# Patient Record
Sex: FEMALE | Race: WHITE
[De-identification: names, ages, dates, MRNs, and addresses within clinical notes are randomized per-mention and may not be internally consistent; named-entity substitution may affect disease eponyms.]

---

## 2018-01-17 ENCOUNTER — HOSPITAL ENCOUNTER (OUTPATIENT)
Dept: HOSPITAL 100 - MTLAB | Age: 73
Discharge: HOME | End: 2018-01-17
Payer: MEDICARE

## 2018-01-17 DIAGNOSIS — Z95.2: Primary | ICD-10-CM

## 2018-01-17 LAB — PROTHROMBIN TIME (PROTIME)PT.: 28.5 SECONDS (ref 11.7–14.9)

## 2018-01-17 PROCEDURE — 85610 PROTHROMBIN TIME: CPT

## 2018-01-17 PROCEDURE — 36415 COLL VENOUS BLD VENIPUNCTURE: CPT

## 2018-02-02 ENCOUNTER — HOSPITAL ENCOUNTER (OUTPATIENT)
Age: 73
End: 2018-02-02
Payer: MEDICARE

## 2018-02-02 DIAGNOSIS — R53.83: Primary | ICD-10-CM

## 2018-02-02 LAB
DEPRECATED RDW RBC: 48.9 FL (ref 35.1–43.9)
DIFFERENTIAL INDICATED: (no result)
ERYTHROCYTE [DISTWIDTH] IN BLOOD: 13.4 % (ref 11.6–14.6)
HCT VFR BLD AUTO: 42.7 % (ref 37–47)
HEMOGLOBIN: 13.7 G/DL (ref 12–15)
HGB BLD-MCNC: 13.7 G/DL (ref 12–15)
IMMATURE GRANULOCYTES COUNT: 0.01 X10^3/UL (ref 0–0)
MCV RBC: 99.5 FL (ref 81–99)
MEAN CORP HGB CONC: 32.1 G/GL (ref 32–36)
MEAN PLATELET VOL.: 10.8 FL (ref 6.2–12)
PLATELET # BLD: 215 K/MM3 (ref 150–450)
PLATELET COUNT: 215 K/MM3 (ref 150–450)
POSITIVE COUNT: NO
POSITIVE DIFFERENTIAL: NO
POSITIVE MORPHOLOGY: NO
RBC # BLD AUTO: 4.29 M/MM3 (ref 4.2–5.4)
RBC DISTRIBUTION WIDTH CV: 13.4 % (ref 11.6–14.6)
RBC DISTRIBUTION WIDTH SD: 48.9 FL (ref 35.1–43.9)
WBC # BLD AUTO: 6.9 K/MM3 (ref 4.4–11)
WHITE BLOOD COUNT: 6.9 K/MM3 (ref 4.4–11)

## 2018-02-02 PROCEDURE — 86664 EPSTEIN-BARR NUCLEAR ANTIGEN: CPT

## 2018-02-02 PROCEDURE — 36415 COLL VENOUS BLD VENIPUNCTURE: CPT

## 2018-02-02 PROCEDURE — 86663 EPSTEIN-BARR ANTIBODY: CPT

## 2018-02-02 PROCEDURE — 85025 COMPLETE CBC W/AUTO DIFF WBC: CPT

## 2018-02-02 PROCEDURE — 86665 EPSTEIN-BARR CAPSID VCA: CPT

## 2018-02-06 LAB
EBV ACUTE VCA IGM: > 160 U/ML (ref 0–35.9)
EBV VCA IGM SER-ACNC: > 160 U/ML (ref 0–35.9)
EBV-VCA IGG: > 600 U/ML (ref 0–17.9)

## 2018-02-08 ENCOUNTER — HOSPITAL ENCOUNTER (OUTPATIENT)
Age: 73
End: 2018-02-08
Payer: MEDICARE

## 2018-02-08 DIAGNOSIS — E78.5: Primary | ICD-10-CM

## 2018-02-08 DIAGNOSIS — E55.9: ICD-10-CM

## 2018-02-08 DIAGNOSIS — R53.83: ICD-10-CM

## 2018-02-08 LAB
ALANINE AMINOTRANSFER ALT/SGPT: 26 U/L (ref 13–56)
ALBUMIN SERPL-MCNC: 3.7 G/DL (ref 3.2–5)
ALKALINE PHOSPHATASE: 84 U/L (ref 45–117)
ANION GAP: 9 (ref 5–15)
AST(SGOT): 16 U/L (ref 15–37)
BUN SERPL-MCNC: 9 MG/DL (ref 7–18)
BUN/CREAT RATIO: 12.7 RATIO (ref 10–20)
CALCIUM SERPL-MCNC: 8.8 MG/DL (ref 8.5–10.1)
CARBON DIOXIDE: 30 MMOL/L (ref 21–32)
CHLORIDE: 102 MMOL/L (ref 98–107)
CHOLEST SERPL-MCNC: 206 MG/DL
DEPRECATED RDW RBC: 46.2 FL (ref 35.1–43.9)
DIFFERENTIAL INDICATED: (no result)
ERYTHROCYTE [DISTWIDTH] IN BLOOD: 13.1 % (ref 11.6–14.6)
EST GLOM FILT RATE - AFR AMER: 105 ML/MIN (ref 60–?)
GLOBULIN: 3.1 G/DL (ref 2.2–4.2)
GLUCOSE: 74 MG/DL (ref 74–106)
HCT VFR BLD AUTO: 41.5 % (ref 37–47)
HEMOGLOBIN: 13.7 G/DL (ref 12–15)
HGB BLD-MCNC: 13.7 G/DL (ref 12–15)
IMMATURE GRANULOCYTES COUNT: 0.02 X10^3/UL (ref 0–0)
MCV RBC: 98.3 FL (ref 81–99)
MEAN CORP HGB CONC: 33 G/GL (ref 32–36)
MEAN PLATELET VOL.: 10.7 FL (ref 6.2–12)
PLATELET # BLD: 302 K/MM3 (ref 150–450)
PLATELET COUNT: 302 K/MM3 (ref 150–450)
POSITIVE COUNT: NO
POSITIVE DIFFERENTIAL: NO
POSITIVE MORPHOLOGY: NO
POTASSIUM: 3.4 MMOL/L (ref 3.5–5.1)
RBC # BLD AUTO: 4.22 M/MM3 (ref 4.2–5.4)
RBC DISTRIBUTION WIDTH CV: 13.1 % (ref 11.6–14.6)
RBC DISTRIBUTION WIDTH SD: 46.2 FL (ref 35.1–43.9)
TRIGLYCERIDES: 136 MG/DL
VLDLC SERPL-MCNC: 27 MG/DL (ref 5–40)
WBC # BLD AUTO: 7.3 K/MM3 (ref 4.4–11)
WHITE BLOOD COUNT: 7.3 K/MM3 (ref 4.4–11)

## 2018-02-08 PROCEDURE — 86664 EPSTEIN-BARR NUCLEAR ANTIGEN: CPT

## 2018-02-08 PROCEDURE — 80061 LIPID PANEL: CPT

## 2018-02-08 PROCEDURE — 80053 COMPREHEN METABOLIC PANEL: CPT

## 2018-02-08 PROCEDURE — 86665 EPSTEIN-BARR CAPSID VCA: CPT

## 2018-02-08 PROCEDURE — 86663 EPSTEIN-BARR ANTIBODY: CPT

## 2018-02-08 PROCEDURE — 82306 VITAMIN D 25 HYDROXY: CPT

## 2018-02-08 PROCEDURE — 36415 COLL VENOUS BLD VENIPUNCTURE: CPT

## 2018-02-08 PROCEDURE — 85025 COMPLETE CBC W/AUTO DIFF WBC: CPT

## 2018-02-09 LAB — VITAMIN D,25 HYDROXY: 49.7 NG/ML (ref 19.95–100.01)

## 2018-02-13 ENCOUNTER — HOSPITAL ENCOUNTER (OUTPATIENT)
Dept: HOSPITAL 100 - MTLAB | Age: 73
Discharge: HOME | End: 2018-02-13
Payer: MEDICARE

## 2018-02-13 DIAGNOSIS — Z95.2: Primary | ICD-10-CM

## 2018-02-13 LAB — PROTHROMBIN TIME (PROTIME)PT.: 25.1 SECONDS (ref 11.7–14.9)

## 2018-02-13 PROCEDURE — 85610 PROTHROMBIN TIME: CPT

## 2018-02-13 PROCEDURE — 36415 COLL VENOUS BLD VENIPUNCTURE: CPT

## 2018-02-20 ENCOUNTER — HOSPITAL ENCOUNTER (OUTPATIENT)
Age: 73
End: 2018-02-20
Payer: MEDICARE

## 2018-02-20 DIAGNOSIS — Z95.2: ICD-10-CM

## 2018-02-20 DIAGNOSIS — E87.6: Primary | ICD-10-CM

## 2018-02-20 LAB
POTASSIUM: 3.8 MMOL/L (ref 3.5–5.1)
PROTHROMBIN TIME (PROTIME)PT.: 33.7 SECONDS (ref 11.7–14.9)

## 2018-02-20 PROCEDURE — 84132 ASSAY OF SERUM POTASSIUM: CPT

## 2018-02-20 PROCEDURE — 36415 COLL VENOUS BLD VENIPUNCTURE: CPT

## 2018-02-20 PROCEDURE — 85610 PROTHROMBIN TIME: CPT

## 2018-03-15 ENCOUNTER — HOSPITAL ENCOUNTER (OUTPATIENT)
Age: 73
End: 2018-03-15
Payer: MEDICARE

## 2018-03-15 DIAGNOSIS — Z95.2: Primary | ICD-10-CM

## 2018-03-15 LAB — PROTHROMBIN TIME (PROTIME)PT.: 23.9 SECONDS (ref 11.7–14.9)

## 2018-03-15 PROCEDURE — 85610 PROTHROMBIN TIME: CPT

## 2018-03-22 ENCOUNTER — HOSPITAL ENCOUNTER (OUTPATIENT)
Dept: HOSPITAL 100 - MTLAB | Age: 73
Discharge: HOME | End: 2018-03-22
Payer: MEDICARE

## 2018-03-22 DIAGNOSIS — Z95.2: Primary | ICD-10-CM

## 2018-03-22 LAB — PROTHROMBIN TIME (PROTIME)PT.: 25.9 SECONDS (ref 11.7–14.9)

## 2018-03-22 PROCEDURE — 36415 COLL VENOUS BLD VENIPUNCTURE: CPT

## 2018-03-22 PROCEDURE — 85610 PROTHROMBIN TIME: CPT

## 2018-04-05 ENCOUNTER — HOSPITAL ENCOUNTER (OUTPATIENT)
Dept: HOSPITAL 100 - MTLAB | Age: 73
Discharge: HOME | End: 2018-04-05
Payer: MEDICARE

## 2018-04-05 DIAGNOSIS — Z95.2: Primary | ICD-10-CM

## 2018-04-05 LAB — PROTHROMBIN TIME (PROTIME)PT.: 28.3 SECONDS (ref 11.7–14.9)

## 2018-04-05 PROCEDURE — 36415 COLL VENOUS BLD VENIPUNCTURE: CPT

## 2018-04-05 PROCEDURE — 85610 PROTHROMBIN TIME: CPT

## 2018-05-03 ENCOUNTER — HOSPITAL ENCOUNTER (OUTPATIENT)
Dept: HOSPITAL 100 - PT | Age: 73
Discharge: HOME | End: 2018-05-03
Payer: MEDICARE

## 2018-05-03 DIAGNOSIS — M70.61: Primary | ICD-10-CM

## 2018-05-03 PROCEDURE — 97035 APP MDLTY 1+ULTRASOUND EA 15: CPT

## 2018-05-03 PROCEDURE — 97163 PT EVAL HIGH COMPLEX 45 MIN: CPT

## 2018-05-03 PROCEDURE — 97110 THERAPEUTIC EXERCISES: CPT

## 2018-05-07 ENCOUNTER — HOSPITAL ENCOUNTER (OUTPATIENT)
Dept: HOSPITAL 100 - MTLAB | Age: 73
Discharge: HOME | End: 2018-05-07
Payer: MEDICARE

## 2018-05-07 DIAGNOSIS — Z95.2: Primary | ICD-10-CM

## 2018-05-07 LAB — PROTHROMBIN TIME (PROTIME)PT.: 32.8 SECONDS (ref 11.7–14.9)

## 2018-05-07 PROCEDURE — 85610 PROTHROMBIN TIME: CPT

## 2018-05-07 PROCEDURE — 36415 COLL VENOUS BLD VENIPUNCTURE: CPT

## 2018-06-08 LAB — PROTHROMBIN TIME (PROTIME)PT.: 37.1 SECONDS (ref 11.7–14.9)

## 2018-06-18 ENCOUNTER — HOSPITAL ENCOUNTER (OUTPATIENT)
Dept: HOSPITAL 100 - MTLAB | Age: 73
Discharge: HOME | End: 2018-06-18
Payer: MEDICARE

## 2018-06-18 DIAGNOSIS — Z95.2: Primary | ICD-10-CM

## 2018-06-18 LAB — PROTHROMBIN TIME (PROTIME)PT.: 31.1 SECONDS (ref 11.7–14.9)

## 2018-06-18 PROCEDURE — 85610 PROTHROMBIN TIME: CPT

## 2018-06-18 PROCEDURE — 36415 COLL VENOUS BLD VENIPUNCTURE: CPT

## 2018-07-03 ENCOUNTER — HOSPITAL ENCOUNTER (OUTPATIENT)
Age: 73
End: 2018-07-03
Payer: MEDICARE

## 2018-07-03 DIAGNOSIS — M70.71: ICD-10-CM

## 2018-07-03 DIAGNOSIS — M25.551: Primary | ICD-10-CM

## 2018-07-03 PROCEDURE — 97035 APP MDLTY 1+ULTRASOUND EA 15: CPT

## 2018-07-03 PROCEDURE — 97140 MANUAL THERAPY 1/> REGIONS: CPT

## 2018-07-03 PROCEDURE — 73502 X-RAY EXAM HIP UNI 2-3 VIEWS: CPT

## 2018-07-05 ENCOUNTER — HOSPITAL ENCOUNTER (OUTPATIENT)
Dept: HOSPITAL 100 - PT | Age: 73
Discharge: HOME | End: 2018-07-05
Payer: MEDICARE

## 2018-07-05 DIAGNOSIS — M70.71: Primary | ICD-10-CM

## 2018-07-05 PROCEDURE — 97140 MANUAL THERAPY 1/> REGIONS: CPT

## 2018-07-05 PROCEDURE — 97162 PT EVAL MOD COMPLEX 30 MIN: CPT

## 2018-07-05 PROCEDURE — 97035 APP MDLTY 1+ULTRASOUND EA 15: CPT

## 2018-07-10 LAB — PROTHROMBIN TIME (PROTIME)PT.: 35.5 SECONDS (ref 11.7–14.9)

## 2018-07-19 ENCOUNTER — HOSPITAL ENCOUNTER (OUTPATIENT)
Dept: HOSPITAL 100 - PT | Age: 73
Discharge: HOME | End: 2018-07-19
Payer: MEDICARE

## 2018-07-19 DIAGNOSIS — M25.551: Primary | ICD-10-CM

## 2018-07-19 PROCEDURE — 97161 PT EVAL LOW COMPLEX 20 MIN: CPT

## 2018-07-25 ENCOUNTER — HOSPITAL ENCOUNTER (OUTPATIENT)
Age: 73
End: 2018-07-25
Payer: MEDICARE

## 2018-07-25 ENCOUNTER — HOSPITAL ENCOUNTER (OUTPATIENT)
Dept: HOSPITAL 100 - LAB | Age: 73
Discharge: HOME | End: 2018-07-25
Payer: MEDICARE

## 2018-07-25 DIAGNOSIS — Z95.2: Primary | ICD-10-CM

## 2018-07-25 DIAGNOSIS — I70.0: Primary | ICD-10-CM

## 2018-07-25 DIAGNOSIS — I10: ICD-10-CM

## 2018-07-25 DIAGNOSIS — I35.0: ICD-10-CM

## 2018-07-25 DIAGNOSIS — I70.0: ICD-10-CM

## 2018-07-25 LAB — PROTHROMBIN TIME (PROTIME)PT.: 35.9 SECONDS (ref 11.7–14.9)

## 2018-07-25 PROCEDURE — 93978 VASCULAR STUDY: CPT

## 2018-07-25 PROCEDURE — 36415 COLL VENOUS BLD VENIPUNCTURE: CPT

## 2018-07-25 PROCEDURE — 85610 PROTHROMBIN TIME: CPT

## 2018-07-31 ENCOUNTER — HOSPITAL ENCOUNTER (OUTPATIENT)
Age: 73
End: 2018-07-31
Payer: MEDICARE

## 2018-07-31 DIAGNOSIS — Z78.0: ICD-10-CM

## 2018-07-31 DIAGNOSIS — Z12.31: Primary | ICD-10-CM

## 2018-07-31 PROCEDURE — 77067 SCR MAMMO BI INCL CAD: CPT

## 2018-07-31 PROCEDURE — 77063 BREAST TOMOSYNTHESIS BI: CPT

## 2018-07-31 PROCEDURE — 77080 DXA BONE DENSITY AXIAL: CPT

## 2018-08-16 LAB — PROTHROMBIN TIME (PROTIME)PT.: 30.9 SECONDS (ref 11.7–14.9)

## 2018-08-28 ENCOUNTER — HOSPITAL ENCOUNTER (OUTPATIENT)
Age: 73
End: 2018-08-28
Payer: MEDICARE

## 2018-08-28 DIAGNOSIS — I71.9: Primary | ICD-10-CM

## 2018-08-28 LAB — CREATININE FINGERSTICK: 0.7 MG/DL (ref 0.55–1.02)

## 2018-08-28 PROCEDURE — 71260 CT THORAX DX C+: CPT

## 2018-08-31 ENCOUNTER — HOSPITAL ENCOUNTER (OUTPATIENT)
Dept: HOSPITAL 100 - MTLAB | Age: 73
Discharge: HOME | End: 2018-08-31
Payer: MEDICARE

## 2018-08-31 DIAGNOSIS — E78.5: Primary | ICD-10-CM

## 2018-08-31 DIAGNOSIS — Z95.2: ICD-10-CM

## 2018-08-31 DIAGNOSIS — Z79.01: ICD-10-CM

## 2018-08-31 LAB
CHOLEST SERPL-MCNC: 194 MG/DL
PROTHROMBIN TIME (PROTIME)PT.: 27 SECONDS (ref 11.7–14.9)
TRIGLYCERIDES: 130 MG/DL
VLDLC SERPL-MCNC: 26 MG/DL (ref 5–40)

## 2018-08-31 PROCEDURE — 80061 LIPID PANEL: CPT

## 2018-08-31 PROCEDURE — 85610 PROTHROMBIN TIME: CPT

## 2018-08-31 PROCEDURE — 36415 COLL VENOUS BLD VENIPUNCTURE: CPT

## 2018-09-28 ENCOUNTER — HOSPITAL ENCOUNTER (OUTPATIENT)
Age: 73
End: 2018-09-28
Payer: MEDICARE

## 2018-09-28 DIAGNOSIS — D72.819: ICD-10-CM

## 2018-09-28 DIAGNOSIS — E53.8: Primary | ICD-10-CM

## 2018-09-28 DIAGNOSIS — E78.5: ICD-10-CM

## 2018-09-28 DIAGNOSIS — I11.9: ICD-10-CM

## 2018-09-28 LAB
ALANINE AMINOTRANSFER ALT/SGPT: 26 U/L (ref 13–56)
ALBUMIN SERPL-MCNC: 3.6 G/DL (ref 3.2–5)
ALKALINE PHOSPHATASE: 74 U/L (ref 45–117)
ANION GAP: 10 (ref 5–15)
AST(SGOT): 23 U/L (ref 15–37)
BUN SERPL-MCNC: 14 MG/DL (ref 7–18)
BUN/CREAT RATIO: 19.5 RATIO (ref 10–20)
CALCIUM SERPL-MCNC: 8.7 MG/DL (ref 8.5–10.1)
CARBON DIOXIDE: 25 MMOL/L (ref 21–32)
CHLORIDE: 106 MMOL/L (ref 98–107)
CHOLEST SERPL-MCNC: 213 MG/DL
CREAT UR-MCNC: 100 MG/DL
DEPRECATED RDW RBC: 46.8 FL (ref 35.1–43.9)
DIFFERENTIAL INDICATED: (no result)
ERYTHROCYTE [DISTWIDTH] IN BLOOD: 13.1 % (ref 11.6–14.6)
EST GLOM FILT RATE - AFR AMER: 103 ML/MIN (ref 60–?)
GLOBULIN: 3.1 G/DL (ref 2.2–4.2)
GLUCOSE: 86 MG/DL (ref 74–106)
HCT VFR BLD AUTO: 43.8 % (ref 37–47)
HEMOGLOBIN: 14.4 G/DL (ref 12–15)
HGB BLD-MCNC: 14.4 G/DL (ref 12–15)
IMMATURE GRANULOCYTES COUNT: 0 X10^3/UL (ref 0–0)
MCV RBC: 98.2 FL (ref 81–99)
MEAN CORP HGB CONC: 32.9 G/GL (ref 32–36)
MEAN PLATELET VOL.: 10.7 FL (ref 6.2–12)
MICROALBUMIN UR-MCNC: 6.4 MG/L
PLATELET # BLD: 206 K/MM3 (ref 150–450)
PLATELET COUNT: 206 K/MM3 (ref 150–450)
POSITIVE COUNT: NO
POSITIVE DIFFERENTIAL: NO
POSITIVE MORPHOLOGY: NO
POTASSIUM: 3.8 MMOL/L (ref 3.5–5.1)
RBC # BLD AUTO: 4.46 M/MM3 (ref 4.2–5.4)
RBC DISTRIBUTION WIDTH CV: 13.1 % (ref 11.6–14.6)
RBC DISTRIBUTION WIDTH SD: 46.8 FL (ref 35.1–43.9)
TRIGLYCERIDES: 137 MG/DL
VITAMIN B12: 381 PG/ML (ref 211–911)
VLDLC SERPL-MCNC: 27 MG/DL (ref 5–40)
WBC # BLD AUTO: 3.9 K/MM3 (ref 4.4–11)
WHITE BLOOD COUNT: 3.9 K/MM3 (ref 4.4–11)

## 2018-09-28 PROCEDURE — 85025 COMPLETE CBC W/AUTO DIFF WBC: CPT

## 2018-09-28 PROCEDURE — 36415 COLL VENOUS BLD VENIPUNCTURE: CPT

## 2018-09-28 PROCEDURE — 82043 UR ALBUMIN QUANTITATIVE: CPT

## 2018-09-28 PROCEDURE — 82570 ASSAY OF URINE CREATININE: CPT

## 2018-09-28 PROCEDURE — 82607 VITAMIN B-12: CPT

## 2018-09-28 PROCEDURE — 80053 COMPREHEN METABOLIC PANEL: CPT

## 2018-09-28 PROCEDURE — 80061 LIPID PANEL: CPT

## 2018-10-01 LAB — PROTHROMBIN TIME (PROTIME)PT.: 37.1 SECONDS (ref 11.7–14.9)

## 2018-10-05 LAB — PROTHROMBIN TIME (PROTIME)PT.: 31.8 SECONDS (ref 11.7–14.9)

## 2018-10-26 ENCOUNTER — HOSPITAL ENCOUNTER (OUTPATIENT)
Dept: HOSPITAL 100 - MTLAB | Age: 73
Discharge: HOME | End: 2018-10-26
Payer: MEDICARE

## 2018-10-26 DIAGNOSIS — Z79.01: Primary | ICD-10-CM

## 2018-10-26 DIAGNOSIS — Z95.2: ICD-10-CM

## 2018-10-26 LAB — PROTHROMBIN TIME (PROTIME)PT.: 32.2 SECONDS (ref 11.7–14.9)

## 2018-10-26 PROCEDURE — 36415 COLL VENOUS BLD VENIPUNCTURE: CPT

## 2018-10-26 PROCEDURE — 85610 PROTHROMBIN TIME: CPT

## 2018-11-23 ENCOUNTER — HOSPITAL ENCOUNTER (OUTPATIENT)
Dept: HOSPITAL 100 - MTLAB | Age: 73
Discharge: HOME | End: 2018-11-23
Payer: MEDICARE

## 2018-11-23 DIAGNOSIS — Z95.2: ICD-10-CM

## 2018-11-23 DIAGNOSIS — Z79.01: Primary | ICD-10-CM

## 2018-11-23 LAB — PROTHROMBIN TIME (PROTIME)PT.: 31.7 SECONDS (ref 11.7–14.9)

## 2018-11-23 PROCEDURE — 85610 PROTHROMBIN TIME: CPT

## 2018-11-23 PROCEDURE — 36415 COLL VENOUS BLD VENIPUNCTURE: CPT

## 2018-12-07 ENCOUNTER — HOSPITAL ENCOUNTER (OUTPATIENT)
Age: 73
End: 2018-12-07
Payer: MEDICARE

## 2018-12-07 DIAGNOSIS — E78.5: ICD-10-CM

## 2018-12-07 DIAGNOSIS — D75.89: ICD-10-CM

## 2018-12-07 DIAGNOSIS — I11.9: Primary | ICD-10-CM

## 2018-12-07 LAB
ALANINE AMINOTRANSFER ALT/SGPT: 26 U/L (ref 13–56)
ALBUMIN SERPL-MCNC: 3.6 G/DL (ref 3.2–5)
ALKALINE PHOSPHATASE: 70 U/L (ref 45–117)
ANION GAP: 7 (ref 5–15)
AST(SGOT): 27 U/L (ref 15–37)
BUN SERPL-MCNC: 12 MG/DL (ref 7–18)
BUN/CREAT RATIO: 14.8 RATIO (ref 10–20)
CALCIUM SERPL-MCNC: 8.7 MG/DL (ref 8.5–10.1)
CARBON DIOXIDE: 29 MMOL/L (ref 21–32)
CHLORIDE: 106 MMOL/L (ref 98–107)
CHOLEST SERPL-MCNC: 187 MG/DL
DEPRECATED RDW RBC: 47.7 FL (ref 35.1–43.9)
DIFFERENTIAL INDICATED: (no result)
ERYTHROCYTE [DISTWIDTH] IN BLOOD: 13.1 % (ref 11.6–14.6)
EST GLOM FILT RATE - AFR AMER: 89 ML/MIN (ref 60–?)
GLOBULIN: 3 G/DL (ref 2.2–4.2)
GLUCOSE: 86 MG/DL (ref 74–106)
HCT VFR BLD AUTO: 42.2 % (ref 37–47)
HEMOGLOBIN: 13.6 G/DL (ref 12–15)
HGB BLD-MCNC: 13.6 G/DL (ref 12–15)
IMMATURE GRANULOCYTES COUNT: 0 X10^3/UL (ref 0–0)
MCV RBC: 98.8 FL (ref 81–99)
MEAN CORP HGB CONC: 32.2 G/GL (ref 32–36)
MEAN PLATELET VOL.: 11 FL (ref 6.2–12)
MUCOUS THREADS URNS QL MICRO: (no result) /HPF
PLATELET # BLD: 219 K/MM3 (ref 150–450)
PLATELET COUNT: 219 K/MM3 (ref 150–450)
POSITIVE COUNT: NO
POSITIVE DIFFERENTIAL: NO
POSITIVE MORPHOLOGY: NO
POTASSIUM: 3.7 MMOL/L (ref 3.5–5.1)
RBC # BLD AUTO: 4.27 M/MM3 (ref 4.2–5.4)
RBC DISTRIBUTION WIDTH CV: 13.1 % (ref 11.6–14.6)
RBC DISTRIBUTION WIDTH SD: 47.7 FL (ref 35.1–43.9)
RBC UR QL: (no result) /HPF (ref 0–5)
SP GR UR: 1.01 (ref 1–1.03)
SQUAMOUS URNS QL MICRO: (no result) /HPF (ref 5–10)
TRIGLYCERIDES: 123 MG/DL
URINE PRESERVATIVE: (no result)
VLDLC SERPL-MCNC: 25 MG/DL (ref 5–40)
WBC # BLD AUTO: 3.9 K/MM3 (ref 4.4–11)
WHITE BLOOD COUNT: 3.9 K/MM3 (ref 4.4–11)

## 2018-12-07 PROCEDURE — 36415 COLL VENOUS BLD VENIPUNCTURE: CPT

## 2018-12-07 PROCEDURE — 80061 LIPID PANEL: CPT

## 2018-12-07 PROCEDURE — 81001 URINALYSIS AUTO W/SCOPE: CPT

## 2018-12-07 PROCEDURE — 80053 COMPREHEN METABOLIC PANEL: CPT

## 2018-12-07 PROCEDURE — 85025 COMPLETE CBC W/AUTO DIFF WBC: CPT

## 2018-12-14 ENCOUNTER — HOSPITAL ENCOUNTER (OUTPATIENT)
Age: 73
End: 2018-12-14
Payer: MEDICARE

## 2018-12-14 DIAGNOSIS — E04.1: Primary | ICD-10-CM

## 2018-12-14 PROCEDURE — 76536 US EXAM OF HEAD AND NECK: CPT

## 2018-12-26 ENCOUNTER — HOSPITAL ENCOUNTER (OUTPATIENT)
Dept: HOSPITAL 100 - MTLAB | Age: 73
Discharge: HOME | End: 2018-12-26
Payer: MEDICARE

## 2018-12-26 VITALS — BODY MASS INDEX: 25.4 KG/M2

## 2018-12-26 DIAGNOSIS — Z95.2: ICD-10-CM

## 2018-12-26 DIAGNOSIS — Z79.01: Primary | ICD-10-CM

## 2018-12-26 LAB — PROTHROMBIN TIME (PROTIME)PT.: 30.9 SECONDS (ref 11.7–14.9)

## 2018-12-26 PROCEDURE — 85610 PROTHROMBIN TIME: CPT

## 2018-12-26 PROCEDURE — 36415 COLL VENOUS BLD VENIPUNCTURE: CPT

## 2019-01-23 ENCOUNTER — HOSPITAL ENCOUNTER (OUTPATIENT)
Dept: HOSPITAL 100 - MTLAB | Age: 74
Discharge: HOME | End: 2019-01-23
Payer: MEDICARE

## 2019-01-23 VITALS — BODY MASS INDEX: 25.4 KG/M2

## 2019-01-23 DIAGNOSIS — Z95.2: ICD-10-CM

## 2019-01-23 DIAGNOSIS — Z79.01: Primary | ICD-10-CM

## 2019-01-23 LAB — PROTHROMBIN TIME (PROTIME)PT.: 30.4 SECONDS (ref 11.7–14.9)

## 2019-01-23 PROCEDURE — 36415 COLL VENOUS BLD VENIPUNCTURE: CPT

## 2019-01-23 PROCEDURE — 85610 PROTHROMBIN TIME: CPT

## 2019-02-22 ENCOUNTER — HOSPITAL ENCOUNTER (OUTPATIENT)
Dept: HOSPITAL 100 - MTLAB | Age: 74
LOS: 6 days | Discharge: HOME | End: 2019-02-28
Payer: MEDICARE

## 2019-02-22 VITALS — BODY MASS INDEX: 25.4 KG/M2

## 2019-02-22 DIAGNOSIS — Z95.2: ICD-10-CM

## 2019-02-22 DIAGNOSIS — Z79.01: Primary | ICD-10-CM

## 2019-02-22 LAB — PROTHROMBIN TIME (PROTIME)PT.: 34.2 SECONDS (ref 11.7–14.9)

## 2019-02-22 PROCEDURE — 36415 COLL VENOUS BLD VENIPUNCTURE: CPT

## 2019-02-22 PROCEDURE — 85610 PROTHROMBIN TIME: CPT

## 2019-03-08 LAB — PROTHROMBIN TIME (PROTIME)PT.: 26.4 SECONDS (ref 11.7–14.9)

## 2019-03-09 ENCOUNTER — HOSPITAL ENCOUNTER (OUTPATIENT)
Age: 74
End: 2019-03-09
Payer: MEDICARE

## 2019-03-09 VITALS — BODY MASS INDEX: 25.4 KG/M2

## 2019-03-09 DIAGNOSIS — E04.1: ICD-10-CM

## 2019-03-09 DIAGNOSIS — I11.9: Primary | ICD-10-CM

## 2019-03-09 DIAGNOSIS — E78.5: ICD-10-CM

## 2019-03-09 DIAGNOSIS — E53.8: ICD-10-CM

## 2019-03-09 LAB
ALANINE AMINOTRANSFER ALT/SGPT: 21 U/L (ref 13–56)
ALBUMIN SERPL-MCNC: 3.6 G/DL (ref 3.2–5)
ALKALINE PHOSPHATASE: 71 U/L (ref 45–117)
ANION GAP: 7 (ref 5–15)
AST(SGOT): 20 U/L (ref 15–37)
BUN SERPL-MCNC: 10 MG/DL (ref 7–18)
BUN/CREAT RATIO: 14.6 RATIO (ref 10–20)
CALCIUM SERPL-MCNC: 8.3 MG/DL (ref 8.5–10.1)
CARBON DIOXIDE: 25 MMOL/L (ref 21–32)
CHLORIDE: 107 MMOL/L (ref 98–107)
CHOLEST SERPL-MCNC: 186 MG/DL
DEPRECATED RDW RBC: 47.6 FL (ref 35.1–43.9)
DIFFERENTIAL INDICATED: (no result)
ERYTHROCYTE [DISTWIDTH] IN BLOOD: 12.9 % (ref 11.6–14.6)
EST GLOM FILT RATE - AFR AMER: 109 ML/MIN (ref 60–?)
GLOBULIN: 2.9 G/DL (ref 2.2–4.2)
GLUCOSE: 85 MG/DL (ref 74–106)
HCT VFR BLD AUTO: 43.4 % (ref 37–47)
HEMOGLOBIN: 13.8 G/DL (ref 12–15)
HGB BLD-MCNC: 13.8 G/DL (ref 12–15)
IMMATURE GRANULOCYTES COUNT: 0 X10^3/UL (ref 0–0)
MCV RBC: 100.9 FL (ref 81–99)
MEAN CORP HGB CONC: 31.8 G/GL (ref 32–36)
MEAN PLATELET VOL.: 10.8 FL (ref 6.2–12)
PLATELET # BLD: 199 K/MM3 (ref 150–450)
PLATELET COUNT: 199 K/MM3 (ref 150–450)
POSITIVE COUNT: NO
POSITIVE DIFFERENTIAL: NO
POSITIVE MORPHOLOGY: NO
POTASSIUM: 3.9 MMOL/L (ref 3.5–5.1)
RBC # BLD AUTO: 4.3 M/MM3 (ref 4.2–5.4)
RBC DISTRIBUTION WIDTH CV: 12.9 % (ref 11.6–14.6)
RBC DISTRIBUTION WIDTH SD: 47.6 FL (ref 35.1–43.9)
TRIGLYCERIDES: 116 MG/DL
VLDLC SERPL-MCNC: 23 MG/DL (ref 5–40)
WBC # BLD AUTO: 3.7 K/MM3 (ref 4.4–11)
WHITE BLOOD COUNT: 3.7 K/MM3 (ref 4.4–11)

## 2019-03-09 PROCEDURE — 84443 ASSAY THYROID STIM HORMONE: CPT

## 2019-03-09 PROCEDURE — 36415 COLL VENOUS BLD VENIPUNCTURE: CPT

## 2019-03-09 PROCEDURE — 80061 LIPID PANEL: CPT

## 2019-03-09 PROCEDURE — 82607 VITAMIN B-12: CPT

## 2019-03-09 PROCEDURE — 85025 COMPLETE CBC W/AUTO DIFF WBC: CPT

## 2019-03-09 PROCEDURE — 80053 COMPREHEN METABOLIC PANEL: CPT

## 2019-03-11 LAB
DILUTE RUSSELL VIPER VENOM: 59.7 SEC (ref 0–47)
PTT-LA: 51.9 SEC (ref 0–51.9)
SCREEN DRVVT: 38 SEC (ref 0–47)
SCREEN DRVVT: 59.7 SEC (ref 0–47)
VITAMIN B12: 690 PG/ML (ref 211–911)

## 2019-03-12 LAB
B2 GLYCOPROT1 IGA SER QL: <9 (ref 0–25)
B2 GLYCOPROT1 IGG SER QL: <9 (ref 0–20)
B2 GLYCOPROT1 IGM SER QL: <9 (ref 0–32)

## 2019-03-13 LAB
ANTI-DSDNA  AB: 9
DSDNA AB SER-ACNC: 9 [IU]/ML

## 2019-03-22 ENCOUNTER — HOSPITAL ENCOUNTER (OUTPATIENT)
Dept: HOSPITAL 100 - MTLAB | Age: 74
Discharge: HOME | End: 2019-03-22
Payer: MEDICARE

## 2019-03-22 VITALS — BODY MASS INDEX: 25.4 KG/M2

## 2019-03-22 DIAGNOSIS — Z79.01: Primary | ICD-10-CM

## 2019-03-22 DIAGNOSIS — Z95.2: ICD-10-CM

## 2019-03-22 LAB — PROTHROMBIN TIME (PROTIME)PT.: 26.8 SECONDS (ref 11.7–14.9)

## 2019-03-22 PROCEDURE — 86225 DNA ANTIBODY NATIVE: CPT

## 2019-03-22 PROCEDURE — 86147 CARDIOLIPIN ANTIBODY EA IG: CPT

## 2019-03-22 PROCEDURE — 86146 BETA-2 GLYCOPROTEIN ANTIBODY: CPT

## 2019-03-22 PROCEDURE — 85610 PROTHROMBIN TIME: CPT

## 2019-03-22 PROCEDURE — 86226 DNA ANTIBODY SINGLE STRAND: CPT

## 2019-03-22 PROCEDURE — 86038 ANTINUCLEAR ANTIBODIES: CPT

## 2019-03-22 PROCEDURE — 86235 NUCLEAR ANTIGEN ANTIBODY: CPT

## 2019-03-22 PROCEDURE — 36415 COLL VENOUS BLD VENIPUNCTURE: CPT

## 2019-03-28 ENCOUNTER — HOSPITAL ENCOUNTER (OUTPATIENT)
Age: 74
End: 2019-03-28
Payer: MEDICARE

## 2019-03-28 VITALS — BODY MASS INDEX: 25.4 KG/M2

## 2019-03-28 DIAGNOSIS — Z79.899: Primary | ICD-10-CM

## 2019-03-28 LAB
ALANINE AMINOTRANSFER ALT/SGPT: 26 U/L (ref 13–56)
ALBUMIN SERPL-MCNC: 3.7 G/DL (ref 3.2–5)
ALKALINE PHOSPHATASE: 74 U/L (ref 45–117)
AST(SGOT): 25 U/L (ref 15–37)
BILIRUB DIRECT SERPL-MCNC: 0.13 MG/DL (ref 0–0.3)
CRP SERPL-MCNC: < 2.9 MG/L (ref 0–3)
DEPRECATED RDW RBC: 48.3 FL (ref 35.1–43.9)
DIFFERENTIAL INDICATED: (no result)
ERYTHROCYTE [DISTWIDTH] IN BLOOD: 13.2 % (ref 11.6–14.6)
EST GLOM FILT RATE - AFR AMER: 96 ML/MIN (ref 60–?)
GLOBULIN: 2.9 G/DL (ref 2.2–4.2)
HCT VFR BLD AUTO: 42.1 % (ref 37–47)
HEMOGLOBIN: 13.6 G/DL (ref 12–15)
HGB BLD-MCNC: 13.6 G/DL (ref 12–15)
IMMATURE GRANULOCYTES COUNT: 0.01 X10^3/UL (ref 0–0)
MCV RBC: 99.8 FL (ref 81–99)
MEAN CORP HGB CONC: 32.3 G/GL (ref 32–36)
MEAN PLATELET VOL.: 11.2 FL (ref 6.2–12)
PLATELET # BLD: 220 K/MM3 (ref 150–450)
PLATELET COUNT: 220 K/MM3 (ref 150–450)
POSITIVE COUNT: NO
POSITIVE DIFFERENTIAL: NO
POSITIVE MORPHOLOGY: NO
RBC # BLD AUTO: 4.22 M/MM3 (ref 4.2–5.4)
RBC DISTRIBUTION WIDTH CV: 13.2 % (ref 11.6–14.6)
RBC DISTRIBUTION WIDTH SD: 48.3 FL (ref 35.1–43.9)
WBC # BLD AUTO: 3.4 K/MM3 (ref 4.4–11)
WHITE BLOOD COUNT: 3.4 K/MM3 (ref 4.4–11)

## 2019-03-28 PROCEDURE — 86235 NUCLEAR ANTIGEN ANTIBODY: CPT

## 2019-03-28 PROCEDURE — 85652 RBC SED RATE AUTOMATED: CPT

## 2019-03-28 PROCEDURE — 80076 HEPATIC FUNCTION PANEL: CPT

## 2019-03-28 PROCEDURE — 85025 COMPLETE CBC W/AUTO DIFF WBC: CPT

## 2019-03-28 PROCEDURE — 86140 C-REACTIVE PROTEIN: CPT

## 2019-03-28 PROCEDURE — 86147 CARDIOLIPIN ANTIBODY EA IG: CPT

## 2019-03-28 PROCEDURE — 82784 ASSAY IGA/IGD/IGG/IGM EACH: CPT

## 2019-03-28 PROCEDURE — 36415 COLL VENOUS BLD VENIPUNCTURE: CPT

## 2019-03-28 PROCEDURE — 86038 ANTINUCLEAR ANTIBODIES: CPT

## 2019-03-28 PROCEDURE — 86160 COMPLEMENT ANTIGEN: CPT

## 2019-03-28 PROCEDURE — 82565 ASSAY OF CREATININE: CPT

## 2019-03-29 LAB
IGA SERPL-MCNC: 174 MG/DL (ref 64–422)
IGM SERPL-MCNC: 82 MG/DL (ref 26–217)
IMMUNOGLOBULIN A: 174 MG/DL (ref 64–422)
IMMUNOGLOBULIN M: 82 MG/DL (ref 26–217)

## 2019-04-23 ENCOUNTER — HOSPITAL ENCOUNTER (OUTPATIENT)
Dept: HOSPITAL 100 - MTLAB | Age: 74
LOS: 7 days | Discharge: HOME | End: 2019-04-30
Payer: MEDICARE

## 2019-04-23 VITALS — BODY MASS INDEX: 25.4 KG/M2

## 2019-04-23 DIAGNOSIS — Z95.2: ICD-10-CM

## 2019-04-23 DIAGNOSIS — Z79.01: Primary | ICD-10-CM

## 2019-04-23 LAB — PROTHROMBIN TIME (PROTIME)PT.: 31.3 SECONDS (ref 11.7–14.9)

## 2019-04-23 PROCEDURE — 36415 COLL VENOUS BLD VENIPUNCTURE: CPT

## 2019-04-23 PROCEDURE — 85610 PROTHROMBIN TIME: CPT

## 2019-05-20 ENCOUNTER — HOSPITAL ENCOUNTER (OUTPATIENT)
Dept: HOSPITAL 100 - HPRAD | Age: 74
Discharge: HOME | End: 2019-05-20
Payer: MEDICARE

## 2019-05-20 VITALS — BODY MASS INDEX: 25.4 KG/M2

## 2019-05-20 DIAGNOSIS — R05: Primary | ICD-10-CM

## 2019-05-20 PROCEDURE — 71046 X-RAY EXAM CHEST 2 VIEWS: CPT

## 2019-06-05 LAB — PROTHROMBIN TIME (PROTIME)PT.: 24 SECONDS (ref 11.7–14.9)

## 2019-06-10 LAB — PROTHROMBIN TIME (PROTIME)PT.: 21.2 SECONDS (ref 11.7–14.9)

## 2019-06-17 LAB — PROTHROMBIN TIME (PROTIME)PT.: 24.5 SECONDS (ref 11.7–14.9)

## 2019-06-27 ENCOUNTER — HOSPITAL ENCOUNTER (OUTPATIENT)
Dept: HOSPITAL 100 - MTLAB | Age: 74
Discharge: HOME | End: 2019-06-27
Payer: MEDICARE

## 2019-06-27 VITALS — BODY MASS INDEX: 25.4 KG/M2

## 2019-06-27 DIAGNOSIS — Z79.01: Primary | ICD-10-CM

## 2019-06-27 DIAGNOSIS — Z95.2: ICD-10-CM

## 2019-06-27 LAB — PROTHROMBIN TIME (PROTIME)PT.: 30.4 SECONDS (ref 11.7–14.9)

## 2019-06-27 PROCEDURE — 85610 PROTHROMBIN TIME: CPT

## 2019-06-27 PROCEDURE — 36415 COLL VENOUS BLD VENIPUNCTURE: CPT

## 2019-07-11 ENCOUNTER — HOSPITAL ENCOUNTER (OUTPATIENT)
Dept: HOSPITAL 100 - MTLAB | Age: 74
LOS: 20 days | Discharge: HOME | End: 2019-07-31
Payer: MEDICARE

## 2019-07-11 VITALS — BODY MASS INDEX: 25.4 KG/M2

## 2019-07-11 DIAGNOSIS — Z95.2: ICD-10-CM

## 2019-07-11 DIAGNOSIS — Z79.01: Primary | ICD-10-CM

## 2019-07-11 LAB — PROTHROMBIN TIME (PROTIME)PT.: 30.1 SECONDS (ref 11.7–14.9)

## 2019-07-11 PROCEDURE — 36415 COLL VENOUS BLD VENIPUNCTURE: CPT

## 2019-07-11 PROCEDURE — 85610 PROTHROMBIN TIME: CPT

## 2019-07-12 ENCOUNTER — HOSPITAL ENCOUNTER (OUTPATIENT)
Dept: HOSPITAL 100 - US | Age: 74
Discharge: HOME | End: 2019-07-12
Payer: MEDICARE

## 2019-07-12 VITALS — BODY MASS INDEX: 25.4 KG/M2

## 2019-07-12 DIAGNOSIS — I70.0: Primary | ICD-10-CM

## 2019-07-12 PROCEDURE — 76775 US EXAM ABDO BACK WALL LIM: CPT

## 2019-07-16 ENCOUNTER — HOSPITAL ENCOUNTER (OUTPATIENT)
Dept: HOSPITAL 100 - CT | Age: 74
Discharge: HOME | End: 2019-07-16
Payer: MEDICARE

## 2019-07-16 VITALS — BODY MASS INDEX: 25.4 KG/M2

## 2019-07-16 DIAGNOSIS — R05: Primary | ICD-10-CM

## 2019-07-16 PROCEDURE — 71250 CT THORAX DX C-: CPT

## 2019-08-01 ENCOUNTER — HOSPITAL ENCOUNTER (OUTPATIENT)
Dept: HOSPITAL 100 - OPBI | Age: 74
Discharge: HOME | End: 2019-08-01
Payer: MEDICARE

## 2019-08-01 VITALS — BODY MASS INDEX: 25.4 KG/M2

## 2019-08-01 DIAGNOSIS — Z12.31: Primary | ICD-10-CM

## 2019-08-01 PROCEDURE — 77067 SCR MAMMO BI INCL CAD: CPT

## 2019-08-01 PROCEDURE — 77063 BREAST TOMOSYNTHESIS BI: CPT

## 2019-08-02 LAB — PROTHROMBIN TIME (PROTIME)PT.: 45 SECONDS (ref 11.7–14.9)

## 2019-08-06 LAB — PROTHROMBIN TIME (PROTIME)PT.: 18.2 SECONDS (ref 11.7–14.9)

## 2019-08-08 LAB — PROTHROMBIN TIME (PROTIME)PT.: 28.1 SECONDS (ref 11.7–14.9)

## 2019-08-15 LAB — PROTHROMBIN TIME (PROTIME)PT.: 33.1 SECONDS (ref 11.7–14.9)

## 2019-08-29 ENCOUNTER — HOSPITAL ENCOUNTER (OUTPATIENT)
Dept: HOSPITAL 100 - MTLAB | Age: 74
Discharge: HOME | End: 2019-08-29
Payer: MEDICARE

## 2019-08-29 VITALS — BODY MASS INDEX: 25.4 KG/M2

## 2019-08-29 DIAGNOSIS — Z79.01: Primary | ICD-10-CM

## 2019-08-29 DIAGNOSIS — Z95.2: ICD-10-CM

## 2019-08-29 LAB — PROTHROMBIN TIME (PROTIME)PT.: 25.9 SECONDS (ref 11.7–14.9)

## 2019-08-29 PROCEDURE — 85610 PROTHROMBIN TIME: CPT

## 2019-08-29 PROCEDURE — 36415 COLL VENOUS BLD VENIPUNCTURE: CPT

## 2019-09-09 LAB — PROTHROMBIN TIME (PROTIME)PT.: 25.4 SECONDS (ref 11.7–14.9)

## 2019-09-16 ENCOUNTER — HOSPITAL ENCOUNTER (OUTPATIENT)
Dept: HOSPITAL 100 - MTLAB | Age: 74
Discharge: HOME | End: 2019-09-16
Payer: MEDICARE

## 2019-09-16 VITALS — BODY MASS INDEX: 23.6 KG/M2

## 2019-09-16 DIAGNOSIS — Z79.01: Primary | ICD-10-CM

## 2019-09-16 DIAGNOSIS — Z95.2: ICD-10-CM

## 2019-09-16 LAB — PROTHROMBIN TIME (PROTIME)PT.: 27 SECONDS (ref 11.7–14.9)

## 2019-09-16 PROCEDURE — 36415 COLL VENOUS BLD VENIPUNCTURE: CPT

## 2019-09-16 PROCEDURE — 85610 PROTHROMBIN TIME: CPT

## 2019-09-17 ENCOUNTER — HOSPITAL ENCOUNTER (OUTPATIENT)
Dept: HOSPITAL 100 - CVS | Age: 74
Discharge: HOME | End: 2019-09-17
Payer: MEDICARE

## 2019-09-17 VITALS — BODY MASS INDEX: 23.6 KG/M2

## 2019-09-17 DIAGNOSIS — Z01.810: Primary | ICD-10-CM

## 2019-09-17 DIAGNOSIS — Z95.2: ICD-10-CM

## 2019-09-17 DIAGNOSIS — I71.2: ICD-10-CM

## 2019-09-17 DIAGNOSIS — Z79.01: ICD-10-CM

## 2019-09-17 DIAGNOSIS — Z98.890: ICD-10-CM

## 2019-09-17 DIAGNOSIS — I10: ICD-10-CM

## 2019-09-17 PROCEDURE — A4216 STERILE WATER/SALINE, 10 ML: HCPCS

## 2019-09-17 PROCEDURE — 93306 TTE W/DOPPLER COMPLETE: CPT

## 2019-10-01 LAB — PROTHROMBIN TIME (PROTIME)PT.: 31.8 SECONDS (ref 11.7–14.9)

## 2019-10-03 ENCOUNTER — HOSPITAL ENCOUNTER (OUTPATIENT)
Dept: HOSPITAL 100 - MTLAB | Age: 74
Discharge: HOME | End: 2019-10-03
Payer: MEDICARE

## 2019-10-03 VITALS — BODY MASS INDEX: 23.6 KG/M2

## 2019-10-03 DIAGNOSIS — I11.9: Primary | ICD-10-CM

## 2019-10-03 DIAGNOSIS — E78.5: ICD-10-CM

## 2019-10-03 DIAGNOSIS — R73.09: ICD-10-CM

## 2019-10-03 DIAGNOSIS — D75.89: ICD-10-CM

## 2019-10-03 DIAGNOSIS — R79.82: ICD-10-CM

## 2019-10-03 LAB
ALANINE AMINOTRANSFER ALT/SGPT: 29 U/L (ref 13–56)
ALBUMIN SERPL-MCNC: 3.8 G/DL (ref 3.2–5)
ALKALINE PHOSPHATASE: 83 U/L (ref 45–117)
ANION GAP: 8 (ref 5–15)
AST(SGOT): 25 U/L (ref 15–37)
BUN SERPL-MCNC: 9 MG/DL (ref 7–18)
BUN/CREAT RATIO: 13.5 RATIO (ref 10–20)
CALCIUM SERPL-MCNC: 8.7 MG/DL (ref 8.5–10.1)
CARBON DIOXIDE: 28 MMOL/L (ref 21–32)
CHLORIDE: 107 MMOL/L (ref 98–107)
CHOLEST SERPL-MCNC: 206 MG/DL
CRP, HIGH SENSITIVITY CARDIAC: 1.93 MG/L
DEPRECATED RDW RBC: 48 FL (ref 35.1–43.9)
ERYTHROCYTE [DISTWIDTH] IN BLOOD: 12.6 % (ref 11.6–14.6)
EST GLOM FILT RATE - AFR AMER: 112 ML/MIN (ref 60–?)
GLOBULIN: 2.5 G/DL (ref 2.2–4.2)
GLUCOSE: 84 MG/DL (ref 74–106)
HCT VFR BLD AUTO: 44 % (ref 37–47)
HEMOGLOBIN: 14 G/DL (ref 12–15)
HGB BLD-MCNC: 14 G/DL (ref 12–15)
IMMATURE GRANULOCYTES COUNT: 0.01 X10^3/UL (ref 0–0)
MCV RBC: 101.9 FL (ref 81–99)
MEAN CORP HGB CONC: 31.8 G/DL (ref 32–36)
MEAN PLATELET VOL.: 11.5 FL (ref 6.2–12)
NRBC FLAGGED BY ANALYZER: 0 % (ref 0–5)
PLATELET # BLD: 194 K/MM3 (ref 150–450)
PLATELET COUNT: 194 K/MM3 (ref 150–450)
POTASSIUM: 3.9 MMOL/L (ref 3.5–5.1)
RBC # BLD AUTO: 4.32 M/MM3 (ref 4.2–5.4)
RBC DISTRIBUTION WIDTH CV: 12.6 % (ref 11.6–14.6)
RBC DISTRIBUTION WIDTH SD: 48 FL (ref 35.1–43.9)
TRIGLYCERIDES: 130 MG/DL
VITAMIN B12: 643 PG/ML (ref 211–911)
VLDLC SERPL-MCNC: 26 MG/DL (ref 5–40)
WBC # BLD AUTO: 4 K/MM3 (ref 4.4–11)
WHITE BLOOD COUNT: 4 K/MM3 (ref 4.4–11)

## 2019-10-03 PROCEDURE — 82607 VITAMIN B-12: CPT

## 2019-10-03 PROCEDURE — 80053 COMPREHEN METABOLIC PANEL: CPT

## 2019-10-03 PROCEDURE — 83036 HEMOGLOBIN GLYCOSYLATED A1C: CPT

## 2019-10-03 PROCEDURE — 36415 COLL VENOUS BLD VENIPUNCTURE: CPT

## 2019-10-03 PROCEDURE — 86141 C-REACTIVE PROTEIN HS: CPT

## 2019-10-03 PROCEDURE — 80061 LIPID PANEL: CPT

## 2019-10-03 PROCEDURE — 85025 COMPLETE CBC W/AUTO DIFF WBC: CPT

## 2019-10-29 ENCOUNTER — HOSPITAL ENCOUNTER (OUTPATIENT)
Dept: HOSPITAL 100 - MTLAB | Age: 74
Discharge: HOME | End: 2019-10-29
Payer: MEDICARE

## 2019-10-29 VITALS — BODY MASS INDEX: 23.6 KG/M2

## 2019-10-29 DIAGNOSIS — Z79.01: Primary | ICD-10-CM

## 2019-10-29 DIAGNOSIS — Z95.2: ICD-10-CM

## 2019-10-29 LAB
ALANINE AMINOTRANSFER ALT/SGPT: 21 U/L (ref 13–56)
ALBUMIN SERPL-MCNC: 3.7 G/DL (ref 3.2–5)
ALKALINE PHOSPHATASE: 80 U/L (ref 45–117)
ANION GAP: 6 (ref 5–15)
AST(SGOT): 22 U/L (ref 15–37)
BUN SERPL-MCNC: 15 MG/DL (ref 7–18)
BUN/CREAT RATIO: 18.2 RATIO (ref 10–20)
CALCIUM SERPL-MCNC: 8.8 MG/DL (ref 8.5–10.1)
CARBON DIOXIDE: 29 MMOL/L (ref 21–32)
CHLORIDE: 105 MMOL/L (ref 98–107)
DEPRECATED RDW RBC: 47.5 FL (ref 35.1–43.9)
ERYTHROCYTE [DISTWIDTH] IN BLOOD: 12.7 % (ref 11.6–14.6)
EST GLOM FILT RATE - AFR AMER: 88 ML/MIN (ref 60–?)
GLOBULIN: 3.3 G/DL (ref 2.2–4.2)
GLUCOSE: 83 MG/DL (ref 74–106)
HCT VFR BLD AUTO: 44.3 % (ref 37–47)
HEMOGLOBIN: 14.4 G/DL (ref 12–15)
HGB BLD-MCNC: 14.4 G/DL (ref 12–15)
IMMATURE GRANULOCYTES COUNT: 0 X10^3/UL (ref 0–0)
MCV RBC: 100.7 FL (ref 81–99)
MEAN CORP HGB CONC: 32.5 G/DL (ref 32–36)
MEAN PLATELET VOL.: 10.8 FL (ref 6.2–12)
NRBC FLAGGED BY ANALYZER: 0 % (ref 0–5)
PLATELET # BLD: 201 K/MM3 (ref 150–450)
PLATELET COUNT: 201 K/MM3 (ref 150–450)
POTASSIUM: 4 MMOL/L (ref 3.5–5.1)
PROTHROMBIN TIME (PROTIME)PT.: 27.2 SECONDS (ref 11.7–14.9)
RBC # BLD AUTO: 4.4 M/MM3 (ref 4.2–5.4)
RBC DISTRIBUTION WIDTH CV: 12.7 % (ref 11.6–14.6)
RBC DISTRIBUTION WIDTH SD: 47.5 FL (ref 35.1–43.9)
WBC # BLD AUTO: 3.6 K/MM3 (ref 4.4–11)
WHITE BLOOD COUNT: 3.6 K/MM3 (ref 4.4–11)

## 2019-10-29 PROCEDURE — 85610 PROTHROMBIN TIME: CPT

## 2019-10-29 PROCEDURE — 80053 COMPREHEN METABOLIC PANEL: CPT

## 2019-10-29 PROCEDURE — 85025 COMPLETE CBC W/AUTO DIFF WBC: CPT

## 2019-10-29 PROCEDURE — 36415 COLL VENOUS BLD VENIPUNCTURE: CPT

## 2019-11-19 LAB — PROTHROMBIN TIME (PROTIME)PT.: 20.9 SECONDS (ref 11.7–14.9)

## 2019-11-22 ENCOUNTER — HOSPITAL ENCOUNTER (OUTPATIENT)
Dept: HOSPITAL 100 - SDC | Age: 74
Discharge: HOME | End: 2019-11-22
Payer: MEDICARE

## 2019-11-22 VITALS
SYSTOLIC BLOOD PRESSURE: 141 MMHG | RESPIRATION RATE: 16 BRPM | HEART RATE: 50 BPM | OXYGEN SATURATION: 94 % | DIASTOLIC BLOOD PRESSURE: 80 MMHG

## 2019-11-22 VITALS
TEMPERATURE: 97.52 F | SYSTOLIC BLOOD PRESSURE: 135 MMHG | OXYGEN SATURATION: 92 % | RESPIRATION RATE: 16 BRPM | DIASTOLIC BLOOD PRESSURE: 80 MMHG | HEART RATE: 50 BPM

## 2019-11-22 VITALS
DIASTOLIC BLOOD PRESSURE: 82 MMHG | SYSTOLIC BLOOD PRESSURE: 141 MMHG | HEART RATE: 52 BPM | OXYGEN SATURATION: 96 % | RESPIRATION RATE: 16 BRPM

## 2019-11-22 VITALS
OXYGEN SATURATION: 98 % | SYSTOLIC BLOOD PRESSURE: 141 MMHG | TEMPERATURE: 98.7 F | HEART RATE: 71 BPM | RESPIRATION RATE: 16 BRPM | DIASTOLIC BLOOD PRESSURE: 80 MMHG

## 2019-11-22 VITALS
DIASTOLIC BLOOD PRESSURE: 85 MMHG | OXYGEN SATURATION: 95 % | HEART RATE: 50 BPM | SYSTOLIC BLOOD PRESSURE: 151 MMHG | RESPIRATION RATE: 16 BRPM

## 2019-11-22 VITALS
HEART RATE: 52 BPM | DIASTOLIC BLOOD PRESSURE: 87 MMHG | SYSTOLIC BLOOD PRESSURE: 149 MMHG | OXYGEN SATURATION: 95 % | RESPIRATION RATE: 16 BRPM

## 2019-11-22 VITALS
RESPIRATION RATE: 16 BRPM | HEART RATE: 50 BPM | OXYGEN SATURATION: 96 % | SYSTOLIC BLOOD PRESSURE: 151 MMHG | DIASTOLIC BLOOD PRESSURE: 80 MMHG

## 2019-11-22 VITALS
RESPIRATION RATE: 18 BRPM | SYSTOLIC BLOOD PRESSURE: 141 MMHG | HEART RATE: 50 BPM | OXYGEN SATURATION: 95 % | DIASTOLIC BLOOD PRESSURE: 80 MMHG | TEMPERATURE: 97 F

## 2019-11-22 VITALS — BODY MASS INDEX: 23.6 KG/M2

## 2019-11-22 VITALS
OXYGEN SATURATION: 95 % | SYSTOLIC BLOOD PRESSURE: 161 MMHG | DIASTOLIC BLOOD PRESSURE: 80 MMHG | HEART RATE: 50 BPM | RESPIRATION RATE: 16 BRPM | TEMPERATURE: 97.88 F

## 2019-11-22 DIAGNOSIS — M32.9: ICD-10-CM

## 2019-11-22 DIAGNOSIS — M21.612: ICD-10-CM

## 2019-11-22 DIAGNOSIS — E55.9: ICD-10-CM

## 2019-11-22 DIAGNOSIS — E53.8: ICD-10-CM

## 2019-11-22 DIAGNOSIS — M20.12: Primary | ICD-10-CM

## 2019-11-22 DIAGNOSIS — M79.7: ICD-10-CM

## 2019-11-22 DIAGNOSIS — I35.1: ICD-10-CM

## 2019-11-22 DIAGNOSIS — E04.1: ICD-10-CM

## 2019-11-22 DIAGNOSIS — K21.9: ICD-10-CM

## 2019-11-22 DIAGNOSIS — M21.622: ICD-10-CM

## 2019-11-22 DIAGNOSIS — M20.42: ICD-10-CM

## 2019-11-22 DIAGNOSIS — D75.89: ICD-10-CM

## 2019-11-22 DIAGNOSIS — Z79.01: ICD-10-CM

## 2019-11-22 DIAGNOSIS — Z79.82: ICD-10-CM

## 2019-11-22 DIAGNOSIS — M19.072: ICD-10-CM

## 2019-11-22 DIAGNOSIS — E78.5: ICD-10-CM

## 2019-11-22 DIAGNOSIS — Z79.899: ICD-10-CM

## 2019-11-22 DIAGNOSIS — I11.9: ICD-10-CM

## 2019-11-22 PROCEDURE — 28292 COR HLX VLGS RSC PRX PHLX BS: CPT

## 2019-11-22 PROCEDURE — 73630 X-RAY EXAM OF FOOT: CPT

## 2019-11-22 PROCEDURE — 28110 PART REMOVAL OF METATARSAL: CPT

## 2019-11-22 PROCEDURE — 28285 REPAIR OF HAMMERTOE: CPT

## 2019-11-22 PROCEDURE — 01480 ANES OPEN PX LOWER L/A/F NOS: CPT

## 2019-11-22 PROCEDURE — C1713 ANCHOR/SCREW BN/BN,TIS/BN: HCPCS

## 2019-11-22 PROCEDURE — 76000 FLUOROSCOPY <1 HR PHYS/QHP: CPT

## 2019-11-22 PROCEDURE — 88311 DECALCIFY TISSUE: CPT

## 2019-11-22 PROCEDURE — 88305 TISSUE EXAM BY PATHOLOGIST: CPT

## 2019-11-26 LAB — PROTHROMBIN TIME (PROTIME)PT.: 19 SECONDS (ref 11.7–14.9)

## 2019-11-29 ENCOUNTER — HOSPITAL ENCOUNTER (OUTPATIENT)
Dept: HOSPITAL 100 - LAB | Age: 74
Discharge: HOME | End: 2019-11-29
Payer: MEDICARE

## 2019-11-29 VITALS — BODY MASS INDEX: 23.6 KG/M2

## 2019-11-29 DIAGNOSIS — Z95.2: ICD-10-CM

## 2019-11-29 DIAGNOSIS — Z79.01: Primary | ICD-10-CM

## 2019-11-29 LAB — PROTHROMBIN TIME (PROTIME)PT.: 21.8 SECONDS (ref 11.7–14.9)

## 2019-11-29 PROCEDURE — 85610 PROTHROMBIN TIME: CPT

## 2019-11-29 PROCEDURE — 36415 COLL VENOUS BLD VENIPUNCTURE: CPT

## 2019-12-04 LAB — PROTHROMBIN TIME (PROTIME)PT.: 31.3 SECONDS (ref 11.7–14.9)

## 2019-12-12 ENCOUNTER — HOSPITAL ENCOUNTER (OUTPATIENT)
Age: 74
End: 2019-12-12
Payer: MEDICARE

## 2019-12-12 VITALS — BODY MASS INDEX: 23.6 KG/M2

## 2019-12-12 DIAGNOSIS — Z95.2: ICD-10-CM

## 2019-12-12 DIAGNOSIS — Z79.01: ICD-10-CM

## 2019-12-12 DIAGNOSIS — E04.1: Primary | ICD-10-CM

## 2019-12-12 LAB — PROTHROMBIN TIME (PROTIME)PT.: 27.3 SECONDS (ref 11.7–14.9)

## 2019-12-12 PROCEDURE — 76536 US EXAM OF HEAD AND NECK: CPT

## 2019-12-12 PROCEDURE — 36415 COLL VENOUS BLD VENIPUNCTURE: CPT

## 2019-12-12 PROCEDURE — 85610 PROTHROMBIN TIME: CPT

## 2019-12-26 ENCOUNTER — HOSPITAL ENCOUNTER (OUTPATIENT)
Dept: HOSPITAL 100 - MTLAB | Age: 74
Discharge: HOME | End: 2019-12-26
Payer: MEDICARE

## 2019-12-26 VITALS — BODY MASS INDEX: 23.6 KG/M2

## 2019-12-26 DIAGNOSIS — Z95.2: ICD-10-CM

## 2019-12-26 DIAGNOSIS — Z79.01: Primary | ICD-10-CM

## 2019-12-26 LAB — PROTHROMBIN TIME (PROTIME)PT.: 31.2 SECONDS (ref 11.7–14.9)

## 2019-12-26 PROCEDURE — 36415 COLL VENOUS BLD VENIPUNCTURE: CPT

## 2019-12-26 PROCEDURE — 85610 PROTHROMBIN TIME: CPT

## 2020-01-09 ENCOUNTER — HOSPITAL ENCOUNTER (OUTPATIENT)
Age: 75
End: 2020-01-09
Payer: MEDICARE

## 2020-01-09 VITALS — BODY MASS INDEX: 23.6 KG/M2

## 2020-01-09 DIAGNOSIS — Z01.818: Primary | ICD-10-CM

## 2020-01-09 LAB
ALANINE AMINOTRANSFER ALT/SGPT: 23 U/L (ref 13–56)
ALBUMIN SERPL-MCNC: 3.7 G/DL (ref 3.2–5)
ALKALINE PHOSPHATASE: 77 U/L (ref 45–117)
ANION GAP: 4 (ref 5–15)
AST(SGOT): 19 U/L (ref 15–37)
BUN SERPL-MCNC: 10 MG/DL (ref 7–18)
BUN/CREAT RATIO: 14.6 RATIO (ref 10–20)
CALCIUM SERPL-MCNC: 9 MG/DL (ref 8.5–10.1)
CARBON DIOXIDE: 30 MMOL/L (ref 21–32)
CHLORIDE: 107 MMOL/L (ref 98–107)
DEPRECATED RDW RBC: 47.8 FL (ref 35.1–43.9)
ERYTHROCYTE [DISTWIDTH] IN BLOOD: 13.1 % (ref 11.6–14.6)
EST GLOM FILT RATE - AFR AMER: 108 ML/MIN (ref 60–?)
GLOBULIN: 3.3 G/DL (ref 2.2–4.2)
GLUCOSE: 81 MG/DL (ref 74–106)
HCT VFR BLD AUTO: 42.5 % (ref 37–47)
HEMOGLOBIN: 13.8 G/DL (ref 12–15)
HGB BLD-MCNC: 13.8 G/DL (ref 12–15)
IMMATURE GRANULOCYTES COUNT: 0 X10^3/UL (ref 0–0)
MCV RBC: 99.3 FL (ref 81–99)
MEAN CORP HGB CONC: 32.5 G/DL (ref 32–36)
MEAN PLATELET VOL.: 10.9 FL (ref 6.2–12)
NRBC FLAGGED BY ANALYZER: 0 % (ref 0–5)
PLATELET # BLD: 233 K/MM3 (ref 150–450)
PLATELET COUNT: 233 K/MM3 (ref 150–450)
POTASSIUM: 4 MMOL/L (ref 3.5–5.1)
RBC # BLD AUTO: 4.28 M/MM3 (ref 4.2–5.4)
RBC DISTRIBUTION WIDTH CV: 13.1 % (ref 11.6–14.6)
RBC DISTRIBUTION WIDTH SD: 47.8 FL (ref 35.1–43.9)
WBC # BLD AUTO: 3.3 K/MM3 (ref 4.4–11)
WHITE BLOOD COUNT: 3.3 K/MM3 (ref 4.4–11)

## 2020-01-09 PROCEDURE — 80053 COMPREHEN METABOLIC PANEL: CPT

## 2020-01-09 PROCEDURE — 36415 COLL VENOUS BLD VENIPUNCTURE: CPT

## 2020-01-09 PROCEDURE — 85025 COMPLETE CBC W/AUTO DIFF WBC: CPT

## 2020-01-16 LAB
CHOLEST SERPL-MCNC: 215 MG/DL
PROTHROMBIN TIME (PROTIME)PT.: 27.3 SECONDS (ref 11.7–14.9)
TRIGLYCERIDES: 112 MG/DL
VITAMIN B12: 555 PG/ML (ref 211–911)
VITAMIN D,25 HYDROXY: 53.3 NG/ML (ref 29.95–100.01)
VLDLC SERPL-MCNC: 22 MG/DL (ref 5–40)

## 2020-01-30 ENCOUNTER — HOSPITAL ENCOUNTER (OUTPATIENT)
Dept: HOSPITAL 100 - MTLAB | Age: 75
Discharge: HOME | End: 2020-01-30
Payer: MEDICARE

## 2020-01-30 VITALS — BODY MASS INDEX: 23.6 KG/M2

## 2020-01-30 DIAGNOSIS — E55.9: ICD-10-CM

## 2020-01-30 DIAGNOSIS — R73.09: Primary | ICD-10-CM

## 2020-01-30 DIAGNOSIS — Z79.01: ICD-10-CM

## 2020-01-30 DIAGNOSIS — Z95.2: ICD-10-CM

## 2020-01-30 DIAGNOSIS — E78.5: ICD-10-CM

## 2020-01-30 LAB — PROTHROMBIN TIME (PROTIME)PT.: 27.5 SECONDS (ref 11.7–14.9)

## 2020-01-30 PROCEDURE — 82306 VITAMIN D 25 HYDROXY: CPT

## 2020-01-30 PROCEDURE — 80061 LIPID PANEL: CPT

## 2020-01-30 PROCEDURE — 82607 VITAMIN B-12: CPT

## 2020-01-30 PROCEDURE — 83036 HEMOGLOBIN GLYCOSYLATED A1C: CPT

## 2020-01-30 PROCEDURE — 36415 COLL VENOUS BLD VENIPUNCTURE: CPT

## 2020-01-30 PROCEDURE — 85610 PROTHROMBIN TIME: CPT

## 2020-02-20 ENCOUNTER — HOSPITAL ENCOUNTER (OUTPATIENT)
Dept: HOSPITAL 100 - PT | Age: 75
Discharge: HOME | End: 2020-02-20
Payer: MEDICARE

## 2020-02-20 VITALS — BODY MASS INDEX: 23.6 KG/M2

## 2020-02-20 DIAGNOSIS — Z98.890: Primary | ICD-10-CM

## 2020-02-20 PROCEDURE — 97161 PT EVAL LOW COMPLEX 20 MIN: CPT

## 2020-02-20 PROCEDURE — 97110 THERAPEUTIC EXERCISES: CPT

## 2020-02-20 PROCEDURE — 97116 GAIT TRAINING THERAPY: CPT

## 2020-02-20 PROCEDURE — 97530 THERAPEUTIC ACTIVITIES: CPT

## 2020-02-20 PROCEDURE — 97140 MANUAL THERAPY 1/> REGIONS: CPT

## 2020-03-04 ENCOUNTER — HOSPITAL ENCOUNTER (OUTPATIENT)
Dept: HOSPITAL 100 - MTLAB | Age: 75
Discharge: HOME | End: 2020-03-04
Payer: MEDICARE

## 2020-03-04 VITALS — BODY MASS INDEX: 23.6 KG/M2

## 2020-03-04 DIAGNOSIS — Z95.2: ICD-10-CM

## 2020-03-04 DIAGNOSIS — Z79.01: Primary | ICD-10-CM

## 2020-03-04 LAB — PROTHROMBIN TIME (PROTIME)PT.: 30.8 SECONDS (ref 11.7–14.9)

## 2020-03-04 PROCEDURE — 85610 PROTHROMBIN TIME: CPT

## 2020-03-04 PROCEDURE — 36415 COLL VENOUS BLD VENIPUNCTURE: CPT

## 2020-03-05 ENCOUNTER — HOSPITAL ENCOUNTER (OUTPATIENT)
Age: 75
End: 2020-03-05
Payer: MEDICARE

## 2020-03-05 VITALS — BODY MASS INDEX: 23.6 KG/M2

## 2020-03-05 DIAGNOSIS — M32.9: Primary | ICD-10-CM

## 2020-03-05 DIAGNOSIS — Z79.899: ICD-10-CM

## 2020-03-05 LAB
ALANINE AMINOTRANSFER ALT/SGPT: 27 U/L (ref 13–56)
ALBUMIN SERPL-MCNC: 3.7 G/DL (ref 3.2–5)
ALKALINE PHOSPHATASE: 86 U/L (ref 45–117)
AST(SGOT): 25 U/L (ref 15–37)
BILIRUB DIRECT SERPL-MCNC: 0.12 MG/DL (ref 0–0.3)
CRP SERPL-MCNC: < 2.9 MG/L (ref 0–3)
DEPRECATED RDW RBC: 48.8 FL (ref 35.1–43.9)
ERYTHROCYTE [DISTWIDTH] IN BLOOD: 13.1 % (ref 11.6–14.6)
EST GLOM FILT RATE - AFR AMER: 103 ML/MIN (ref 60–?)
GLOBULIN: 3.3 G/DL (ref 2.2–4.2)
HCT VFR BLD AUTO: 44.2 % (ref 37–47)
HEMOGLOBIN: 14 G/DL (ref 12–15)
HGB BLD-MCNC: 14 G/DL (ref 12–15)
IMMATURE GRANULOCYTES COUNT: 0 X10^3/UL (ref 0–0)
MCV RBC: 100 FL (ref 81–99)
MEAN CORP HGB CONC: 31.7 G/DL (ref 32–36)
MEAN PLATELET VOL.: 11 FL (ref 6.2–12)
MUCOUS THREADS URNS QL MICRO: (no result) /HPF
NRBC FLAGGED BY ANALYZER: 0 % (ref 0–5)
PLATELET # BLD: 205 K/MM3 (ref 150–450)
PLATELET COUNT: 205 K/MM3 (ref 150–450)
RBC # BLD AUTO: 4.42 M/MM3 (ref 4.2–5.4)
RBC DISTRIBUTION WIDTH CV: 13.1 % (ref 11.6–14.6)
RBC DISTRIBUTION WIDTH SD: 48.8 FL (ref 35.1–43.9)
RBC UR QL: (no result) /HPF (ref 0–5)
SP GR UR: 1.01 (ref 1–1.03)
SQUAMOUS URNS QL MICRO: (no result) /HPF (ref 5–10)
URINE PRESERVATIVE: (no result)
WBC # BLD AUTO: 3.7 K/MM3 (ref 4.4–11)
WHITE BLOOD COUNT: 3.7 K/MM3 (ref 4.4–11)

## 2020-03-05 PROCEDURE — 85652 RBC SED RATE AUTOMATED: CPT

## 2020-03-05 PROCEDURE — 81001 URINALYSIS AUTO W/SCOPE: CPT

## 2020-03-05 PROCEDURE — 85025 COMPLETE CBC W/AUTO DIFF WBC: CPT

## 2020-03-05 PROCEDURE — 82565 ASSAY OF CREATININE: CPT

## 2020-03-05 PROCEDURE — 80076 HEPATIC FUNCTION PANEL: CPT

## 2020-03-05 PROCEDURE — 86160 COMPLEMENT ANTIGEN: CPT

## 2020-03-05 PROCEDURE — 86140 C-REACTIVE PROTEIN: CPT

## 2020-03-05 PROCEDURE — 36415 COLL VENOUS BLD VENIPUNCTURE: CPT

## 2020-03-12 ENCOUNTER — HOSPITAL ENCOUNTER (OUTPATIENT)
Age: 75
End: 2020-03-12
Payer: MEDICARE

## 2020-03-12 VITALS — BODY MASS INDEX: 23.6 KG/M2

## 2020-03-12 DIAGNOSIS — Z79.899: ICD-10-CM

## 2020-03-12 DIAGNOSIS — M32.9: Primary | ICD-10-CM

## 2020-03-12 LAB
ALANINE AMINOTRANSFER ALT/SGPT: 31 U/L (ref 13–56)
ALBUMIN SERPL-MCNC: 3.7 G/DL (ref 3.2–5)
ALKALINE PHOSPHATASE: 91 U/L (ref 45–117)
AST(SGOT): 26 U/L (ref 15–37)
BILIRUB DIRECT SERPL-MCNC: 0.11 MG/DL (ref 0–0.3)
CRP SERPL-MCNC: < 2.9 MG/L (ref 0–3)
DEPRECATED RDW RBC: 48.5 FL (ref 35.1–43.9)
ERYTHROCYTE [DISTWIDTH] IN BLOOD: 13.2 % (ref 11.6–14.6)
EST GLOM FILT RATE - AFR AMER: 90 ML/MIN (ref 60–?)
GLOBULIN: 3.1 G/DL (ref 2.2–4.2)
HCT VFR BLD AUTO: 42 % (ref 37–47)
HEMOGLOBIN: 13.4 G/DL (ref 12–15)
HGB BLD-MCNC: 13.4 G/DL (ref 12–15)
IMMATURE GRANULOCYTES COUNT: 0.01 X10^3/UL (ref 0–0)
MCV RBC: 99.8 FL (ref 81–99)
MEAN CORP HGB CONC: 31.9 G/DL (ref 32–36)
MEAN PLATELET VOL.: 11.2 FL (ref 6.2–12)
NRBC FLAGGED BY ANALYZER: 0 % (ref 0–5)
PLATELET # BLD: 210 K/MM3 (ref 150–450)
PLATELET COUNT: 210 K/MM3 (ref 150–450)
RBC # BLD AUTO: 4.21 M/MM3 (ref 4.2–5.4)
RBC DISTRIBUTION WIDTH CV: 13.2 % (ref 11.6–14.6)
RBC DISTRIBUTION WIDTH SD: 48.5 FL (ref 35.1–43.9)
WBC # BLD AUTO: 4.5 K/MM3 (ref 4.4–11)
WHITE BLOOD COUNT: 4.5 K/MM3 (ref 4.4–11)

## 2020-03-12 PROCEDURE — 85025 COMPLETE CBC W/AUTO DIFF WBC: CPT

## 2020-03-12 PROCEDURE — 36415 COLL VENOUS BLD VENIPUNCTURE: CPT

## 2020-03-12 PROCEDURE — 86160 COMPLEMENT ANTIGEN: CPT

## 2020-03-12 PROCEDURE — 85652 RBC SED RATE AUTOMATED: CPT

## 2020-03-12 PROCEDURE — 80076 HEPATIC FUNCTION PANEL: CPT

## 2020-03-12 PROCEDURE — 86140 C-REACTIVE PROTEIN: CPT

## 2020-03-12 PROCEDURE — 82565 ASSAY OF CREATININE: CPT

## 2020-04-06 LAB
ALANINE AMINOTRANSFER ALT/SGPT: 26 U/L (ref 13–56)
ALBUMIN SERPL-MCNC: 3.7 G/DL (ref 3.2–5)
ALKALINE PHOSPHATASE: 87 U/L (ref 45–117)
ANION GAP: 5 (ref 5–15)
AST(SGOT): 22 U/L (ref 15–37)
BUN SERPL-MCNC: 15 MG/DL (ref 7–18)
BUN/CREAT RATIO: 22.1 RATIO (ref 10–20)
CALCIUM SERPL-MCNC: 8.8 MG/DL (ref 8.5–10.1)
CARBON DIOXIDE: 26 MMOL/L (ref 21–32)
CHLORIDE: 110 MMOL/L (ref 98–107)
CHOLEST SERPL-MCNC: 207 MG/DL
DEPRECATED RDW RBC: 47.8 FL (ref 35.1–43.9)
ERYTHROCYTE [DISTWIDTH] IN BLOOD: 13 % (ref 11.6–14.6)
EST GLOM FILT RATE - AFR AMER: 110 ML/MIN (ref 60–?)
GLOBULIN: 2.6 G/DL (ref 2.2–4.2)
GLUCOSE: 90 MG/DL (ref 74–106)
HCT VFR BLD AUTO: 44.1 % (ref 37–47)
HEMOGLOBIN: 14.3 G/DL (ref 12–15)
HGB BLD-MCNC: 14.3 G/DL (ref 12–15)
IMMATURE GRANULOCYTES COUNT: 0.01 X10^3/UL (ref 0–0)
MCV RBC: 98.9 FL (ref 81–99)
MEAN CORP HGB CONC: 32.4 G/DL (ref 32–36)
MEAN PLATELET VOL.: 10.5 FL (ref 6.2–12)
MUCOUS THREADS URNS QL MICRO: (no result) /HPF
NRBC FLAGGED BY ANALYZER: 0 % (ref 0–5)
PLATELET # BLD: 191 K/MM3 (ref 150–450)
PLATELET COUNT: 191 K/MM3 (ref 150–450)
POTASSIUM: 4.1 MMOL/L (ref 3.5–5.1)
PROTHROMBIN TIME (PROTIME)PT.: 26 SECONDS (ref 11.7–14.9)
RBC # BLD AUTO: 4.46 M/MM3 (ref 4.2–5.4)
RBC DISTRIBUTION WIDTH CV: 13 % (ref 11.6–14.6)
RBC DISTRIBUTION WIDTH SD: 47.8 FL (ref 35.1–43.9)
RBC UR QL: (no result) /HPF (ref 0–5)
RBC UR QL: 10 /UL
SP GR UR: 1.01 (ref 1–1.03)
SQUAMOUS URNS QL MICRO: (no result) /HPF (ref 5–10)
TRIGLYCERIDES: 85 MG/DL
URINE PRESERVATIVE: (no result)
VITAMIN B12: 607 PG/ML (ref 211–911)
VITAMIN D,25 HYDROXY: 56.4 NG/ML
VLDLC SERPL-MCNC: 17 MG/DL (ref 5–40)
WBC # BLD AUTO: 4.1 K/MM3 (ref 4.4–11)
WHITE BLOOD COUNT: 4.1 K/MM3 (ref 4.4–11)

## 2020-04-28 ENCOUNTER — HOSPITAL ENCOUNTER (OUTPATIENT)
Dept: HOSPITAL 100 - MTLAB | Age: 75
LOS: 2 days | Discharge: HOME | End: 2020-04-30
Payer: MEDICARE

## 2020-04-28 VITALS — BODY MASS INDEX: 23.6 KG/M2

## 2020-04-28 DIAGNOSIS — Z79.01: ICD-10-CM

## 2020-04-28 DIAGNOSIS — I11.9: Primary | ICD-10-CM

## 2020-04-28 DIAGNOSIS — E55.9: ICD-10-CM

## 2020-04-28 DIAGNOSIS — E78.5: ICD-10-CM

## 2020-04-28 DIAGNOSIS — R73.09: ICD-10-CM

## 2020-04-28 DIAGNOSIS — Z95.2: ICD-10-CM

## 2020-04-28 LAB — PROTHROMBIN TIME (PROTIME)PT.: 29.9 SECONDS (ref 11.7–14.9)

## 2020-04-28 PROCEDURE — 80053 COMPREHEN METABOLIC PANEL: CPT

## 2020-04-28 PROCEDURE — 82607 VITAMIN B-12: CPT

## 2020-04-28 PROCEDURE — 85025 COMPLETE CBC W/AUTO DIFF WBC: CPT

## 2020-04-28 PROCEDURE — 82306 VITAMIN D 25 HYDROXY: CPT

## 2020-04-28 PROCEDURE — 36415 COLL VENOUS BLD VENIPUNCTURE: CPT

## 2020-04-28 PROCEDURE — 80061 LIPID PANEL: CPT

## 2020-04-28 PROCEDURE — 85610 PROTHROMBIN TIME: CPT

## 2020-04-28 PROCEDURE — 81001 URINALYSIS AUTO W/SCOPE: CPT

## 2020-04-28 PROCEDURE — 83036 HEMOGLOBIN GLYCOSYLATED A1C: CPT

## 2020-05-19 ENCOUNTER — HOSPITAL ENCOUNTER (OUTPATIENT)
Dept: HOSPITAL 100 - MTLAB | Age: 75
Discharge: HOME | End: 2020-05-19
Payer: MEDICARE

## 2020-05-19 VITALS — BODY MASS INDEX: 23.6 KG/M2

## 2020-05-19 DIAGNOSIS — Z95.2: ICD-10-CM

## 2020-05-19 DIAGNOSIS — Z79.01: Primary | ICD-10-CM

## 2020-05-19 LAB — PROTHROMBIN TIME (PROTIME)PT.: 27.6 SECONDS (ref 11.7–14.9)

## 2020-05-19 PROCEDURE — 85610 PROTHROMBIN TIME: CPT

## 2020-05-19 PROCEDURE — 36415 COLL VENOUS BLD VENIPUNCTURE: CPT

## 2020-07-23 ENCOUNTER — HOSPITAL ENCOUNTER (OUTPATIENT)
Dept: HOSPITAL 100 - MTLAB | Age: 75
Discharge: HOME | End: 2020-07-23
Payer: MEDICARE

## 2020-07-23 VITALS — BODY MASS INDEX: 23.6 KG/M2

## 2020-07-23 DIAGNOSIS — Z95.2: ICD-10-CM

## 2020-07-23 DIAGNOSIS — Z79.01: Primary | ICD-10-CM

## 2020-07-23 LAB — PROTHROMBIN TIME (PROTIME)PT.: 34.6 SECONDS (ref 11.7–14.9)

## 2020-07-23 PROCEDURE — 36415 COLL VENOUS BLD VENIPUNCTURE: CPT

## 2020-07-23 PROCEDURE — 85610 PROTHROMBIN TIME: CPT

## 2020-08-06 ENCOUNTER — HOSPITAL ENCOUNTER (OUTPATIENT)
Age: 75
End: 2020-08-06
Payer: MEDICARE

## 2020-08-06 VITALS — BODY MASS INDEX: 23.6 KG/M2

## 2020-08-06 DIAGNOSIS — Z12.31: Primary | ICD-10-CM

## 2020-08-06 DIAGNOSIS — Z78.0: ICD-10-CM

## 2020-08-06 PROCEDURE — 77080 DXA BONE DENSITY AXIAL: CPT

## 2020-08-06 PROCEDURE — 77067 SCR MAMMO BI INCL CAD: CPT

## 2020-08-06 PROCEDURE — 77063 BREAST TOMOSYNTHESIS BI: CPT

## 2020-08-20 ENCOUNTER — HOSPITAL ENCOUNTER (OUTPATIENT)
Age: 75
End: 2020-08-20
Payer: MEDICARE

## 2020-08-20 VITALS — BODY MASS INDEX: 25.9 KG/M2

## 2020-08-20 DIAGNOSIS — I71.2: Primary | ICD-10-CM

## 2020-08-20 LAB — CREATININE FINGERSTICK: < 0.6 MG/DL (ref 0.55–1.02)

## 2020-08-20 PROCEDURE — 71260 CT THORAX DX C+: CPT

## 2020-08-25 ENCOUNTER — HOSPITAL ENCOUNTER (OUTPATIENT)
Dept: HOSPITAL 100 - MTLAB | Age: 75
LOS: 6 days | Discharge: HOME | End: 2020-08-31
Payer: MEDICARE

## 2020-08-25 VITALS — BODY MASS INDEX: 23.6 KG/M2 | BODY MASS INDEX: 25.9 KG/M2

## 2020-08-25 DIAGNOSIS — Z79.01: Primary | ICD-10-CM

## 2020-08-25 DIAGNOSIS — I70.0: ICD-10-CM

## 2020-08-25 DIAGNOSIS — I10: ICD-10-CM

## 2020-08-25 DIAGNOSIS — I35.0: ICD-10-CM

## 2020-08-25 DIAGNOSIS — Z95.2: ICD-10-CM

## 2020-08-25 LAB — PROTHROMBIN TIME (PROTIME)PT.: 32.8 SECONDS (ref 11.7–14.9)

## 2020-08-25 PROCEDURE — 85610 PROTHROMBIN TIME: CPT

## 2020-08-25 PROCEDURE — 36415 COLL VENOUS BLD VENIPUNCTURE: CPT

## 2020-09-16 ENCOUNTER — HOSPITAL ENCOUNTER (OUTPATIENT)
Age: 75
End: 2020-09-16
Payer: MEDICARE

## 2020-09-16 VITALS — BODY MASS INDEX: 25.9 KG/M2

## 2020-09-16 DIAGNOSIS — M32.9: Primary | ICD-10-CM

## 2020-09-16 DIAGNOSIS — Z79.899: ICD-10-CM

## 2020-09-16 LAB
ALANINE AMINOTRANSFER ALT/SGPT: 26 U/L (ref 13–56)
ALBUMIN SERPL-MCNC: 4.1 G/DL (ref 3.2–5)
ALKALINE PHOSPHATASE: 94 U/L (ref 45–117)
AST(SGOT): 25 U/L (ref 15–37)
BILIRUB DIRECT SERPL-MCNC: 0.16 MG/DL (ref 0–0.3)
CRP SERPL-MCNC: < 2.9 MG/L (ref 0–3)
DEPRECATED RDW RBC: 48.3 FL (ref 35.1–43.9)
ERYTHROCYTE [DISTWIDTH] IN BLOOD: 13.1 % (ref 11.6–14.6)
EST GLOM FILT RATE - AFR AMER: 99 ML/MIN (ref 60–?)
GLOBULIN: 3.5 G/DL (ref 2.2–4.2)
HCT VFR BLD AUTO: 44.8 % (ref 37–47)
HEMOGLOBIN: 14.6 G/DL (ref 12–15)
HGB BLD-MCNC: 14.6 G/DL (ref 12–15)
IMMATURE GRANULOCYTES COUNT: 0.01 X10^3/UL (ref 0–0)
MCV RBC: 99.3 FL (ref 81–99)
MEAN CORP HGB CONC: 32.6 G/DL (ref 32–36)
MEAN PLATELET VOL.: 10.8 FL (ref 6.2–12)
MUCOUS THREADS URNS QL MICRO: (no result) /HPF
NRBC FLAGGED BY ANALYZER: 0 % (ref 0–5)
PLATELET # BLD: 230 K/MM3 (ref 150–450)
PLATELET COUNT: 230 K/MM3 (ref 150–450)
PROT UR QL STRIP.AUTO: 15 MG/DL
RBC # BLD AUTO: 4.51 M/MM3 (ref 4.2–5.4)
RBC DISTRIBUTION WIDTH CV: 13.1 % (ref 11.6–14.6)
RBC DISTRIBUTION WIDTH SD: 48.3 FL (ref 35.1–43.9)
RBC UR QL: (no result) /HPF (ref 0–5)
RBC UR QL: 10 /UL
SP GR UR: 1.01 (ref 1–1.03)
SQUAMOUS URNS QL MICRO: (no result) /HPF (ref 5–10)
URINE PRESERVATIVE: (no result)
WBC # BLD AUTO: 6.3 K/MM3 (ref 4.4–11)
WHITE BLOOD COUNT: 6.3 K/MM3 (ref 4.4–11)

## 2020-09-16 PROCEDURE — 82565 ASSAY OF CREATININE: CPT

## 2020-09-16 PROCEDURE — 81001 URINALYSIS AUTO W/SCOPE: CPT

## 2020-09-16 PROCEDURE — 80076 HEPATIC FUNCTION PANEL: CPT

## 2020-09-16 PROCEDURE — 85652 RBC SED RATE AUTOMATED: CPT

## 2020-09-16 PROCEDURE — 86160 COMPLEMENT ANTIGEN: CPT

## 2020-09-16 PROCEDURE — 36415 COLL VENOUS BLD VENIPUNCTURE: CPT

## 2020-09-16 PROCEDURE — 86140 C-REACTIVE PROTEIN: CPT

## 2020-09-16 PROCEDURE — 85025 COMPLETE CBC W/AUTO DIFF WBC: CPT

## 2020-10-29 ENCOUNTER — HOSPITAL ENCOUNTER (OUTPATIENT)
Dept: HOSPITAL 100 - MTLAB | Age: 75
Discharge: HOME | End: 2020-10-29
Payer: MEDICARE

## 2020-10-29 VITALS — BODY MASS INDEX: 25.9 KG/M2

## 2020-10-29 DIAGNOSIS — Z79.01: ICD-10-CM

## 2020-10-29 DIAGNOSIS — Z95.2: ICD-10-CM

## 2020-10-29 DIAGNOSIS — I11.9: Primary | ICD-10-CM

## 2020-10-29 LAB
MUCOUS THREADS URNS QL MICRO: (no result) /HPF
PROTHROMBIN TIME (PROTIME)PT.: 33.5 SECONDS (ref 11.7–14.9)
RBC UR QL: (no result) /HPF (ref 0–5)
SP GR UR: 1.01 (ref 1–1.03)
SQUAMOUS URNS QL MICRO: (no result) /HPF (ref 5–10)
URINE PRESERVATIVE: (no result)

## 2020-10-29 PROCEDURE — 85610 PROTHROMBIN TIME: CPT

## 2020-10-29 PROCEDURE — 81001 URINALYSIS AUTO W/SCOPE: CPT

## 2020-10-29 PROCEDURE — 36415 COLL VENOUS BLD VENIPUNCTURE: CPT

## 2020-11-19 ENCOUNTER — HOSPITAL ENCOUNTER (OUTPATIENT)
Age: 75
End: 2020-11-19
Payer: MEDICARE

## 2020-11-19 VITALS — BODY MASS INDEX: 25.9 KG/M2

## 2020-11-19 DIAGNOSIS — M85.80: ICD-10-CM

## 2020-11-19 DIAGNOSIS — I11.9: Primary | ICD-10-CM

## 2020-11-19 DIAGNOSIS — E78.5: ICD-10-CM

## 2020-11-19 DIAGNOSIS — R80.9: ICD-10-CM

## 2020-11-19 DIAGNOSIS — R73.09: ICD-10-CM

## 2020-11-19 LAB
ALANINE AMINOTRANSFER ALT/SGPT: 20 U/L (ref 13–56)
ALBUMIN SERPL-MCNC: 3.7 G/DL (ref 3.2–5)
ALKALINE PHOSPHATASE: 78 U/L (ref 45–117)
ANION GAP: 3 (ref 5–15)
AST(SGOT): 17 U/L (ref 15–37)
BUN SERPL-MCNC: 12 MG/DL (ref 7–18)
BUN/CREAT RATIO: 15.8 RATIO (ref 10–20)
CALCIUM SERPL-MCNC: 8.6 MG/DL (ref 8.5–10.1)
CARBON DIOXIDE: 28 MMOL/L (ref 21–32)
CHLORIDE: 109 MMOL/L (ref 98–107)
CHOLEST SERPL-MCNC: 206 MG/DL
CREAT UR-MCNC: 188 MG/DL
DEPRECATED RDW RBC: 49.8 FL (ref 35.1–43.9)
ERYTHROCYTE [DISTWIDTH] IN BLOOD: 13.2 % (ref 11.6–14.6)
EST GLOM FILT RATE - AFR AMER: 96 ML/MIN (ref 60–?)
GLOBULIN: 3 G/DL (ref 2.2–4.2)
GLUCOSE: 88 MG/DL (ref 74–106)
HCT VFR BLD AUTO: 43.6 % (ref 37–47)
HEMOGLOBIN: 14.3 G/DL (ref 12–15)
HGB BLD-MCNC: 14.3 G/DL (ref 12–15)
IMMATURE GRANULOCYTES COUNT: 0.01 X10^3/UL (ref 0–0)
MCV RBC: 100.5 FL (ref 81–99)
MEAN CORP HGB CONC: 32.8 G/DL (ref 32–36)
MEAN PLATELET VOL.: 10.6 FL (ref 6.2–12)
MICROALBUMIN UR-MCNC: 14.8 MG/L
MUCOUS THREADS URNS QL MICRO: (no result) /HPF
NRBC FLAGGED BY ANALYZER: 0 % (ref 0–5)
PLATELET # BLD: 250 K/MM3 (ref 150–450)
PLATELET COUNT: 250 K/MM3 (ref 150–450)
POTASSIUM: 3.4 MMOL/L (ref 3.5–5.1)
PROT UR QL STRIP.AUTO: 15 MG/DL
RBC # BLD AUTO: 4.34 M/MM3 (ref 4.2–5.4)
RBC DISTRIBUTION WIDTH CV: 13.2 % (ref 11.6–14.6)
RBC DISTRIBUTION WIDTH SD: 49.8 FL (ref 35.1–43.9)
RBC UR QL: (no result) /HPF (ref 0–5)
SP GR UR: 1.02 (ref 1–1.03)
SQUAMOUS URNS QL MICRO: (no result) /HPF (ref 5–10)
TRIGLYCERIDES: 85 MG/DL
URINE PRESERVATIVE: (no result)
VITAMIN B12: 895 PG/ML (ref 211–911)
VLDLC SERPL-MCNC: 17 MG/DL (ref 5–40)
WBC # BLD AUTO: 4.7 K/MM3 (ref 4.4–11)
WHITE BLOOD COUNT: 4.7 K/MM3 (ref 4.4–11)

## 2020-11-19 PROCEDURE — 80061 LIPID PANEL: CPT

## 2020-11-19 PROCEDURE — 36415 COLL VENOUS BLD VENIPUNCTURE: CPT

## 2020-11-19 PROCEDURE — 85025 COMPLETE CBC W/AUTO DIFF WBC: CPT

## 2020-11-19 PROCEDURE — 82570 ASSAY OF URINE CREATININE: CPT

## 2020-11-19 PROCEDURE — 82043 UR ALBUMIN QUANTITATIVE: CPT

## 2020-11-19 PROCEDURE — 82607 VITAMIN B-12: CPT

## 2020-11-19 PROCEDURE — 81001 URINALYSIS AUTO W/SCOPE: CPT

## 2020-11-19 PROCEDURE — 80053 COMPREHEN METABOLIC PANEL: CPT

## 2020-11-19 PROCEDURE — 83036 HEMOGLOBIN GLYCOSYLATED A1C: CPT

## 2020-11-30 ENCOUNTER — HOSPITAL ENCOUNTER (OUTPATIENT)
Dept: HOSPITAL 100 - MTLAB | Age: 75
Discharge: HOME | End: 2020-11-30
Payer: MEDICARE

## 2020-11-30 VITALS — BODY MASS INDEX: 25.9 KG/M2

## 2020-11-30 DIAGNOSIS — Z79.01: Primary | ICD-10-CM

## 2020-11-30 LAB — PROTHROMBIN TIME (PROTIME)PT.: 31.8 SECONDS (ref 11.7–14.9)

## 2020-11-30 PROCEDURE — 36415 COLL VENOUS BLD VENIPUNCTURE: CPT

## 2020-11-30 PROCEDURE — 85610 PROTHROMBIN TIME: CPT

## 2020-12-28 ENCOUNTER — HOSPITAL ENCOUNTER (OUTPATIENT)
Dept: HOSPITAL 100 - MTLAB | Age: 75
Discharge: HOME | End: 2020-12-28
Payer: MEDICARE

## 2020-12-28 VITALS — BODY MASS INDEX: 25.9 KG/M2

## 2020-12-28 DIAGNOSIS — Z79.01: Primary | ICD-10-CM

## 2020-12-28 LAB — PROTHROMBIN TIME (PROTIME)PT.: 32.2 SECONDS (ref 11.7–14.9)

## 2020-12-28 PROCEDURE — 85610 PROTHROMBIN TIME: CPT

## 2020-12-28 PROCEDURE — 36415 COLL VENOUS BLD VENIPUNCTURE: CPT

## 2021-01-25 ENCOUNTER — HOSPITAL ENCOUNTER (OUTPATIENT)
Dept: HOSPITAL 100 - MTLAB | Age: 76
Discharge: HOME | End: 2021-01-25
Payer: MEDICARE

## 2021-01-25 VITALS — BODY MASS INDEX: 25.9 KG/M2

## 2021-01-25 DIAGNOSIS — Z79.01: Primary | ICD-10-CM

## 2021-01-25 LAB — PROTHROMBIN TIME (PROTIME)PT.: 23.5 SECONDS (ref 11.7–14.9)

## 2021-01-25 PROCEDURE — 36415 COLL VENOUS BLD VENIPUNCTURE: CPT

## 2021-01-25 PROCEDURE — 85610 PROTHROMBIN TIME: CPT

## 2021-02-03 LAB — PROTHROMBIN TIME (PROTIME)PT.: 35.5 SECONDS (ref 11.7–14.9)

## 2021-02-10 LAB — PROTHROMBIN TIME (PROTIME)PT.: 37.2 SECONDS (ref 11.7–14.9)

## 2021-02-25 ENCOUNTER — HOSPITAL ENCOUNTER (OUTPATIENT)
Dept: HOSPITAL 100 - MTLAB | Age: 76
Discharge: HOME | End: 2021-02-25
Payer: MEDICARE

## 2021-02-25 VITALS — BODY MASS INDEX: 25.9 KG/M2

## 2021-02-25 DIAGNOSIS — Z79.01: ICD-10-CM

## 2021-02-25 DIAGNOSIS — I10: ICD-10-CM

## 2021-02-25 DIAGNOSIS — E53.8: ICD-10-CM

## 2021-02-25 DIAGNOSIS — I70.0: Primary | ICD-10-CM

## 2021-02-25 DIAGNOSIS — E55.9: ICD-10-CM

## 2021-02-25 DIAGNOSIS — I35.0: ICD-10-CM

## 2021-02-25 DIAGNOSIS — R73.09: ICD-10-CM

## 2021-02-25 LAB
ALANINE AMINOTRANSFER ALT/SGPT: 25 U/L (ref 13–56)
ALBUMIN SERPL-MCNC: 3.8 G/DL (ref 3.2–5)
ALKALINE PHOSPHATASE: 79 U/L (ref 45–117)
ANION GAP: 6 (ref 5–15)
AST(SGOT): 24 U/L (ref 15–37)
BUN SERPL-MCNC: 12 MG/DL (ref 7–18)
BUN/CREAT RATIO: 16.8 RATIO (ref 10–20)
CALCIUM SERPL-MCNC: 8.8 MG/DL (ref 8.5–10.1)
CARBON DIOXIDE: 24 MMOL/L (ref 21–32)
CHLORIDE: 110 MMOL/L (ref 98–107)
CHOLEST SERPL-MCNC: 215 MG/DL
CREAT UR-MCNC: 174 MG/DL
DEPRECATED RDW RBC: 47.6 FL (ref 35.1–43.9)
ERYTHROCYTE [DISTWIDTH] IN BLOOD: 12.8 % (ref 11.6–14.6)
EST GLOM FILT RATE - AFR AMER: 103 ML/MIN (ref 60–?)
GLOBULIN: 3.1 G/DL (ref 2.2–4.2)
GLUCOSE: 85 MG/DL (ref 74–106)
HCT VFR BLD AUTO: 44.5 % (ref 37–47)
HEMOGLOBIN: 14.6 G/DL (ref 12–15)
HGB BLD-MCNC: 14.6 G/DL (ref 12–15)
IMMATURE GRANULOCYTES COUNT: 0.01 X10^3/UL (ref 0–0)
KETONE-DIPSTICK: 5 MG/DL
LEUKOCYTE ESTERASE UR QL STRIP: 25 /UL
MCV RBC: 100 FL (ref 81–99)
MEAN CORP HGB CONC: 32.8 G/DL (ref 32–36)
MEAN PLATELET VOL.: 11.2 FL (ref 6.2–12)
MICROALBUMIN UR-MCNC: 13.6 MG/L
NRBC FLAGGED BY ANALYZER: 0 % (ref 0–5)
PLATELET # BLD: 228 K/MM3 (ref 150–450)
PLATELET COUNT: 228 K/MM3 (ref 150–450)
POTASSIUM: 3.8 MMOL/L (ref 3.5–5.1)
PROTHROMBIN TIME (PROTIME)PT.: 27.5 SECONDS (ref 11.7–14.9)
RBC # BLD AUTO: 4.45 M/MM3 (ref 4.2–5.4)
RBC DISTRIBUTION WIDTH CV: 12.8 % (ref 11.6–14.6)
RBC DISTRIBUTION WIDTH SD: 47.6 FL (ref 35.1–43.9)
SP GR UR: 1.02 (ref 1–1.03)
TRIGLYCERIDES: 90 MG/DL
URINE PRESERVATIVE: (no result)
VITAMIN D,25 HYDROXY: 55.8 NG/ML
VLDLC SERPL-MCNC: 18 MG/DL (ref 5–40)
WBC # BLD AUTO: 3.7 K/MM3 (ref 4.4–11)
WHITE BLOOD COUNT: 3.7 K/MM3 (ref 4.4–11)

## 2021-02-25 PROCEDURE — 85025 COMPLETE CBC W/AUTO DIFF WBC: CPT

## 2021-02-25 PROCEDURE — 82043 UR ALBUMIN QUANTITATIVE: CPT

## 2021-02-25 PROCEDURE — 80053 COMPREHEN METABOLIC PANEL: CPT

## 2021-02-25 PROCEDURE — 85610 PROTHROMBIN TIME: CPT

## 2021-02-25 PROCEDURE — 81002 URINALYSIS NONAUTO W/O SCOPE: CPT

## 2021-02-25 PROCEDURE — 36415 COLL VENOUS BLD VENIPUNCTURE: CPT

## 2021-02-25 PROCEDURE — 82306 VITAMIN D 25 HYDROXY: CPT

## 2021-02-25 PROCEDURE — 82570 ASSAY OF URINE CREATININE: CPT

## 2021-02-25 PROCEDURE — 80061 LIPID PANEL: CPT

## 2021-02-25 PROCEDURE — 83036 HEMOGLOBIN GLYCOSYLATED A1C: CPT

## 2021-03-10 ENCOUNTER — HOSPITAL ENCOUNTER (OUTPATIENT)
Dept: HOSPITAL 100 - MTLAB | Age: 76
Discharge: HOME | End: 2021-03-10
Payer: MEDICARE

## 2021-03-10 VITALS — BODY MASS INDEX: 25.9 KG/M2

## 2021-03-10 DIAGNOSIS — Z79.01: Primary | ICD-10-CM

## 2021-03-10 LAB — PROTHROMBIN TIME (PROTIME)PT.: 30.9 SECONDS (ref 11.7–14.9)

## 2021-03-10 PROCEDURE — 36415 COLL VENOUS BLD VENIPUNCTURE: CPT

## 2021-03-10 PROCEDURE — 85610 PROTHROMBIN TIME: CPT

## 2021-04-02 ENCOUNTER — HOSPITAL ENCOUNTER (OUTPATIENT)
Age: 76
End: 2021-04-02
Payer: MEDICARE

## 2021-04-02 VITALS — BODY MASS INDEX: 25.9 KG/M2

## 2021-04-02 DIAGNOSIS — M79.606: Primary | ICD-10-CM

## 2021-04-02 DIAGNOSIS — M54.9: ICD-10-CM

## 2021-04-02 LAB — CRP SERPL-MCNC: < 2.9 MG/L (ref 0–3)

## 2021-04-02 PROCEDURE — 86140 C-REACTIVE PROTEIN: CPT

## 2021-04-02 PROCEDURE — 85652 RBC SED RATE AUTOMATED: CPT

## 2021-04-02 PROCEDURE — 81374 HLA I TYPING 1 ANTIGEN LR: CPT

## 2021-04-02 PROCEDURE — 72202 X-RAY EXAM SI JOINTS 3/> VWS: CPT

## 2021-04-02 PROCEDURE — 72110 X-RAY EXAM L-2 SPINE 4/>VWS: CPT

## 2021-04-02 PROCEDURE — 36415 COLL VENOUS BLD VENIPUNCTURE: CPT

## 2021-04-02 PROCEDURE — 73521 X-RAY EXAM HIPS BI 2 VIEWS: CPT

## 2021-04-09 LAB — PROTHROMBIN TIME (PROTIME)PT.: 40.1 SECONDS (ref 11.7–14.9)

## 2021-04-16 ENCOUNTER — HOSPITAL ENCOUNTER (OUTPATIENT)
Dept: HOSPITAL 100 - MTLAB | Age: 76
Discharge: HOME | End: 2021-04-16
Payer: MEDICARE

## 2021-04-16 VITALS — BODY MASS INDEX: 25.9 KG/M2

## 2021-04-16 DIAGNOSIS — Z79.01: Primary | ICD-10-CM

## 2021-04-16 LAB — PROTHROMBIN TIME (PROTIME)PT.: 26.8 SECONDS (ref 11.7–14.9)

## 2021-04-16 PROCEDURE — 85610 PROTHROMBIN TIME: CPT

## 2021-04-16 PROCEDURE — 36415 COLL VENOUS BLD VENIPUNCTURE: CPT

## 2021-05-03 ENCOUNTER — HOSPITAL ENCOUNTER (OUTPATIENT)
Dept: HOSPITAL 100 - MTLAB | Age: 76
Discharge: HOME | End: 2021-05-03
Payer: MEDICARE

## 2021-05-03 VITALS — BODY MASS INDEX: 25.9 KG/M2

## 2021-05-03 DIAGNOSIS — Z79.01: Primary | ICD-10-CM

## 2021-05-03 LAB — PROTHROMBIN TIME (PROTIME)PT.: 31.3 SECONDS (ref 11.7–14.9)

## 2021-05-03 PROCEDURE — 36415 COLL VENOUS BLD VENIPUNCTURE: CPT

## 2021-05-03 PROCEDURE — 85610 PROTHROMBIN TIME: CPT

## 2021-05-27 ENCOUNTER — HOSPITAL ENCOUNTER (OUTPATIENT)
Age: 76
End: 2021-05-27
Payer: MEDICARE

## 2021-05-27 VITALS — BODY MASS INDEX: 25.9 KG/M2

## 2021-05-27 DIAGNOSIS — R73.09: Primary | ICD-10-CM

## 2021-05-27 DIAGNOSIS — E78.5: ICD-10-CM

## 2021-05-27 DIAGNOSIS — E04.1: ICD-10-CM

## 2021-05-27 DIAGNOSIS — E55.9: ICD-10-CM

## 2021-05-27 LAB
DEPRECATED RDW RBC: 49.2 FL (ref 35.1–43.9)
ERYTHROCYTE [DISTWIDTH] IN BLOOD: 13.3 % (ref 11.6–14.6)
HCT VFR BLD AUTO: 44.4 % (ref 37–47)
HEMOGLOBIN: 14.3 G/DL (ref 12–15)
HGB BLD-MCNC: 14.3 G/DL (ref 12–15)
IMMATURE GRANULOCYTES COUNT: 0 X10^3/UL (ref 0–0)
MCV RBC: 99.8 FL (ref 81–99)
MEAN CORP HGB CONC: 32.2 G/DL (ref 32–36)
MEAN PLATELET VOL.: 11 FL (ref 6.2–12)
NRBC FLAGGED BY ANALYZER: 0 % (ref 0–5)
PLATELET # BLD: 203 K/MM3 (ref 150–450)
PLATELET COUNT: 203 K/MM3 (ref 150–450)
RBC # BLD AUTO: 4.45 M/MM3 (ref 4.2–5.4)
RBC DISTRIBUTION WIDTH CV: 13.3 % (ref 11.6–14.6)
RBC DISTRIBUTION WIDTH SD: 49.2 FL (ref 35.1–43.9)
WBC # BLD AUTO: 4.1 K/MM3 (ref 4.4–11)
WHITE BLOOD COUNT: 4.1 K/MM3 (ref 4.4–11)

## 2021-05-27 PROCEDURE — 83036 HEMOGLOBIN GLYCOSYLATED A1C: CPT

## 2021-05-27 PROCEDURE — 85025 COMPLETE CBC W/AUTO DIFF WBC: CPT

## 2021-05-27 PROCEDURE — 36415 COLL VENOUS BLD VENIPUNCTURE: CPT

## 2021-05-28 ENCOUNTER — HOSPITAL ENCOUNTER (OUTPATIENT)
Age: 76
End: 2021-05-28
Payer: MEDICARE

## 2021-05-28 VITALS — BODY MASS INDEX: 25.9 KG/M2

## 2021-05-28 DIAGNOSIS — E04.1: ICD-10-CM

## 2021-05-28 DIAGNOSIS — E55.9: ICD-10-CM

## 2021-05-28 DIAGNOSIS — R73.09: Primary | ICD-10-CM

## 2021-05-28 DIAGNOSIS — E53.8: ICD-10-CM

## 2021-05-28 DIAGNOSIS — E78.5: ICD-10-CM

## 2021-05-28 LAB
ALANINE AMINOTRANSFER ALT/SGPT: 29 U/L (ref 13–56)
ALBUMIN SERPL-MCNC: 4 G/DL (ref 3.2–5)
ALKALINE PHOSPHATASE: 77 U/L (ref 45–117)
ANION GAP: 6 (ref 5–15)
AST(SGOT): 27 U/L (ref 15–37)
BUN SERPL-MCNC: 14 MG/DL (ref 7–18)
BUN/CREAT RATIO: 21 RATIO (ref 10–20)
CALCIUM SERPL-MCNC: 8.8 MG/DL (ref 8.5–10.1)
CARBON DIOXIDE: 29 MMOL/L (ref 21–32)
CHLORIDE: 106 MMOL/L (ref 98–107)
CHOLEST SERPL-MCNC: 231 MG/DL
EST GLOM FILT RATE - AFR AMER: 111 ML/MIN (ref 60–?)
FOLATES, (FOLIC ACID): 14.1 NG/ML (ref 3.1–55.4)
GLOBULIN: 2.8 G/DL (ref 2.2–4.2)
GLUCOSE: 92 MG/DL (ref 74–106)
POTASSIUM: 4 MMOL/L (ref 3.5–5.1)
TRIGLYCERIDES: 142 MG/DL
VITAMIN B12: 651 PG/ML (ref 211–911)
VITAMIN D,25 HYDROXY: 54.5 NG/ML
VLDLC SERPL-MCNC: 28 MG/DL (ref 5–40)

## 2021-05-28 PROCEDURE — 82306 VITAMIN D 25 HYDROXY: CPT

## 2021-05-28 PROCEDURE — 84443 ASSAY THYROID STIM HORMONE: CPT

## 2021-05-28 PROCEDURE — 80061 LIPID PANEL: CPT

## 2021-05-28 PROCEDURE — 82607 VITAMIN B-12: CPT

## 2021-05-28 PROCEDURE — 80053 COMPREHEN METABOLIC PANEL: CPT

## 2021-05-28 PROCEDURE — 82746 ASSAY OF FOLIC ACID SERUM: CPT

## 2021-06-04 ENCOUNTER — HOSPITAL ENCOUNTER (OUTPATIENT)
Age: 76
End: 2021-06-04
Payer: MEDICARE

## 2021-06-04 VITALS — BODY MASS INDEX: 25.9 KG/M2

## 2021-06-04 DIAGNOSIS — E04.1: Primary | ICD-10-CM

## 2021-06-04 DIAGNOSIS — R93.7: ICD-10-CM

## 2021-06-04 PROCEDURE — A9503 TC99M MEDRONATE: HCPCS

## 2021-06-04 PROCEDURE — 78300 BONE IMAGING LIMITED AREA: CPT

## 2021-06-04 PROCEDURE — 76536 US EXAM OF HEAD AND NECK: CPT

## 2021-07-06 ENCOUNTER — HOSPITAL ENCOUNTER (OUTPATIENT)
Dept: HOSPITAL 100 - MTLAB | Age: 76
Discharge: HOME | End: 2021-07-06
Payer: MEDICARE

## 2021-07-06 VITALS — BODY MASS INDEX: 25.9 KG/M2

## 2021-07-06 DIAGNOSIS — Z79.01: Primary | ICD-10-CM

## 2021-07-06 LAB — PROTHROMBIN TIME (PROTIME)PT.: 31 SECONDS (ref 11.7–14.9)

## 2021-07-06 PROCEDURE — 36415 COLL VENOUS BLD VENIPUNCTURE: CPT

## 2021-07-06 PROCEDURE — 85610 PROTHROMBIN TIME: CPT

## 2021-08-09 ENCOUNTER — HOSPITAL ENCOUNTER (OUTPATIENT)
Dept: HOSPITAL 100 - MTLAB | Age: 76
Discharge: HOME | End: 2021-08-09
Payer: MEDICARE

## 2021-08-09 ENCOUNTER — HOSPITAL ENCOUNTER (OUTPATIENT)
Age: 76
End: 2021-08-09
Payer: MEDICARE

## 2021-08-09 VITALS — BODY MASS INDEX: 25.9 KG/M2

## 2021-08-09 DIAGNOSIS — Z12.31: ICD-10-CM

## 2021-08-09 DIAGNOSIS — Z12.31: Primary | ICD-10-CM

## 2021-08-09 DIAGNOSIS — Z79.01: Primary | ICD-10-CM

## 2021-08-09 LAB — PROTHROMBIN TIME (PROTIME)PT.: 30.3 SECONDS (ref 11.7–14.9)

## 2021-08-09 PROCEDURE — 77063 BREAST TOMOSYNTHESIS BI: CPT

## 2021-08-09 PROCEDURE — 36415 COLL VENOUS BLD VENIPUNCTURE: CPT

## 2021-08-09 PROCEDURE — 85610 PROTHROMBIN TIME: CPT

## 2021-08-09 PROCEDURE — 77067 SCR MAMMO BI INCL CAD: CPT

## 2021-08-26 ENCOUNTER — HOSPITAL ENCOUNTER (OUTPATIENT)
Age: 76
End: 2021-08-26
Payer: MEDICARE

## 2021-08-26 DIAGNOSIS — I34.0: ICD-10-CM

## 2021-08-26 DIAGNOSIS — I34.1: ICD-10-CM

## 2021-08-26 DIAGNOSIS — Z95.2: ICD-10-CM

## 2021-08-26 DIAGNOSIS — E78.00: ICD-10-CM

## 2021-08-26 DIAGNOSIS — I51.7: Primary | ICD-10-CM

## 2021-08-26 DIAGNOSIS — Z98.890: ICD-10-CM

## 2021-08-26 PROCEDURE — 93306 TTE W/DOPPLER COMPLETE: CPT

## 2021-09-07 ENCOUNTER — HOSPITAL ENCOUNTER (OUTPATIENT)
Dept: HOSPITAL 100 - MTLAB | Age: 76
Discharge: HOME | End: 2021-09-07
Payer: MEDICARE

## 2021-09-07 VITALS — BODY MASS INDEX: 25.9 KG/M2

## 2021-09-07 DIAGNOSIS — Z79.01: ICD-10-CM

## 2021-09-07 DIAGNOSIS — M32.9: Primary | ICD-10-CM

## 2021-09-07 DIAGNOSIS — Z79.899: ICD-10-CM

## 2021-09-07 LAB
ALANINE AMINOTRANSFER ALT/SGPT: 23 U/L (ref 13–56)
ALBUMIN SERPL-MCNC: 3.6 G/DL (ref 3.2–5)
ALKALINE PHOSPHATASE: 74 U/L (ref 45–117)
AST(SGOT): 21 U/L (ref 15–37)
BILIRUB DIRECT SERPL-MCNC: 0.13 MG/DL (ref 0–0.3)
CRP SERPL-MCNC: < 2.9 MG/L (ref 0–3)
DEPRECATED RDW RBC: 46 FL (ref 35.1–43.9)
ERYTHROCYTE [DISTWIDTH] IN BLOOD: 12.9 % (ref 11.6–14.6)
EST GLOM FILT RATE - AFR AMER: 114 ML/MIN (ref 60–?)
GLOBULIN: 3.3 G/DL (ref 2.2–4.2)
HCT VFR BLD AUTO: 40.8 % (ref 37–47)
HEMOGLOBIN: 13.7 G/DL (ref 12–15)
HGB BLD-MCNC: 13.7 G/DL (ref 12–15)
IMMATURE GRANULOCYTES COUNT: 0 X10^3/UL (ref 0–0)
MCV RBC: 96.5 FL (ref 81–99)
MEAN CORP HGB CONC: 33.6 G/DL (ref 32–36)
MEAN PLATELET VOL.: 11 FL (ref 6.2–12)
NRBC FLAGGED BY ANALYZER: 0 % (ref 0–5)
PLATELET # BLD: 198 K/MM3 (ref 150–450)
PLATELET COUNT: 198 K/MM3 (ref 150–450)
PROTHROMBIN TIME (PROTIME)PT.: 28.3 SECONDS (ref 11.7–14.9)
RBC # BLD AUTO: 4.23 M/MM3 (ref 4.2–5.4)
RBC DISTRIBUTION WIDTH CV: 12.9 % (ref 11.6–14.6)
RBC DISTRIBUTION WIDTH SD: 46 FL (ref 35.1–43.9)
WBC # BLD AUTO: 3.8 K/MM3 (ref 4.4–11)
WHITE BLOOD COUNT: 3.8 K/MM3 (ref 4.4–11)

## 2021-09-07 PROCEDURE — 86160 COMPLEMENT ANTIGEN: CPT

## 2021-09-07 PROCEDURE — 86140 C-REACTIVE PROTEIN: CPT

## 2021-09-07 PROCEDURE — 86235 NUCLEAR ANTIGEN ANTIBODY: CPT

## 2021-09-07 PROCEDURE — 86225 DNA ANTIBODY NATIVE: CPT

## 2021-09-07 PROCEDURE — 82565 ASSAY OF CREATININE: CPT

## 2021-09-07 PROCEDURE — 85610 PROTHROMBIN TIME: CPT

## 2021-09-07 PROCEDURE — 36415 COLL VENOUS BLD VENIPUNCTURE: CPT

## 2021-09-07 PROCEDURE — 85652 RBC SED RATE AUTOMATED: CPT

## 2021-09-07 PROCEDURE — 80076 HEPATIC FUNCTION PANEL: CPT

## 2021-09-07 PROCEDURE — 83516 IMMUNOASSAY NONANTIBODY: CPT

## 2021-09-07 PROCEDURE — 85025 COMPLETE CBC W/AUTO DIFF WBC: CPT

## 2021-09-08 LAB
ANTI-DSDNA  AB: 10 IU/ML (ref 0–9)
DSDNA AB SER-ACNC: 10 IU/ML (ref 0–9)
SJOGREN'S ANTI-SS-A TEST: 3.5 AI (ref 0–0.9)
SJOGREN'S ANTI-SS-B TEST: < 0.2 AI (ref 0–0.9)

## 2021-09-09 ENCOUNTER — HOSPITAL ENCOUNTER (OUTPATIENT)
Age: 76
End: 2021-09-09
Payer: MEDICARE

## 2021-09-09 DIAGNOSIS — I71.2: Primary | ICD-10-CM

## 2021-09-09 PROCEDURE — 71260 CT THORAX DX C+: CPT

## 2021-09-09 PROCEDURE — A4216 STERILE WATER/SALINE, 10 ML: HCPCS

## 2021-09-10 LAB
ACTIN IGG SERPL-ACNC: 9 UNITS (ref 0–19)
ANTI-SMOOTH MUSCLE ABS: 9 UNITS (ref 0–19)

## 2021-09-21 ENCOUNTER — HOSPITAL ENCOUNTER (OUTPATIENT)
Age: 76
End: 2021-09-21
Payer: MEDICARE

## 2021-09-21 DIAGNOSIS — M32.9: Primary | ICD-10-CM

## 2021-09-21 LAB
LEUKOCYTE ESTERASE UR QL STRIP: 25 /UL
MUCOUS THREADS URNS QL MICRO: (no result) /HPF
PROT UR QL STRIP.AUTO: 30 MG/DL
RBC UR QL: (no result) /HPF (ref 0–5)
RBC UR QL: 10 /UL
SP GR UR: 1.01 (ref 1–1.03)
SQUAMOUS URNS QL MICRO: (no result) /HPF (ref 5–10)
URINE PRESERVATIVE: (no result)

## 2021-09-21 PROCEDURE — 36415 COLL VENOUS BLD VENIPUNCTURE: CPT

## 2021-09-21 PROCEDURE — 81001 URINALYSIS AUTO W/SCOPE: CPT

## 2021-10-07 ENCOUNTER — HOSPITAL ENCOUNTER (OUTPATIENT)
Dept: HOSPITAL 100 - MTLAB | Age: 76
LOS: 24 days | Discharge: HOME | End: 2021-10-31
Payer: MEDICARE

## 2021-10-07 VITALS — BODY MASS INDEX: 25.9 KG/M2

## 2021-10-07 DIAGNOSIS — Z79.01: Primary | ICD-10-CM

## 2021-10-07 LAB — PROTHROMBIN TIME (PROTIME)PT.: 29.8 SECONDS (ref 11.7–14.9)

## 2021-10-07 PROCEDURE — 85610 PROTHROMBIN TIME: CPT

## 2021-10-07 PROCEDURE — 36415 COLL VENOUS BLD VENIPUNCTURE: CPT

## 2021-10-25 ENCOUNTER — HOSPITAL ENCOUNTER (OUTPATIENT)
Age: 76
End: 2021-10-25
Payer: MEDICARE

## 2021-10-25 DIAGNOSIS — N39.0: Primary | ICD-10-CM

## 2021-10-25 LAB
MUCOUS THREADS URNS QL MICRO: (no result) /HPF
PROT UR QL STRIP.AUTO: 15 MG/DL
RBC UR QL: (no result) /HPF (ref 0–5)
SP GR UR: 1.01 (ref 1–1.03)
SQUAMOUS URNS QL MICRO: (no result) /HPF (ref 5–10)
URINE PRESERVATIVE: (no result)

## 2021-10-25 PROCEDURE — 81001 URINALYSIS AUTO W/SCOPE: CPT

## 2021-10-30 ENCOUNTER — HOSPITAL ENCOUNTER (EMERGENCY)
Age: 76
Discharge: ANOTHER HOSPITAL | End: 2021-10-30
Payer: MEDICARE

## 2021-10-30 VITALS
HEART RATE: 72 BPM | SYSTOLIC BLOOD PRESSURE: 197 MMHG | DIASTOLIC BLOOD PRESSURE: 103 MMHG | OXYGEN SATURATION: 96 % | TEMPERATURE: 97.9 F | RESPIRATION RATE: 18 BRPM

## 2021-10-30 VITALS
DIASTOLIC BLOOD PRESSURE: 100 MMHG | HEART RATE: 68 BPM | OXYGEN SATURATION: 97 % | RESPIRATION RATE: 18 BRPM | SYSTOLIC BLOOD PRESSURE: 162 MMHG

## 2021-10-30 VITALS
DIASTOLIC BLOOD PRESSURE: 104 MMHG | RESPIRATION RATE: 14 BRPM | OXYGEN SATURATION: 100 % | SYSTOLIC BLOOD PRESSURE: 166 MMHG | HEART RATE: 67 BPM

## 2021-10-30 VITALS
DIASTOLIC BLOOD PRESSURE: 81 MMHG | HEART RATE: 67 BPM | OXYGEN SATURATION: 100 % | SYSTOLIC BLOOD PRESSURE: 110 MMHG | RESPIRATION RATE: 15 BRPM

## 2021-10-30 VITALS
RESPIRATION RATE: 17 BRPM | SYSTOLIC BLOOD PRESSURE: 123 MMHG | OXYGEN SATURATION: 99 % | HEART RATE: 69 BPM | DIASTOLIC BLOOD PRESSURE: 86 MMHG

## 2021-10-30 VITALS — BODY MASS INDEX: 23.9 KG/M2

## 2021-10-30 VITALS
DIASTOLIC BLOOD PRESSURE: 98 MMHG | RESPIRATION RATE: 15 BRPM | OXYGEN SATURATION: 100 % | HEART RATE: 63 BPM | SYSTOLIC BLOOD PRESSURE: 156 MMHG

## 2021-10-30 VITALS
OXYGEN SATURATION: 100 % | DIASTOLIC BLOOD PRESSURE: 82 MMHG | RESPIRATION RATE: 15 BRPM | HEART RATE: 62 BPM | SYSTOLIC BLOOD PRESSURE: 121 MMHG

## 2021-10-30 VITALS — RESPIRATION RATE: 16 BRPM | HEART RATE: 69 BPM | OXYGEN SATURATION: 98 %

## 2021-10-30 VITALS — RESPIRATION RATE: 15 BRPM | HEART RATE: 66 BPM | SYSTOLIC BLOOD PRESSURE: 161 MMHG | DIASTOLIC BLOOD PRESSURE: 99 MMHG

## 2021-10-30 VITALS
SYSTOLIC BLOOD PRESSURE: 197 MMHG | RESPIRATION RATE: 18 BRPM | TEMPERATURE: 97.88 F | HEART RATE: 72 BPM | DIASTOLIC BLOOD PRESSURE: 103 MMHG | OXYGEN SATURATION: 96 %

## 2021-10-30 VITALS — OXYGEN SATURATION: 98 %

## 2021-10-30 DIAGNOSIS — J96.00: ICD-10-CM

## 2021-10-30 DIAGNOSIS — Z79.01: ICD-10-CM

## 2021-10-30 DIAGNOSIS — R56.9: ICD-10-CM

## 2021-10-30 DIAGNOSIS — E78.00: ICD-10-CM

## 2021-10-30 DIAGNOSIS — Z79.899: ICD-10-CM

## 2021-10-30 DIAGNOSIS — K21.9: ICD-10-CM

## 2021-10-30 DIAGNOSIS — I60.9: ICD-10-CM

## 2021-10-30 DIAGNOSIS — Z79.82: ICD-10-CM

## 2021-10-30 DIAGNOSIS — Z95.2: ICD-10-CM

## 2021-10-30 DIAGNOSIS — I61.5: Primary | ICD-10-CM

## 2021-10-30 LAB
ALANINE AMINOTRANSFER ALT/SGPT: 28 U/L (ref 13–56)
ALBUMIN SERPL-MCNC: 3.7 G/DL (ref 3.2–5)
ALKALINE PHOSPHATASE: 80 U/L (ref 45–117)
ANION GAP: 13 (ref 5–15)
AST(SGOT): 33 U/L (ref 15–37)
BASE EXCESS BLDV CALC-SCNC: -6 MMOL/L
BUN SERPL-MCNC: 18 MG/DL (ref 7–18)
BUN/CREAT RATIO: 23.7 RATIO (ref 10–20)
CALCIUM SERPL-MCNC: 8.6 MG/DL (ref 8.5–10.1)
CARBON DIOXIDE: 22 MMOL/L (ref 21–32)
CHLORIDE: 104 MMOL/L (ref 98–107)
DEPRECATED RDW RBC: 49.2 FL (ref 35.1–43.9)
ERYTHROCYTE [DISTWIDTH] IN BLOOD: 13.6 % (ref 11.6–14.6)
EST GLOM FILT RATE - AFR AMER: 96 ML/MIN (ref 60–?)
ESTIMATED CREATININE CLEARANCE: 46.2 ML/MIN
FI02: 35
GLOBULIN: 3.4 G/DL (ref 2.2–4.2)
GLUCOSE, DIPSTICK: 100 MG/DL
GLUCOSE: 164 MG/DL (ref 74–106)
HCT VFR BLD AUTO: 41.6 % (ref 37–47)
HEMOGLOBIN: 13.9 G/DL (ref 12–15)
HGB BLD-MCNC: 13.9 G/DL (ref 12–15)
IMMATURE GRANULOCYTES COUNT: 0.03 X10^3/UL (ref 0–0)
KETONE-DIPSTICK: 5 MG/DL
LIPASE: 86 U/L (ref 73–393)
MCV RBC: 97.9 FL (ref 81–99)
MEAN CORP HGB CONC: 33.4 G/DL (ref 32–36)
MEAN PLATELET VOL.: 10 FL (ref 6.2–12)
MUCOUS THREADS URNS QL MICRO: (no result) /HPF
NRBC FLAGGED BY ANALYZER: 0 % (ref 0–5)
PEEP: 5
PLATELET # BLD: 244 K/MM3 (ref 150–450)
PLATELET COUNT: 244 K/MM3 (ref 150–450)
PO2 BLDA: 44 MMHG (ref 75–100)
POTASSIUM: 3 MMOL/L (ref 3.5–5.1)
PROT UR QL STRIP.AUTO: 15 MG/DL
PROTHROMBIN TIME (PROTIME)PT.: 34.2 SECONDS (ref 11.7–14.9)
RBC # BLD AUTO: 4.25 M/MM3 (ref 4.2–5.4)
RBC DISTRIBUTION WIDTH CV: 13.6 % (ref 11.6–14.6)
RBC DISTRIBUTION WIDTH SD: 49.2 FL (ref 35.1–43.9)
RBC UR QL: (no result) /HPF (ref 0–5)
RR: 14
SO2: 81 % (ref 95–99)
SP GR UR: 1.01 (ref 1–1.03)
SQUAMOUS URNS QL MICRO: (no result) /HPF (ref 5–10)
TROPONIN-I HS: 10 PG/ML (ref 3–54)
URINE PRESERVATIVE: (no result)
WBC # BLD AUTO: 9.9 K/MM3 (ref 4.4–11)
WHITE BLOOD COUNT: 9.9 K/MM3 (ref 4.4–11)

## 2021-10-30 PROCEDURE — 74018 RADEX ABDOMEN 1 VIEW: CPT

## 2021-10-30 PROCEDURE — 93005 ELECTROCARDIOGRAM TRACING: CPT

## 2021-10-30 PROCEDURE — 99285 EMERGENCY DEPT VISIT HI MDM: CPT

## 2021-10-30 PROCEDURE — A4216 STERILE WATER/SALINE, 10 ML: HCPCS

## 2021-10-30 PROCEDURE — 96365 THER/PROPH/DIAG IV INF INIT: CPT

## 2021-10-30 PROCEDURE — 96367 TX/PROPH/DG ADDL SEQ IV INF: CPT

## 2021-10-30 PROCEDURE — 83690 ASSAY OF LIPASE: CPT

## 2021-10-30 PROCEDURE — 80053 COMPREHEN METABOLIC PANEL: CPT

## 2021-10-30 PROCEDURE — 72125 CT NECK SPINE W/O DYE: CPT

## 2021-10-30 PROCEDURE — 96375 TX/PRO/DX INJ NEW DRUG ADDON: CPT

## 2021-10-30 PROCEDURE — 31500 INSERT EMERGENCY AIRWAY: CPT

## 2021-10-30 PROCEDURE — 81001 URINALYSIS AUTO W/SCOPE: CPT

## 2021-10-30 PROCEDURE — 84484 ASSAY OF TROPONIN QUANT: CPT

## 2021-10-30 PROCEDURE — 99251: CPT

## 2021-10-30 PROCEDURE — 51702 INSERT TEMP BLADDER CATH: CPT

## 2021-10-30 PROCEDURE — 96366 THER/PROPH/DIAG IV INF ADDON: CPT

## 2021-10-30 PROCEDURE — 83605 ASSAY OF LACTIC ACID: CPT

## 2021-10-30 PROCEDURE — 36600 WITHDRAWAL OF ARTERIAL BLOOD: CPT

## 2021-10-30 PROCEDURE — G0463 HOSPITAL OUTPT CLINIC VISIT: HCPCS

## 2021-10-30 PROCEDURE — 74177 CT ABD & PELVIS W/CONTRAST: CPT

## 2021-10-30 PROCEDURE — 94002 VENT MGMT INPAT INIT DAY: CPT

## 2021-10-30 PROCEDURE — 85610 PROTHROMBIN TIME: CPT

## 2021-10-30 PROCEDURE — 70450 CT HEAD/BRAIN W/O DYE: CPT

## 2021-10-30 PROCEDURE — 85025 COMPLETE CBC W/AUTO DIFF WBC: CPT

## 2021-10-30 PROCEDURE — 82803 BLOOD GASES ANY COMBINATION: CPT

## 2021-10-30 PROCEDURE — 71045 X-RAY EXAM CHEST 1 VIEW: CPT

## 2021-11-29 ENCOUNTER — HOSPITAL ENCOUNTER (EMERGENCY)
Age: 76
LOS: 1 days | Discharge: NURSING, REHAB OR LONG-TERM CARE | End: 2021-11-30
Payer: MEDICARE

## 2021-11-29 ENCOUNTER — HOSPITAL ENCOUNTER (INPATIENT)
Age: 76
LOS: 37 days | Discharge: HOME HEALTH | DRG: 65 | End: 2022-01-05
Payer: MEDICARE

## 2021-11-29 VITALS
DIASTOLIC BLOOD PRESSURE: 105 MMHG | TEMPERATURE: 98.42 F | OXYGEN SATURATION: 93 % | HEART RATE: 109 BPM | RESPIRATION RATE: 20 BRPM | SYSTOLIC BLOOD PRESSURE: 160 MMHG

## 2021-11-29 VITALS
HEART RATE: 99 BPM | RESPIRATION RATE: 12 BRPM | TEMPERATURE: 98.7 F | DIASTOLIC BLOOD PRESSURE: 100 MMHG | SYSTOLIC BLOOD PRESSURE: 153 MMHG | OXYGEN SATURATION: 97 %

## 2021-11-29 VITALS — HEART RATE: 107 BPM | SYSTOLIC BLOOD PRESSURE: 163 MMHG | DIASTOLIC BLOOD PRESSURE: 93 MMHG

## 2021-11-29 VITALS — OXYGEN SATURATION: 95 % | HEART RATE: 107 BPM

## 2021-11-29 VITALS — BODY MASS INDEX: 26.2 KG/M2

## 2021-11-29 DIAGNOSIS — R33.9: Primary | ICD-10-CM

## 2021-11-29 DIAGNOSIS — L02.211: ICD-10-CM

## 2021-11-29 DIAGNOSIS — E78.5: ICD-10-CM

## 2021-11-29 DIAGNOSIS — Z79.01: ICD-10-CM

## 2021-11-29 DIAGNOSIS — I67.1: ICD-10-CM

## 2021-11-29 DIAGNOSIS — K94.22: ICD-10-CM

## 2021-11-29 DIAGNOSIS — Z79.82: ICD-10-CM

## 2021-11-29 DIAGNOSIS — I60.9: Primary | ICD-10-CM

## 2021-11-29 DIAGNOSIS — K21.9: ICD-10-CM

## 2021-11-29 DIAGNOSIS — Z93.1: ICD-10-CM

## 2021-11-29 DIAGNOSIS — F32.A: ICD-10-CM

## 2021-11-29 DIAGNOSIS — Z79.899: ICD-10-CM

## 2021-11-29 DIAGNOSIS — M32.9: ICD-10-CM

## 2021-11-29 DIAGNOSIS — Z86.73: ICD-10-CM

## 2021-11-29 DIAGNOSIS — Z95.2: ICD-10-CM

## 2021-11-29 DIAGNOSIS — I61.5: ICD-10-CM

## 2021-11-29 DIAGNOSIS — G40.909: ICD-10-CM

## 2021-11-29 DIAGNOSIS — I10: ICD-10-CM

## 2021-11-29 LAB — PROTHROMBIN TIME (PROTIME)PT.: 31.5 SECONDS (ref 11.7–14.9)

## 2021-11-29 PROCEDURE — 70450 CT HEAD/BRAIN W/O DYE: CPT

## 2021-11-29 PROCEDURE — 87070 CULTURE OTHR SPECIMN AEROBIC: CPT

## 2021-11-29 PROCEDURE — 85610 PROTHROMBIN TIME: CPT

## 2021-11-29 PROCEDURE — 36415 COLL VENOUS BLD VENIPUNCTURE: CPT

## 2021-11-29 PROCEDURE — 87088 URINE BACTERIA CULTURE: CPT

## 2021-11-29 PROCEDURE — 92611 MOTION FLUOROSCOPY/SWALLOW: CPT

## 2021-11-29 PROCEDURE — 92507 TX SP LANG VOICE COMM INDIV: CPT

## 2021-11-29 PROCEDURE — 99283 EMERGENCY DEPT VISIT LOW MDM: CPT

## 2021-11-29 PROCEDURE — 87077 CULTURE AEROBIC IDENTIFY: CPT

## 2021-11-29 PROCEDURE — A4216 STERILE WATER/SALINE, 10 ML: HCPCS

## 2021-11-29 PROCEDURE — 92610 EVALUATE SWALLOWING FUNCTION: CPT

## 2021-11-29 PROCEDURE — U0005 INFEC AGEN DETEC AMPLI PROBE: HCPCS

## 2021-11-29 PROCEDURE — U0003 INFECTIOUS AGENT DETECTION BY NUCLEIC ACID (DNA OR RNA); SEVERE ACUTE RESPIRATORY SYNDROME CORONAVIRUS 2 (SARS-COV-2) (CORONAVIRUS DISEASE [COVID-19]), AMPLIFIED PROBE TECHNIQUE, MAKING USE OF HIGH THROUGHPUT TECHNOLOGIES AS DESCRIBED BY CMS-2020-01-R: HCPCS

## 2021-11-29 PROCEDURE — 87086 URINE CULTURE/COLONY COUNT: CPT

## 2021-11-29 PROCEDURE — 80048 BASIC METABOLIC PNL TOTAL CA: CPT

## 2021-11-29 PROCEDURE — 87186 SC STD MICRODIL/AGAR DIL: CPT

## 2021-11-29 PROCEDURE — 87640 STAPH A DNA AMP PROBE: CPT

## 2021-11-29 PROCEDURE — 96375 TX/PRO/DX INJ NEW DRUG ADDON: CPT

## 2021-11-29 PROCEDURE — 97802 MEDICAL NUTRITION INDIV IN: CPT

## 2021-11-29 PROCEDURE — 96365 THER/PROPH/DIAG IV INF INIT: CPT

## 2021-11-29 PROCEDURE — 92526 ORAL FUNCTION THERAPY: CPT

## 2021-11-29 PROCEDURE — 74177 CT ABD & PELVIS W/CONTRAST: CPT

## 2021-11-29 PROCEDURE — 97110 THERAPEUTIC EXERCISES: CPT

## 2021-11-29 PROCEDURE — 97535 SELF CARE MNGMENT TRAINING: CPT

## 2021-11-29 PROCEDURE — 87635 SARS-COV-2 COVID-19 AMP PRB: CPT

## 2021-11-29 PROCEDURE — 97112 NEUROMUSCULAR REEDUCATION: CPT

## 2021-11-29 PROCEDURE — 87205 SMEAR GRAM STAIN: CPT

## 2021-11-29 PROCEDURE — 85025 COMPLETE CBC W/AUTO DIFF WBC: CPT

## 2021-11-29 PROCEDURE — 83605 ASSAY OF LACTIC ACID: CPT

## 2021-11-29 PROCEDURE — 97803 MED NUTRITION INDIV SUBSEQ: CPT

## 2021-11-29 PROCEDURE — 97116 GAIT TRAINING THERAPY: CPT

## 2021-11-29 PROCEDURE — 71046 X-RAY EXAM CHEST 2 VIEWS: CPT

## 2021-11-29 PROCEDURE — 97166 OT EVAL MOD COMPLEX 45 MIN: CPT

## 2021-11-29 PROCEDURE — 87807 RSV ASSAY W/OPTIC: CPT

## 2021-11-29 PROCEDURE — 36569 INSJ PICC 5 YR+ W/O IMAGING: CPT

## 2021-11-29 PROCEDURE — 97530 THERAPEUTIC ACTIVITIES: CPT

## 2021-11-29 PROCEDURE — 81001 URINALYSIS AUTO W/SCOPE: CPT

## 2021-11-29 PROCEDURE — 97162 PT EVAL MOD COMPLEX 30 MIN: CPT

## 2021-11-29 PROCEDURE — 74230 X-RAY XM SWLNG FUNCJ C+: CPT

## 2021-11-29 PROCEDURE — 87804 INFLUENZA ASSAY W/OPTIC: CPT

## 2021-11-30 VITALS
DIASTOLIC BLOOD PRESSURE: 84 MMHG | HEART RATE: 93 BPM | SYSTOLIC BLOOD PRESSURE: 127 MMHG | OXYGEN SATURATION: 95 % | TEMPERATURE: 98.06 F | RESPIRATION RATE: 18 BRPM

## 2021-11-30 VITALS
OXYGEN SATURATION: 93 % | RESPIRATION RATE: 14 BRPM | SYSTOLIC BLOOD PRESSURE: 134 MMHG | TEMPERATURE: 97.88 F | HEART RATE: 91 BPM | DIASTOLIC BLOOD PRESSURE: 81 MMHG

## 2021-11-30 VITALS
OXYGEN SATURATION: 94 % | DIASTOLIC BLOOD PRESSURE: 94 MMHG | SYSTOLIC BLOOD PRESSURE: 149 MMHG | HEART RATE: 102 BPM | RESPIRATION RATE: 16 BRPM

## 2021-11-30 VITALS
SYSTOLIC BLOOD PRESSURE: 126 MMHG | RESPIRATION RATE: 16 BRPM | DIASTOLIC BLOOD PRESSURE: 87 MMHG | OXYGEN SATURATION: 93 % | HEART RATE: 95 BPM

## 2021-11-30 VITALS
HEART RATE: 82 BPM | DIASTOLIC BLOOD PRESSURE: 78 MMHG | RESPIRATION RATE: 15 BRPM | SYSTOLIC BLOOD PRESSURE: 124 MMHG | OXYGEN SATURATION: 97 %

## 2021-11-30 LAB
ANION GAP: 7 (ref 5–15)
ANION GAP: 8 (ref 5–15)
BUN SERPL-MCNC: 10 MG/DL (ref 7–18)
BUN SERPL-MCNC: 9 MG/DL (ref 7–18)
BUN/CREAT RATIO: 14.7 RATIO (ref 10–20)
BUN/CREAT RATIO: 15.8 RATIO (ref 10–20)
CALCIUM SERPL-MCNC: 8.7 MG/DL (ref 8.5–10.1)
CALCIUM SERPL-MCNC: 9 MG/DL (ref 8.5–10.1)
CARBON DIOXIDE: 26 MMOL/L (ref 21–32)
CARBON DIOXIDE: 27 MMOL/L (ref 21–32)
CHLORIDE: 103 MMOL/L (ref 98–107)
CHLORIDE: 107 MMOL/L (ref 98–107)
DEPRECATED RDW RBC: 50 FL (ref 35.1–43.9)
DEPRECATED RDW RBC: 51.3 FL (ref 35.1–43.9)
DIFFERENTIAL INDICATED: (no result)
ERYTHROCYTE [DISTWIDTH] IN BLOOD: 13.5 % (ref 11.6–14.6)
ERYTHROCYTE [DISTWIDTH] IN BLOOD: 14 % (ref 11.6–14.6)
EST GLOM FILT RATE - AFR AMER: 118 ML/MIN (ref 60–?)
EST GLOM FILT RATE - AFR AMER: 123 ML/MIN (ref 60–?)
ESTIMATED CREATININE CLEARANCE: 39.04 ML/MIN
ESTIMATED CREATININE CLEARANCE: 40.64 ML/MIN
GLUCOSE: 108 MG/DL (ref 74–106)
GLUCOSE: 96 MG/DL (ref 74–106)
HCT VFR BLD AUTO: 29.9 % (ref 37–47)
HCT VFR BLD AUTO: 31.8 % (ref 37–47)
HEMOGLOBIN: 10 G/DL (ref 12–15)
HEMOGLOBIN: 10.7 G/DL (ref 12–15)
HGB BLD-MCNC: 10 G/DL (ref 12–15)
HGB BLD-MCNC: 10.7 G/DL (ref 12–15)
IMMATURE GRANULOCYTES COUNT: 0.03 X10^3/UL (ref 0–0)
IMMATURE GRANULOCYTES COUNT: 0.03 X10^3/UL (ref 0–0)
LEUKOCYTE ESTERASE UR QL STRIP: 500 /UL
MCV RBC: 100 FL (ref 81–99)
MCV RBC: 99.4 FL (ref 81–99)
MEAN CORP HGB CONC: 33.4 G/DL (ref 32–36)
MEAN CORP HGB CONC: 33.6 G/DL (ref 32–36)
MEAN PLATELET VOL.: 10 FL (ref 6.2–12)
MEAN PLATELET VOL.: 10.1 FL (ref 6.2–12)
MUCOUS THREADS URNS QL MICRO: (no result) /HPF
NRBC FLAGGED BY ANALYZER: 0 % (ref 0–5)
NRBC FLAGGED BY ANALYZER: 0 % (ref 0–5)
PLATELET # BLD: 237 K/MM3 (ref 150–450)
PLATELET # BLD: 241 K/MM3 (ref 150–450)
PLATELET COUNT: 237 K/MM3 (ref 150–450)
PLATELET COUNT: 241 K/MM3 (ref 150–450)
POSITIVE DIFFERENTIAL: YES
POTASSIUM: 3.8 MMOL/L (ref 3.5–5.1)
POTASSIUM: 3.9 MMOL/L (ref 3.5–5.1)
PROT UR QL STRIP.AUTO: 30 MG/DL
PROTHROMBIN TIME (PROTIME)PT.: 31 SECONDS (ref 11.7–14.9)
RBC # BLD AUTO: 2.99 M/MM3 (ref 4.2–5.4)
RBC # BLD AUTO: 3.2 M/MM3 (ref 4.2–5.4)
RBC DISTRIBUTION WIDTH CV: 13.5 % (ref 11.6–14.6)
RBC DISTRIBUTION WIDTH CV: 14 % (ref 11.6–14.6)
RBC DISTRIBUTION WIDTH SD: 50 FL (ref 35.1–43.9)
RBC DISTRIBUTION WIDTH SD: 51.3 FL (ref 35.1–43.9)
RBC UR QL: (no result) /HPF (ref 0–5)
RBC UR QL: 25 /UL
SCAN SMEAR PER REVIEW CRITERIA: (no result)
SP GR UR: 1.01 (ref 1–1.03)
SQUAMOUS URNS QL MICRO: (no result) /HPF (ref 5–10)
STAPH AUREUS DNA BY PCR: NEGATIVE
URINE PRESERVATIVE: (no result)
WBC # BLD AUTO: 11.8 K/MM3 (ref 4.4–11)
WBC # BLD AUTO: 9.8 K/MM3 (ref 4.4–11)
WHITE BLOOD COUNT: 11.8 K/MM3 (ref 4.4–11)
WHITE BLOOD COUNT: 9.8 K/MM3 (ref 4.4–11)

## 2021-12-01 VITALS
TEMPERATURE: 98.5 F | RESPIRATION RATE: 24 BRPM | DIASTOLIC BLOOD PRESSURE: 86 MMHG | SYSTOLIC BLOOD PRESSURE: 128 MMHG | HEART RATE: 105 BPM | OXYGEN SATURATION: 96 %

## 2021-12-01 VITALS — OXYGEN SATURATION: 95 % | RESPIRATION RATE: 16 BRPM | HEART RATE: 82 BPM

## 2021-12-02 VITALS
OXYGEN SATURATION: 96 % | HEART RATE: 90 BPM | DIASTOLIC BLOOD PRESSURE: 90 MMHG | RESPIRATION RATE: 16 BRPM | TEMPERATURE: 96.44 F | SYSTOLIC BLOOD PRESSURE: 144 MMHG

## 2021-12-02 VITALS — HEART RATE: 99 BPM | RESPIRATION RATE: 16 BRPM | OXYGEN SATURATION: 95 %

## 2021-12-02 LAB — PROTHROMBIN TIME (PROTIME)PT.: 32.1 SECONDS (ref 11.7–14.9)

## 2021-12-03 VITALS
SYSTOLIC BLOOD PRESSURE: 149 MMHG | HEART RATE: 79 BPM | TEMPERATURE: 97.3 F | DIASTOLIC BLOOD PRESSURE: 85 MMHG | RESPIRATION RATE: 14 BRPM | OXYGEN SATURATION: 95 %

## 2021-12-04 VITALS
DIASTOLIC BLOOD PRESSURE: 73 MMHG | HEART RATE: 84 BPM | RESPIRATION RATE: 18 BRPM | TEMPERATURE: 98.24 F | OXYGEN SATURATION: 95 % | SYSTOLIC BLOOD PRESSURE: 135 MMHG

## 2021-12-05 VITALS
DIASTOLIC BLOOD PRESSURE: 88 MMHG | HEART RATE: 94 BPM | TEMPERATURE: 97.88 F | OXYGEN SATURATION: 94 % | RESPIRATION RATE: 16 BRPM | SYSTOLIC BLOOD PRESSURE: 153 MMHG

## 2021-12-05 VITALS — HEART RATE: 78 BPM | RESPIRATION RATE: 18 BRPM | OXYGEN SATURATION: 94 %

## 2021-12-06 VITALS
HEART RATE: 87 BPM | OXYGEN SATURATION: 98 % | RESPIRATION RATE: 16 BRPM | SYSTOLIC BLOOD PRESSURE: 154 MMHG | DIASTOLIC BLOOD PRESSURE: 93 MMHG | TEMPERATURE: 97.52 F

## 2021-12-06 LAB — PROTHROMBIN TIME (PROTIME)PT.: 27.3 SECONDS (ref 11.7–14.9)

## 2021-12-07 VITALS
OXYGEN SATURATION: 93 % | SYSTOLIC BLOOD PRESSURE: 144 MMHG | HEART RATE: 95 BPM | TEMPERATURE: 98.6 F | RESPIRATION RATE: 16 BRPM | DIASTOLIC BLOOD PRESSURE: 92 MMHG

## 2021-12-07 VITALS — RESPIRATION RATE: 18 BRPM | OXYGEN SATURATION: 93 % | HEART RATE: 95 BPM

## 2021-12-07 LAB
ANION GAP: 6 (ref 5–15)
BUN SERPL-MCNC: 19 MG/DL (ref 7–18)
BUN/CREAT RATIO: 37.5 RATIO (ref 10–20)
CALCIUM SERPL-MCNC: 8.2 MG/DL (ref 8.5–10.1)
CARBON DIOXIDE: 29 MMOL/L (ref 21–32)
CHLORIDE: 107 MMOL/L (ref 98–107)
DEPRECATED RDW RBC: 55.6 FL (ref 35.1–43.9)
ERYTHROCYTE [DISTWIDTH] IN BLOOD: 14.9 % (ref 11.6–14.6)
EST GLOM FILT RATE - AFR AMER: 153 ML/MIN (ref 60–?)
ESTIMATED CREATININE CLEARANCE: 39.04 ML/MIN
GLUCOSE: 105 MG/DL (ref 74–106)
HCT VFR BLD AUTO: 29.3 % (ref 37–47)
HEMOGLOBIN: 9.3 G/DL (ref 12–15)
HGB BLD-MCNC: 9.3 G/DL (ref 12–15)
IMMATURE GRANULOCYTES COUNT: 0.04 X10^3/UL (ref 0–0)
MCV RBC: 102.4 FL (ref 81–99)
MEAN CORP HGB CONC: 31.7 G/DL (ref 32–36)
MEAN PLATELET VOL.: 10.1 FL (ref 6.2–12)
NRBC FLAGGED BY ANALYZER: 0 % (ref 0–5)
PLATELET # BLD: 373 K/MM3 (ref 150–450)
PLATELET COUNT: 373 K/MM3 (ref 150–450)
POTASSIUM: 3.4 MMOL/L (ref 3.5–5.1)
RBC # BLD AUTO: 2.86 M/MM3 (ref 4.2–5.4)
RBC DISTRIBUTION WIDTH CV: 14.9 % (ref 11.6–14.6)
RBC DISTRIBUTION WIDTH SD: 55.6 FL (ref 35.1–43.9)
WBC # BLD AUTO: 6.4 K/MM3 (ref 4.4–11)
WHITE BLOOD COUNT: 6.4 K/MM3 (ref 4.4–11)

## 2021-12-08 ENCOUNTER — HOSPITAL ENCOUNTER (OUTPATIENT)
Age: 76
End: 2021-12-08
Payer: MEDICARE

## 2021-12-08 VITALS — OXYGEN SATURATION: 94 % | HEART RATE: 92 BPM | RESPIRATION RATE: 16 BRPM

## 2021-12-08 VITALS — HEART RATE: 83 BPM

## 2021-12-08 VITALS
RESPIRATION RATE: 15 BRPM | HEART RATE: 83 BPM | TEMPERATURE: 98.78 F | SYSTOLIC BLOOD PRESSURE: 142 MMHG | OXYGEN SATURATION: 91 % | DIASTOLIC BLOOD PRESSURE: 84 MMHG

## 2021-12-08 VITALS — HEART RATE: 90 BPM | SYSTOLIC BLOOD PRESSURE: 149 MMHG | DIASTOLIC BLOOD PRESSURE: 95 MMHG

## 2021-12-08 VITALS — SYSTOLIC BLOOD PRESSURE: 149 MMHG | DIASTOLIC BLOOD PRESSURE: 93 MMHG

## 2021-12-08 VITALS — HEART RATE: 90 BPM

## 2021-12-08 DIAGNOSIS — G44.52: Primary | ICD-10-CM

## 2021-12-08 LAB
ANION GAP: 7 (ref 5–15)
BUN SERPL-MCNC: 20 MG/DL (ref 7–18)
BUN/CREAT RATIO: 38.4 RATIO (ref 10–20)
CALCIUM SERPL-MCNC: 8.5 MG/DL (ref 8.5–10.1)
CARBON DIOXIDE: 27 MMOL/L (ref 21–32)
CHLORIDE: 109 MMOL/L (ref 98–107)
EST GLOM FILT RATE - AFR AMER: 148 ML/MIN (ref 60–?)
ESTIMATED CREATININE CLEARANCE: 39.04 ML/MIN
GLUCOSE: 106 MG/DL (ref 74–106)
POTASSIUM: 3.5 MMOL/L (ref 3.5–5.1)

## 2021-12-08 PROCEDURE — 70450 CT HEAD/BRAIN W/O DYE: CPT

## 2021-12-09 VITALS — HEART RATE: 94 BPM | RESPIRATION RATE: 16 BRPM | OXYGEN SATURATION: 92 %

## 2021-12-09 VITALS
DIASTOLIC BLOOD PRESSURE: 84 MMHG | TEMPERATURE: 98.96 F | HEART RATE: 88 BPM | SYSTOLIC BLOOD PRESSURE: 129 MMHG | OXYGEN SATURATION: 95 % | RESPIRATION RATE: 16 BRPM

## 2021-12-09 VITALS — SYSTOLIC BLOOD PRESSURE: 129 MMHG | DIASTOLIC BLOOD PRESSURE: 84 MMHG | HEART RATE: 88 BPM

## 2021-12-09 VITALS — DIASTOLIC BLOOD PRESSURE: 84 MMHG | HEART RATE: 88 BPM | SYSTOLIC BLOOD PRESSURE: 129 MMHG

## 2021-12-09 VITALS — DIASTOLIC BLOOD PRESSURE: 79 MMHG | HEART RATE: 90 BPM | SYSTOLIC BLOOD PRESSURE: 130 MMHG

## 2021-12-09 LAB — PROTHROMBIN TIME (PROTIME)PT.: 29.4 SECONDS (ref 11.7–14.9)

## 2021-12-10 ENCOUNTER — HOSPITAL ENCOUNTER (OUTPATIENT)
Age: 76
End: 2021-12-10
Payer: MEDICARE

## 2021-12-10 VITALS
TEMPERATURE: 97.34 F | DIASTOLIC BLOOD PRESSURE: 83 MMHG | SYSTOLIC BLOOD PRESSURE: 130 MMHG | RESPIRATION RATE: 16 BRPM | HEART RATE: 86 BPM | OXYGEN SATURATION: 93 %

## 2021-12-10 VITALS
RESPIRATION RATE: 16 BRPM | TEMPERATURE: 97.8 F | HEART RATE: 71 BPM | SYSTOLIC BLOOD PRESSURE: 115 MMHG | OXYGEN SATURATION: 95 % | DIASTOLIC BLOOD PRESSURE: 72 MMHG

## 2021-12-10 VITALS
SYSTOLIC BLOOD PRESSURE: 131 MMHG | RESPIRATION RATE: 17 BRPM | HEART RATE: 88 BPM | DIASTOLIC BLOOD PRESSURE: 88 MMHG | TEMPERATURE: 97.3 F | OXYGEN SATURATION: 95 %

## 2021-12-10 VITALS — HEART RATE: 80 BPM | DIASTOLIC BLOOD PRESSURE: 72 MMHG | SYSTOLIC BLOOD PRESSURE: 115 MMHG

## 2021-12-10 VITALS — HEART RATE: 86 BPM

## 2021-12-10 DIAGNOSIS — K65.1: Primary | ICD-10-CM

## 2021-12-10 PROCEDURE — 74160 CT ABDOMEN W/CONTRAST: CPT

## 2021-12-10 PROCEDURE — A4216 STERILE WATER/SALINE, 10 ML: HCPCS

## 2021-12-11 VITALS
OXYGEN SATURATION: 96 % | DIASTOLIC BLOOD PRESSURE: 90 MMHG | HEART RATE: 77 BPM | SYSTOLIC BLOOD PRESSURE: 156 MMHG | RESPIRATION RATE: 14 BRPM | TEMPERATURE: 97.88 F

## 2021-12-11 VITALS — OXYGEN SATURATION: 98 % | RESPIRATION RATE: 16 BRPM | HEART RATE: 85 BPM

## 2021-12-11 VITALS — HEART RATE: 77 BPM

## 2021-12-11 VITALS — HEART RATE: 80 BPM | SYSTOLIC BLOOD PRESSURE: 123 MMHG | DIASTOLIC BLOOD PRESSURE: 80 MMHG

## 2021-12-11 VITALS — RESPIRATION RATE: 16 BRPM

## 2021-12-11 VITALS
DIASTOLIC BLOOD PRESSURE: 78 MMHG | SYSTOLIC BLOOD PRESSURE: 136 MMHG | HEART RATE: 65 BPM | OXYGEN SATURATION: 98 % | RESPIRATION RATE: 14 BRPM | TEMPERATURE: 98.5 F

## 2021-12-12 VITALS
OXYGEN SATURATION: 95 % | DIASTOLIC BLOOD PRESSURE: 71 MMHG | TEMPERATURE: 98.4 F | SYSTOLIC BLOOD PRESSURE: 130 MMHG | HEART RATE: 76 BPM | RESPIRATION RATE: 16 BRPM

## 2021-12-12 VITALS — DIASTOLIC BLOOD PRESSURE: 91 MMHG | SYSTOLIC BLOOD PRESSURE: 128 MMHG | HEART RATE: 85 BPM

## 2021-12-12 VITALS — HEART RATE: 72 BPM

## 2021-12-13 VITALS — RESPIRATION RATE: 18 BRPM | HEART RATE: 79 BPM | OXYGEN SATURATION: 95 %

## 2021-12-13 VITALS
SYSTOLIC BLOOD PRESSURE: 149 MMHG | TEMPERATURE: 98.78 F | OXYGEN SATURATION: 93 % | DIASTOLIC BLOOD PRESSURE: 96 MMHG | HEART RATE: 84 BPM | RESPIRATION RATE: 14 BRPM

## 2021-12-13 VITALS — SYSTOLIC BLOOD PRESSURE: 149 MMHG | DIASTOLIC BLOOD PRESSURE: 96 MMHG | HEART RATE: 84 BPM

## 2021-12-13 VITALS — DIASTOLIC BLOOD PRESSURE: 99 MMHG | SYSTOLIC BLOOD PRESSURE: 149 MMHG | HEART RATE: 87 BPM

## 2021-12-13 LAB — PROTHROMBIN TIME (PROTIME)PT.: 22.4 SECONDS (ref 11.7–14.9)

## 2021-12-14 VITALS
TEMPERATURE: 97.8 F | HEART RATE: 88 BPM | RESPIRATION RATE: 16 BRPM | OXYGEN SATURATION: 92 % | SYSTOLIC BLOOD PRESSURE: 140 MMHG | DIASTOLIC BLOOD PRESSURE: 92 MMHG

## 2021-12-14 VITALS — HEART RATE: 84 BPM | RESPIRATION RATE: 16 BRPM | OXYGEN SATURATION: 95 %

## 2021-12-14 VITALS — DIASTOLIC BLOOD PRESSURE: 104 MMHG | SYSTOLIC BLOOD PRESSURE: 151 MMHG | HEART RATE: 90 BPM

## 2021-12-14 VITALS — HEART RATE: 88 BPM

## 2021-12-14 VITALS — HEART RATE: 90 BPM | SYSTOLIC BLOOD PRESSURE: 151 MMHG | DIASTOLIC BLOOD PRESSURE: 104 MMHG

## 2021-12-14 VITALS — DIASTOLIC BLOOD PRESSURE: 94 MMHG | HEART RATE: 87 BPM | SYSTOLIC BLOOD PRESSURE: 149 MMHG

## 2021-12-14 VITALS — SYSTOLIC BLOOD PRESSURE: 160 MMHG | DIASTOLIC BLOOD PRESSURE: 95 MMHG | HEART RATE: 84 BPM

## 2021-12-14 LAB
ANION GAP: 6 (ref 5–15)
BUN SERPL-MCNC: 17 MG/DL (ref 7–18)
BUN/CREAT RATIO: 40.3 RATIO (ref 10–20)
CALCIUM SERPL-MCNC: 8.7 MG/DL (ref 8.5–10.1)
CARBON DIOXIDE: 26 MMOL/L (ref 21–32)
CHLORIDE: 108 MMOL/L (ref 98–107)
DEPRECATED RDW RBC: 57.9 FL (ref 35.1–43.9)
ERYTHROCYTE [DISTWIDTH] IN BLOOD: 15.1 % (ref 11.6–14.6)
EST GLOM FILT RATE - AFR AMER: 188 ML/MIN (ref 60–?)
ESTIMATED CREATININE CLEARANCE: 39.04 ML/MIN
GLUCOSE: 85 MG/DL (ref 74–106)
HCT VFR BLD AUTO: 32.1 % (ref 37–47)
HEMOGLOBIN: 10.2 G/DL (ref 12–15)
HGB BLD-MCNC: 10.2 G/DL (ref 12–15)
IMMATURE GRANULOCYTES COUNT: 0.02 X10^3/UL (ref 0–0)
MCV RBC: 104.2 FL (ref 81–99)
MEAN CORP HGB CONC: 31.8 G/DL (ref 32–36)
MEAN PLATELET VOL.: 10.4 FL (ref 6.2–12)
NRBC FLAGGED BY ANALYZER: 0 % (ref 0–5)
PLATELET # BLD: 339 K/MM3 (ref 150–450)
PLATELET COUNT: 339 K/MM3 (ref 150–450)
POTASSIUM: 3.9 MMOL/L (ref 3.5–5.1)
RBC # BLD AUTO: 3.08 M/MM3 (ref 4.2–5.4)
RBC DISTRIBUTION WIDTH CV: 15.1 % (ref 11.6–14.6)
RBC DISTRIBUTION WIDTH SD: 57.9 FL (ref 35.1–43.9)
WBC # BLD AUTO: 4.4 K/MM3 (ref 4.4–11)
WHITE BLOOD COUNT: 4.4 K/MM3 (ref 4.4–11)

## 2021-12-15 VITALS — RESPIRATION RATE: 16 BRPM | HEART RATE: 82 BPM | OXYGEN SATURATION: 94 %

## 2021-12-15 VITALS — HEART RATE: 87 BPM | SYSTOLIC BLOOD PRESSURE: 156 MMHG | DIASTOLIC BLOOD PRESSURE: 95 MMHG

## 2021-12-15 VITALS — HEART RATE: 90 BPM | SYSTOLIC BLOOD PRESSURE: 145 MMHG | DIASTOLIC BLOOD PRESSURE: 104 MMHG

## 2021-12-15 VITALS — HEART RATE: 82 BPM

## 2021-12-15 VITALS
OXYGEN SATURATION: 92 % | TEMPERATURE: 98.8 F | SYSTOLIC BLOOD PRESSURE: 144 MMHG | DIASTOLIC BLOOD PRESSURE: 77 MMHG | RESPIRATION RATE: 16 BRPM | HEART RATE: 78 BPM

## 2021-12-15 VITALS — DIASTOLIC BLOOD PRESSURE: 79 MMHG | SYSTOLIC BLOOD PRESSURE: 133 MMHG | HEART RATE: 82 BPM

## 2021-12-15 VITALS — HEART RATE: 90 BPM

## 2021-12-15 VITALS — HEART RATE: 79 BPM | DIASTOLIC BLOOD PRESSURE: 98 MMHG | SYSTOLIC BLOOD PRESSURE: 154 MMHG

## 2021-12-16 VITALS
RESPIRATION RATE: 16 BRPM | HEART RATE: 80 BPM | TEMPERATURE: 97.7 F | SYSTOLIC BLOOD PRESSURE: 116 MMHG | OXYGEN SATURATION: 95 % | DIASTOLIC BLOOD PRESSURE: 68 MMHG

## 2021-12-16 VITALS — DIASTOLIC BLOOD PRESSURE: 78 MMHG | HEART RATE: 80 BPM | SYSTOLIC BLOOD PRESSURE: 116 MMHG

## 2021-12-16 VITALS — SYSTOLIC BLOOD PRESSURE: 116 MMHG | DIASTOLIC BLOOD PRESSURE: 78 MMHG | HEART RATE: 80 BPM

## 2021-12-16 VITALS — HEART RATE: 86 BPM | DIASTOLIC BLOOD PRESSURE: 85 MMHG | SYSTOLIC BLOOD PRESSURE: 116 MMHG

## 2021-12-16 VITALS — HEART RATE: 86 BPM | OXYGEN SATURATION: 95 % | RESPIRATION RATE: 16 BRPM

## 2021-12-16 VITALS — HEART RATE: 80 BPM

## 2021-12-16 LAB — PROTHROMBIN TIME (PROTIME)PT.: 23.5 SECONDS (ref 11.7–14.9)

## 2021-12-17 VITALS — SYSTOLIC BLOOD PRESSURE: 112 MMHG | DIASTOLIC BLOOD PRESSURE: 81 MMHG | HEART RATE: 84 BPM

## 2021-12-17 VITALS
TEMPERATURE: 98.1 F | OXYGEN SATURATION: 96 % | DIASTOLIC BLOOD PRESSURE: 80 MMHG | RESPIRATION RATE: 16 BRPM | SYSTOLIC BLOOD PRESSURE: 119 MMHG | HEART RATE: 88 BPM

## 2021-12-17 VITALS — RESPIRATION RATE: 16 BRPM | HEART RATE: 76 BPM | OXYGEN SATURATION: 96 %

## 2021-12-17 VITALS — HEART RATE: 84 BPM

## 2021-12-17 VITALS — HEART RATE: 88 BPM

## 2021-12-18 VITALS — DIASTOLIC BLOOD PRESSURE: 83 MMHG | SYSTOLIC BLOOD PRESSURE: 134 MMHG | HEART RATE: 85 BPM

## 2021-12-18 VITALS
OXYGEN SATURATION: 94 % | DIASTOLIC BLOOD PRESSURE: 75 MMHG | SYSTOLIC BLOOD PRESSURE: 110 MMHG | HEART RATE: 81 BPM | TEMPERATURE: 97.1 F | RESPIRATION RATE: 16 BRPM

## 2021-12-18 VITALS — DIASTOLIC BLOOD PRESSURE: 74 MMHG | SYSTOLIC BLOOD PRESSURE: 97 MMHG | HEART RATE: 76 BPM

## 2021-12-18 VITALS — HEART RATE: 81 BPM

## 2021-12-18 VITALS — SYSTOLIC BLOOD PRESSURE: 116 MMHG | HEART RATE: 81 BPM | DIASTOLIC BLOOD PRESSURE: 79 MMHG

## 2021-12-19 VITALS — HEART RATE: 87 BPM | SYSTOLIC BLOOD PRESSURE: 117 MMHG | DIASTOLIC BLOOD PRESSURE: 84 MMHG

## 2021-12-19 VITALS
DIASTOLIC BLOOD PRESSURE: 78 MMHG | TEMPERATURE: 97.4 F | OXYGEN SATURATION: 96 % | SYSTOLIC BLOOD PRESSURE: 120 MMHG | HEART RATE: 84 BPM | RESPIRATION RATE: 16 BRPM

## 2021-12-19 VITALS — SYSTOLIC BLOOD PRESSURE: 129 MMHG | DIASTOLIC BLOOD PRESSURE: 78 MMHG | HEART RATE: 103 BPM

## 2021-12-19 VITALS — RESPIRATION RATE: 16 BRPM

## 2021-12-20 ENCOUNTER — HOSPITAL ENCOUNTER (OUTPATIENT)
Age: 76
End: 2021-12-20
Payer: MEDICARE

## 2021-12-20 VITALS
RESPIRATION RATE: 16 BRPM | TEMPERATURE: 97.34 F | DIASTOLIC BLOOD PRESSURE: 72 MMHG | OXYGEN SATURATION: 96 % | SYSTOLIC BLOOD PRESSURE: 118 MMHG | HEART RATE: 80 BPM

## 2021-12-20 VITALS — HEART RATE: 78 BPM | SYSTOLIC BLOOD PRESSURE: 104 MMHG | DIASTOLIC BLOOD PRESSURE: 69 MMHG

## 2021-12-20 VITALS
SYSTOLIC BLOOD PRESSURE: 125 MMHG | DIASTOLIC BLOOD PRESSURE: 79 MMHG | RESPIRATION RATE: 18 BRPM | TEMPERATURE: 96.98 F | HEART RATE: 75 BPM

## 2021-12-20 VITALS — HEART RATE: 80 BPM

## 2021-12-20 DIAGNOSIS — I83.893: Primary | ICD-10-CM

## 2021-12-20 LAB — PROTHROMBIN TIME (PROTIME)PT.: 28.1 SECONDS (ref 11.7–14.9)

## 2021-12-20 PROCEDURE — 70450 CT HEAD/BRAIN W/O DYE: CPT

## 2021-12-21 VITALS — DIASTOLIC BLOOD PRESSURE: 81 MMHG | SYSTOLIC BLOOD PRESSURE: 135 MMHG | HEART RATE: 71 BPM

## 2021-12-21 VITALS
TEMPERATURE: 98.42 F | HEART RATE: 83 BPM | OXYGEN SATURATION: 97 % | DIASTOLIC BLOOD PRESSURE: 85 MMHG | SYSTOLIC BLOOD PRESSURE: 118 MMHG | RESPIRATION RATE: 18 BRPM

## 2021-12-21 VITALS — DIASTOLIC BLOOD PRESSURE: 85 MMHG | SYSTOLIC BLOOD PRESSURE: 118 MMHG | HEART RATE: 83 BPM

## 2021-12-21 LAB
ANION GAP: 7 (ref 5–15)
BUN SERPL-MCNC: 19 MG/DL (ref 7–18)
BUN/CREAT RATIO: 33.9 RATIO (ref 10–20)
CALCIUM SERPL-MCNC: 8.3 MG/DL (ref 8.5–10.1)
CARBON DIOXIDE: 28 MMOL/L (ref 21–32)
CHLORIDE: 107 MMOL/L (ref 98–107)
DEPRECATED RDW RBC: 53.6 FL (ref 35.1–43.9)
ERYTHROCYTE [DISTWIDTH] IN BLOOD: 14.2 % (ref 11.6–14.6)
EST GLOM FILT RATE - AFR AMER: 136 ML/MIN (ref 60–?)
ESTIMATED CREATININE CLEARANCE: 39.04 ML/MIN
GLUCOSE: 97 MG/DL (ref 74–106)
HCT VFR BLD AUTO: 32.2 % (ref 37–47)
HEMOGLOBIN: 10.6 G/DL (ref 12–15)
HGB BLD-MCNC: 10.6 G/DL (ref 12–15)
IMMATURE GRANULOCYTES COUNT: 0 X10^3/UL (ref 0–0)
MCV RBC: 102.5 FL (ref 81–99)
MEAN CORP HGB CONC: 32.9 G/DL (ref 32–36)
MEAN PLATELET VOL.: 10.3 FL (ref 6.2–12)
NRBC FLAGGED BY ANALYZER: 0 % (ref 0–5)
PLATELET # BLD: 200 K/MM3 (ref 150–450)
PLATELET COUNT: 200 K/MM3 (ref 150–450)
POTASSIUM: 4.1 MMOL/L (ref 3.5–5.1)
RBC # BLD AUTO: 3.14 M/MM3 (ref 4.2–5.4)
RBC DISTRIBUTION WIDTH CV: 14.2 % (ref 11.6–14.6)
RBC DISTRIBUTION WIDTH SD: 53.6 FL (ref 35.1–43.9)
WBC # BLD AUTO: 4.5 K/MM3 (ref 4.4–11)
WHITE BLOOD COUNT: 4.5 K/MM3 (ref 4.4–11)

## 2021-12-22 VITALS — HEART RATE: 74 BPM | SYSTOLIC BLOOD PRESSURE: 112 MMHG | DIASTOLIC BLOOD PRESSURE: 80 MMHG

## 2021-12-22 VITALS — SYSTOLIC BLOOD PRESSURE: 135 MMHG | HEART RATE: 78 BPM | DIASTOLIC BLOOD PRESSURE: 85 MMHG

## 2021-12-22 VITALS
HEART RATE: 78 BPM | DIASTOLIC BLOOD PRESSURE: 85 MMHG | RESPIRATION RATE: 16 BRPM | SYSTOLIC BLOOD PRESSURE: 135 MMHG | OXYGEN SATURATION: 95 % | TEMPERATURE: 98.5 F

## 2021-12-23 VITALS — RESPIRATION RATE: 16 BRPM | OXYGEN SATURATION: 94 % | HEART RATE: 66 BPM

## 2021-12-23 VITALS
OXYGEN SATURATION: 97 % | RESPIRATION RATE: 14 BRPM | TEMPERATURE: 98.5 F | HEART RATE: 74 BPM | SYSTOLIC BLOOD PRESSURE: 126 MMHG | DIASTOLIC BLOOD PRESSURE: 79 MMHG

## 2021-12-23 VITALS — DIASTOLIC BLOOD PRESSURE: 76 MMHG | SYSTOLIC BLOOD PRESSURE: 118 MMHG | HEART RATE: 71 BPM

## 2021-12-23 VITALS — HEART RATE: 74 BPM

## 2021-12-23 LAB — PROTHROMBIN TIME (PROTIME)PT.: 31.8 SECONDS (ref 11.7–14.9)

## 2021-12-24 VITALS — HEART RATE: 71 BPM

## 2021-12-24 VITALS
RESPIRATION RATE: 16 BRPM | OXYGEN SATURATION: 93 % | SYSTOLIC BLOOD PRESSURE: 117 MMHG | TEMPERATURE: 98.1 F | DIASTOLIC BLOOD PRESSURE: 81 MMHG | HEART RATE: 83 BPM

## 2021-12-24 VITALS — DIASTOLIC BLOOD PRESSURE: 83 MMHG | SYSTOLIC BLOOD PRESSURE: 126 MMHG | HEART RATE: 71 BPM

## 2021-12-24 VITALS — HEART RATE: 83 BPM

## 2021-12-25 VITALS — HEART RATE: 85 BPM | SYSTOLIC BLOOD PRESSURE: 111 MMHG | DIASTOLIC BLOOD PRESSURE: 80 MMHG

## 2021-12-25 VITALS — HEART RATE: 78 BPM

## 2021-12-25 VITALS
SYSTOLIC BLOOD PRESSURE: 123 MMHG | TEMPERATURE: 98.4 F | RESPIRATION RATE: 16 BRPM | DIASTOLIC BLOOD PRESSURE: 76 MMHG | OXYGEN SATURATION: 91 % | HEART RATE: 78 BPM

## 2021-12-25 VITALS — HEART RATE: 85 BPM

## 2021-12-26 VITALS — DIASTOLIC BLOOD PRESSURE: 84 MMHG | HEART RATE: 91 BPM | SYSTOLIC BLOOD PRESSURE: 113 MMHG

## 2021-12-26 VITALS — DIASTOLIC BLOOD PRESSURE: 84 MMHG | SYSTOLIC BLOOD PRESSURE: 121 MMHG | HEART RATE: 75 BPM

## 2021-12-26 VITALS
TEMPERATURE: 98.24 F | DIASTOLIC BLOOD PRESSURE: 84 MMHG | SYSTOLIC BLOOD PRESSURE: 121 MMHG | OXYGEN SATURATION: 95 % | RESPIRATION RATE: 16 BRPM | HEART RATE: 75 BPM

## 2021-12-27 VITALS — SYSTOLIC BLOOD PRESSURE: 125 MMHG | HEART RATE: 83 BPM | DIASTOLIC BLOOD PRESSURE: 85 MMHG

## 2021-12-27 VITALS
SYSTOLIC BLOOD PRESSURE: 115 MMHG | RESPIRATION RATE: 16 BRPM | OXYGEN SATURATION: 97 % | TEMPERATURE: 98.06 F | DIASTOLIC BLOOD PRESSURE: 73 MMHG | HEART RATE: 75 BPM

## 2021-12-27 VITALS — DIASTOLIC BLOOD PRESSURE: 73 MMHG | SYSTOLIC BLOOD PRESSURE: 115 MMHG | HEART RATE: 75 BPM

## 2021-12-27 LAB — PROTHROMBIN TIME (PROTIME)PT.: 32.5 SECONDS (ref 11.7–14.9)

## 2021-12-28 VITALS — OXYGEN SATURATION: 94 % | HEART RATE: 63 BPM | RESPIRATION RATE: 18 BRPM

## 2021-12-28 VITALS
DIASTOLIC BLOOD PRESSURE: 69 MMHG | TEMPERATURE: 97.9 F | RESPIRATION RATE: 16 BRPM | OXYGEN SATURATION: 97 % | SYSTOLIC BLOOD PRESSURE: 105 MMHG | HEART RATE: 70 BPM

## 2021-12-28 VITALS — DIASTOLIC BLOOD PRESSURE: 86 MMHG | HEART RATE: 83 BPM | SYSTOLIC BLOOD PRESSURE: 130 MMHG

## 2021-12-28 VITALS — SYSTOLIC BLOOD PRESSURE: 120 MMHG | HEART RATE: 61 BPM | DIASTOLIC BLOOD PRESSURE: 79 MMHG

## 2021-12-28 VITALS — HEART RATE: 61 BPM

## 2021-12-28 LAB
ANION GAP: 6 (ref 5–15)
BUN SERPL-MCNC: 14 MG/DL (ref 7–18)
BUN/CREAT RATIO: 25.9 RATIO (ref 10–20)
CALCIUM SERPL-MCNC: 8.3 MG/DL (ref 8.5–10.1)
CARBON DIOXIDE: 27 MMOL/L (ref 21–32)
CHLORIDE: 110 MMOL/L (ref 98–107)
DEPRECATED RDW RBC: 50.8 FL (ref 35.1–43.9)
ERYTHROCYTE [DISTWIDTH] IN BLOOD: 13.3 % (ref 11.6–14.6)
EST GLOM FILT RATE - AFR AMER: 141 ML/MIN (ref 60–?)
ESTIMATED CREATININE CLEARANCE: 39.04 ML/MIN
GLUCOSE: 88 MG/DL (ref 74–106)
HCT VFR BLD AUTO: 33.7 % (ref 37–47)
HEMOGLOBIN: 10.7 G/DL (ref 12–15)
HGB BLD-MCNC: 10.7 G/DL (ref 12–15)
IMMATURE GRANULOCYTES COUNT: 0.01 X10^3/UL (ref 0–0)
MCV RBC: 102.7 FL (ref 81–99)
MEAN CORP HGB CONC: 31.8 G/DL (ref 32–36)
MEAN PLATELET VOL.: 11.4 FL (ref 6.2–12)
NRBC FLAGGED BY ANALYZER: 0 % (ref 0–5)
PLATELET # BLD: 156 K/MM3 (ref 150–450)
PLATELET COUNT: 156 K/MM3 (ref 150–450)
POTASSIUM: 3.3 MMOL/L (ref 3.5–5.1)
RBC # BLD AUTO: 3.28 M/MM3 (ref 4.2–5.4)
RBC DISTRIBUTION WIDTH CV: 13.3 % (ref 11.6–14.6)
RBC DISTRIBUTION WIDTH SD: 50.8 FL (ref 35.1–43.9)
WBC # BLD AUTO: 3.3 K/MM3 (ref 4.4–11)
WHITE BLOOD COUNT: 3.3 K/MM3 (ref 4.4–11)

## 2021-12-29 VITALS
SYSTOLIC BLOOD PRESSURE: 101 MMHG | DIASTOLIC BLOOD PRESSURE: 58 MMHG | OXYGEN SATURATION: 95 % | HEART RATE: 73 BPM | RESPIRATION RATE: 16 BRPM | TEMPERATURE: 98.06 F

## 2021-12-29 VITALS — HEART RATE: 81 BPM | SYSTOLIC BLOOD PRESSURE: 122 MMHG | DIASTOLIC BLOOD PRESSURE: 86 MMHG

## 2021-12-29 VITALS — HEART RATE: 70 BPM | DIASTOLIC BLOOD PRESSURE: 79 MMHG | SYSTOLIC BLOOD PRESSURE: 120 MMHG

## 2021-12-30 VITALS
OXYGEN SATURATION: 98 % | RESPIRATION RATE: 16 BRPM | DIASTOLIC BLOOD PRESSURE: 76 MMHG | HEART RATE: 75 BPM | TEMPERATURE: 99.14 F | SYSTOLIC BLOOD PRESSURE: 118 MMHG

## 2021-12-30 VITALS — HEART RATE: 73 BPM | SYSTOLIC BLOOD PRESSURE: 139 MMHG | DIASTOLIC BLOOD PRESSURE: 85 MMHG

## 2021-12-30 VITALS — HEART RATE: 89 BPM | OXYGEN SATURATION: 92 % | RESPIRATION RATE: 16 BRPM

## 2021-12-30 VITALS — HEART RATE: 75 BPM

## 2021-12-30 LAB
ANION GAP: 6 (ref 5–15)
BUN SERPL-MCNC: 13 MG/DL (ref 7–18)
BUN/CREAT RATIO: 21.2 RATIO (ref 10–20)
CALCIUM SERPL-MCNC: 8.5 MG/DL (ref 8.5–10.1)
CARBON DIOXIDE: 26 MMOL/L (ref 21–32)
CHLORIDE: 113 MMOL/L (ref 98–107)
EST GLOM FILT RATE - AFR AMER: 123 ML/MIN (ref 60–?)
ESTIMATED CREATININE CLEARANCE: 39.04 ML/MIN
GLUCOSE: 91 MG/DL (ref 74–106)
POTASSIUM: 3.6 MMOL/L (ref 3.5–5.1)
PROTHROMBIN TIME (PROTIME)PT.: 41.7 SECONDS (ref 11.7–14.9)

## 2021-12-31 VITALS
OXYGEN SATURATION: 93 % | SYSTOLIC BLOOD PRESSURE: 119 MMHG | RESPIRATION RATE: 15 BRPM | TEMPERATURE: 99.5 F | DIASTOLIC BLOOD PRESSURE: 78 MMHG | HEART RATE: 82 BPM

## 2021-12-31 VITALS — HEART RATE: 82 BPM | DIASTOLIC BLOOD PRESSURE: 78 MMHG | SYSTOLIC BLOOD PRESSURE: 119 MMHG

## 2021-12-31 VITALS — RESPIRATION RATE: 18 BRPM | OXYGEN SATURATION: 92 %

## 2021-12-31 VITALS — SYSTOLIC BLOOD PRESSURE: 132 MMHG | DIASTOLIC BLOOD PRESSURE: 88 MMHG | HEART RATE: 84 BPM

## 2021-12-31 LAB
ANION GAP: 5 (ref 5–15)
BUN SERPL-MCNC: 11 MG/DL (ref 7–18)
BUN/CREAT RATIO: 16.7 RATIO (ref 10–20)
CALCIUM SERPL-MCNC: 8.8 MG/DL (ref 8.5–10.1)
CARBON DIOXIDE: 26 MMOL/L (ref 21–32)
CHLORIDE: 112 MMOL/L (ref 98–107)
DEPRECATED RDW RBC: 50.6 FL (ref 35.1–43.9)
DIFFERENTIAL INDICATED: (no result)
ERYTHROCYTE [DISTWIDTH] IN BLOOD: 13.5 % (ref 11.6–14.6)
EST GLOM FILT RATE - AFR AMER: 112 ML/MIN (ref 60–?)
ESTIMATED CREATININE CLEARANCE: 39.04 ML/MIN
GLUCOSE: 109 MG/DL (ref 74–106)
HCT VFR BLD AUTO: 35.5 % (ref 37–47)
HEMOGLOBIN: 11.6 G/DL (ref 12–15)
HGB BLD-MCNC: 11.6 G/DL (ref 12–15)
IMMATURE GRANULOCYTES COUNT: 0.01 X10^3/UL (ref 0–0)
MCV RBC: 101.4 FL (ref 81–99)
MEAN CORP HGB CONC: 32.7 G/DL (ref 32–36)
MEAN PLATELET VOL.: 11.2 FL (ref 6.2–12)
NRBC FLAGGED BY ANALYZER: 0 % (ref 0–5)
PLATELET # BLD: 114 K/MM3 (ref 150–450)
PLATELET COUNT: 114 K/MM3 (ref 150–450)
POSITIVE COUNT: YES
POSITIVE DIFFERENTIAL: YES
POTASSIUM: 4 MMOL/L (ref 3.5–5.1)
PROTHROMBIN TIME (PROTIME)PT.: 32.2 SECONDS (ref 11.7–14.9)
RBC # BLD AUTO: 3.5 M/MM3 (ref 4.2–5.4)
RBC DISTRIBUTION WIDTH CV: 13.5 % (ref 11.6–14.6)
RBC DISTRIBUTION WIDTH SD: 50.6 FL (ref 35.1–43.9)
SCAN SMEAR PER REVIEW CRITERIA: (no result)
WBC # BLD AUTO: 4 K/MM3 (ref 4.4–11)
WHITE BLOOD COUNT: 4 K/MM3 (ref 4.4–11)

## 2022-01-01 VITALS
HEART RATE: 80 BPM | RESPIRATION RATE: 16 BRPM | SYSTOLIC BLOOD PRESSURE: 103 MMHG | TEMPERATURE: 97.7 F | DIASTOLIC BLOOD PRESSURE: 77 MMHG | OXYGEN SATURATION: 97 %

## 2022-01-01 VITALS — SYSTOLIC BLOOD PRESSURE: 111 MMHG | DIASTOLIC BLOOD PRESSURE: 74 MMHG | HEART RATE: 78 BPM

## 2022-01-01 VITALS — HEART RATE: 80 BPM

## 2022-01-01 LAB — PROTHROMBIN TIME (PROTIME)PT.: 22.9 SECONDS (ref 11.7–14.9)

## 2022-01-02 VITALS
RESPIRATION RATE: 16 BRPM | OXYGEN SATURATION: 96 % | HEART RATE: 71 BPM | DIASTOLIC BLOOD PRESSURE: 75 MMHG | SYSTOLIC BLOOD PRESSURE: 121 MMHG | TEMPERATURE: 98.06 F

## 2022-01-02 VITALS — SYSTOLIC BLOOD PRESSURE: 101 MMHG | HEART RATE: 75 BPM | DIASTOLIC BLOOD PRESSURE: 68 MMHG

## 2022-01-02 VITALS — HEART RATE: 71 BPM

## 2022-01-02 LAB — PROTHROMBIN TIME (PROTIME)PT.: 20.8 SECONDS (ref 11.7–14.9)

## 2022-01-03 VITALS — DIASTOLIC BLOOD PRESSURE: 81 MMHG | HEART RATE: 72 BPM | SYSTOLIC BLOOD PRESSURE: 118 MMHG

## 2022-01-03 VITALS — HEART RATE: 80 BPM | SYSTOLIC BLOOD PRESSURE: 123 MMHG | DIASTOLIC BLOOD PRESSURE: 82 MMHG

## 2022-01-03 VITALS
HEART RATE: 81 BPM | TEMPERATURE: 97.6 F | RESPIRATION RATE: 16 BRPM | DIASTOLIC BLOOD PRESSURE: 82 MMHG | SYSTOLIC BLOOD PRESSURE: 122 MMHG | OXYGEN SATURATION: 94 %

## 2022-01-03 LAB — PROTHROMBIN TIME (PROTIME)PT.: 23.7 SECONDS (ref 11.7–14.9)

## 2022-01-04 VITALS — SYSTOLIC BLOOD PRESSURE: 115 MMHG | HEART RATE: 72 BPM | DIASTOLIC BLOOD PRESSURE: 81 MMHG

## 2022-01-04 VITALS
TEMPERATURE: 97.16 F | RESPIRATION RATE: 18 BRPM | OXYGEN SATURATION: 96 % | SYSTOLIC BLOOD PRESSURE: 111 MMHG | DIASTOLIC BLOOD PRESSURE: 69 MMHG | HEART RATE: 73 BPM

## 2022-01-04 VITALS — SYSTOLIC BLOOD PRESSURE: 111 MMHG | DIASTOLIC BLOOD PRESSURE: 69 MMHG | HEART RATE: 73 BPM

## 2022-01-04 LAB
ANION GAP: 7 (ref 5–15)
BUN SERPL-MCNC: 12 MG/DL (ref 7–18)
BUN/CREAT RATIO: 18.2 RATIO (ref 10–20)
CALCIUM SERPL-MCNC: 8.6 MG/DL (ref 8.5–10.1)
CARBON DIOXIDE: 28 MMOL/L (ref 21–32)
CHLORIDE: 110 MMOL/L (ref 98–107)
DEPRECATED RDW RBC: 51.8 FL (ref 35.1–43.9)
ERYTHROCYTE [DISTWIDTH] IN BLOOD: 13.3 % (ref 11.6–14.6)
EST GLOM FILT RATE - AFR AMER: 113 ML/MIN (ref 60–?)
ESTIMATED CREATININE CLEARANCE: 39.04 ML/MIN
GLUCOSE: 87 MG/DL (ref 74–106)
HCT VFR BLD AUTO: 38.3 % (ref 37–47)
HEMOGLOBIN: 12 G/DL (ref 12–15)
HGB BLD-MCNC: 12 G/DL (ref 12–15)
IMMATURE GRANULOCYTES COUNT: 0.01 X10^3/UL (ref 0–0)
MCV RBC: 103.2 FL (ref 81–99)
MEAN CORP HGB CONC: 31.3 G/DL (ref 32–36)
MEAN PLATELET VOL.: 11.2 FL (ref 6.2–12)
NRBC FLAGGED BY ANALYZER: 0 % (ref 0–5)
PLATELET # BLD: 162 K/MM3 (ref 150–450)
PLATELET COUNT: 162 K/MM3 (ref 150–450)
POTASSIUM: 3.8 MMOL/L (ref 3.5–5.1)
PROTHROMBIN TIME (PROTIME)PT.: 25.2 SECONDS (ref 11.7–14.9)
RBC # BLD AUTO: 3.71 M/MM3 (ref 4.2–5.4)
RBC DISTRIBUTION WIDTH CV: 13.3 % (ref 11.6–14.6)
RBC DISTRIBUTION WIDTH SD: 51.8 FL (ref 35.1–43.9)
WBC # BLD AUTO: 2.9 K/MM3 (ref 4.4–11)
WHITE BLOOD COUNT: 2.9 K/MM3 (ref 4.4–11)

## 2022-01-05 VITALS
OXYGEN SATURATION: 97 % | RESPIRATION RATE: 14 BRPM | TEMPERATURE: 97.52 F | DIASTOLIC BLOOD PRESSURE: 80 MMHG | SYSTOLIC BLOOD PRESSURE: 110 MMHG | HEART RATE: 74 BPM

## 2022-01-05 VITALS — HEART RATE: 75 BPM | SYSTOLIC BLOOD PRESSURE: 111 MMHG | DIASTOLIC BLOOD PRESSURE: 74 MMHG

## 2022-01-05 LAB — PROTHROMBIN TIME (PROTIME)PT.: 28.6 SECONDS (ref 11.7–14.9)

## 2022-01-06 ENCOUNTER — HOSPITAL ENCOUNTER (OUTPATIENT)
Dept: HOSPITAL 100 - MTLAB | Age: 77
Discharge: ANOTHER HOSPITAL | End: 2022-01-06
Payer: MEDICARE

## 2022-01-06 DIAGNOSIS — R73.09: ICD-10-CM

## 2022-01-06 DIAGNOSIS — R82.90: ICD-10-CM

## 2022-01-06 DIAGNOSIS — I11.9: Primary | ICD-10-CM

## 2022-01-06 DIAGNOSIS — E78.5: ICD-10-CM

## 2022-01-06 LAB
ALANINE AMINOTRANSFER ALT/SGPT: 44 U/L (ref 13–56)
ALBUMIN SERPL-MCNC: 3.6 G/DL (ref 3.2–5)
ALKALINE PHOSPHATASE: 158 U/L (ref 45–117)
ANION GAP: 9 (ref 5–15)
AST(SGOT): 32 U/L (ref 15–37)
BUN SERPL-MCNC: 18 MG/DL (ref 7–18)
BUN/CREAT RATIO: 25.3 RATIO (ref 10–20)
CALCIUM SERPL-MCNC: 8.9 MG/DL (ref 8.5–10.1)
CARBON DIOXIDE: 27 MMOL/L (ref 21–32)
CHLORIDE: 109 MMOL/L (ref 98–107)
CHOLEST SERPL-MCNC: 114 MG/DL
DEPRECATED RDW RBC: 50.1 FL (ref 35.1–43.9)
ERYTHROCYTE [DISTWIDTH] IN BLOOD: 13.3 % (ref 11.6–14.6)
EST GLOM FILT RATE - AFR AMER: 103 ML/MIN (ref 60–?)
GLOBULIN: 3.6 G/DL (ref 2.2–4.2)
GLUCOSE: 95 MG/DL (ref 74–106)
HCT VFR BLD AUTO: 40.1 % (ref 37–47)
HEMOGLOBIN: 12.5 G/DL (ref 12–15)
HGB BLD-MCNC: 12.5 G/DL (ref 12–15)
IMMATURE GRANULOCYTES COUNT: 0.01 X10^3/UL (ref 0–0)
MCV RBC: 102.6 FL (ref 81–99)
MEAN CORP HGB CONC: 31.2 G/DL (ref 32–36)
MEAN PLATELET VOL.: 11.6 FL (ref 6.2–12)
NRBC FLAGGED BY ANALYZER: 0 % (ref 0–5)
PLATELET # BLD: 207 K/MM3 (ref 150–450)
PLATELET COUNT: 207 K/MM3 (ref 150–450)
POTASSIUM: 3.2 MMOL/L (ref 3.5–5.1)
RBC # BLD AUTO: 3.91 M/MM3 (ref 4.2–5.4)
RBC DISTRIBUTION WIDTH CV: 13.3 % (ref 11.6–14.6)
RBC DISTRIBUTION WIDTH SD: 50.1 FL (ref 35.1–43.9)
TRIGLYCERIDES: 89 MG/DL
VLDLC SERPL-MCNC: 18 MG/DL (ref 5–40)
WBC # BLD AUTO: 4.6 K/MM3 (ref 4.4–11)
WHITE BLOOD COUNT: 4.6 K/MM3 (ref 4.4–11)

## 2022-01-06 PROCEDURE — 80053 COMPREHEN METABOLIC PANEL: CPT

## 2022-01-06 PROCEDURE — 83036 HEMOGLOBIN GLYCOSYLATED A1C: CPT

## 2022-01-06 PROCEDURE — 80061 LIPID PANEL: CPT

## 2022-01-06 PROCEDURE — 85025 COMPLETE CBC W/AUTO DIFF WBC: CPT

## 2022-01-06 PROCEDURE — 87086 URINE CULTURE/COLONY COUNT: CPT

## 2022-01-06 PROCEDURE — 87088 URINE BACTERIA CULTURE: CPT

## 2022-01-06 PROCEDURE — 36415 COLL VENOUS BLD VENIPUNCTURE: CPT

## 2022-01-19 ENCOUNTER — HOSPITAL ENCOUNTER (OUTPATIENT)
Dept: HOSPITAL 100 - HHLAB | Age: 77
LOS: 12 days | Discharge: HOME | End: 2022-01-31
Payer: MEDICARE

## 2022-01-19 DIAGNOSIS — I11.9: ICD-10-CM

## 2022-01-19 DIAGNOSIS — I69.098: Primary | ICD-10-CM

## 2022-01-19 DIAGNOSIS — G91.9: ICD-10-CM

## 2022-01-19 DIAGNOSIS — I69.091: ICD-10-CM

## 2022-01-19 DIAGNOSIS — E53.8: ICD-10-CM

## 2022-01-19 DIAGNOSIS — D50.9: ICD-10-CM

## 2022-01-19 LAB
ALANINE AMINOTRANSFER ALT/SGPT: 88 U/L (ref 13–56)
ALBUMIN SERPL-MCNC: 3.7 G/DL (ref 3.2–5)
ALKALINE PHOSPHATASE: 232 U/L (ref 45–117)
ANION GAP: 6 (ref 5–15)
AST(SGOT): 94 U/L (ref 15–37)
BUN SERPL-MCNC: 14 MG/DL (ref 7–18)
BUN/CREAT RATIO: 17.6 RATIO (ref 10–20)
CALCIUM SERPL-MCNC: 8.8 MG/DL (ref 8.5–10.1)
CARBON DIOXIDE: 26 MMOL/L (ref 21–32)
CHLORIDE: 112 MMOL/L (ref 98–107)
EST GLOM FILT RATE - AFR AMER: 90 ML/MIN (ref 60–?)
FERRITIN SERPL-MCNC: 441 NG/ML (ref 8–252)
GLOBULIN: 3.2 G/DL (ref 2.2–4.2)
GLUCOSE: 91 MG/DL (ref 74–106)
IRON BINDING CAPACITY,TOTAL: 231 UG/DL (ref 250–450)
IRON SATN MFR SERPL: 27.3 % (ref 15–55)
IRON SPEC-MCNT: 63 UG/DL (ref 50–170)
POTASSIUM: 3.5 MMOL/L (ref 3.5–5.1)
VITAMIN B12: 709 PG/ML (ref 211–911)

## 2022-01-19 PROCEDURE — 83540 ASSAY OF IRON: CPT

## 2022-01-19 PROCEDURE — 80053 COMPREHEN METABOLIC PANEL: CPT

## 2022-01-19 PROCEDURE — 83550 IRON BINDING TEST: CPT

## 2022-01-19 PROCEDURE — 82728 ASSAY OF FERRITIN: CPT

## 2022-01-19 PROCEDURE — 82607 VITAMIN B-12: CPT

## 2022-04-15 LAB — PROTHROMBIN TIME (PROTIME)PT.: 28.1 SECONDS (ref 11.7–14.9)

## 2022-04-28 ENCOUNTER — HOSPITAL ENCOUNTER (OUTPATIENT)
Dept: HOSPITAL 100 - MTLAB | Age: 77
Discharge: HOME | End: 2022-04-28
Payer: MEDICARE

## 2022-04-28 DIAGNOSIS — Z95.2: Primary | ICD-10-CM

## 2022-04-28 DIAGNOSIS — Z79.01: ICD-10-CM

## 2022-04-28 LAB — PROTHROMBIN TIME (PROTIME)PT.: 23.1 SECONDS (ref 11.7–14.9)

## 2022-04-28 PROCEDURE — 85610 PROTHROMBIN TIME: CPT

## 2022-04-28 PROCEDURE — 36415 COLL VENOUS BLD VENIPUNCTURE: CPT

## 2022-05-06 ENCOUNTER — HOSPITAL ENCOUNTER (OUTPATIENT)
Dept: HOSPITAL 100 - MTLAB | Age: 77
Discharge: HOME | End: 2022-05-06
Payer: MEDICARE

## 2022-05-06 DIAGNOSIS — R69: Primary | ICD-10-CM

## 2022-05-06 PROCEDURE — 85610 PROTHROMBIN TIME: CPT

## 2022-05-06 PROCEDURE — 36415 COLL VENOUS BLD VENIPUNCTURE: CPT

## 2022-05-13 LAB
ALANINE AMINOTRANSFER ALT/SGPT: 18 U/L (ref 13–56)
ALBUMIN SERPL-MCNC: 3.5 G/DL (ref 3.2–5)
ALKALINE PHOSPHATASE: 60 U/L (ref 45–117)
ANION GAP: 5 (ref 5–15)
AST(SGOT): 23 U/L (ref 15–37)
BUN SERPL-MCNC: 12 MG/DL (ref 7–18)
BUN/CREAT RATIO: 15.3 RATIO (ref 10–20)
CALCIUM SERPL-MCNC: 9 MG/DL (ref 8.5–10.1)
CARBON DIOXIDE: 25 MMOL/L (ref 21–32)
CHLORIDE: 111 MMOL/L (ref 98–107)
CHOLEST SERPL-MCNC: 206 MG/DL
DEPRECATED RDW RBC: 48.8 FL (ref 35.1–43.9)
ERYTHROCYTE [DISTWIDTH] IN BLOOD: 13.2 % (ref 11.6–14.6)
EST GLOM FILT RATE - AFR AMER: 92 ML/MIN (ref 60–?)
FERRITIN SERPL-MCNC: 204 NG/ML (ref 8–252)
GLOBULIN: 3.4 G/DL (ref 2.2–4.2)
GLUCOSE: 93 MG/DL (ref 74–106)
HCT VFR BLD AUTO: 42.5 % (ref 37–47)
HEMOGLOBIN: 13.8 G/DL (ref 12–15)
HGB BLD-MCNC: 13.8 G/DL (ref 12–15)
IMMATURE GRANULOCYTES COUNT: 0.01 X10^3/UL (ref 0–0)
IRON SPEC-MCNT: 85 UG/DL (ref 50–170)
MCV RBC: 99.8 FL (ref 81–99)
MEAN CORP HGB CONC: 32.5 G/DL (ref 32–36)
MEAN PLATELET VOL.: 10.5 FL (ref 6.2–12)
NRBC FLAGGED BY ANALYZER: 0 % (ref 0–5)
PLATELET # BLD: 199 K/MM3 (ref 150–450)
PLATELET COUNT: 199 K/MM3 (ref 150–450)
POTASSIUM: 4 MMOL/L (ref 3.5–5.1)
PROTHROMBIN TIME (PROTIME)PT.: 18.5 SECONDS (ref 11.7–14.9)
RBC # BLD AUTO: 4.26 M/MM3 (ref 4.2–5.4)
RBC DISTRIBUTION WIDTH CV: 13.2 % (ref 11.6–14.6)
RBC DISTRIBUTION WIDTH SD: 48.8 FL (ref 35.1–43.9)
TRIGLYCERIDES: 179 MG/DL
VITAMIN B12: 424 PG/ML (ref 211–911)
VITAMIN D,25 HYDROXY: 71.4 NG/ML
VLDLC SERPL-MCNC: 36 MG/DL (ref 5–40)
WBC # BLD AUTO: 4.6 K/MM3 (ref 4.4–11)
WHITE BLOOD COUNT: 4.6 K/MM3 (ref 4.4–11)

## 2022-05-17 LAB — PROTHROMBIN TIME (PROTIME)PT.: 33.3 SECONDS (ref 11.7–14.9)

## 2022-05-19 LAB — KEPPRA (LEVETIRACETAM): 17.6 UG/ML (ref 10–40)

## 2022-05-24 ENCOUNTER — HOSPITAL ENCOUNTER (OUTPATIENT)
Dept: HOSPITAL 100 - MTLAB | Age: 77
Discharge: HOME | End: 2022-05-24
Payer: MEDICARE

## 2022-05-24 DIAGNOSIS — E78.5: ICD-10-CM

## 2022-05-24 DIAGNOSIS — Z95.2: ICD-10-CM

## 2022-05-24 DIAGNOSIS — E55.9: ICD-10-CM

## 2022-05-24 DIAGNOSIS — D50.9: ICD-10-CM

## 2022-05-24 DIAGNOSIS — Z79.01: ICD-10-CM

## 2022-05-24 DIAGNOSIS — E53.8: ICD-10-CM

## 2022-05-24 DIAGNOSIS — I69.091: ICD-10-CM

## 2022-05-24 DIAGNOSIS — G91.9: ICD-10-CM

## 2022-05-24 DIAGNOSIS — I69.098: Primary | ICD-10-CM

## 2022-05-24 DIAGNOSIS — E04.1: ICD-10-CM

## 2022-05-24 DIAGNOSIS — R73.09: ICD-10-CM

## 2022-05-24 DIAGNOSIS — I11.9: ICD-10-CM

## 2022-05-24 LAB — PROTHROMBIN TIME (PROTIME)PT.: 30.6 SECONDS (ref 11.7–14.9)

## 2022-05-24 PROCEDURE — 83036 HEMOGLOBIN GLYCOSYLATED A1C: CPT

## 2022-05-24 PROCEDURE — 80053 COMPREHEN METABOLIC PANEL: CPT

## 2022-05-24 PROCEDURE — 80177 DRUG SCRN QUAN LEVETIRACETAM: CPT

## 2022-05-24 PROCEDURE — 85610 PROTHROMBIN TIME: CPT

## 2022-05-24 PROCEDURE — 80061 LIPID PANEL: CPT

## 2022-05-24 PROCEDURE — 82607 VITAMIN B-12: CPT

## 2022-05-24 PROCEDURE — 82306 VITAMIN D 25 HYDROXY: CPT

## 2022-05-24 PROCEDURE — 36415 COLL VENOUS BLD VENIPUNCTURE: CPT

## 2022-05-24 PROCEDURE — 82728 ASSAY OF FERRITIN: CPT

## 2022-05-24 PROCEDURE — 84443 ASSAY THYROID STIM HORMONE: CPT

## 2022-05-24 PROCEDURE — 85025 COMPLETE CBC W/AUTO DIFF WBC: CPT

## 2022-05-24 PROCEDURE — 83540 ASSAY OF IRON: CPT

## 2022-05-27 ENCOUNTER — HOSPITAL ENCOUNTER (OUTPATIENT)
Dept: HOSPITAL 100 - LABSPEC | Age: 77
Discharge: HOME | End: 2022-05-27
Payer: MEDICARE

## 2022-05-27 DIAGNOSIS — R41.0: Primary | ICD-10-CM

## 2022-05-27 LAB
ALANINE AMINOTRANSFER ALT/SGPT: 21 U/L (ref 13–56)
ALBUMIN SERPL-MCNC: 4.1 G/DL (ref 3.2–5)
ALKALINE PHOSPHATASE: 61 U/L (ref 45–117)
ANION GAP: 5 (ref 5–15)
AST(SGOT): 20 U/L (ref 15–37)
BUN SERPL-MCNC: 13 MG/DL (ref 7–18)
BUN/CREAT RATIO: 16.8 RATIO (ref 10–20)
CALCIUM SERPL-MCNC: 9.5 MG/DL (ref 8.5–10.1)
CARBON DIOXIDE: 28 MMOL/L (ref 21–32)
CHLORIDE: 106 MMOL/L (ref 98–107)
EST GLOM FILT RATE - AFR AMER: 93 ML/MIN (ref 60–?)
GLOBULIN: 2.9 G/DL (ref 2.2–4.2)
GLUCOSE: 117 MG/DL (ref 74–106)
POTASSIUM: 4.2 MMOL/L (ref 3.5–5.1)

## 2022-05-27 PROCEDURE — 80053 COMPREHEN METABOLIC PANEL: CPT

## 2022-05-29 ENCOUNTER — HOSPITAL ENCOUNTER (INPATIENT)
Dept: HOSPITAL 100 - ED | Age: 77
LOS: 3 days | Discharge: PSYCHIATRIC OR SUBSTANCE USE FACILITY | DRG: 689 | End: 2022-06-01
Payer: MEDICARE

## 2022-05-29 VITALS
DIASTOLIC BLOOD PRESSURE: 76 MMHG | SYSTOLIC BLOOD PRESSURE: 133 MMHG | RESPIRATION RATE: 14 BRPM | TEMPERATURE: 97.88 F | OXYGEN SATURATION: 98 % | HEART RATE: 78 BPM

## 2022-05-29 VITALS
TEMPERATURE: 97.88 F | DIASTOLIC BLOOD PRESSURE: 97 MMHG | HEART RATE: 76 BPM | SYSTOLIC BLOOD PRESSURE: 138 MMHG | RESPIRATION RATE: 16 BRPM | OXYGEN SATURATION: 97 %

## 2022-05-29 VITALS
SYSTOLIC BLOOD PRESSURE: 123 MMHG | DIASTOLIC BLOOD PRESSURE: 88 MMHG | OXYGEN SATURATION: 96 % | RESPIRATION RATE: 18 BRPM | TEMPERATURE: 99.14 F | HEART RATE: 66 BPM

## 2022-05-29 VITALS
SYSTOLIC BLOOD PRESSURE: 135 MMHG | DIASTOLIC BLOOD PRESSURE: 78 MMHG | OXYGEN SATURATION: 98 % | HEART RATE: 77 BPM | RESPIRATION RATE: 14 BRPM | TEMPERATURE: 98.8 F

## 2022-05-29 VITALS — BODY MASS INDEX: 23.6 KG/M2 | BODY MASS INDEX: 23.8 KG/M2

## 2022-05-29 VITALS
TEMPERATURE: 98 F | SYSTOLIC BLOOD PRESSURE: 155 MMHG | DIASTOLIC BLOOD PRESSURE: 103 MMHG | OXYGEN SATURATION: 100 % | RESPIRATION RATE: 16 BRPM | HEART RATE: 72 BPM

## 2022-05-29 VITALS
HEART RATE: 76 BPM | DIASTOLIC BLOOD PRESSURE: 97 MMHG | RESPIRATION RATE: 16 BRPM | OXYGEN SATURATION: 97 % | TEMPERATURE: 97.88 F | SYSTOLIC BLOOD PRESSURE: 138 MMHG

## 2022-05-29 DIAGNOSIS — Z95.2: ICD-10-CM

## 2022-05-29 DIAGNOSIS — G93.41: ICD-10-CM

## 2022-05-29 DIAGNOSIS — K21.9: ICD-10-CM

## 2022-05-29 DIAGNOSIS — B96.1: ICD-10-CM

## 2022-05-29 DIAGNOSIS — T45.515A: ICD-10-CM

## 2022-05-29 DIAGNOSIS — F05: ICD-10-CM

## 2022-05-29 DIAGNOSIS — Z86.73: ICD-10-CM

## 2022-05-29 DIAGNOSIS — I10: ICD-10-CM

## 2022-05-29 DIAGNOSIS — M54.2: ICD-10-CM

## 2022-05-29 DIAGNOSIS — Z79.83: ICD-10-CM

## 2022-05-29 DIAGNOSIS — Z20.822: ICD-10-CM

## 2022-05-29 DIAGNOSIS — E78.00: ICD-10-CM

## 2022-05-29 DIAGNOSIS — E78.5: ICD-10-CM

## 2022-05-29 DIAGNOSIS — Z79.899: ICD-10-CM

## 2022-05-29 DIAGNOSIS — Z79.01: ICD-10-CM

## 2022-05-29 DIAGNOSIS — R79.1: ICD-10-CM

## 2022-05-29 DIAGNOSIS — F23: ICD-10-CM

## 2022-05-29 DIAGNOSIS — N30.00: Primary | ICD-10-CM

## 2022-05-29 LAB
ALANINE AMINOTRANSFER ALT/SGPT: 23 U/L (ref 13–56)
ALBUMIN SERPL-MCNC: 4.1 G/DL (ref 3.2–5)
ALKALINE PHOSPHATASE: 59 U/L (ref 45–117)
ANION GAP: 8 (ref 5–15)
AST(SGOT): 30 U/L (ref 15–37)
BNP,B-TYPE NATRIURETIC PEPTIDE: 78.6 PG/ML (ref 0–100)
BUN SERPL-MCNC: 14 MG/DL (ref 7–18)
BUN/CREAT RATIO: 15.3 RATIO (ref 10–20)
CALCIUM SERPL-MCNC: 9.4 MG/DL (ref 8.5–10.1)
CARBON DIOXIDE: 24 MMOL/L (ref 21–32)
CHLORIDE: 102 MMOL/L (ref 98–107)
DEPRECATED RDW RBC: 47.2 FL (ref 35.1–43.9)
ERYTHROCYTE [DISTWIDTH] IN BLOOD: 12.8 % (ref 11.6–14.6)
EST GLOM FILT RATE - AFR AMER: 77 ML/MIN (ref 60–?)
ESTIMATED CREATININE CLEARANCE: 42.43 ML/MIN
GLOBULIN: 3.4 G/DL (ref 2.2–4.2)
GLUCOSE: 111 MG/DL (ref 74–106)
HCT VFR BLD AUTO: 43.3 % (ref 37–47)
HEMOGLOBIN: 13.9 G/DL (ref 12–15)
HGB BLD-MCNC: 13.9 G/DL (ref 12–15)
IMMATURE GRANULOCYTES COUNT: 0.01 X10^3/UL (ref 0–0)
KETONE-DIPSTICK: 5 MG/DL
LEUKOCYTE ESTERASE UR QL STRIP: 500 /UL
MCV RBC: 99.3 FL (ref 81–99)
MEAN CORP HGB CONC: 32.1 G/DL (ref 32–36)
MEAN PLATELET VOL.: 10.9 FL (ref 6.2–12)
MUCOUS THREADS URNS QL MICRO: (no result) /HPF
NRBC FLAGGED BY ANALYZER: 0 % (ref 0–5)
PLATELET # BLD: 234 K/MM3 (ref 150–450)
PLATELET COUNT: 234 K/MM3 (ref 150–450)
POTASSIUM: 4.9 MMOL/L (ref 3.5–5.1)
PROT UR QL STRIP.AUTO: 30 MG/DL
PROTHROMBIN TIME (PROTIME)PT.: 35.7 SECONDS (ref 11.7–14.9)
RBC # BLD AUTO: 4.36 M/MM3 (ref 4.2–5.4)
RBC DISTRIBUTION WIDTH CV: 12.8 % (ref 11.6–14.6)
RBC DISTRIBUTION WIDTH SD: 47.2 FL (ref 35.1–43.9)
RBC UR QL: (no result) /HPF (ref 0–5)
RBC UR QL: 25 /UL
SP GR UR: 1.01 (ref 1–1.03)
SQUAMOUS URNS QL MICRO: (no result) /HPF (ref 5–10)
TROPONIN-I HS: 9 PG/ML (ref 3–54)
URINE PRESERVATIVE: (no result)
WBC # BLD AUTO: 7.3 K/MM3 (ref 4.4–11)
WHITE BLOOD COUNT: 7.3 K/MM3 (ref 4.4–11)

## 2022-05-29 PROCEDURE — 97802 MEDICAL NUTRITION INDIV IN: CPT

## 2022-05-29 PROCEDURE — 36415 COLL VENOUS BLD VENIPUNCTURE: CPT

## 2022-05-29 PROCEDURE — 80048 BASIC METABOLIC PNL TOTAL CA: CPT

## 2022-05-29 PROCEDURE — 83880 ASSAY OF NATRIURETIC PEPTIDE: CPT

## 2022-05-29 PROCEDURE — 83605 ASSAY OF LACTIC ACID: CPT

## 2022-05-29 PROCEDURE — 87040 BLOOD CULTURE FOR BACTERIA: CPT

## 2022-05-29 PROCEDURE — 97166 OT EVAL MOD COMPLEX 45 MIN: CPT

## 2022-05-29 PROCEDURE — 87077 CULTURE AEROBIC IDENTIFY: CPT

## 2022-05-29 PROCEDURE — 99283 EMERGENCY DEPT VISIT LOW MDM: CPT

## 2022-05-29 PROCEDURE — 71046 X-RAY EXAM CHEST 2 VIEWS: CPT

## 2022-05-29 PROCEDURE — 87086 URINE CULTURE/COLONY COUNT: CPT

## 2022-05-29 PROCEDURE — 87426 SARSCOV CORONAVIRUS AG IA: CPT

## 2022-05-29 PROCEDURE — A4216 STERILE WATER/SALINE, 10 ML: HCPCS

## 2022-05-29 PROCEDURE — 93005 ELECTROCARDIOGRAM TRACING: CPT

## 2022-05-29 PROCEDURE — 81001 URINALYSIS AUTO W/SCOPE: CPT

## 2022-05-29 PROCEDURE — 84484 ASSAY OF TROPONIN QUANT: CPT

## 2022-05-29 PROCEDURE — 87186 SC STD MICRODIL/AGAR DIL: CPT

## 2022-05-29 PROCEDURE — 87088 URINE BACTERIA CULTURE: CPT

## 2022-05-29 PROCEDURE — 85025 COMPLETE CBC W/AUTO DIFF WBC: CPT

## 2022-05-29 PROCEDURE — 85610 PROTHROMBIN TIME: CPT

## 2022-05-29 PROCEDURE — 97161 PT EVAL LOW COMPLEX 20 MIN: CPT

## 2022-05-29 PROCEDURE — 80053 COMPREHEN METABOLIC PANEL: CPT

## 2022-05-29 PROCEDURE — 70450 CT HEAD/BRAIN W/O DYE: CPT

## 2022-05-29 RX ADMIN — SODIUM CHLORIDE 100 ML: 9 INJECTION, SOLUTION INTRAVENOUS at 21:26

## 2022-05-29 RX ADMIN — SODIUM CHLORIDE 100 ML: 9 INJECTION, SOLUTION INTRAVENOUS at 13:28

## 2022-05-30 VITALS
DIASTOLIC BLOOD PRESSURE: 103 MMHG | TEMPERATURE: 98.24 F | SYSTOLIC BLOOD PRESSURE: 149 MMHG | OXYGEN SATURATION: 97 % | HEART RATE: 74 BPM | RESPIRATION RATE: 16 BRPM

## 2022-05-30 VITALS
DIASTOLIC BLOOD PRESSURE: 93 MMHG | HEART RATE: 81 BPM | TEMPERATURE: 98.24 F | SYSTOLIC BLOOD PRESSURE: 138 MMHG | RESPIRATION RATE: 16 BRPM | OXYGEN SATURATION: 97 %

## 2022-05-30 VITALS
DIASTOLIC BLOOD PRESSURE: 72 MMHG | OXYGEN SATURATION: 97 % | SYSTOLIC BLOOD PRESSURE: 152 MMHG | RESPIRATION RATE: 18 BRPM | TEMPERATURE: 98.06 F | HEART RATE: 69 BPM

## 2022-05-30 VITALS
OXYGEN SATURATION: 98 % | RESPIRATION RATE: 16 BRPM | DIASTOLIC BLOOD PRESSURE: 89 MMHG | HEART RATE: 71 BPM | TEMPERATURE: 97.16 F | SYSTOLIC BLOOD PRESSURE: 105 MMHG

## 2022-05-30 LAB
ALANINE AMINOTRANSFER ALT/SGPT: 16 U/L (ref 13–56)
ALBUMIN SERPL-MCNC: 3.3 G/DL (ref 3.2–5)
ALKALINE PHOSPHATASE: 49 U/L (ref 45–117)
ANION GAP: 7 (ref 5–15)
AST(SGOT): 18 U/L (ref 15–37)
BUN SERPL-MCNC: 8 MG/DL (ref 7–18)
BUN/CREAT RATIO: 14.2 RATIO (ref 10–20)
CALCIUM SERPL-MCNC: 8.3 MG/DL (ref 8.5–10.1)
CARBON DIOXIDE: 24 MMOL/L (ref 21–32)
CHLORIDE: 110 MMOL/L (ref 98–107)
DEPRECATED RDW RBC: 46.8 FL (ref 35.1–43.9)
ERYTHROCYTE [DISTWIDTH] IN BLOOD: 12.7 % (ref 11.6–14.6)
EST GLOM FILT RATE - AFR AMER: 135 ML/MIN (ref 60–?)
ESTIMATED CREATININE CLEARANCE: 39.04 ML/MIN
GLOBULIN: 2.7 G/DL (ref 2.2–4.2)
GLUCOSE: 100 MG/DL (ref 74–106)
HCT VFR BLD AUTO: 35.9 % (ref 37–47)
HEMOGLOBIN: 11.8 G/DL (ref 12–15)
HGB BLD-MCNC: 11.8 G/DL (ref 12–15)
IMMATURE GRANULOCYTES COUNT: 0.02 X10^3/UL (ref 0–0)
MCV RBC: 99.4 FL (ref 81–99)
MEAN CORP HGB CONC: 32.9 G/DL (ref 32–36)
MEAN PLATELET VOL.: 10.7 FL (ref 6.2–12)
NRBC FLAGGED BY ANALYZER: 0 % (ref 0–5)
PLATELET # BLD: 192 K/MM3 (ref 150–450)
PLATELET COUNT: 192 K/MM3 (ref 150–450)
POTASSIUM: 3.5 MMOL/L (ref 3.5–5.1)
PROTHROMBIN TIME (PROTIME)PT.: 44.3 SECONDS (ref 11.7–14.9)
RBC # BLD AUTO: 3.61 M/MM3 (ref 4.2–5.4)
RBC DISTRIBUTION WIDTH CV: 12.7 % (ref 11.6–14.6)
RBC DISTRIBUTION WIDTH SD: 46.8 FL (ref 35.1–43.9)
WBC # BLD AUTO: 4.8 K/MM3 (ref 4.4–11)
WHITE BLOOD COUNT: 4.8 K/MM3 (ref 4.4–11)

## 2022-05-30 RX ADMIN — SODIUM CHLORIDE 100 ML: 9 INJECTION, SOLUTION INTRAVENOUS at 17:22

## 2022-05-30 RX ADMIN — POTASSIUM CHLORIDE 20 MEQ: 1500 TABLET, EXTENDED RELEASE ORAL at 17:17

## 2022-05-30 RX ADMIN — SODIUM CHLORIDE 100 ML: 9 INJECTION, SOLUTION INTRAVENOUS at 07:38

## 2022-05-31 VITALS
RESPIRATION RATE: 16 BRPM | TEMPERATURE: 97.6 F | SYSTOLIC BLOOD PRESSURE: 139 MMHG | DIASTOLIC BLOOD PRESSURE: 97 MMHG | OXYGEN SATURATION: 97 % | HEART RATE: 79 BPM

## 2022-05-31 VITALS
RESPIRATION RATE: 18 BRPM | DIASTOLIC BLOOD PRESSURE: 106 MMHG | SYSTOLIC BLOOD PRESSURE: 145 MMHG | HEART RATE: 74 BPM | OXYGEN SATURATION: 95 % | TEMPERATURE: 98.42 F

## 2022-05-31 VITALS — HEART RATE: 79 BPM

## 2022-05-31 VITALS
OXYGEN SATURATION: 98 % | SYSTOLIC BLOOD PRESSURE: 130 MMHG | DIASTOLIC BLOOD PRESSURE: 91 MMHG | TEMPERATURE: 97.4 F | HEART RATE: 75 BPM | RESPIRATION RATE: 18 BRPM

## 2022-05-31 VITALS
SYSTOLIC BLOOD PRESSURE: 129 MMHG | TEMPERATURE: 97.6 F | HEART RATE: 66 BPM | RESPIRATION RATE: 16 BRPM | OXYGEN SATURATION: 99 % | DIASTOLIC BLOOD PRESSURE: 75 MMHG

## 2022-05-31 LAB
ALANINE AMINOTRANSFER ALT/SGPT: 17 U/L (ref 13–56)
ALBUMIN SERPL-MCNC: 3.3 G/DL (ref 3.2–5)
ALKALINE PHOSPHATASE: 49 U/L (ref 45–117)
ANION GAP: 6 (ref 5–15)
AST(SGOT): 20 U/L (ref 15–37)
BUN SERPL-MCNC: 5 MG/DL (ref 7–18)
BUN/CREAT RATIO: 9 RATIO (ref 10–20)
CALCIUM SERPL-MCNC: 8.4 MG/DL (ref 8.5–10.1)
CARBON DIOXIDE: 25 MMOL/L (ref 21–32)
CHLORIDE: 110 MMOL/L (ref 98–107)
DEPRECATED RDW RBC: 46.5 FL (ref 35.1–43.9)
ERYTHROCYTE [DISTWIDTH] IN BLOOD: 13 % (ref 11.6–14.6)
EST GLOM FILT RATE - AFR AMER: 137 ML/MIN (ref 60–?)
ESTIMATED CREATININE CLEARANCE: 39.04 ML/MIN
GLOBULIN: 2.9 G/DL (ref 2.2–4.2)
GLUCOSE: 91 MG/DL (ref 74–106)
HCT VFR BLD AUTO: 36.7 % (ref 37–47)
HEMOGLOBIN: 12.3 G/DL (ref 12–15)
HGB BLD-MCNC: 12.3 G/DL (ref 12–15)
IMMATURE GRANULOCYTES COUNT: 0 X10^3/UL (ref 0–0)
MCV RBC: 96.6 FL (ref 81–99)
MEAN CORP HGB CONC: 33.5 G/DL (ref 32–36)
MEAN PLATELET VOL.: 10.5 FL (ref 6.2–12)
NRBC FLAGGED BY ANALYZER: 0 % (ref 0–5)
PLATELET # BLD: 173 K/MM3 (ref 150–450)
PLATELET COUNT: 173 K/MM3 (ref 150–450)
POTASSIUM: 3.1 MMOL/L (ref 3.5–5.1)
PROTHROMBIN TIME (PROTIME)PT.: 33.9 SECONDS (ref 11.7–14.9)
RBC # BLD AUTO: 3.8 M/MM3 (ref 4.2–5.4)
RBC DISTRIBUTION WIDTH CV: 13 % (ref 11.6–14.6)
RBC DISTRIBUTION WIDTH SD: 46.5 FL (ref 35.1–43.9)
WBC # BLD AUTO: 3.7 K/MM3 (ref 4.4–11)
WHITE BLOOD COUNT: 3.7 K/MM3 (ref 4.4–11)

## 2022-05-31 RX ADMIN — POTASSIUM CHLORIDE 20 MEQ: 1500 TABLET, EXTENDED RELEASE ORAL at 09:49

## 2022-05-31 RX ADMIN — SODIUM CHLORIDE, PRESERVATIVE FREE 0 ML: 5 INJECTION INTRAVENOUS at 22:59

## 2022-05-31 RX ADMIN — SODIUM CHLORIDE 100 ML: 9 INJECTION, SOLUTION INTRAVENOUS at 02:21

## 2022-05-31 RX ADMIN — POTASSIUM CHLORIDE 20 MEQ: 1500 TABLET, EXTENDED RELEASE ORAL at 16:59

## 2022-05-31 RX ADMIN — SODIUM CHLORIDE, PRESERVATIVE FREE 0 ML: 5 INJECTION INTRAVENOUS at 23:58

## 2022-06-01 VITALS — HEART RATE: 78 BPM

## 2022-06-01 VITALS
DIASTOLIC BLOOD PRESSURE: 100 MMHG | OXYGEN SATURATION: 100 % | HEART RATE: 77 BPM | RESPIRATION RATE: 16 BRPM | TEMPERATURE: 98.06 F | SYSTOLIC BLOOD PRESSURE: 138 MMHG

## 2022-06-01 VITALS
HEART RATE: 78 BPM | DIASTOLIC BLOOD PRESSURE: 89 MMHG | TEMPERATURE: 98.24 F | SYSTOLIC BLOOD PRESSURE: 114 MMHG | OXYGEN SATURATION: 100 % | RESPIRATION RATE: 16 BRPM

## 2022-06-01 VITALS
SYSTOLIC BLOOD PRESSURE: 107 MMHG | HEART RATE: 68 BPM | OXYGEN SATURATION: 98 % | TEMPERATURE: 97.34 F | DIASTOLIC BLOOD PRESSURE: 85 MMHG | RESPIRATION RATE: 18 BRPM

## 2022-06-01 LAB
ANION GAP: 7 (ref 5–15)
BUN SERPL-MCNC: 8 MG/DL (ref 7–18)
BUN/CREAT RATIO: 9.7 RATIO (ref 10–20)
CALCIUM SERPL-MCNC: 8.6 MG/DL (ref 8.5–10.1)
CARBON DIOXIDE: 23 MMOL/L (ref 21–32)
CHLORIDE: 110 MMOL/L (ref 98–107)
DEPRECATED RDW RBC: 45.7 FL (ref 35.1–43.9)
ERYTHROCYTE [DISTWIDTH] IN BLOOD: 12.8 % (ref 11.6–14.6)
EST GLOM FILT RATE - AFR AMER: 87 ML/MIN (ref 60–?)
ESTIMATED CREATININE CLEARANCE: 47.61 ML/MIN
GLUCOSE: 100 MG/DL (ref 74–106)
HCT VFR BLD AUTO: 40 % (ref 37–47)
HEMOGLOBIN: 13.4 G/DL (ref 12–15)
HGB BLD-MCNC: 13.4 G/DL (ref 12–15)
IMMATURE GRANULOCYTES COUNT: 0.01 X10^3/UL (ref 0–0)
MCV RBC: 97.6 FL (ref 81–99)
MEAN CORP HGB CONC: 33.5 G/DL (ref 32–36)
MEAN PLATELET VOL.: 10.2 FL (ref 6.2–12)
NRBC FLAGGED BY ANALYZER: 0 % (ref 0–5)
PLATELET # BLD: 194 K/MM3 (ref 150–450)
PLATELET COUNT: 194 K/MM3 (ref 150–450)
POTASSIUM: 3.7 MMOL/L (ref 3.5–5.1)
PROTHROMBIN TIME (PROTIME)PT.: 25.2 SECONDS (ref 11.7–14.9)
RBC # BLD AUTO: 4.1 M/MM3 (ref 4.2–5.4)
RBC DISTRIBUTION WIDTH CV: 12.8 % (ref 11.6–14.6)
RBC DISTRIBUTION WIDTH SD: 45.7 FL (ref 35.1–43.9)
WBC # BLD AUTO: 5 K/MM3 (ref 4.4–11)
WHITE BLOOD COUNT: 5 K/MM3 (ref 4.4–11)

## 2022-06-01 RX ADMIN — SODIUM CHLORIDE, PRESERVATIVE FREE 0 ML: 5 INJECTION INTRAVENOUS at 06:45

## 2022-06-01 RX ADMIN — POTASSIUM CHLORIDE 20 MEQ: 1500 TABLET, EXTENDED RELEASE ORAL at 08:53

## 2022-06-08 LAB
ALBUMIN SERPL-MCNC: 4.4 G/DL (ref 3.5–5.2)
ALP BLD-CCNC: 63 U/L (ref 35–104)
ALT SERPL-CCNC: 16 U/L (ref 0–32)
ANION GAP SERPL CALCULATED.3IONS-SCNC: 14 MMOL/L (ref 7–16)
AST SERPL-CCNC: 26 U/L (ref 0–31)
BASOPHILS ABSOLUTE: 0.03 E9/L (ref 0–0.2)
BASOPHILS RELATIVE PERCENT: 0.7 % (ref 0–2)
BILIRUB SERPL-MCNC: 0.3 MG/DL (ref 0–1.2)
BUN BLDV-MCNC: 19 MG/DL (ref 6–23)
CALCIUM SERPL-MCNC: 9.6 MG/DL (ref 8.6–10.2)
CHLORIDE BLD-SCNC: 107 MMOL/L (ref 98–107)
CO2: 19 MMOL/L (ref 22–29)
CREAT SERPL-MCNC: 1 MG/DL (ref 0.5–1)
EOSINOPHILS ABSOLUTE: 0.1 E9/L (ref 0.05–0.5)
EOSINOPHILS RELATIVE PERCENT: 2.2 % (ref 0–6)
FOLATE: 8.8 NG/ML (ref 4.8–24.2)
GFR AFRICAN AMERICAN: >60
GFR NON-AFRICAN AMERICAN: 54 ML/MIN/1.73
GLUCOSE BLD-MCNC: 93 MG/DL (ref 74–99)
HCT VFR BLD CALC: 41.2 % (ref 34–48)
HEMOGLOBIN: 13.2 G/DL (ref 11.5–15.5)
IMMATURE GRANULOCYTES #: 0.01 E9/L
IMMATURE GRANULOCYTES %: 0.2 % (ref 0–5)
LYMPHOCYTES ABSOLUTE: 1.12 E9/L (ref 1.5–4)
LYMPHOCYTES RELATIVE PERCENT: 25.1 % (ref 20–42)
MCH RBC QN AUTO: 32.3 PG (ref 26–35)
MCHC RBC AUTO-ENTMCNC: 32 % (ref 32–34.5)
MCV RBC AUTO: 100.7 FL (ref 80–99.9)
MONOCYTES ABSOLUTE: 0.68 E9/L (ref 0.1–0.95)
MONOCYTES RELATIVE PERCENT: 15.2 % (ref 2–12)
NEUTROPHILS ABSOLUTE: 2.52 E9/L (ref 1.8–7.3)
NEUTROPHILS RELATIVE PERCENT: 56.6 % (ref 43–80)
PDW BLD-RTO: 13 FL (ref 11.5–15)
PLATELET # BLD: 208 E9/L (ref 130–450)
PMV BLD AUTO: 11.3 FL (ref 7–12)
POTASSIUM SERPL-SCNC: 4.7 MMOL/L (ref 3.5–5)
RBC # BLD: 4.09 E12/L (ref 3.5–5.5)
SODIUM BLD-SCNC: 140 MMOL/L (ref 132–146)
TOTAL PROTEIN: 6.3 G/DL (ref 6.4–8.3)
TSH SERPL DL<=0.05 MIU/L-ACNC: 1.12 UIU/ML (ref 0.27–4.2)
VITAMIN B-12: 719 PG/ML (ref 211–946)
WBC # BLD: 4.5 E9/L (ref 4.5–11.5)

## 2022-06-09 LAB — RPR: NORMAL

## 2022-06-11 LAB
INR BLD: 4.4
PROTHROMBIN TIME: 48.2 SEC (ref 9.3–12.4)

## 2022-06-12 LAB
INR BLD: 3.6
PROTHROMBIN TIME: 38.8 SEC (ref 9.3–12.4)

## 2022-06-16 LAB — PROTHROMBIN TIME (PROTIME)PT.: 21.6 SECONDS (ref 11.7–14.9)

## 2022-06-22 ENCOUNTER — HOSPITAL ENCOUNTER (OUTPATIENT)
Dept: HOSPITAL 100 - MTLAB | Age: 77
LOS: 8 days | Discharge: HOME | End: 2022-06-30
Payer: MEDICARE

## 2022-06-22 DIAGNOSIS — Z95.2: ICD-10-CM

## 2022-06-22 DIAGNOSIS — Z79.01: ICD-10-CM

## 2022-06-22 LAB — PROTHROMBIN TIME (PROTIME)PT.: 36.2 SECONDS (ref 11.7–14.9)

## 2022-06-22 PROCEDURE — 85610 PROTHROMBIN TIME: CPT

## 2022-06-22 PROCEDURE — 36415 COLL VENOUS BLD VENIPUNCTURE: CPT

## 2022-07-02 ENCOUNTER — HOSPITAL ENCOUNTER (OUTPATIENT)
Dept: HOSPITAL 100 - LABSPEC | Age: 77
Discharge: HOME | End: 2022-07-02
Payer: MEDICARE

## 2022-07-02 DIAGNOSIS — Z79.01: ICD-10-CM

## 2022-07-02 DIAGNOSIS — Z95.2: Primary | ICD-10-CM

## 2022-07-02 LAB — PROTHROMBIN TIME (PROTIME)PT.: 30 SECONDS (ref 11.7–14.9)

## 2022-07-02 PROCEDURE — 36415 COLL VENOUS BLD VENIPUNCTURE: CPT

## 2022-07-02 PROCEDURE — 85610 PROTHROMBIN TIME: CPT

## 2022-07-15 ENCOUNTER — HOSPITAL ENCOUNTER (EMERGENCY)
Age: 77
LOS: 1 days | Discharge: PSYCHIATRIC OR SUBSTANCE USE FACILITY | End: 2022-07-16
Payer: MEDICARE

## 2022-07-15 VITALS
SYSTOLIC BLOOD PRESSURE: 118 MMHG | OXYGEN SATURATION: 100 % | TEMPERATURE: 98.6 F | RESPIRATION RATE: 16 BRPM | HEART RATE: 91 BPM | DIASTOLIC BLOOD PRESSURE: 85 MMHG

## 2022-07-15 VITALS
OXYGEN SATURATION: 98 % | DIASTOLIC BLOOD PRESSURE: 70 MMHG | SYSTOLIC BLOOD PRESSURE: 155 MMHG | HEART RATE: 90 BPM | RESPIRATION RATE: 18 BRPM

## 2022-07-15 VITALS
RESPIRATION RATE: 16 BRPM | HEART RATE: 85 BPM | SYSTOLIC BLOOD PRESSURE: 120 MMHG | DIASTOLIC BLOOD PRESSURE: 65 MMHG | OXYGEN SATURATION: 97 %

## 2022-07-15 VITALS
OXYGEN SATURATION: 96 % | SYSTOLIC BLOOD PRESSURE: 170 MMHG | RESPIRATION RATE: 16 BRPM | DIASTOLIC BLOOD PRESSURE: 100 MMHG | HEART RATE: 93 BPM

## 2022-07-15 VITALS
TEMPERATURE: 98.2 F | HEART RATE: 69 BPM | RESPIRATION RATE: 18 BRPM | DIASTOLIC BLOOD PRESSURE: 95 MMHG | SYSTOLIC BLOOD PRESSURE: 155 MMHG | OXYGEN SATURATION: 97 %

## 2022-07-15 VITALS — BODY MASS INDEX: 23.6 KG/M2

## 2022-07-15 DIAGNOSIS — Z86.73: ICD-10-CM

## 2022-07-15 DIAGNOSIS — Z79.01: ICD-10-CM

## 2022-07-15 DIAGNOSIS — Z79.899: ICD-10-CM

## 2022-07-15 DIAGNOSIS — K21.9: ICD-10-CM

## 2022-07-15 DIAGNOSIS — I10: ICD-10-CM

## 2022-07-15 DIAGNOSIS — F03.90: Primary | ICD-10-CM

## 2022-07-15 LAB
ALANINE AMINOTRANSFER ALT/SGPT: 18 U/L (ref 13–56)
ALBUMIN SERPL-MCNC: 3.6 G/DL (ref 3.2–5)
ALCOHOL, BLOOD (MEDICAL)-SERUM: 3 MG/DL
ALKALINE PHOSPHATASE: 56 U/L (ref 45–117)
AMPHET UR-MCNC: NEGATIVE NG/ML
ANION GAP: 8 (ref 5–15)
AST(SGOT): 23 U/L (ref 15–37)
BARBITURATE URINE VISTA: NEGATIVE
BENZODIAZEPINE URINE VISTA: NEGATIVE
BUN SERPL-MCNC: 9 MG/DL (ref 7–18)
BUN/CREAT RATIO: 9.8 RATIO (ref 10–20)
CALCIUM SERPL-MCNC: 9 MG/DL (ref 8.5–10.1)
CARBON DIOXIDE: 27 MMOL/L (ref 21–32)
CHLORIDE: 109 MMOL/L (ref 98–107)
COCAINE URINE VISTA: NEGATIVE
DEPRECATED RDW RBC: 50 FL (ref 35.1–43.9)
DIFFERENTIAL COMMENT: (no result)
DIFFERENTIAL INDICATED: (no result)
DRUG CONFIRMATION TO FOLLOW?: (no result)
ECSTACY URINE VISTA: NEGATIVE
ERYTHROCYTE [DISTWIDTH] IN BLOOD: 13.2 % (ref 11.6–14.6)
EST GLOM FILT RATE - AFR AMER: 77 ML/MIN (ref 60–?)
ESTIMATED CREATININE CLEARANCE: 41.79 ML/MIN
GLOBULIN: 2.9 G/DL (ref 2.2–4.2)
GLUCOSE: 105 MG/DL (ref 74–106)
HCT VFR BLD AUTO: 37.4 % (ref 37–47)
HEMOGLOBIN: 12.2 G/DL (ref 12–15)
HGB BLD-MCNC: 12.2 G/DL (ref 12–15)
IMMATURE GRANULOCYTES COUNT: 0.01 X10^3/UL (ref 0–0)
MANUAL DIF COMMENT BLD-IMP: (no result)
MCV RBC: 101.9 FL (ref 81–99)
MEAN CORP HGB CONC: 32.6 G/DL (ref 32–36)
MEAN PLATELET VOL.: 10.7 FL (ref 6.2–12)
METHADONE URINE VISTA: NEGATIVE
MUCOUS THREADS URNS QL MICRO: (no result) /HPF
NRBC FLAGGED BY ANALYZER: 0 % (ref 0–5)
PCP UR QL: NEGATIVE
PH UR: 7 [PH]
PLATELET # BLD: 203 K/MM3 (ref 150–450)
PLATELET COUNT: 203 K/MM3 (ref 150–450)
POSITIVE DIFFERENTIAL: YES
POTASSIUM: 3.6 MMOL/L (ref 3.5–5.1)
PROTHROMBIN TIME (PROTIME)PT.: 20.6 SECONDS (ref 11.7–14.9)
RBC # BLD AUTO: 3.67 M/MM3 (ref 4.2–5.4)
RBC DISTRIBUTION WIDTH CV: 13.2 % (ref 11.6–14.6)
RBC DISTRIBUTION WIDTH SD: 50 FL (ref 35.1–43.9)
RBC UR QL: (no result) /HPF (ref 0–5)
SCAN SMEAR PER REVIEW CRITERIA: (no result)
SP GR UR: 1.01 (ref 1–1.03)
SQUAMOUS URNS QL MICRO: (no result) /HPF (ref 5–10)
THC URINE VISTA: NEGATIVE
TROPONIN-I HS: 14 PG/ML (ref 3–54)
URINE PRESERVATIVE: (no result)
WBC # BLD AUTO: 4.4 K/MM3 (ref 4.4–11)
WHITE BLOOD COUNT: 4.4 K/MM3 (ref 4.4–11)

## 2022-07-15 PROCEDURE — 84484 ASSAY OF TROPONIN QUANT: CPT

## 2022-07-15 PROCEDURE — 99285 EMERGENCY DEPT VISIT HI MDM: CPT

## 2022-07-15 PROCEDURE — 85025 COMPLETE CBC W/AUTO DIFF WBC: CPT

## 2022-07-15 PROCEDURE — 87088 URINE BACTERIA CULTURE: CPT

## 2022-07-15 PROCEDURE — 85610 PROTHROMBIN TIME: CPT

## 2022-07-15 PROCEDURE — 70450 CT HEAD/BRAIN W/O DYE: CPT

## 2022-07-15 PROCEDURE — 87086 URINE CULTURE/COLONY COUNT: CPT

## 2022-07-15 PROCEDURE — 80053 COMPREHEN METABOLIC PANEL: CPT

## 2022-07-15 PROCEDURE — 81001 URINALYSIS AUTO W/SCOPE: CPT

## 2022-07-15 PROCEDURE — 93005 ELECTROCARDIOGRAM TRACING: CPT

## 2022-07-15 PROCEDURE — 82077 ASSAY SPEC XCP UR&BREATH IA: CPT

## 2022-07-15 PROCEDURE — 80307 DRUG TEST PRSMV CHEM ANLYZR: CPT

## 2022-07-15 PROCEDURE — 87811 SARS-COV-2 COVID19 W/OPTIC: CPT

## 2022-07-16 VITALS
OXYGEN SATURATION: 97 % | DIASTOLIC BLOOD PRESSURE: 95 MMHG | SYSTOLIC BLOOD PRESSURE: 155 MMHG | RESPIRATION RATE: 18 BRPM | HEART RATE: 69 BPM | TEMPERATURE: 98.24 F

## 2022-07-16 VITALS — RESPIRATION RATE: 16 BRPM

## 2022-07-28 ENCOUNTER — HOSPITAL ENCOUNTER (OUTPATIENT)
Dept: HOSPITAL 100 - MTLAB | Age: 77
LOS: 3 days | Discharge: HOME | End: 2022-07-31
Payer: MEDICARE

## 2022-07-28 DIAGNOSIS — Z79.01: Primary | ICD-10-CM

## 2022-07-28 DIAGNOSIS — Z95.2: ICD-10-CM

## 2022-07-28 LAB — PROTHROMBIN TIME (PROTIME)PT.: 33.4 SECONDS (ref 11.7–14.9)

## 2022-07-28 PROCEDURE — 85610 PROTHROMBIN TIME: CPT

## 2022-07-28 PROCEDURE — 36415 COLL VENOUS BLD VENIPUNCTURE: CPT

## 2022-08-18 ENCOUNTER — HOSPITAL ENCOUNTER (OUTPATIENT)
Dept: HOSPITAL 100 - MTLAB | Age: 77
Discharge: HOME | End: 2022-08-18
Payer: MEDICARE

## 2022-08-18 DIAGNOSIS — Z79.01: Primary | ICD-10-CM

## 2022-08-18 DIAGNOSIS — Z95.2: ICD-10-CM

## 2022-08-18 LAB — PROTHROMBIN TIME (PROTIME)PT.: 27.9 SECONDS (ref 11.7–14.9)

## 2022-08-18 PROCEDURE — 85610 PROTHROMBIN TIME: CPT

## 2022-08-18 PROCEDURE — 36415 COLL VENOUS BLD VENIPUNCTURE: CPT

## 2022-08-25 ENCOUNTER — HOSPITAL ENCOUNTER (OUTPATIENT)
Age: 77
Discharge: HOME | End: 2022-08-25
Payer: MEDICARE

## 2022-08-25 DIAGNOSIS — I71.2: Primary | ICD-10-CM

## 2022-08-25 LAB
CREATININE FINGERSTICK: < 0.9 MG/DL (ref 0.55–1.02)
EGFR FINGERSTICK: > 60 ML/MIN (ref 60–?)

## 2022-08-25 PROCEDURE — 71260 CT THORAX DX C+: CPT

## 2022-09-01 ENCOUNTER — HOSPITAL ENCOUNTER (OUTPATIENT)
Dept: HOSPITAL 100 - MTLAB | Age: 77
Discharge: HOME | End: 2022-09-01
Payer: MEDICARE

## 2022-09-01 DIAGNOSIS — E53.8: ICD-10-CM

## 2022-09-01 DIAGNOSIS — E04.1: ICD-10-CM

## 2022-09-01 DIAGNOSIS — R73.09: ICD-10-CM

## 2022-09-01 DIAGNOSIS — E55.9: ICD-10-CM

## 2022-09-01 DIAGNOSIS — E78.5: Primary | ICD-10-CM

## 2022-09-01 LAB
ALANINE AMINOTRANSFER ALT/SGPT: 23 U/L (ref 13–56)
ALBUMIN SERPL-MCNC: 3.8 G/DL (ref 3.2–5)
ALKALINE PHOSPHATASE: 73 U/L (ref 45–117)
ANION GAP: 9 (ref 5–15)
AST(SGOT): 18 U/L (ref 15–37)
BUN SERPL-MCNC: 14 MG/DL (ref 7–18)
BUN/CREAT RATIO: 19 RATIO (ref 10–20)
CALCIUM SERPL-MCNC: 8.7 MG/DL (ref 8.5–10.1)
CARBON DIOXIDE: 25 MMOL/L (ref 21–32)
CHLORIDE: 103 MMOL/L (ref 98–107)
CHOLEST SERPL-MCNC: 193 MG/DL
DEPRECATED RDW RBC: 48.4 FL (ref 35.1–43.9)
ERYTHROCYTE [DISTWIDTH] IN BLOOD: 12.9 % (ref 11.6–14.6)
EST GLOM FILT RATE - AFR AMER: 99 ML/MIN (ref 60–?)
GLOBULIN: 3.3 G/DL (ref 2.2–4.2)
GLUCOSE: 87 MG/DL (ref 74–106)
HCT VFR BLD AUTO: 43.2 % (ref 37–47)
HEMOGLOBIN: 14 G/DL (ref 12–15)
HGB BLD-MCNC: 14 G/DL (ref 12–15)
IMMATURE GRANULOCYTES COUNT: 0.01 X10^3/UL (ref 0–0)
MCV RBC: 102.6 FL (ref 81–99)
MEAN CORP HGB CONC: 32.4 G/DL (ref 32–36)
MEAN PLATELET VOL.: 10.6 FL (ref 6.2–12)
NRBC FLAGGED BY ANALYZER: 0 % (ref 0–5)
PLATELET # BLD: 247 K/MM3 (ref 150–450)
PLATELET COUNT: 247 K/MM3 (ref 150–450)
POTASSIUM: 4 MMOL/L (ref 3.5–5.1)
RBC # BLD AUTO: 4.21 M/MM3 (ref 4.2–5.4)
RBC DISTRIBUTION WIDTH CV: 12.9 % (ref 11.6–14.6)
RBC DISTRIBUTION WIDTH SD: 48.4 FL (ref 35.1–43.9)
TRIGLYCERIDES: 112 MG/DL
VITAMIN B12: 441 PG/ML (ref 211–911)
VITAMIN D,25 HYDROXY: 62.9 NG/ML
VLDLC SERPL-MCNC: 22 MG/DL (ref 5–40)
WBC # BLD AUTO: 5 K/MM3 (ref 4.4–11)
WHITE BLOOD COUNT: 5 K/MM3 (ref 4.4–11)

## 2022-09-01 PROCEDURE — 85025 COMPLETE CBC W/AUTO DIFF WBC: CPT

## 2022-09-01 PROCEDURE — 83036 HEMOGLOBIN GLYCOSYLATED A1C: CPT

## 2022-09-01 PROCEDURE — 82607 VITAMIN B-12: CPT

## 2022-09-01 PROCEDURE — 84443 ASSAY THYROID STIM HORMONE: CPT

## 2022-09-01 PROCEDURE — 80053 COMPREHEN METABOLIC PANEL: CPT

## 2022-09-01 PROCEDURE — 82306 VITAMIN D 25 HYDROXY: CPT

## 2022-09-01 PROCEDURE — 80061 LIPID PANEL: CPT

## 2022-09-01 PROCEDURE — 36415 COLL VENOUS BLD VENIPUNCTURE: CPT

## 2022-09-09 LAB — PROTHROMBIN TIME (PROTIME)PT.: 32.1 SECONDS (ref 11.7–14.9)

## 2022-09-13 ENCOUNTER — HOSPITAL ENCOUNTER (OUTPATIENT)
Dept: HOSPITAL 100 - CVS | Age: 77
Discharge: HOME | End: 2022-09-13
Payer: MEDICARE

## 2022-09-13 DIAGNOSIS — Z95.2: Primary | ICD-10-CM

## 2022-09-13 PROCEDURE — 93306 TTE W/DOPPLER COMPLETE: CPT

## 2022-09-21 ENCOUNTER — HOSPITAL ENCOUNTER (OUTPATIENT)
Dept: HOSPITAL 100 - OPBD | Age: 77
Discharge: HOME | End: 2022-09-21
Payer: MEDICARE

## 2022-09-21 DIAGNOSIS — Z12.31: ICD-10-CM

## 2022-09-21 DIAGNOSIS — M85.89: Primary | ICD-10-CM

## 2022-09-21 PROCEDURE — 77063 BREAST TOMOSYNTHESIS BI: CPT

## 2022-09-21 PROCEDURE — 77080 DXA BONE DENSITY AXIAL: CPT

## 2022-09-21 PROCEDURE — 77067 SCR MAMMO BI INCL CAD: CPT

## 2022-09-29 ENCOUNTER — HOSPITAL ENCOUNTER (OUTPATIENT)
Dept: HOSPITAL 100 - MTLAB | Age: 77
Discharge: HOME | End: 2022-09-29
Payer: MEDICARE

## 2022-09-29 DIAGNOSIS — Z79.01: Primary | ICD-10-CM

## 2022-09-29 DIAGNOSIS — Z95.2: ICD-10-CM

## 2022-09-29 LAB — PROTHROMBIN TIME (PROTIME)PT.: 37.8 SECONDS (ref 11.7–14.9)

## 2022-09-29 PROCEDURE — 85610 PROTHROMBIN TIME: CPT

## 2022-09-29 PROCEDURE — 36415 COLL VENOUS BLD VENIPUNCTURE: CPT

## 2022-10-14 ENCOUNTER — HOSPITAL ENCOUNTER (OUTPATIENT)
Dept: HOSPITAL 100 - MTLAB | Age: 77
Discharge: HOME | End: 2022-10-14
Payer: MEDICARE

## 2022-10-14 DIAGNOSIS — Z79.01: Primary | ICD-10-CM

## 2022-10-14 DIAGNOSIS — Z95.2: ICD-10-CM

## 2022-10-14 LAB — PROTHROMBIN TIME (PROTIME)PT.: 31.3 SECONDS (ref 11.7–14.9)

## 2022-10-14 PROCEDURE — 85610 PROTHROMBIN TIME: CPT

## 2022-10-14 PROCEDURE — 36415 COLL VENOUS BLD VENIPUNCTURE: CPT

## 2022-10-20 ENCOUNTER — HOSPITAL ENCOUNTER (OUTPATIENT)
Dept: HOSPITAL 100 - PSN | Age: 77
Discharge: HOME | End: 2022-10-20
Payer: MEDICARE

## 2022-10-20 DIAGNOSIS — R56.9: Primary | ICD-10-CM

## 2022-10-20 PROCEDURE — 95819 EEG AWAKE AND ASLEEP: CPT

## 2022-11-03 LAB — PROTHROMBIN TIME (PROTIME)PT.: 43.3 SECONDS (ref 11.7–14.9)

## 2022-11-08 LAB — PROTHROMBIN TIME (PROTIME)PT.: 31.6 SECONDS (ref 11.7–14.9)

## 2022-11-17 ENCOUNTER — HOSPITAL ENCOUNTER (OUTPATIENT)
Dept: HOSPITAL 100 - MTLAB | Age: 77
Discharge: HOME | End: 2022-11-17
Payer: MEDICARE

## 2022-11-17 DIAGNOSIS — M32.9: Primary | ICD-10-CM

## 2022-11-17 LAB
MUCOUS THREADS URNS QL MICRO: (no result) /HPF
PROT UR-MCNC: 16.9 MG/DL (ref ?–11.9)
RBC UR QL: (no result) /HPF (ref 0–5)
SP GR UR: 1.01 (ref 1–1.03)
SQUAMOUS URNS QL MICRO: (no result) /HPF (ref 5–10)
URINE PRESERVATIVE: (no result)

## 2022-11-17 PROCEDURE — 84156 ASSAY OF PROTEIN URINE: CPT

## 2022-11-17 PROCEDURE — 81001 URINALYSIS AUTO W/SCOPE: CPT

## 2022-11-22 ENCOUNTER — HOSPITAL ENCOUNTER (OUTPATIENT)
Dept: HOSPITAL 100 - MTLAB | Age: 77
LOS: 8 days | Discharge: HOME | End: 2022-11-30
Payer: MEDICARE

## 2022-11-22 DIAGNOSIS — Z95.2: ICD-10-CM

## 2022-11-22 DIAGNOSIS — Z79.01: Primary | ICD-10-CM

## 2022-11-22 LAB — PROTHROMBIN TIME (PROTIME)PT.: 37.5 SECONDS (ref 11.7–14.9)

## 2022-11-22 PROCEDURE — 85610 PROTHROMBIN TIME: CPT

## 2022-11-22 PROCEDURE — 36415 COLL VENOUS BLD VENIPUNCTURE: CPT

## 2022-11-28 ENCOUNTER — HOSPITAL ENCOUNTER (OUTPATIENT)
Dept: HOSPITAL 100 - MTLAB | Age: 77
Discharge: HOME | End: 2022-11-28
Payer: MEDICARE

## 2022-11-28 DIAGNOSIS — M32.19: Primary | ICD-10-CM

## 2022-11-28 LAB
CREAT UR-MCNC: 29.9 MG/DL
PROT UR-MCNC: 7.6 MG/DL (ref ?–11.9)
PROT/CREAT UR: 254 MG/G CRE (ref 0–200)

## 2022-11-28 PROCEDURE — 82570 ASSAY OF URINE CREATININE: CPT

## 2022-11-28 PROCEDURE — 84156 ASSAY OF PROTEIN URINE: CPT

## 2022-12-06 ENCOUNTER — HOSPITAL ENCOUNTER (OUTPATIENT)
Dept: HOSPITAL 100 - US | Age: 77
Discharge: HOME | End: 2022-12-06
Payer: MEDICARE

## 2022-12-06 DIAGNOSIS — Z95.2: ICD-10-CM

## 2022-12-06 DIAGNOSIS — D50.9: ICD-10-CM

## 2022-12-06 DIAGNOSIS — R73.09: ICD-10-CM

## 2022-12-06 DIAGNOSIS — Z79.01: ICD-10-CM

## 2022-12-06 DIAGNOSIS — E04.1: Primary | ICD-10-CM

## 2022-12-06 DIAGNOSIS — E53.8: ICD-10-CM

## 2022-12-06 DIAGNOSIS — I11.9: ICD-10-CM

## 2022-12-06 LAB
ALANINE AMINOTRANSFER ALT/SGPT: 25 U/L (ref 13–56)
ALBUMIN SERPL-MCNC: 3.8 G/DL (ref 3.2–5)
ALKALINE PHOSPHATASE: 70 U/L (ref 45–117)
ANION GAP: 5 (ref 5–15)
AST(SGOT): 24 U/L (ref 15–37)
BUN SERPL-MCNC: 16 MG/DL (ref 7–18)
BUN/CREAT RATIO: 18.8 RATIO (ref 10–20)
CALCIUM SERPL-MCNC: 9.1 MG/DL (ref 8.5–10.1)
CARBON DIOXIDE: 29 MMOL/L (ref 21–32)
CHLORIDE: 108 MMOL/L (ref 98–107)
CHOLEST SERPL-MCNC: 237 MG/DL
DEPRECATED RDW RBC: 46.5 FL (ref 35.1–43.9)
ERYTHROCYTE [DISTWIDTH] IN BLOOD: 12.9 % (ref 11.6–14.6)
EST GLOM FILT RATE - AFR AMER: 84 ML/MIN (ref 60–?)
GLOBULIN: 2.9 G/DL (ref 2.2–4.2)
GLUCOSE: 93 MG/DL (ref 74–106)
HCT VFR BLD AUTO: 39.9 % (ref 37–47)
HEMOGLOBIN: 13.2 G/DL (ref 12–15)
HGB BLD-MCNC: 13.2 G/DL (ref 12–15)
IMMATURE GRANULOCYTES COUNT: 0 X10^3/UL (ref 0–0)
MCV RBC: 98.5 FL (ref 81–99)
MEAN CORP HGB CONC: 33.1 G/DL (ref 32–36)
MEAN PLATELET VOL.: 10.9 FL (ref 6.2–12)
NRBC FLAGGED BY ANALYZER: 0 % (ref 0–5)
PLATELET # BLD: 223 K/MM3 (ref 150–450)
PLATELET COUNT: 223 K/MM3 (ref 150–450)
POTASSIUM: 3.7 MMOL/L (ref 3.5–5.1)
PROTHROMBIN TIME (PROTIME)PT.: 39.8 SECONDS (ref 11.7–14.9)
RBC # BLD AUTO: 4.05 M/MM3 (ref 4.2–5.4)
RBC DISTRIBUTION WIDTH CV: 12.9 % (ref 11.6–14.6)
RBC DISTRIBUTION WIDTH SD: 46.5 FL (ref 35.1–43.9)
TRIGLYCERIDES: 110 MG/DL
VLDLC SERPL-MCNC: 22 MG/DL (ref 5–40)
WBC # BLD AUTO: 4 K/MM3 (ref 4.4–11)
WHITE BLOOD COUNT: 4 K/MM3 (ref 4.4–11)

## 2022-12-06 PROCEDURE — 36415 COLL VENOUS BLD VENIPUNCTURE: CPT

## 2022-12-06 PROCEDURE — 85025 COMPLETE CBC W/AUTO DIFF WBC: CPT

## 2022-12-06 PROCEDURE — 83036 HEMOGLOBIN GLYCOSYLATED A1C: CPT

## 2022-12-06 PROCEDURE — 85610 PROTHROMBIN TIME: CPT

## 2022-12-06 PROCEDURE — 80061 LIPID PANEL: CPT

## 2022-12-06 PROCEDURE — 80053 COMPREHEN METABOLIC PANEL: CPT

## 2022-12-06 PROCEDURE — 76536 US EXAM OF HEAD AND NECK: CPT

## 2022-12-13 ENCOUNTER — HOSPITAL ENCOUNTER (OUTPATIENT)
Dept: HOSPITAL 100 - MTLAB | Age: 77
Discharge: HOME | End: 2022-12-13
Payer: MEDICARE

## 2022-12-13 DIAGNOSIS — E53.8: ICD-10-CM

## 2022-12-13 DIAGNOSIS — R73.09: ICD-10-CM

## 2022-12-13 DIAGNOSIS — D50.9: ICD-10-CM

## 2022-12-13 DIAGNOSIS — I11.9: ICD-10-CM

## 2022-12-13 DIAGNOSIS — Z95.2: ICD-10-CM

## 2022-12-13 DIAGNOSIS — E78.5: ICD-10-CM

## 2022-12-13 DIAGNOSIS — Z79.01: Primary | ICD-10-CM

## 2022-12-13 LAB — PROTHROMBIN TIME (PROTIME)PT.: 33.3 SECONDS (ref 11.7–14.9)

## 2022-12-13 PROCEDURE — 85610 PROTHROMBIN TIME: CPT

## 2022-12-13 PROCEDURE — 36415 COLL VENOUS BLD VENIPUNCTURE: CPT

## 2022-12-13 PROCEDURE — 83036 HEMOGLOBIN GLYCOSYLATED A1C: CPT

## 2022-12-13 PROCEDURE — 85025 COMPLETE CBC W/AUTO DIFF WBC: CPT

## 2022-12-13 PROCEDURE — 80053 COMPREHEN METABOLIC PANEL: CPT

## 2022-12-13 PROCEDURE — 80061 LIPID PANEL: CPT

## 2023-01-10 LAB — PROTHROMBIN TIME (PROTIME)PT.: 30 SECONDS (ref 11.7–14.9)

## 2023-01-30 ENCOUNTER — HOSPITAL ENCOUNTER (OUTPATIENT)
Dept: HOSPITAL 100 - MTLAB | Age: 78
Discharge: HOME | End: 2023-01-30
Payer: MEDICARE

## 2023-01-30 DIAGNOSIS — Z79.01: Primary | ICD-10-CM

## 2023-01-30 DIAGNOSIS — Z95.2: ICD-10-CM

## 2023-01-30 LAB — PROTHROMBIN TIME (PROTIME)PT.: 32.1 SECONDS (ref 11.7–14.9)

## 2023-01-30 PROCEDURE — 36415 COLL VENOUS BLD VENIPUNCTURE: CPT

## 2023-01-30 PROCEDURE — 85610 PROTHROMBIN TIME: CPT

## 2023-03-13 LAB — PROTHROMBIN TIME (PROTIME)PT.: 41.5 SECONDS (ref 11.7–14.9)

## 2023-03-17 LAB — PROTHROMBIN TIME (PROTIME)PT.: 24.1 SECONDS (ref 11.7–14.9)

## 2023-03-24 ENCOUNTER — HOSPITAL ENCOUNTER (OUTPATIENT)
Dept: HOSPITAL 100 - PT | Age: 78
Discharge: HOME | End: 2023-03-24
Payer: MEDICARE

## 2023-03-24 ENCOUNTER — HOSPITAL ENCOUNTER (OUTPATIENT)
Dept: HOSPITAL 100 - MTLAB | Age: 78
Discharge: HOME | End: 2023-03-24
Payer: MEDICARE

## 2023-03-24 DIAGNOSIS — R60.0: Primary | ICD-10-CM

## 2023-03-24 DIAGNOSIS — R26.89: Primary | ICD-10-CM

## 2023-03-24 DIAGNOSIS — R33.9: ICD-10-CM

## 2023-03-24 LAB
ALANINE AMINOTRANSFER ALT/SGPT: 21 U/L (ref 13–56)
ALBUMIN SERPL-MCNC: 3.8 G/DL (ref 3.2–5)
ALKALINE PHOSPHATASE: 58 U/L (ref 45–117)
ANION GAP: 4 (ref 5–15)
AST(SGOT): 21 U/L (ref 15–37)
BUN SERPL-MCNC: 15 MG/DL (ref 7–18)
BUN/CREAT RATIO: 16.7 RATIO (ref 10–20)
CALCIUM SERPL-MCNC: 9.2 MG/DL (ref 8.5–10.1)
CARBON DIOXIDE: 27 MMOL/L (ref 21–32)
CHLORIDE: 108 MMOL/L (ref 98–107)
DEPRECATED RDW RBC: 46.4 FL (ref 35.1–43.9)
ERYTHROCYTE [DISTWIDTH] IN BLOOD: 12.3 % (ref 11.6–14.6)
EST GLOM FILT RATE - AFR AMER: 78 ML/MIN (ref 60–?)
GLOBULIN: 2.9 G/DL (ref 2.2–4.2)
GLUCOSE: 126 MG/DL (ref 74–106)
HCT VFR BLD AUTO: 39.9 % (ref 37–47)
HEMOGLOBIN: 12.8 G/DL (ref 12–15)
HGB BLD-MCNC: 12.8 G/DL (ref 12–15)
IMMATURE GRANULOCYTES COUNT: 0.01 X10^3/UL (ref 0–0)
MCV RBC: 100.3 FL (ref 81–99)
MEAN CORP HGB CONC: 32.1 G/DL (ref 32–36)
MEAN PLATELET VOL.: 11 FL (ref 6.2–12)
MUCOUS THREADS URNS QL MICRO: (no result) /HPF
NRBC FLAGGED BY ANALYZER: 0 % (ref 0–5)
PLATELET # BLD: 216 K/MM3 (ref 150–450)
PLATELET COUNT: 216 K/MM3 (ref 150–450)
POTASSIUM: 4 MMOL/L (ref 3.5–5.1)
PROT UR QL STRIP.AUTO: 15 MG/DL
RBC # BLD AUTO: 3.98 M/MM3 (ref 4.2–5.4)
RBC DISTRIBUTION WIDTH CV: 12.3 % (ref 11.6–14.6)
RBC DISTRIBUTION WIDTH SD: 46.4 FL (ref 35.1–43.9)
RBC UR QL: (no result) /HPF (ref 0–5)
SP GR UR: 1.01 (ref 1–1.03)
SQUAMOUS URNS QL MICRO: (no result) /HPF (ref 5–10)
URINE PRESERVATIVE: (no result)
WBC # BLD AUTO: 6.3 K/MM3 (ref 4.4–11)
WHITE BLOOD COUNT: 6.3 K/MM3 (ref 4.4–11)

## 2023-03-24 PROCEDURE — 84443 ASSAY THYROID STIM HORMONE: CPT

## 2023-03-24 PROCEDURE — 87086 URINE CULTURE/COLONY COUNT: CPT

## 2023-03-24 PROCEDURE — 81001 URINALYSIS AUTO W/SCOPE: CPT

## 2023-03-24 PROCEDURE — 97162 PT EVAL MOD COMPLEX 30 MIN: CPT

## 2023-03-24 PROCEDURE — 36415 COLL VENOUS BLD VENIPUNCTURE: CPT

## 2023-03-24 PROCEDURE — 97164 PT RE-EVAL EST PLAN CARE: CPT

## 2023-03-24 PROCEDURE — 80053 COMPREHEN METABOLIC PANEL: CPT

## 2023-03-24 PROCEDURE — 97110 THERAPEUTIC EXERCISES: CPT

## 2023-03-24 PROCEDURE — 97750 PHYSICAL PERFORMANCE TEST: CPT

## 2023-03-24 PROCEDURE — 85025 COMPLETE CBC W/AUTO DIFF WBC: CPT

## 2023-03-27 ENCOUNTER — HOSPITAL ENCOUNTER (OUTPATIENT)
Dept: HOSPITAL 100 - MTLAB | Age: 78
LOS: 4 days | Discharge: HOME | End: 2023-03-31
Payer: MEDICARE

## 2023-03-27 DIAGNOSIS — Z79.01: Primary | ICD-10-CM

## 2023-03-27 DIAGNOSIS — Z95.2: ICD-10-CM

## 2023-03-27 LAB — PROTHROMBIN TIME (PROTIME)PT.: 19.6 SECONDS (ref 11.7–14.9)

## 2023-03-27 PROCEDURE — 36415 COLL VENOUS BLD VENIPUNCTURE: CPT

## 2023-03-27 PROCEDURE — 85610 PROTHROMBIN TIME: CPT

## 2023-04-03 ENCOUNTER — HOSPITAL ENCOUNTER (OUTPATIENT)
Dept: HOSPITAL 100 - MTLAB | Age: 78
LOS: 27 days | Discharge: HOME | End: 2023-04-30
Payer: MEDICARE

## 2023-04-03 DIAGNOSIS — Z79.01: Primary | ICD-10-CM

## 2023-04-03 DIAGNOSIS — Z95.2: ICD-10-CM

## 2023-04-03 LAB — PROTHROMBIN TIME (PROTIME)PT.: 29.9 SECONDS (ref 11.7–14.9)

## 2023-04-03 PROCEDURE — 85610 PROTHROMBIN TIME: CPT

## 2023-04-03 PROCEDURE — 36415 COLL VENOUS BLD VENIPUNCTURE: CPT

## 2023-05-05 LAB — INR FINGERSTICK: 3.1

## 2023-05-08 ENCOUNTER — HOSPITAL ENCOUNTER (OUTPATIENT)
Dept: HOSPITAL 100 - EN | Age: 78
Discharge: HOME | End: 2023-05-08
Payer: MEDICARE

## 2023-05-08 VITALS
HEART RATE: 71 BPM | SYSTOLIC BLOOD PRESSURE: 117 MMHG | OXYGEN SATURATION: 99 % | RESPIRATION RATE: 16 BRPM | DIASTOLIC BLOOD PRESSURE: 75 MMHG | TEMPERATURE: 98.24 F

## 2023-05-08 VITALS
OXYGEN SATURATION: 99 % | SYSTOLIC BLOOD PRESSURE: 124 MMHG | TEMPERATURE: 97.4 F | RESPIRATION RATE: 18 BRPM | HEART RATE: 72 BPM | DIASTOLIC BLOOD PRESSURE: 78 MMHG

## 2023-05-08 VITALS
DIASTOLIC BLOOD PRESSURE: 78 MMHG | SYSTOLIC BLOOD PRESSURE: 122 MMHG | RESPIRATION RATE: 16 BRPM | HEART RATE: 70 BPM | OXYGEN SATURATION: 97 %

## 2023-05-08 VITALS
DIASTOLIC BLOOD PRESSURE: 78 MMHG | RESPIRATION RATE: 16 BRPM | OXYGEN SATURATION: 97 % | SYSTOLIC BLOOD PRESSURE: 124 MMHG | HEART RATE: 71 BPM | TEMPERATURE: 97.52 F

## 2023-05-08 VITALS
OXYGEN SATURATION: 97 % | SYSTOLIC BLOOD PRESSURE: 124 MMHG | HEART RATE: 69 BPM | RESPIRATION RATE: 16 BRPM | DIASTOLIC BLOOD PRESSURE: 78 MMHG

## 2023-05-08 VITALS — SYSTOLIC BLOOD PRESSURE: 124 MMHG | DIASTOLIC BLOOD PRESSURE: 78 MMHG

## 2023-05-08 VITALS
DIASTOLIC BLOOD PRESSURE: 83 MMHG | SYSTOLIC BLOOD PRESSURE: 124 MMHG | RESPIRATION RATE: 16 BRPM | HEART RATE: 73 BPM | OXYGEN SATURATION: 97 %

## 2023-05-08 DIAGNOSIS — K21.00: ICD-10-CM

## 2023-05-08 DIAGNOSIS — I10: ICD-10-CM

## 2023-05-08 DIAGNOSIS — Z86.73: ICD-10-CM

## 2023-05-08 DIAGNOSIS — Z12.11: Primary | ICD-10-CM

## 2023-05-08 DIAGNOSIS — K57.30: ICD-10-CM

## 2023-05-08 DIAGNOSIS — R56.9: ICD-10-CM

## 2023-05-08 DIAGNOSIS — K22.2: ICD-10-CM

## 2023-05-08 DIAGNOSIS — Z86.010: ICD-10-CM

## 2023-05-08 DIAGNOSIS — D12.0: ICD-10-CM

## 2023-05-08 DIAGNOSIS — Z79.01: ICD-10-CM

## 2023-05-08 DIAGNOSIS — K44.9: ICD-10-CM

## 2023-05-08 DIAGNOSIS — Z79.899: ICD-10-CM

## 2023-05-08 DIAGNOSIS — F32.A: ICD-10-CM

## 2023-05-08 LAB — INR FINGERSTICK: 1.7

## 2023-05-08 PROCEDURE — 36416 COLLJ CAPILLARY BLOOD SPEC: CPT

## 2023-05-08 PROCEDURE — 43239 EGD BIOPSY SINGLE/MULTIPLE: CPT

## 2023-05-08 PROCEDURE — 0DJD8ZZ INSPECTION OF LOWER INTESTINAL TRACT, VIA NATURAL OR ARTIFICIAL OPENING ENDOSCOPIC: ICD-10-PCS | Performed by: INTERNAL MEDICINE

## 2023-05-08 PROCEDURE — 43248 EGD GUIDE WIRE INSERTION: CPT

## 2023-05-08 PROCEDURE — C1769 GUIDE WIRE: HCPCS

## 2023-05-08 PROCEDURE — 45385 COLONOSCOPY W/LESION REMOVAL: CPT

## 2023-05-08 PROCEDURE — 88305 TISSUE EXAM BY PATHOLOGIST: CPT

## 2023-05-08 PROCEDURE — 85610 PROTHROMBIN TIME: CPT

## 2023-05-08 PROCEDURE — 88312 SPECIAL STAINS GROUP 1: CPT

## 2023-05-08 PROCEDURE — 88313 SPECIAL STAINS GROUP 2: CPT

## 2023-05-12 LAB — INR FINGERSTICK: 2.5

## 2023-05-13 ENCOUNTER — HOSPITAL ENCOUNTER (OUTPATIENT)
Age: 78
Discharge: HOME | End: 2023-05-13
Payer: MEDICARE

## 2023-05-13 DIAGNOSIS — E78.5: Primary | ICD-10-CM

## 2023-05-13 DIAGNOSIS — R73.09: ICD-10-CM

## 2023-05-13 DIAGNOSIS — I11.9: ICD-10-CM

## 2023-05-13 DIAGNOSIS — E53.8: ICD-10-CM

## 2023-05-13 LAB
ALANINE AMINOTRANSFER ALT/SGPT: 24 U/L (ref 13–56)
ALBUMIN SERPL-MCNC: 3.8 G/DL (ref 3.2–5)
ALKALINE PHOSPHATASE: 77 U/L (ref 45–117)
ANION GAP: 7 (ref 5–15)
AST(SGOT): 29 U/L (ref 15–37)
BUN SERPL-MCNC: 9 MG/DL (ref 7–18)
BUN/CREAT RATIO: 11 RATIO (ref 10–20)
CALCIUM SERPL-MCNC: 9.4 MG/DL (ref 8.5–10.1)
CARBON DIOXIDE: 28 MMOL/L (ref 21–32)
CHLORIDE: 105 MMOL/L (ref 98–107)
CHOLEST SERPL-MCNC: 219 MG/DL
DEPRECATED RDW RBC: 47.9 FL (ref 35.1–43.9)
ERYTHROCYTE [DISTWIDTH] IN BLOOD: 12.7 % (ref 11.6–14.6)
EST GLOM FILT RATE - AFR AMER: 87 ML/MIN (ref 60–?)
FOLATES, (FOLIC ACID): 8.5 NG/ML (ref 3.1–55.4)
GLOBULIN: 2.7 G/DL (ref 2.2–4.2)
GLUCOSE: 99 MG/DL (ref 74–106)
HCT VFR BLD AUTO: 41.7 % (ref 37–47)
HEMOGLOBIN: 13.3 G/DL (ref 12–15)
HGB BLD-MCNC: 13.3 G/DL (ref 12–15)
IMMATURE GRANULOCYTES COUNT: 0.01 X10^3/UL (ref 0–0)
MCV RBC: 101.2 FL (ref 81–99)
MEAN CORP HGB CONC: 31.9 G/DL (ref 32–36)
MEAN PLATELET VOL.: 10.7 FL (ref 6.2–12)
NRBC FLAGGED BY ANALYZER: 0 % (ref 0–5)
PLATELET # BLD: 217 K/MM3 (ref 150–450)
PLATELET COUNT: 217 K/MM3 (ref 150–450)
POTASSIUM: 4.2 MMOL/L (ref 3.5–5.1)
RBC # BLD AUTO: 4.12 M/MM3 (ref 4.2–5.4)
RBC DISTRIBUTION WIDTH CV: 12.7 % (ref 11.6–14.6)
RBC DISTRIBUTION WIDTH SD: 47.9 FL (ref 35.1–43.9)
TRIGLYCERIDES: 119 MG/DL
VLDLC SERPL-MCNC: 24 MG/DL (ref 5–40)
WBC # BLD AUTO: 4.5 K/MM3 (ref 4.4–11)
WHITE BLOOD COUNT: 4.5 K/MM3 (ref 4.4–11)

## 2023-05-13 PROCEDURE — 80061 LIPID PANEL: CPT

## 2023-05-13 PROCEDURE — 85025 COMPLETE CBC W/AUTO DIFF WBC: CPT

## 2023-05-13 PROCEDURE — 82607 VITAMIN B-12: CPT

## 2023-05-13 PROCEDURE — 83036 HEMOGLOBIN GLYCOSYLATED A1C: CPT

## 2023-05-13 PROCEDURE — 82746 ASSAY OF FOLIC ACID SERUM: CPT

## 2023-05-13 PROCEDURE — 80053 COMPREHEN METABOLIC PANEL: CPT

## 2023-05-13 PROCEDURE — 36415 COLL VENOUS BLD VENIPUNCTURE: CPT

## 2023-05-15 ENCOUNTER — HOSPITAL ENCOUNTER (OUTPATIENT)
Dept: HOSPITAL 100 - LABSPEC | Age: 78
Discharge: HOME | End: 2023-05-15
Payer: MEDICARE

## 2023-05-15 DIAGNOSIS — I11.9: ICD-10-CM

## 2023-05-15 DIAGNOSIS — E53.8: ICD-10-CM

## 2023-05-15 DIAGNOSIS — E78.5: Primary | ICD-10-CM

## 2023-05-15 LAB
CREAT UR-MCNC: 149 MG/DL
MICROALBUMIN UR-MCNC: 119 MG/L
VITAMIN B12: 1108 PG/ML (ref 211–911)

## 2023-05-15 PROCEDURE — 82043 UR ALBUMIN QUANTITATIVE: CPT

## 2023-05-15 PROCEDURE — 82570 ASSAY OF URINE CREATININE: CPT

## 2023-05-25 LAB — PROTHROMBIN TIME (PROTIME)PT.: 49.2 SECONDS (ref 11.7–14.9)

## 2023-05-30 ENCOUNTER — HOSPITAL ENCOUNTER (OUTPATIENT)
Dept: HOSPITAL 100 - MTLAB | Age: 78
Discharge: HOME | End: 2023-05-30
Payer: MEDICARE

## 2023-05-30 DIAGNOSIS — Z79.01: Primary | ICD-10-CM

## 2023-05-30 DIAGNOSIS — Z95.2: ICD-10-CM

## 2023-05-30 LAB — PROTHROMBIN TIME (PROTIME)PT.: 27.7 SECONDS (ref 11.7–14.9)

## 2023-05-30 PROCEDURE — 85610 PROTHROMBIN TIME: CPT

## 2023-05-30 PROCEDURE — 36415 COLL VENOUS BLD VENIPUNCTURE: CPT

## 2023-05-30 PROCEDURE — 36416 COLLJ CAPILLARY BLOOD SPEC: CPT

## 2023-06-06 LAB — PROTHROMBIN TIME (PROTIME)PT.: 25.2 SECONDS (ref 11.7–14.9)

## 2023-06-12 LAB — PROTHROMBIN TIME (PROTIME)PT.: 23.4 SECONDS (ref 11.7–14.9)

## 2023-06-19 ENCOUNTER — HOSPITAL ENCOUNTER (OUTPATIENT)
Dept: HOSPITAL 100 - MTLAB | Age: 78
Discharge: HOME | End: 2023-06-19
Payer: MEDICARE

## 2023-06-19 DIAGNOSIS — Z95.2: ICD-10-CM

## 2023-06-19 DIAGNOSIS — Z79.01: Primary | ICD-10-CM

## 2023-06-19 LAB — INR FINGERSTICK: 3.4

## 2023-06-19 PROCEDURE — 36416 COLLJ CAPILLARY BLOOD SPEC: CPT

## 2023-06-19 PROCEDURE — 36415 COLL VENOUS BLD VENIPUNCTURE: CPT

## 2023-06-19 PROCEDURE — 85610 PROTHROMBIN TIME: CPT

## 2023-07-12 LAB — INR FINGERSTICK: 2.5

## 2023-07-17 ENCOUNTER — HOSPITAL ENCOUNTER (OUTPATIENT)
Dept: HOSPITAL 100 - MTLAB | Age: 78
LOS: 14 days | Discharge: HOME | End: 2023-07-31
Payer: MEDICARE

## 2023-07-17 DIAGNOSIS — Z79.01: Primary | ICD-10-CM

## 2023-07-17 DIAGNOSIS — Z95.2: ICD-10-CM

## 2023-07-17 LAB — PROTHROMBIN TIME (PROTIME)PT.: 32.7 SECONDS (ref 11.7–14.9)

## 2023-07-17 PROCEDURE — 36415 COLL VENOUS BLD VENIPUNCTURE: CPT

## 2023-07-17 PROCEDURE — 36416 COLLJ CAPILLARY BLOOD SPEC: CPT

## 2023-07-17 PROCEDURE — 85610 PROTHROMBIN TIME: CPT

## 2023-08-10 LAB — PROTHROMBIN TIME (PROTIME)PT.: 37.9 SECONDS (ref 11.7–14.9)

## 2023-08-15 ENCOUNTER — HOSPITAL ENCOUNTER (OUTPATIENT)
Dept: HOSPITAL 100 - MTLAB | Age: 78
Discharge: HOME | End: 2023-08-15
Payer: MEDICARE

## 2023-08-15 ENCOUNTER — HOSPITAL ENCOUNTER (OUTPATIENT)
Dept: HOSPITAL 100 - LAB | Age: 78
Discharge: HOME | End: 2023-08-15
Payer: MEDICARE

## 2023-08-15 DIAGNOSIS — R56.9: ICD-10-CM

## 2023-08-15 DIAGNOSIS — Z95.2: ICD-10-CM

## 2023-08-15 DIAGNOSIS — E55.9: ICD-10-CM

## 2023-08-15 DIAGNOSIS — I11.9: Primary | ICD-10-CM

## 2023-08-15 DIAGNOSIS — Z79.01: ICD-10-CM

## 2023-08-15 DIAGNOSIS — E04.1: ICD-10-CM

## 2023-08-15 DIAGNOSIS — Z79.01: Primary | ICD-10-CM

## 2023-08-15 DIAGNOSIS — E78.5: ICD-10-CM

## 2023-08-15 DIAGNOSIS — R73.09: ICD-10-CM

## 2023-08-15 LAB
ALANINE AMINOTRANSFER ALT/SGPT: 14 U/L (ref 13–56)
ALBUMIN SERPL-MCNC: 3.5 G/DL (ref 3.2–5)
ALKALINE PHOSPHATASE: 66 U/L (ref 45–117)
ANION GAP: 4 (ref 5–15)
AST(SGOT): 16 U/L (ref 15–37)
BUN SERPL-MCNC: 9 MG/DL (ref 7–18)
BUN/CREAT RATIO: 12 RATIO (ref 10–20)
CALCIUM SERPL-MCNC: 8.9 MG/DL (ref 8.5–10.1)
CARBON DIOXIDE: 27 MMOL/L (ref 21–32)
CHLORIDE: 109 MMOL/L (ref 98–107)
CHOLEST SERPL-MCNC: 188 MG/DL
DEPRECATED RDW RBC: 47.5 FL (ref 35.1–43.9)
ERYTHROCYTE [DISTWIDTH] IN BLOOD: 12.7 % (ref 11.6–14.6)
EST GLOM FILT RATE - AFR AMER: 97 ML/MIN (ref 60–?)
GLOBULIN: 3.2 G/DL (ref 2.2–4.2)
GLUCOSE: 93 MG/DL (ref 74–106)
HCT VFR BLD AUTO: 43.3 % (ref 37–47)
HEMOGLOBIN: 14 G/DL (ref 12–15)
HGB BLD-MCNC: 14 G/DL (ref 12–15)
IMMATURE GRANULOCYTES COUNT: 0.02 X10^3/UL (ref 0–0)
MCV RBC: 100.9 FL (ref 81–99)
MEAN CORP HGB CONC: 32.3 G/DL (ref 32–36)
MEAN PLATELET VOL.: 10.3 FL (ref 6.2–12)
NRBC FLAGGED BY ANALYZER: 0 % (ref 0–5)
PLATELET # BLD: 221 K/MM3 (ref 150–450)
PLATELET COUNT: 221 K/MM3 (ref 150–450)
POTASSIUM: 4.4 MMOL/L (ref 3.5–5.1)
PROTHROMBIN TIME (PROTIME)PT.: 35.9 SECONDS (ref 11.7–14.9)
RBC # BLD AUTO: 4.29 M/MM3 (ref 4.2–5.4)
RBC DISTRIBUTION WIDTH CV: 12.7 % (ref 11.6–14.6)
RBC DISTRIBUTION WIDTH SD: 47.5 FL (ref 35.1–43.9)
TRIGLYCERIDES: 160 MG/DL
VITAMIN D,25 HYDROXY: 85.7 NG/ML
VLDLC SERPL-MCNC: 32 MG/DL (ref 5–40)
WBC # BLD AUTO: 5.5 K/MM3 (ref 4.4–11)
WHITE BLOOD COUNT: 5.5 K/MM3 (ref 4.4–11)

## 2023-08-15 PROCEDURE — 82306 VITAMIN D 25 HYDROXY: CPT

## 2023-08-15 PROCEDURE — 36415 COLL VENOUS BLD VENIPUNCTURE: CPT

## 2023-08-15 PROCEDURE — 83036 HEMOGLOBIN GLYCOSYLATED A1C: CPT

## 2023-08-15 PROCEDURE — 85025 COMPLETE CBC W/AUTO DIFF WBC: CPT

## 2023-08-15 PROCEDURE — 80177 DRUG SCRN QUAN LEVETIRACETAM: CPT

## 2023-08-15 PROCEDURE — 85610 PROTHROMBIN TIME: CPT

## 2023-08-15 PROCEDURE — 80061 LIPID PANEL: CPT

## 2023-08-15 PROCEDURE — 80053 COMPREHEN METABOLIC PANEL: CPT

## 2023-08-15 PROCEDURE — 84443 ASSAY THYROID STIM HORMONE: CPT

## 2023-08-17 LAB — KEPPRA (LEVETIRACETAM): 13.3 UG/ML (ref 10–40)

## 2023-08-29 ENCOUNTER — HOSPITAL ENCOUNTER (OUTPATIENT)
Dept: HOSPITAL 100 - CVS | Age: 78
Discharge: HOME | End: 2023-08-29
Payer: MEDICARE

## 2023-08-29 DIAGNOSIS — M79.89: Primary | ICD-10-CM

## 2023-08-29 PROCEDURE — 93971 EXTREMITY STUDY: CPT

## 2023-09-13 ENCOUNTER — HOSPITAL ENCOUNTER (OUTPATIENT)
Dept: HOSPITAL 100 - MTLAB | Age: 78
Discharge: HOME | End: 2023-09-13
Payer: MEDICARE

## 2023-09-13 DIAGNOSIS — Z95.2: ICD-10-CM

## 2023-09-13 DIAGNOSIS — Z79.01: Primary | ICD-10-CM

## 2023-09-13 LAB — INR FINGERSTICK: 2

## 2023-09-13 PROCEDURE — 85610 PROTHROMBIN TIME: CPT

## 2023-09-13 PROCEDURE — 36416 COLLJ CAPILLARY BLOOD SPEC: CPT

## 2023-09-26 ENCOUNTER — HOSPITAL ENCOUNTER (OUTPATIENT)
Dept: HOSPITAL 100 - OPBI | Age: 78
Discharge: HOME | End: 2023-09-26
Payer: MEDICARE

## 2023-09-26 DIAGNOSIS — Z12.31: Primary | ICD-10-CM

## 2023-09-26 PROCEDURE — 77067 SCR MAMMO BI INCL CAD: CPT

## 2023-09-26 PROCEDURE — 77063 BREAST TOMOSYNTHESIS BI: CPT

## 2023-10-04 LAB — PROTHROMBIN TIME (PROTIME)PT.: 27.9 SECONDS (ref 11.7–14.9)

## 2023-10-18 ENCOUNTER — HOSPITAL ENCOUNTER (OUTPATIENT)
Dept: HOSPITAL 100 - MTLAB | Age: 78
Discharge: HOME | End: 2023-10-18
Payer: MEDICARE

## 2023-10-18 DIAGNOSIS — Z79.01: Primary | ICD-10-CM

## 2023-10-18 DIAGNOSIS — Z95.2: ICD-10-CM

## 2023-10-18 LAB — PROTHROMBIN TIME (PROTIME)PT.: 27.3 SECONDS (ref 11.7–14.9)

## 2023-10-18 PROCEDURE — 36415 COLL VENOUS BLD VENIPUNCTURE: CPT

## 2023-10-18 PROCEDURE — 85610 PROTHROMBIN TIME: CPT

## 2023-10-24 ENCOUNTER — HOSPITAL ENCOUNTER (OUTPATIENT)
Dept: HOSPITAL 100 - CT | Age: 78
Discharge: HOME | End: 2023-10-24
Payer: MEDICARE

## 2023-10-24 DIAGNOSIS — I71.20: Primary | ICD-10-CM

## 2023-10-24 LAB — CREATININE FINGERSTICK: 1.2 MG/DL (ref 0.55–1.02)

## 2023-10-24 PROCEDURE — 71275 CT ANGIOGRAPHY CHEST: CPT

## 2023-10-24 PROCEDURE — A4216 STERILE WATER/SALINE, 10 ML: HCPCS

## 2023-11-01 ENCOUNTER — HOSPITAL ENCOUNTER (OUTPATIENT)
Dept: HOSPITAL 100 - MTLAB | Age: 78
LOS: 29 days | Discharge: HOME | End: 2023-11-30
Payer: MEDICARE

## 2023-11-01 DIAGNOSIS — Z79.01: Primary | ICD-10-CM

## 2023-11-01 LAB — PROTHROMBIN TIME (PROTIME)PT.: 37.5 SECONDS (ref 11.7–14.9)

## 2023-11-01 PROCEDURE — 36415 COLL VENOUS BLD VENIPUNCTURE: CPT

## 2023-11-01 PROCEDURE — 85610 PROTHROMBIN TIME: CPT

## 2023-12-04 ENCOUNTER — HOSPITAL ENCOUNTER (OUTPATIENT)
Dept: HOSPITAL 100 - US | Age: 78
Discharge: HOME | End: 2023-12-04
Payer: MEDICARE

## 2023-12-04 DIAGNOSIS — M54.2: Primary | ICD-10-CM

## 2023-12-04 DIAGNOSIS — E04.1: ICD-10-CM

## 2023-12-04 LAB — PROTHROMBIN TIME (PROTIME)PT.: 29.3 SECONDS (ref 11.7–14.9)

## 2023-12-04 PROCEDURE — 76536 US EXAM OF HEAD AND NECK: CPT

## 2023-12-04 PROCEDURE — 72050 X-RAY EXAM NECK SPINE 4/5VWS: CPT

## 2023-12-26 LAB
ALANINE AMINOTRANSFER ALT/SGPT: 14 U/L (ref 13–56)
ALBUMIN SERPL-MCNC: 3.8 G/DL (ref 3.2–5)
ALKALINE PHOSPHATASE: 61 U/L (ref 45–117)
ANION GAP: 5 (ref 5–15)
AST(SGOT): 21 U/L (ref 15–37)
BUN SERPL-MCNC: 9 MG/DL (ref 7–18)
BUN/CREAT RATIO: 11.3 RATIO (ref 10–20)
CALCIUM SERPL-MCNC: 9.2 MG/DL (ref 8.5–10.1)
CARBON DIOXIDE: 28 MMOL/L (ref 21–32)
CHLORIDE: 107 MMOL/L (ref 98–107)
CHOLEST SERPL-MCNC: 189 MG/DL
EST GLOM FILT RATE - AFR AMER: 90 ML/MIN (ref 60–?)
FOLATES, (FOLIC ACID): 15.1 NG/ML (ref 3.1–55.4)
GLOBULIN: 3.1 G/DL (ref 2.2–4.2)
GLUCOSE: 100 MG/DL (ref 74–106)
POTASSIUM: 3.5 MMOL/L (ref 3.5–5.1)
PROTHROMBIN TIME (PROTIME)PT.: 47.3 SECONDS (ref 11.7–14.9)
TRIGLYCERIDES: 108 MG/DL
VITAMIN B12: 1609 PG/ML (ref 211–911)
VLDLC SERPL-MCNC: 22 MG/DL (ref 5–40)

## 2023-12-29 ENCOUNTER — HOSPITAL ENCOUNTER (OUTPATIENT)
Dept: HOSPITAL 100 - MTLAB | Age: 78
LOS: 2 days | Discharge: HOME | End: 2023-12-31
Payer: MEDICARE

## 2023-12-29 DIAGNOSIS — E78.5: ICD-10-CM

## 2023-12-29 DIAGNOSIS — Z79.01: Primary | ICD-10-CM

## 2023-12-29 DIAGNOSIS — R73.09: ICD-10-CM

## 2023-12-29 LAB — PROTHROMBIN TIME (PROTIME)PT.: 37.1 SECONDS (ref 11.7–14.9)

## 2023-12-29 PROCEDURE — 36415 COLL VENOUS BLD VENIPUNCTURE: CPT

## 2023-12-29 PROCEDURE — 80061 LIPID PANEL: CPT

## 2023-12-29 PROCEDURE — 82607 VITAMIN B-12: CPT

## 2023-12-29 PROCEDURE — 85610 PROTHROMBIN TIME: CPT

## 2023-12-29 PROCEDURE — 80053 COMPREHEN METABOLIC PANEL: CPT

## 2023-12-29 PROCEDURE — 83036 HEMOGLOBIN GLYCOSYLATED A1C: CPT

## 2023-12-29 PROCEDURE — 82746 ASSAY OF FOLIC ACID SERUM: CPT

## 2024-01-02 LAB — PROTHROMBIN TIME (PROTIME)PT.: 19.2 SECONDS (ref 11.7–14.9)

## 2024-01-03 ENCOUNTER — HOSPITAL ENCOUNTER (OUTPATIENT)
Dept: HOSPITAL 100 - EN | Age: 79
Discharge: HOME | End: 2024-01-03
Payer: MEDICARE

## 2024-01-03 VITALS
SYSTOLIC BLOOD PRESSURE: 98 MMHG | OXYGEN SATURATION: 95 % | TEMPERATURE: 97.88 F | RESPIRATION RATE: 18 BRPM | DIASTOLIC BLOOD PRESSURE: 84 MMHG | HEART RATE: 53 BPM

## 2024-01-03 VITALS
HEART RATE: 55 BPM | RESPIRATION RATE: 18 BRPM | OXYGEN SATURATION: 95 % | SYSTOLIC BLOOD PRESSURE: 108 MMHG | TEMPERATURE: 97.34 F | DIASTOLIC BLOOD PRESSURE: 66 MMHG

## 2024-01-03 VITALS
OXYGEN SATURATION: 92 % | HEART RATE: 57 BPM | DIASTOLIC BLOOD PRESSURE: 84 MMHG | RESPIRATION RATE: 18 BRPM | SYSTOLIC BLOOD PRESSURE: 124 MMHG

## 2024-01-03 VITALS — BODY MASS INDEX: 21 KG/M2

## 2024-01-03 VITALS
OXYGEN SATURATION: 99 % | HEART RATE: 65 BPM | SYSTOLIC BLOOD PRESSURE: 124 MMHG | RESPIRATION RATE: 16 BRPM | TEMPERATURE: 97.8 F | DIASTOLIC BLOOD PRESSURE: 84 MMHG

## 2024-01-03 VITALS
HEART RATE: 55 BPM | RESPIRATION RATE: 18 BRPM | SYSTOLIC BLOOD PRESSURE: 124 MMHG | OXYGEN SATURATION: 94 % | DIASTOLIC BLOOD PRESSURE: 84 MMHG

## 2024-01-03 VITALS — DIASTOLIC BLOOD PRESSURE: 84 MMHG | SYSTOLIC BLOOD PRESSURE: 124 MMHG

## 2024-01-03 DIAGNOSIS — Z79.899: ICD-10-CM

## 2024-01-03 DIAGNOSIS — R13.12: ICD-10-CM

## 2024-01-03 DIAGNOSIS — K22.2: Primary | ICD-10-CM

## 2024-01-03 DIAGNOSIS — I10: ICD-10-CM

## 2024-01-03 DIAGNOSIS — K21.9: ICD-10-CM

## 2024-01-03 DIAGNOSIS — F01.50: ICD-10-CM

## 2024-01-03 DIAGNOSIS — Z79.01: ICD-10-CM

## 2024-01-03 DIAGNOSIS — Z86.73: ICD-10-CM

## 2024-01-03 DIAGNOSIS — F32.A: ICD-10-CM

## 2024-01-03 PROCEDURE — 43248 EGD GUIDE WIRE INSERTION: CPT

## 2024-01-03 PROCEDURE — 88305 TISSUE EXAM BY PATHOLOGIST: CPT

## 2024-01-03 PROCEDURE — 43239 EGD BIOPSY SINGLE/MULTIPLE: CPT

## 2024-01-03 PROCEDURE — 0DJ08ZZ INSPECTION OF UPPER INTESTINAL TRACT, VIA NATURAL OR ARTIFICIAL OPENING ENDOSCOPIC: ICD-10-PCS | Performed by: INTERNAL MEDICINE

## 2024-01-08 LAB — PROTHROMBIN TIME (PROTIME)PT.: 17.4 SECONDS (ref 11.7–14.9)

## 2024-01-11 LAB — PROTHROMBIN TIME (PROTIME)PT.: 30.7 SECONDS (ref 11.7–14.9)

## 2024-01-23 ENCOUNTER — HOSPITAL ENCOUNTER (OUTPATIENT)
Dept: HOSPITAL 100 - MTLAB | Age: 79
Discharge: HOME | End: 2024-01-23
Payer: MEDICARE

## 2024-01-23 DIAGNOSIS — Z95.2: ICD-10-CM

## 2024-01-23 DIAGNOSIS — Z79.01: Primary | ICD-10-CM

## 2024-01-23 LAB — PROTHROMBIN TIME (PROTIME)PT.: 37.4 SECONDS (ref 11.7–14.9)

## 2024-01-23 PROCEDURE — 36415 COLL VENOUS BLD VENIPUNCTURE: CPT

## 2024-01-23 PROCEDURE — 85610 PROTHROMBIN TIME: CPT

## 2024-01-25 ENCOUNTER — HOSPITAL ENCOUNTER (OUTPATIENT)
Dept: HOSPITAL 100 - US | Age: 79
Discharge: HOME | End: 2024-01-25
Payer: MEDICARE

## 2024-01-25 DIAGNOSIS — N39.0: Primary | ICD-10-CM

## 2024-01-25 PROCEDURE — 76770 US EXAM ABDO BACK WALL COMP: CPT

## 2024-02-06 LAB — PROTHROMBIN TIME (PROTIME)PT.: 37.1 SECONDS (ref 11.7–14.9)

## 2024-02-20 ENCOUNTER — HOSPITAL ENCOUNTER (OUTPATIENT)
Dept: HOSPITAL 100 - MTLAB | Age: 79
LOS: 9 days | Discharge: HOME | End: 2024-02-29
Payer: MEDICARE

## 2024-02-20 DIAGNOSIS — Z79.01: Primary | ICD-10-CM

## 2024-02-20 LAB — PROTHROMBIN TIME (PROTIME)PT.: 35.4 SECONDS (ref 11.7–14.9)

## 2024-02-20 PROCEDURE — 36415 COLL VENOUS BLD VENIPUNCTURE: CPT

## 2024-02-20 PROCEDURE — 85610 PROTHROMBIN TIME: CPT

## 2024-02-23 ENCOUNTER — HOSPITAL ENCOUNTER (OUTPATIENT)
Dept: HOSPITAL 100 - PT | Age: 79
Discharge: HOME | End: 2024-02-23
Payer: MEDICARE

## 2024-02-23 DIAGNOSIS — R53.1: ICD-10-CM

## 2024-02-23 DIAGNOSIS — I61.9: Primary | ICD-10-CM

## 2024-02-23 DIAGNOSIS — R26.2: ICD-10-CM

## 2024-02-23 DIAGNOSIS — L93.2: ICD-10-CM

## 2024-02-23 PROCEDURE — 97162 PT EVAL MOD COMPLEX 30 MIN: CPT

## 2024-02-23 PROCEDURE — 97112 NEUROMUSCULAR REEDUCATION: CPT

## 2024-02-23 PROCEDURE — 97110 THERAPEUTIC EXERCISES: CPT

## 2024-03-05 LAB — PROTHROMBIN TIME (PROTIME)PT.: 35.7 SECONDS (ref 11.7–14.9)

## 2024-03-19 ENCOUNTER — HOSPITAL ENCOUNTER (OUTPATIENT)
Dept: HOSPITAL 100 - MTLAB | Age: 79
LOS: 12 days | Discharge: HOME | End: 2024-03-31
Payer: MEDICARE

## 2024-03-19 DIAGNOSIS — Z95.2: ICD-10-CM

## 2024-03-19 DIAGNOSIS — Z79.01: Primary | ICD-10-CM

## 2024-03-19 LAB — PROTHROMBIN TIME (PROTIME)PT.: 37.7 SECONDS (ref 11.7–14.9)

## 2024-03-19 PROCEDURE — 85610 PROTHROMBIN TIME: CPT

## 2024-03-19 PROCEDURE — 36415 COLL VENOUS BLD VENIPUNCTURE: CPT

## 2024-04-02 LAB — PROTHROMBIN TIME (PROTIME)PT.: 21.4 SECONDS (ref 11.7–14.9)

## 2024-04-09 LAB
ALANINE AMINOTRANSFER ALT/SGPT: 25 U/L (ref 13–56)
ALBUMIN SERPL-MCNC: 3.5 G/DL (ref 3.2–5)
ALKALINE PHOSPHATASE: 65 U/L (ref 45–117)
ANION GAP: 6 (ref 5–15)
AST(SGOT): 25 U/L (ref 15–37)
BUN SERPL-MCNC: 10 MG/DL (ref 7–18)
BUN/CREAT RATIO: 12.1 RATIO (ref 10–20)
CALCIUM SERPL-MCNC: 9.1 MG/DL (ref 8.5–10.1)
CARBON DIOXIDE: 27 MMOL/L (ref 21–32)
CHLORIDE: 106 MMOL/L (ref 98–107)
CHOLEST SERPL-MCNC: 167 MG/DL
DEPRECATED RDW RBC: 47.2 FL (ref 35.1–43.9)
ERYTHROCYTE [DISTWIDTH] IN BLOOD: 12.8 % (ref 11.6–14.6)
EST GLOM FILT RATE - AFR AMER: 87 ML/MIN (ref 60–?)
GLOBULIN: 3.1 G/DL (ref 2.2–4.2)
GLUCOSE: 115 MG/DL (ref 74–106)
HCT VFR BLD AUTO: 39.6 % (ref 37–47)
HGB BLD-MCNC: 12.7 G/DL (ref 12–15)
IMMATURE GRANULOCYTES COUNT: 0.01 X10^3/UL (ref 0–0)
MCV RBC: 99.5 FL (ref 81–99)
MEAN CORP HGB CONC: 32.1 G/DL (ref 32–36)
MEAN PLATELET VOL.: 11.1 FL (ref 6.2–12)
MUCOUS THREADS URNS QL MICRO: (no result) /HPF
NRBC FLAGGED BY ANALYZER: 0 % (ref 0–5)
PLATELET # BLD: 192 K/MM3 (ref 150–450)
POTASSIUM: 3.8 MMOL/L (ref 3.5–5.1)
PROT UR QL STRIP.AUTO: 15 MG/DL
PROTHROMBIN TIME (PROTIME)PT.: 24 SECONDS (ref 11.7–14.9)
RBC # BLD AUTO: 3.98 M/MM3 (ref 4.2–5.4)
RBC UR QL: (no result) /HPF (ref 0–5)
SP GR UR: 1.01 (ref 1–1.03)
SQUAMOUS URNS QL MICRO: (no result) /HPF (ref 5–10)
TRIGLYCERIDES: 110 MG/DL
URINE PRESERVATIVE: (no result)
VITAMIN D,25 HYDROXY: 100.1 NG/ML
VLDLC SERPL-MCNC: 22 MG/DL (ref 5–40)
WBC # BLD AUTO: 4.7 K/MM3 (ref 4.4–11)

## 2024-04-16 ENCOUNTER — HOSPITAL ENCOUNTER (OUTPATIENT)
Dept: HOSPITAL 100 - MTLAB | Age: 79
LOS: 14 days | Discharge: HOME | End: 2024-04-30
Payer: MEDICARE

## 2024-04-16 DIAGNOSIS — E55.9: ICD-10-CM

## 2024-04-16 DIAGNOSIS — Z95.2: ICD-10-CM

## 2024-04-16 DIAGNOSIS — E78.5: ICD-10-CM

## 2024-04-16 DIAGNOSIS — R73.09: ICD-10-CM

## 2024-04-16 DIAGNOSIS — E04.1: ICD-10-CM

## 2024-04-16 DIAGNOSIS — Z79.01: Primary | ICD-10-CM

## 2024-04-16 LAB — PROTHROMBIN TIME (PROTIME)PT.: 29.7 SECONDS (ref 11.7–14.9)

## 2024-04-16 PROCEDURE — 80053 COMPREHEN METABOLIC PANEL: CPT

## 2024-04-16 PROCEDURE — 82306 VITAMIN D 25 HYDROXY: CPT

## 2024-04-16 PROCEDURE — 83036 HEMOGLOBIN GLYCOSYLATED A1C: CPT

## 2024-04-16 PROCEDURE — 84443 ASSAY THYROID STIM HORMONE: CPT

## 2024-04-16 PROCEDURE — 85025 COMPLETE CBC W/AUTO DIFF WBC: CPT

## 2024-04-16 PROCEDURE — 36415 COLL VENOUS BLD VENIPUNCTURE: CPT

## 2024-04-16 PROCEDURE — 81001 URINALYSIS AUTO W/SCOPE: CPT

## 2024-04-16 PROCEDURE — 80061 LIPID PANEL: CPT

## 2024-04-16 PROCEDURE — 85610 PROTHROMBIN TIME: CPT

## 2024-05-06 LAB — PROTHROMBIN TIME (PROTIME)PT.: 36.2 SECONDS (ref 11.7–14.9)

## 2024-05-20 ENCOUNTER — HOSPITAL ENCOUNTER (OUTPATIENT)
Dept: HOSPITAL 100 - MTLAB | Age: 79
Discharge: HOME | End: 2024-05-20
Payer: MEDICARE

## 2024-05-20 DIAGNOSIS — Z95.2: ICD-10-CM

## 2024-05-20 DIAGNOSIS — Z79.01: Primary | ICD-10-CM

## 2024-05-20 LAB — PROTHROMBIN TIME (PROTIME)PT.: 29.2 SECONDS (ref 11.7–14.9)

## 2024-05-20 PROCEDURE — 85610 PROTHROMBIN TIME: CPT

## 2024-05-20 PROCEDURE — 36415 COLL VENOUS BLD VENIPUNCTURE: CPT

## 2024-06-13 ENCOUNTER — HOSPITAL ENCOUNTER (OUTPATIENT)
Dept: HOSPITAL 100 - MTLAB | Age: 79
Discharge: HOME | End: 2024-06-13
Payer: MEDICARE

## 2024-06-13 DIAGNOSIS — Z79.01: Primary | ICD-10-CM

## 2024-06-13 DIAGNOSIS — Z95.2: ICD-10-CM

## 2024-06-13 LAB — PROTHROMBIN TIME (PROTIME)PT.: 31.1 SECONDS (ref 11.7–14.9)

## 2024-06-13 PROCEDURE — 85610 PROTHROMBIN TIME: CPT

## 2024-06-13 PROCEDURE — 36415 COLL VENOUS BLD VENIPUNCTURE: CPT

## 2024-06-25 ENCOUNTER — HOSPITAL ENCOUNTER (OUTPATIENT)
Dept: HOSPITAL 100 - EN | Age: 79
Discharge: HOME | End: 2024-06-25
Payer: MEDICARE

## 2024-06-25 VITALS
OXYGEN SATURATION: 96 % | RESPIRATION RATE: 16 BRPM | DIASTOLIC BLOOD PRESSURE: 83 MMHG | TEMPERATURE: 96.3 F | HEART RATE: 62 BPM | SYSTOLIC BLOOD PRESSURE: 115 MMHG

## 2024-06-25 VITALS
HEART RATE: 71 BPM | OXYGEN SATURATION: 99 % | TEMPERATURE: 97.2 F | SYSTOLIC BLOOD PRESSURE: 128 MMHG | DIASTOLIC BLOOD PRESSURE: 80 MMHG | RESPIRATION RATE: 14 BRPM

## 2024-06-25 VITALS
SYSTOLIC BLOOD PRESSURE: 127 MMHG | DIASTOLIC BLOOD PRESSURE: 80 MMHG | TEMPERATURE: 97.3 F | HEART RATE: 72 BPM | OXYGEN SATURATION: 99 % | RESPIRATION RATE: 16 BRPM

## 2024-06-25 VITALS
SYSTOLIC BLOOD PRESSURE: 127 MMHG | RESPIRATION RATE: 16 BRPM | DIASTOLIC BLOOD PRESSURE: 77 MMHG | HEART RATE: 68 BPM | OXYGEN SATURATION: 98 %

## 2024-06-25 VITALS
OXYGEN SATURATION: 100 % | SYSTOLIC BLOOD PRESSURE: 127 MMHG | RESPIRATION RATE: 16 BRPM | HEART RATE: 69 BPM | DIASTOLIC BLOOD PRESSURE: 83 MMHG | TEMPERATURE: 98.96 F

## 2024-06-25 VITALS
DIASTOLIC BLOOD PRESSURE: 83 MMHG | HEART RATE: 69 BPM | SYSTOLIC BLOOD PRESSURE: 127 MMHG | TEMPERATURE: 99 F | OXYGEN SATURATION: 100 % | RESPIRATION RATE: 16 BRPM

## 2024-06-25 VITALS
DIASTOLIC BLOOD PRESSURE: 70 MMHG | RESPIRATION RATE: 16 BRPM | OXYGEN SATURATION: 96 % | SYSTOLIC BLOOD PRESSURE: 108 MMHG | HEART RATE: 68 BPM

## 2024-06-25 VITALS — DIASTOLIC BLOOD PRESSURE: 83 MMHG | SYSTOLIC BLOOD PRESSURE: 127 MMHG

## 2024-06-25 VITALS
DIASTOLIC BLOOD PRESSURE: 74 MMHG | HEART RATE: 69 BPM | OXYGEN SATURATION: 98 % | SYSTOLIC BLOOD PRESSURE: 127 MMHG | RESPIRATION RATE: 16 BRPM

## 2024-06-25 VITALS — BODY MASS INDEX: 19.5 KG/M2

## 2024-06-25 DIAGNOSIS — Z86.73: ICD-10-CM

## 2024-06-25 DIAGNOSIS — K22.0: ICD-10-CM

## 2024-06-25 DIAGNOSIS — R13.12: Primary | ICD-10-CM

## 2024-06-25 DIAGNOSIS — K21.9: ICD-10-CM

## 2024-06-25 DIAGNOSIS — K22.2: ICD-10-CM

## 2024-06-25 DIAGNOSIS — E78.5: ICD-10-CM

## 2024-06-25 DIAGNOSIS — I10: ICD-10-CM

## 2024-06-25 DIAGNOSIS — Z95.2: ICD-10-CM

## 2024-06-25 DIAGNOSIS — E74.00: ICD-10-CM

## 2024-06-25 DIAGNOSIS — K22.10: ICD-10-CM

## 2024-06-25 PROCEDURE — 43236 UPPR GI SCOPE W/SUBMUC INJ: CPT

## 2024-06-25 PROCEDURE — 88305 TISSUE EXAM BY PATHOLOGIST: CPT

## 2024-06-25 PROCEDURE — A4216 STERILE WATER/SALINE, 10 ML: HCPCS

## 2024-06-25 PROCEDURE — 43248 EGD GUIDE WIRE INSERTION: CPT

## 2024-06-25 PROCEDURE — 43239 EGD BIOPSY SINGLE/MULTIPLE: CPT

## 2024-06-25 PROCEDURE — 88312 SPECIAL STAINS GROUP 1: CPT

## 2024-06-25 PROCEDURE — 0DJ08ZZ INSPECTION OF UPPER INTESTINAL TRACT, VIA NATURAL OR ARTIFICIAL OPENING ENDOSCOPIC: ICD-10-PCS | Performed by: INTERNAL MEDICINE

## 2024-06-25 PROCEDURE — C1769 GUIDE WIRE: HCPCS

## 2024-06-25 RX ADMIN — SODIUM CHLORIDE, PRESERVATIVE FREE 10 ML: 5 INJECTION INTRAVENOUS at 13:49

## 2024-06-25 RX ADMIN — SODIUM CHLORIDE 10 ML: 9 INJECTION, SOLUTION INTRAMUSCULAR; INTRAVENOUS; SUBCUTANEOUS at 13:46

## 2024-07-23 ENCOUNTER — HOSPITAL ENCOUNTER (OUTPATIENT)
Dept: HOSPITAL 100 - MTLAB | Age: 79
LOS: 8 days | Discharge: HOME | End: 2024-07-31
Payer: MEDICARE

## 2024-07-23 DIAGNOSIS — Z79.01: Primary | ICD-10-CM

## 2024-07-23 DIAGNOSIS — Z95.2: ICD-10-CM

## 2024-07-23 DIAGNOSIS — I11.9: ICD-10-CM

## 2024-07-23 DIAGNOSIS — R73.09: ICD-10-CM

## 2024-07-23 DIAGNOSIS — E55.9: ICD-10-CM

## 2024-07-23 LAB
ALANINE AMINOTRANSFER ALT/SGPT: 21 U/L (ref 13–56)
ALBUMIN SERPL-MCNC: 3.5 G/DL (ref 3.2–5)
ALKALINE PHOSPHATASE: 62 U/L (ref 45–117)
ANION GAP: 4 (ref 5–15)
AST(SGOT): 16 U/L (ref 15–37)
BUN SERPL-MCNC: 13 MG/DL (ref 7–18)
BUN/CREAT RATIO: 18.2 RATIO (ref 10–20)
CALCIUM SERPL-MCNC: 9.1 MG/DL (ref 8.5–10.1)
CARBON DIOXIDE: 31 MMOL/L (ref 21–32)
CHLORIDE: 104 MMOL/L (ref 98–107)
CHOLEST SERPL-MCNC: 181 MG/DL
CREAT UR-MCNC: 56.2 MG/DL
DEPRECATED RDW RBC: 45.3 FL (ref 35.1–43.9)
ERYTHROCYTE [DISTWIDTH] IN BLOOD: 12.3 % (ref 11.6–14.6)
EST GLOM FILT RATE - AFR AMER: 102 ML/MIN (ref 60–?)
FOLATES, (FOLIC ACID): 10.6 NG/ML (ref 3.1–55.4)
GLOBULIN: 3.2 G/DL (ref 2.2–4.2)
GLUCOSE: 105 MG/DL (ref 74–106)
HCT VFR BLD AUTO: 40.7 % (ref 37–47)
HEMOGLOBIN: 13 G/DL (ref 12–15)
HGB BLD-MCNC: 13 G/DL (ref 12–15)
IMMATURE GRANULOCYTES COUNT: 0.01 X10^3/UL (ref 0–0)
MCV RBC: 98.8 FL (ref 81–99)
MEAN CORP HGB CONC: 31.9 G/DL (ref 32–36)
MEAN PLATELET VOL.: 10.7 FL (ref 6.2–12)
MICROALBUMIN UR-MCNC: 27.6 MG/L
NRBC FLAGGED BY ANALYZER: 0 % (ref 0–5)
PLATELET # BLD: 211 K/MM3 (ref 150–450)
PLATELET COUNT: 211 K/MM3 (ref 150–450)
POTASSIUM: 3.4 MMOL/L (ref 3.5–5.1)
PROTHROMBIN TIME (PROTIME)PT.: 23.5 SECONDS (ref 11.7–14.9)
RBC # BLD AUTO: 4.12 M/MM3 (ref 4.2–5.4)
RBC DISTRIBUTION WIDTH CV: 12.3 % (ref 11.6–14.6)
RBC DISTRIBUTION WIDTH SD: 45.3 FL (ref 35.1–43.9)
TRIGLYCERIDES: 115 MG/DL
VITAMIN B12: 1443 PG/ML (ref 211–911)
VITAMIN D,25 HYDROXY: 85.8 NG/ML
VLDLC SERPL-MCNC: 23 MG/DL (ref 5–40)
WBC # BLD AUTO: 5.2 K/MM3 (ref 4.4–11)
WHITE BLOOD COUNT: 5.2 K/MM3 (ref 4.4–11)

## 2024-07-23 PROCEDURE — 82043 UR ALBUMIN QUANTITATIVE: CPT

## 2024-07-23 PROCEDURE — 82306 VITAMIN D 25 HYDROXY: CPT

## 2024-07-23 PROCEDURE — 85025 COMPLETE CBC W/AUTO DIFF WBC: CPT

## 2024-07-23 PROCEDURE — 85610 PROTHROMBIN TIME: CPT

## 2024-07-23 PROCEDURE — 82746 ASSAY OF FOLIC ACID SERUM: CPT

## 2024-07-23 PROCEDURE — 36415 COLL VENOUS BLD VENIPUNCTURE: CPT

## 2024-07-23 PROCEDURE — 80053 COMPREHEN METABOLIC PANEL: CPT

## 2024-07-23 PROCEDURE — 82570 ASSAY OF URINE CREATININE: CPT

## 2024-07-23 PROCEDURE — 82607 VITAMIN B-12: CPT

## 2024-07-23 PROCEDURE — 80061 LIPID PANEL: CPT

## 2024-07-23 PROCEDURE — 83036 HEMOGLOBIN GLYCOSYLATED A1C: CPT

## 2024-07-30 ENCOUNTER — HOSPITAL ENCOUNTER (OUTPATIENT)
Age: 79
Discharge: HOME | End: 2024-07-30
Payer: MEDICARE

## 2024-07-30 DIAGNOSIS — M79.89: Primary | ICD-10-CM

## 2024-07-30 PROCEDURE — 93971 EXTREMITY STUDY: CPT

## 2024-08-06 LAB — PROTHROMBIN TIME (PROTIME)PT.: 24.8 SECONDS (ref 11.7–14.9)

## 2024-08-19 ENCOUNTER — HOSPITAL ENCOUNTER (OUTPATIENT)
Dept: HOSPITAL 100 - MTLAB | Age: 79
Discharge: HOME | End: 2024-08-19
Payer: MEDICARE

## 2024-08-19 DIAGNOSIS — Z79.01: Primary | ICD-10-CM

## 2024-08-19 LAB — PROTHROMBIN TIME (PROTIME)PT.: 26.6 SECONDS (ref 11.7–14.9)

## 2024-08-19 PROCEDURE — 36415 COLL VENOUS BLD VENIPUNCTURE: CPT

## 2024-08-19 PROCEDURE — 85610 PROTHROMBIN TIME: CPT

## 2024-08-29 ENCOUNTER — HOSPITAL ENCOUNTER (OUTPATIENT)
Age: 79
Discharge: HOME | End: 2024-08-29
Payer: MEDICARE

## 2024-08-29 DIAGNOSIS — E87.6: Primary | ICD-10-CM

## 2024-08-29 LAB
ANION GAP: 4 (ref 5–15)
BUN SERPL-MCNC: 16 MG/DL (ref 7–18)
BUN/CREAT RATIO: 21.5 RATIO (ref 10–20)
CALCIUM SERPL-MCNC: 9.1 MG/DL (ref 8.5–10.1)
CARBON DIOXIDE: 28 MMOL/L (ref 21–32)
CHLORIDE: 104 MMOL/L (ref 98–107)
EST GLOM FILT RATE - AFR AMER: 97 ML/MIN (ref 60–?)
GLUCOSE: 99 MG/DL (ref 74–106)
MAGNESIUM: 1.9 MG/DL (ref 1.6–2.6)
POTASSIUM: 4 MMOL/L (ref 3.5–5.1)

## 2024-08-29 PROCEDURE — 36415 COLL VENOUS BLD VENIPUNCTURE: CPT

## 2024-08-29 PROCEDURE — 80048 BASIC METABOLIC PNL TOTAL CA: CPT

## 2024-08-29 PROCEDURE — 83735 ASSAY OF MAGNESIUM: CPT

## 2024-09-09 LAB — PROTHROMBIN TIME (PROTIME)PT.: 28.6 SECONDS (ref 11.7–14.9)

## 2024-09-25 ENCOUNTER — HOSPITAL ENCOUNTER (OUTPATIENT)
Dept: HOSPITAL 100 - CT | Age: 79
Discharge: HOME | End: 2024-09-25
Payer: MEDICARE

## 2024-09-25 DIAGNOSIS — I71.9: Primary | ICD-10-CM

## 2024-09-25 PROCEDURE — 71275 CT ANGIOGRAPHY CHEST: CPT

## 2024-09-30 ENCOUNTER — HOSPITAL ENCOUNTER (OUTPATIENT)
Dept: HOSPITAL 100 - MTLAB | Age: 79
Discharge: HOME | End: 2024-09-30
Payer: MEDICARE

## 2024-09-30 DIAGNOSIS — Z79.01: Primary | ICD-10-CM

## 2024-09-30 DIAGNOSIS — I48.91: ICD-10-CM

## 2024-09-30 LAB — PROTHROMBIN TIME (PROTIME)PT.: 31.9 SECONDS (ref 11.7–14.9)

## 2024-09-30 PROCEDURE — 36415 COLL VENOUS BLD VENIPUNCTURE: CPT

## 2024-09-30 PROCEDURE — 85610 PROTHROMBIN TIME: CPT

## 2024-10-01 ENCOUNTER — HOSPITAL ENCOUNTER (OUTPATIENT)
Dept: HOSPITAL 100 - OPBD | Age: 79
Discharge: HOME | End: 2024-10-01
Payer: MEDICARE

## 2024-10-01 DIAGNOSIS — Z78.0: ICD-10-CM

## 2024-10-01 DIAGNOSIS — Z12.31: Primary | ICD-10-CM

## 2024-10-01 PROCEDURE — 77063 BREAST TOMOSYNTHESIS BI: CPT

## 2024-10-01 PROCEDURE — 77067 SCR MAMMO BI INCL CAD: CPT

## 2024-10-01 PROCEDURE — 77080 DXA BONE DENSITY AXIAL: CPT

## 2024-10-29 ENCOUNTER — HOSPITAL ENCOUNTER (OUTPATIENT)
Dept: HOSPITAL 100 - MTLAB | Age: 79
Discharge: HOME | End: 2024-10-29
Payer: MEDICARE

## 2024-10-29 DIAGNOSIS — Z79.01: Primary | ICD-10-CM

## 2024-10-29 DIAGNOSIS — Z95.2: ICD-10-CM

## 2024-10-29 LAB — PROTHROMBIN TIME (PROTIME)PT.: 35.5 SECONDS (ref 11.7–14.9)

## 2024-10-29 PROCEDURE — 36415 COLL VENOUS BLD VENIPUNCTURE: CPT

## 2024-10-29 PROCEDURE — 85610 PROTHROMBIN TIME: CPT

## 2024-11-04 ENCOUNTER — HOSPITAL ENCOUNTER (EMERGENCY)
Age: 79
Discharge: HOME | End: 2024-11-04
Payer: MEDICARE

## 2024-11-04 VITALS — BODY MASS INDEX: 20 KG/M2

## 2024-11-04 VITALS
SYSTOLIC BLOOD PRESSURE: 153 MMHG | OXYGEN SATURATION: 98 % | HEART RATE: 72 BPM | TEMPERATURE: 97.7 F | RESPIRATION RATE: 18 BRPM | DIASTOLIC BLOOD PRESSURE: 93 MMHG

## 2024-11-04 VITALS
RESPIRATION RATE: 16 BRPM | HEART RATE: 66 BPM | OXYGEN SATURATION: 98 % | DIASTOLIC BLOOD PRESSURE: 76 MMHG | SYSTOLIC BLOOD PRESSURE: 131 MMHG

## 2024-11-04 DIAGNOSIS — Z88.2: ICD-10-CM

## 2024-11-04 DIAGNOSIS — Z86.73: ICD-10-CM

## 2024-11-04 DIAGNOSIS — Z95.2: ICD-10-CM

## 2024-11-04 DIAGNOSIS — Z79.899: ICD-10-CM

## 2024-11-04 DIAGNOSIS — J34.89: Primary | ICD-10-CM

## 2024-11-04 DIAGNOSIS — I71.20: ICD-10-CM

## 2024-11-04 DIAGNOSIS — M32.9: ICD-10-CM

## 2024-11-04 DIAGNOSIS — M50.30: ICD-10-CM

## 2024-11-04 DIAGNOSIS — I35.0: ICD-10-CM

## 2024-11-04 DIAGNOSIS — Z88.0: ICD-10-CM

## 2024-11-04 DIAGNOSIS — F01.53: ICD-10-CM

## 2024-11-04 DIAGNOSIS — E55.9: ICD-10-CM

## 2024-11-04 DIAGNOSIS — G40.909: ICD-10-CM

## 2024-11-04 DIAGNOSIS — Z88.1: ICD-10-CM

## 2024-11-04 DIAGNOSIS — I10: ICD-10-CM

## 2024-11-04 DIAGNOSIS — M19.90: ICD-10-CM

## 2024-11-04 DIAGNOSIS — K21.9: ICD-10-CM

## 2024-11-04 DIAGNOSIS — Z79.01: ICD-10-CM

## 2024-11-04 LAB
ANION GAP: 4 (ref 5–15)
BUN SERPL-MCNC: 20 MG/DL (ref 7–18)
BUN/CREAT RATIO: 25.9 RATIO (ref 10–20)
CALCIUM SERPL-MCNC: 8.8 MG/DL (ref 8.5–10.1)
CARBON DIOXIDE: 28 MMOL/L (ref 21–32)
CHLORIDE: 104 MMOL/L (ref 98–107)
DEPRECATED RDW RBC: 45.2 FL (ref 35.1–43.9)
ERYTHROCYTE [DISTWIDTH] IN BLOOD: 12.4 % (ref 11.6–14.6)
EST GLOM FILT RATE - AFR AMER: 93 ML/MIN (ref 60–?)
ESTIMATED CREATININE CLEARANCE: 46.26 ML/MIN
GLUCOSE: 132 MG/DL (ref 74–106)
HCT VFR BLD AUTO: 38.7 % (ref 37–47)
HEMOGLOBIN: 12.6 G/DL (ref 12–15)
HGB BLD-MCNC: 12.6 G/DL (ref 12–15)
IMMATURE GRANULOCYTES COUNT: 0.02 X10^3/UL (ref 0–0)
MCV RBC: 99 FL (ref 81–99)
MEAN CORP HGB CONC: 32.6 G/DL (ref 32–36)
MEAN PLATELET VOL.: 10 FL (ref 6.2–12)
NRBC FLAGGED BY ANALYZER: 0 % (ref 0–5)
PLATELET # BLD: 183 K/MM3 (ref 150–450)
PLATELET COUNT: 183 K/MM3 (ref 150–450)
POTASSIUM: 3.8 MMOL/L (ref 3.5–5.1)
RBC # BLD AUTO: 3.91 M/MM3 (ref 4.2–5.4)
RBC DISTRIBUTION WIDTH CV: 12.4 % (ref 11.6–14.6)
RBC DISTRIBUTION WIDTH SD: 45.2 FL (ref 35.1–43.9)
WBC # BLD AUTO: 6.3 K/MM3 (ref 4.4–11)
WHITE BLOOD COUNT: 6.3 K/MM3 (ref 4.4–11)

## 2024-11-04 PROCEDURE — 80048 BASIC METABOLIC PNL TOTAL CA: CPT

## 2024-11-04 PROCEDURE — 70487 CT MAXILLOFACIAL W/DYE: CPT

## 2024-11-04 PROCEDURE — 99282 EMERGENCY DEPT VISIT SF MDM: CPT

## 2024-11-04 PROCEDURE — 85025 COMPLETE CBC W/AUTO DIFF WBC: CPT

## 2024-11-11 ENCOUNTER — OFFICE VISIT (OUTPATIENT)
Dept: OTOLARYNGOLOGY | Facility: CLINIC | Age: 79
End: 2024-11-11
Payer: MEDICARE

## 2024-11-11 DIAGNOSIS — J34.89 NASAL MASS: ICD-10-CM

## 2024-11-11 PROCEDURE — 11104 PUNCH BX SKIN SINGLE LESION: CPT | Performed by: STUDENT IN AN ORGANIZED HEALTH CARE EDUCATION/TRAINING PROGRAM

## 2024-11-11 PROCEDURE — 99214 OFFICE O/P EST MOD 30 MIN: CPT | Performed by: STUDENT IN AN ORGANIZED HEALTH CARE EDUCATION/TRAINING PROGRAM

## 2024-11-11 PROCEDURE — 31231 NASAL ENDOSCOPY DX: CPT | Performed by: STUDENT IN AN ORGANIZED HEALTH CARE EDUCATION/TRAINING PROGRAM

## 2024-11-11 PROCEDURE — 1159F MED LIST DOCD IN RCRD: CPT | Performed by: STUDENT IN AN ORGANIZED HEALTH CARE EDUCATION/TRAINING PROGRAM

## 2024-11-11 PROCEDURE — 99204 OFFICE O/P NEW MOD 45 MIN: CPT | Performed by: STUDENT IN AN ORGANIZED HEALTH CARE EDUCATION/TRAINING PROGRAM

## 2024-11-11 RX ORDER — FLUTICASONE PROPIONATE 50 MCG
2 SPRAY, SUSPENSION (ML) NASAL
COMMUNITY

## 2024-11-11 RX ORDER — MIRTAZAPINE 15 MG/1
TABLET, FILM COATED ORAL
COMMUNITY

## 2024-11-11 RX ORDER — WARFARIN 4 MG/1
4 TABLET ORAL
COMMUNITY

## 2024-11-11 RX ORDER — ARIPIPRAZOLE 5 MG/1
1 TABLET ORAL DAILY
COMMUNITY

## 2024-11-11 RX ORDER — NITROFURANTOIN 25; 75 MG/1; MG/1
CAPSULE ORAL
COMMUNITY
Start: 2024-05-22

## 2024-11-11 RX ORDER — HYDROXYCHLOROQUINE SULFATE 200 MG/1
1 TABLET ORAL DAILY
COMMUNITY

## 2024-11-11 RX ORDER — NEBIVOLOL HYDROCHLORIDE 5 MG/1
TABLET ORAL
COMMUNITY

## 2024-11-11 RX ORDER — LEVETIRACETAM 500 MG/1
TABLET ORAL EVERY 12 HOURS
COMMUNITY

## 2024-11-11 RX ORDER — CLINDAMYCIN HYDROCHLORIDE 300 MG/1
CAPSULE ORAL
COMMUNITY
Start: 2023-12-14

## 2024-11-11 RX ORDER — TALC
POWDER (GRAM) TOPICAL
COMMUNITY

## 2024-11-11 RX ORDER — MEMANTINE HYDROCHLORIDE 5 MG/1
5 TABLET ORAL
COMMUNITY

## 2024-11-11 RX ORDER — OMEPRAZOLE 20 MG/1
20 CAPSULE, DELAYED RELEASE ORAL
COMMUNITY

## 2024-11-11 RX ORDER — LOSARTAN POTASSIUM 100 MG/1
50 TABLET ORAL 2 TIMES DAILY
COMMUNITY

## 2024-11-11 RX ORDER — ESTROGENS, CONJUGATED 0.3 MG/1
TABLET, FILM COATED ORAL EVERY 24 HOURS
COMMUNITY

## 2024-11-11 RX ORDER — MINERAL OIL
180 ENEMA (ML) RECTAL
COMMUNITY

## 2024-11-11 RX ORDER — VIT C/E/ZN/COPPR/LUTEIN/ZEAXAN 250MG-90MG
CAPSULE ORAL
COMMUNITY

## 2024-11-11 RX ORDER — PSYLLIUM HUSK 0.4 G
600 CAPSULE ORAL
COMMUNITY

## 2024-11-11 RX ORDER — DOCUSATE SODIUM 100 MG/1
CAPSULE, LIQUID FILLED ORAL EVERY 24 HOURS
COMMUNITY

## 2024-11-11 RX ORDER — CETIRIZINE HYDROCHLORIDE 10 MG/1
TABLET ORAL
COMMUNITY

## 2024-11-11 RX ORDER — POTASSIUM CHLORIDE 1500 MG/1
1 TABLET, EXTENDED RELEASE ORAL DAILY
COMMUNITY

## 2024-11-11 ASSESSMENT — PATIENT HEALTH QUESTIONNAIRE - PHQ9
1. LITTLE INTEREST OR PLEASURE IN DOING THINGS: NOT AT ALL
2. FEELING DOWN, DEPRESSED OR HOPELESS: NOT AT ALL
SUM OF ALL RESPONSES TO PHQ9 QUESTIONS 1 AND 2: 0

## 2024-11-11 NOTE — PROGRESS NOTES
HEAD AND NECK SURGERY CONSULT  [facility]    Referring Provider: Mariia CHACON  I had the pleasure of seeing Amrita Grant as a consultation today for evaluation of nasal mass. She is accompanied by her  today who assists with history.     The patient and  report noticing a discolored mass on her nose a few weeks ago. They are not sure how long it has been there before then. It has grown in the last few weeks. No bleeding from the nose or mass. No trouble breathing, trouble swallowing, weight loss, fevers, chills, or night sweats. 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 This was 'frozen off'. No other lumps or bumps. She is in a wheelchair but able to ambulate short distances. History of a stroke, heart valve replacement, on coumadin, and lupus.     The patient denies having prior trauma or surgery to their head and neck. There is not a personal or family history of blood clots, easy bleeding or bruising, or anesthesia concerns. They live at home with  who assists her today.     Tobacco use: The patient  has no history on file for tobacco use.   Alcohol Use: The patient  has no history on file for alcohol use.     Physical Examination  Vitals:  There were no vitals taken for this visit.  General: Examination reveals a well-developed, well-nourished patient in no apparent distress.  The patient has no audible dysphonia, stridor or airway distress.  The patient is oriented, alert and responsive.    Face: symmetric, no scars or lesions  Nose: large firm mass right nasal dorsum/sideweall  Oral Cavity:  The patient is able to open the mouth widely without trismus.  The floor of mouth and oral tongue are soft, and no mucosal abnormalities are noted on the lips or within the oral cavity.  The oral tongue is fully mobile and midline on protrusion.    Oropharynx: There are no mucosal abnormalities noted within the oropharynx.  The soft  "palate elevates symmetrically, the tonsils and base of tongue are normal to inspection and palpation.       Salivary glands: There are no palpable masses of the parotid or submandibular glands.   Neuro: Cranial nerves 2-12 are without obvious abnormality.  Neck: The neck is soft and symmetric.  There is no palpable adenopathy.    Procedure Note:  Diagnostic Nasal endoscopy  Indication: nasal mass with possible endonasal extension  Surgeon: Carlie Templeton MD  Informed Consent: The procedure, risks, and benefits were discussed with the patient and verbal consent obtained to proceed.   Procedure: Topical anesthesia (lidocaine) was used to spray the nasal mucosa.  A flexible scope was inserted through the nasal cavity.     Findings: discoloration and erythema of right endonasal side wall, no overt mass extending into the nasal cavity, septum, turbinates are normal    Attestation: I performed the procedure in its entirety, or was present with the resident/fellow for the entirety of the procedure.      Procedure  Biopsy of the right nasal skin  Indication: Lesion of the right nasal dorsum present requiring tissue analysis  Surgeon: Carlie Templeton MD  Informed consent: Risks and benefits were discussed and verbal consent obtained from the patient/guardian prior to proceeding.  Anesthesia: Injection with 1:100,000 lidocaine with epinephrine  Procedure: After adequate time for analgesia, the lesion was grasped with toothed forceps and a portion sharply transected. The specimen was placed in formalin and sent for analysis. Hemostasis was achieved with a mixture of pressure and silver nitrate. The patient tolerated the procedure well.    DATA REVIEWED:  Labs:  No results found for: \"WBC\", \"HGB\", \"HCT\", \"PLT\", \"NEUTROABS\"  Lab Results   Component Value Date    HGBA1C 5.6 10/31/2021     Radiology: I personally reviewed the CT sinus from 11/4/24 which demonstrated soft tissue mass involving right nasal soft tissue, " possibly extending internally     Review of prior medical records: I reviewed the patient's medical records which included a clinic note from Dr. Alessandro Chiang ([facility]) dated 11/6/24 in which he detailed identifying nasal mass, no biopsy done at that time and referral to .    ASSESSMENT and PLAN:    Nasal mass  - Mass on the nasal sidewall concerning for a possible malignancy. Biopsy performed of the mass in clinic today. Specimen sent to pathology for examination.   - Briefly discussed concerns for malignancy and possible treatment options, final plan will be pending biopsy results. Would also need additional scans to complete work up  - Wound care discussed including keeping pressure dressing applied to biopsy site until tomorrow    I will call her with biopsy results   Carlie Templeton MD

## 2024-11-12 ENCOUNTER — HOSPITAL ENCOUNTER (OUTPATIENT)
Dept: HOSPITAL 100 - MTLAB | Age: 79
LOS: 18 days | Discharge: HOME | End: 2024-11-30
Payer: MEDICARE

## 2024-11-12 DIAGNOSIS — Z79.01: Primary | ICD-10-CM

## 2024-11-12 LAB — PROTHROMBIN TIME (PROTIME)PT.: 28.7 SECONDS (ref 11.7–14.9)

## 2024-11-12 PROCEDURE — 85610 PROTHROMBIN TIME: CPT

## 2024-11-12 PROCEDURE — 36415 COLL VENOUS BLD VENIPUNCTURE: CPT

## 2024-11-20 DIAGNOSIS — J34.89 NASAL MASS: Primary | ICD-10-CM

## 2024-11-20 LAB
LAB AP ASR DISCLAIMER: NORMAL
LABORATORY COMMENT REPORT: NORMAL
PATH REPORT.FINAL DX SPEC: NORMAL
PATH REPORT.GROSS SPEC: NORMAL
PATH REPORT.RELEVANT HX SPEC: NORMAL
PATH REPORT.TOTAL CANCER: NORMAL
RESIDENT REVIEW: NORMAL

## 2024-11-22 ENCOUNTER — TELEPHONE (OUTPATIENT)
Dept: OTOLARYNGOLOGY | Facility: HOSPITAL | Age: 79
End: 2024-11-22

## 2024-11-22 ENCOUNTER — TELEPHONE (OUTPATIENT)
Dept: HEMATOLOGY/ONCOLOGY | Facility: HOSPITAL | Age: 79
End: 2024-11-22

## 2024-11-22 ENCOUNTER — TUMOR BOARD CONFERENCE (OUTPATIENT)
Dept: HEMATOLOGY/ONCOLOGY | Facility: HOSPITAL | Age: 79
End: 2024-11-22
Payer: MEDICARE

## 2024-11-22 NOTE — TUMOR BOARD NOTE
Presentation deferred due to incomplete pathology. Please see tumor board note from 12/20/24.     Magalys Ortega MD  PGY-4  Otolaryngology - Head and Neck Surgery

## 2024-11-22 NOTE — TELEPHONE ENCOUNTER
Called patient and  to discuss pathology results, per path it is invasive carcinoma but unable to identify what type and requesting additional pathology. Will add her on to clinic Tuesday at 12:30 to do repeat biopsy. Also will need further imaging to evaluate. Questions answered and they are in agreement with the plan

## 2024-11-25 DIAGNOSIS — C44.301 CANCER OF SKIN OF EXTERNAL NOSE: ICD-10-CM

## 2024-11-25 DIAGNOSIS — J34.89 NASAL MASS: ICD-10-CM

## 2024-11-26 ENCOUNTER — LAB (OUTPATIENT)
Dept: LAB | Facility: HOSPITAL | Age: 79
End: 2024-11-26
Payer: MEDICARE

## 2024-11-26 ENCOUNTER — OFFICE VISIT (OUTPATIENT)
Dept: OTOLARYNGOLOGY | Facility: HOSPITAL | Age: 79
End: 2024-11-26
Payer: MEDICARE

## 2024-11-26 DIAGNOSIS — J34.89 NASAL MASS: ICD-10-CM

## 2024-11-26 DIAGNOSIS — C44.301 CANCER OF SKIN OF EXTERNAL NOSE: ICD-10-CM

## 2024-11-26 LAB
BUN SERPL-MCNC: 12 MG/DL (ref 6–23)
CREAT SERPL-MCNC: 0.76 MG/DL (ref 0.5–1.05)
EGFRCR SERPLBLD CKD-EPI 2021: 81 ML/MIN/1.73M*2
HOLD SPECIMEN: NORMAL

## 2024-11-26 PROCEDURE — 82565 ASSAY OF CREATININE: CPT

## 2024-11-26 PROCEDURE — 11104 PUNCH BX SKIN SINGLE LESION: CPT | Performed by: STUDENT IN AN ORGANIZED HEALTH CARE EDUCATION/TRAINING PROGRAM

## 2024-11-26 PROCEDURE — 84520 ASSAY OF UREA NITROGEN: CPT

## 2024-11-26 PROCEDURE — 99213 OFFICE O/P EST LOW 20 MIN: CPT | Performed by: STUDENT IN AN ORGANIZED HEALTH CARE EDUCATION/TRAINING PROGRAM

## 2024-11-26 PROCEDURE — 36415 COLL VENOUS BLD VENIPUNCTURE: CPT

## 2024-11-26 PROCEDURE — 1159F MED LIST DOCD IN RCRD: CPT | Performed by: STUDENT IN AN ORGANIZED HEALTH CARE EDUCATION/TRAINING PROGRAM

## 2024-11-26 NOTE — LETTER
November 26, 2024     Charito Ribeiro DO  [address]  [address]  [place] [place] [facility] [address]    Patient: Amrita Grant   YOB: 1945   Date of Visit: 11/26/2024       Dear Dr. Charito Ribeiro DO:    Thank you for referring Amrita Grant to me for evaluation. Below are my notes for this consultation.  If you have questions, please do not hesitate to call me. I look forward to following your patient along with you.       Sincerely,     Carlie Templeton MD      CC: No Recipients  ______________________________________________________________________________________    HEAD AND NECK SURGERY CONSULT  [facility]    Referring Provider: Charito Ribeiro    HPI  I had the pleasure of seeing Amrita Grant as a consultation today for evaluation of nasal mass. She is accompanied by her  and a friend today who assists with history and treatment planning.     She has been doing well since the last appointment. 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 They think the mass has grown since then.      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 This was 'frozen off'. No other lumps or bumps. She is in a wheelchair but able to ambulate short distances. History of a stroke, heart valve replacement, on coumadin, and lupus. They live at home with  who assists her today.     Tobacco use: Never smoker    Physical Examination  Vitals:  There were no vitals taken for this visit.  General: Examination reveals a well-developed, well-nourished patient in no apparent distress.  The patient has no audible dysphonia, stridor or airway distress.  The patient is oriented, alert and responsive.    Face: symmetric, no scars or lesions  Nose: large firm mass right nasal dorsum/sidewall extending onto cheek, see image  Patient ambulated to and from bathroom with steady gait, he did not have SOB while up, and returned to stretcher.      Greta Schneider RN  04/22/23 1618 "below  Nose: nasal cavity with normal appearing mucosa, mass effect from lesion but does not appear to be originating from within the sinonasal cavity   Oral Cavity:  The patient is able to open the mouth widely without trismus.  The floor of mouth and oral tongue are soft, and no mucosal abnormalities are noted on the lips or within the oral cavity.  The oral tongue is fully mobile and midline on protrusion.    Oropharynx: There are no mucosal abnormalities noted within the oropharynx.  The soft palate elevates symmetrically, the tonsils and base of tongue are normal to inspection and palpation.       Salivary glands: There are no palpable masses of the parotid or submandibular glands.   Neuro: Cranial nerves 2-12 are without obvious abnormality.  Neck: The neck is soft and symmetric.  There is no palpable adenopathy.            Procedure  Biopsy of the right nasal skin  Indication: Lesion of the right nasal dorsum present requiring tissue analysis  Surgeon: Carlie Templeton MD  Informed consent: Risks and benefits were discussed and verbal consent obtained from the patient/guardian prior to proceeding.  Anesthesia: Injection with 1:100,000 lidocaine with epinephrine  Procedure: After adequate time for analgesia, the lesion was grasped with toothed forceps and a portion sharply transected. The specimen was placed in formalin and sent for analysis. Hemostasis was achieved with a mixture of pressure and silver nitrate. 4.0 chromic suture to bring skin edges together. The patient tolerated the procedure well.    DATA REVIEWED:  Labs:  No results found for: \"WBC\", \"HGB\", \"HCT\", \"PLT\", \"NEUTROABS\"  Lab Results   Component Value Date    HGBA1C 5.6 10/31/2021     Radiology: I personally reviewed the CT sinus from 11/4/24 which demonstrated soft tissue mass involving right nasal soft tissue, possibly extending internally     Review of prior medical records: I reviewed the patient's medical records which included a " clinic note from Dr. Alessandro Chiang ([facility]) dated 11/6/24 in which he detailed identifying nasal mass, no biopsy done at that time and referral to .    ASSESSMENT and PLAN:    Nasal mass  - Mass on the nasal sidewall, prior biopsy with invasive carcinoma but not able to make diagnosis. Additional biopsy done today, will reach out to pathology for their expedited review given the mass has grown since last visit.   - CT neck and chest scheduled today  - Wound care discussed including keeping pressure dressing applied to biopsy site until tomorrow, and daily dressing changes as needed  - Will also need to discuss at head and neck tumor board  - I will call her with results    Carlie Templeton MD

## 2024-11-26 NOTE — PROGRESS NOTES
HEAD AND NECK SURGERY CONSULT  [facility]    Referring Provider: Charito CHCAON  I had the pleasure of seeing Amrita Grant as a consultation today for evaluation of nasal mass. She is accompanied by her  and a friend today who assists with history and treatment planning.     She has been doing well since the last appointment. 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 They think the mass has grown since then.      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 This was 'frozen off'. No other lumps or bumps. She is in a wheelchair but able to ambulate short distances. History of a stroke, heart valve replacement, on coumadin, and lupus. They live at home with  who assists her today.     Tobacco use: Never smoker    Physical Examination  Vitals:  There were no vitals taken for this visit.  General: Examination reveals a well-developed, well-nourished patient in no apparent distress.  The patient has no audible dysphonia, stridor or airway distress.  The patient is oriented, alert and responsive.    Face: symmetric, no scars or lesions  Nose: large firm mass right nasal dorsum/sidewall extending onto cheek, see image below  Nose: nasal cavity with normal appearing mucosa, mass effect from lesion but does not appear to be originating from within the sinonasal cavity   Oral Cavity:  The patient is able to open the mouth widely without trismus.  The floor of mouth and oral tongue are soft, and no mucosal abnormalities are noted on the lips or within the oral cavity.  The oral tongue is fully mobile and midline on protrusion.    Oropharynx: There are no mucosal abnormalities noted within the oropharynx.  The soft palate elevates symmetrically, the tonsils and base of tongue are normal to  "inspection and palpation.       Salivary glands: There are no palpable masses of the parotid or submandibular glands.   Neuro: Cranial nerves 2-12 are without obvious abnormality.  Neck: The neck is soft and symmetric.  There is no palpable adenopathy.            Procedure  Biopsy of the right nasal skin  Indication: Lesion of the right nasal dorsum present requiring tissue analysis  Surgeon: Carlie Templeton MD  Informed consent: Risks and benefits were discussed and verbal consent obtained from the patient/guardian prior to proceeding.  Anesthesia: Injection with 1:100,000 lidocaine with epinephrine  Procedure: After adequate time for analgesia, the lesion was grasped with toothed forceps and a portion sharply transected. The specimen was placed in formalin and sent for analysis. Hemostasis was achieved with a mixture of pressure and silver nitrate. 4.0 chromic suture to bring skin edges together. The patient tolerated the procedure well.    DATA REVIEWED:  Labs:  No results found for: \"WBC\", \"HGB\", \"HCT\", \"PLT\", \"NEUTROABS\"  Lab Results   Component Value Date    HGBA1C 5.6 10/31/2021     Radiology: I personally reviewed the CT sinus from 11/4/24 which demonstrated soft tissue mass involving right nasal soft tissue, possibly extending internally     Review of prior medical records: I reviewed the patient's medical records which included a clinic note from Dr. Alessandro Chiang ([facility]) dated 11/6/24 in which he detailed identifying nasal mass, no biopsy done at that time and referral to .    ASSESSMENT and PLAN:    Nasal mass  - Mass on the nasal sidewall, prior biopsy with invasive carcinoma but not able to make diagnosis. Additional biopsy done today, will reach out to pathology for their expedited review given the mass has grown since last visit.   - CT neck and chest scheduled today  - Wound care discussed including keeping pressure dressing applied to biopsy site until tomorrow, and daily " dressing changes as needed  - Will also need to discuss at head and neck tumor board  - I will call her with results    Carlie Templeton MD

## 2024-12-04 ENCOUNTER — HOSPITAL ENCOUNTER (OUTPATIENT)
Dept: RADIOLOGY | Facility: HOSPITAL | Age: 79
Discharge: HOME | End: 2024-12-04
Payer: MEDICARE

## 2024-12-04 DIAGNOSIS — C44.301 CANCER OF SKIN OF EXTERNAL NOSE: ICD-10-CM

## 2024-12-04 PROCEDURE — 2550000001 HC RX 255 CONTRASTS: Performed by: STUDENT IN AN ORGANIZED HEALTH CARE EDUCATION/TRAINING PROGRAM

## 2024-12-04 PROCEDURE — 70491 CT SOFT TISSUE NECK W/DYE: CPT

## 2024-12-04 PROCEDURE — 71260 CT THORAX DX C+: CPT

## 2024-12-06 ENCOUNTER — TELEPHONE (OUTPATIENT)
Dept: OTOLARYNGOLOGY | Facility: HOSPITAL | Age: 79
End: 2024-12-06
Payer: MEDICARE

## 2024-12-06 NOTE — TELEPHONE ENCOUNTER
Returned phone call to  to review imaging, no LAD, no significant change to known mass. Pathology is not back yet, will call him to discuss results and next steps once it is back.

## 2024-12-09 DIAGNOSIS — J98.59 MEDIASTINAL MASS: ICD-10-CM

## 2024-12-10 DIAGNOSIS — J34.89 NASAL MASS: Primary | ICD-10-CM

## 2024-12-13 ENCOUNTER — TUMOR BOARD CONFERENCE (OUTPATIENT)
Dept: HEMATOLOGY/ONCOLOGY | Facility: HOSPITAL | Age: 79
End: 2024-12-13
Payer: MEDICARE

## 2024-12-13 DIAGNOSIS — J34.89 NASAL MASS: Primary | ICD-10-CM

## 2024-12-13 DIAGNOSIS — J34.89 NASAL MASS: ICD-10-CM

## 2024-12-13 LAB
LAB AP ASR DISCLAIMER: NORMAL
LABORATORY COMMENT REPORT: NORMAL
PATH REPORT.FINAL DX SPEC: NORMAL
PATH REPORT.GROSS SPEC: NORMAL
PATH REPORT.RELEVANT HX SPEC: NORMAL
PATH REPORT.TOTAL CANCER: NORMAL

## 2024-12-13 PROCEDURE — 88341 IMHCHEM/IMCYTCHM EA ADD ANTB: CPT | Mod: TC,SUR | Performed by: STUDENT IN AN ORGANIZED HEALTH CARE EDUCATION/TRAINING PROGRAM

## 2024-12-13 RX ORDER — LORAZEPAM 0.5 MG/1
TABLET ORAL
Qty: 1 TABLET | Refills: 0 | Status: SHIPPED | OUTPATIENT
Start: 2024-12-13

## 2024-12-16 ENCOUNTER — TELEMEDICINE (OUTPATIENT)
Dept: OTOLARYNGOLOGY | Facility: CLINIC | Age: 79
End: 2024-12-16
Payer: MEDICARE

## 2024-12-16 ENCOUNTER — HOSPITAL ENCOUNTER (OUTPATIENT)
Dept: RADIOLOGY | Facility: CLINIC | Age: 79
Discharge: HOME | End: 2024-12-16
Payer: MEDICARE

## 2024-12-16 ENCOUNTER — APPOINTMENT (OUTPATIENT)
Dept: RADIOLOGY | Facility: HOSPITAL | Age: 79
End: 2024-12-16
Payer: MEDICARE

## 2024-12-16 DIAGNOSIS — C44.301 MALIGNANT NEOPLASM OF SKIN OF NOSE: Primary | ICD-10-CM

## 2024-12-16 DIAGNOSIS — J98.59 MEDIASTINAL MASS: ICD-10-CM

## 2024-12-16 PROCEDURE — 2550000001 HC RX 255 CONTRASTS: Performed by: STUDENT IN AN ORGANIZED HEALTH CARE EDUCATION/TRAINING PROGRAM

## 2024-12-16 PROCEDURE — 71552 MRI CHEST W/O & W/DYE: CPT | Performed by: STUDENT IN AN ORGANIZED HEALTH CARE EDUCATION/TRAINING PROGRAM

## 2024-12-16 PROCEDURE — 71552 MRI CHEST W/O & W/DYE: CPT

## 2024-12-16 PROCEDURE — A9575 INJ GADOTERATE MEGLUMI 0.1ML: HCPCS | Performed by: STUDENT IN AN ORGANIZED HEALTH CARE EDUCATION/TRAINING PROGRAM

## 2024-12-16 RX ORDER — GADOTERATE MEGLUMINE 376.9 MG/ML
10 INJECTION INTRAVENOUS
Status: COMPLETED | OUTPATIENT
Start: 2024-12-16 | End: 2024-12-16

## 2024-12-16 NOTE — PROGRESS NOTES
Called patient to discuss biopsy results. 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 Will add to Head and Neck Tumor Board this Friday to discuss. Will refer to medical and radiation oncology as well. Reviewed treatment options, including surgical excision. I am concerned about the morbidity of surgery, and she may be a candidate for immunotherapy and/or radiation. Referrals placed today, she is getting MRI chest due to findings on CT chest today. Follow up after tumor board on Friday

## 2024-12-17 ENCOUNTER — HOSPITAL ENCOUNTER (OUTPATIENT)
Dept: RADIOLOGY | Facility: HOSPITAL | Age: 79
End: 2024-12-17
Payer: MEDICARE

## 2024-12-19 ENCOUNTER — HOSPITAL ENCOUNTER (OUTPATIENT)
Dept: HOSPITAL 100 - MTLAB | Age: 79
Discharge: HOME | End: 2024-12-19
Payer: MEDICARE

## 2024-12-19 DIAGNOSIS — Z79.01: Primary | ICD-10-CM

## 2024-12-19 LAB — PROTHROMBIN TIME (PROTIME)PT.: 21.5 SECONDS (ref 11.7–14.9)

## 2024-12-19 PROCEDURE — 36415 COLL VENOUS BLD VENIPUNCTURE: CPT

## 2024-12-19 PROCEDURE — 85610 PROTHROMBIN TIME: CPT

## 2024-12-20 ENCOUNTER — APPOINTMENT (OUTPATIENT)
Dept: HEMATOLOGY/ONCOLOGY | Facility: HOSPITAL | Age: 79
End: 2024-12-20
Payer: MEDICARE

## 2024-12-23 ENCOUNTER — APPOINTMENT (OUTPATIENT)
Dept: OTOLARYNGOLOGY | Facility: HOSPITAL | Age: 79
End: 2024-12-23
Payer: MEDICARE

## 2024-12-23 ENCOUNTER — TELEMEDICINE (OUTPATIENT)
Dept: OTOLARYNGOLOGY | Facility: CLINIC | Age: 79
End: 2024-12-23
Payer: MEDICARE

## 2024-12-23 DIAGNOSIS — C44.301 MALIGNANT NEOPLASM OF SKIN OF NOSE: Primary | ICD-10-CM

## 2024-12-23 DIAGNOSIS — L08.9 SKIN INFECTION: Primary | ICD-10-CM

## 2024-12-23 DIAGNOSIS — L08.9 SKIN INFECTION: ICD-10-CM

## 2024-12-23 LAB
LAB AP ASR DISCLAIMER: NORMAL
LABORATORY COMMENT REPORT: NORMAL
PATH REPORT.ADDENDUM SPEC: NORMAL
PATH REPORT.FINAL DX SPEC: NORMAL
PATH REPORT.GROSS SPEC: NORMAL
PATH REPORT.RELEVANT HX SPEC: NORMAL
PATH REPORT.TOTAL CANCER: NORMAL

## 2024-12-23 PROCEDURE — 99213 OFFICE O/P EST LOW 20 MIN: CPT | Performed by: STUDENT IN AN ORGANIZED HEALTH CARE EDUCATION/TRAINING PROGRAM

## 2024-12-23 RX ORDER — MUPIROCIN CALCIUM 20 MG/G
CREAM TOPICAL 2 TIMES DAILY
Qty: 30 G | Refills: 0 | Status: SHIPPED | OUTPATIENT
Start: 2024-12-23 | End: 2024-12-30

## 2024-12-23 RX ORDER — MUPIROCIN 20 MG/G
OINTMENT TOPICAL 2 TIMES DAILY
Qty: 22 G | Refills: 1 | Status: SHIPPED | OUTPATIENT
Start: 2024-12-23 | End: 2024-12-30

## 2024-12-23 RX ORDER — MUPIROCIN 20 MG/G
OINTMENT TOPICAL 2 TIMES DAILY
Qty: 22 G | Refills: 1 | Status: SHIPPED | OUTPATIENT
Start: 2024-12-23 | End: 2024-12-23

## 2024-12-23 NOTE — PROGRESS NOTES
"HEAD AND NECK SURGERY FOLLOW UP    Virtual or Telephone Consent    An interactive audio and video telecommunication system which permits real time communications between the patient (at the originating site) and provider (at the distant site) was utilized to provide this telehealth service.   Verbal consent was requested and obtained from Amrita Grant on this date, 12/23/24 for a telehealth visit.     Referring Provider: Charito CHACON  I had the pleasure of seeing Amrita Grant as a follow up today for evaluation of nasal mass. She is accompanied by her  who assists with history and treatment planning.     I previously discussed repeat biopsy results with her and  over the phone. Patient was discussed at Head and Neck Tumor Board and diagnosis favored cutaneous BCC.     Medical history notable for prior cancer on the skin above her left lip, she is not sure what type. She is in a wheelchair but able to ambulate short distances. History of a stroke, heart valve replacement, on coumadin, and lupus.   Tobacco use: Never smoker    Physical Examination  General: Examination reveals a well-developed, well-nourished patient in no apparent distress.    Voice: The patient has no audible dysphonia, stridor or airway distress.    Neuro: The patient is oriented, alert and responsive.    Face: symmetric, no scars or lesions  Nose: large mass right nasal dorsum/sidewall extending onto cheek, appears infected  Oral Cavity:  The patient is able to open the mouth widely without trismus.  Neck: The neck is symmetric. FROM    DATA REVIEWED:  Labs:  No results found for: \"WBC\", \"HGB\", \"HCT\", \"PLT\", \"NEUTROABS\"  Lab Results   Component Value Date    HGBA1C 5.6 10/31/2021     Radiology: I personally reviewed the CT sinus from 11/4/24 which demonstrated soft tissue mass involving right nasal soft tissue, possibly extending internally     Review of prior medical records: I reviewed the patient's medical records which " included a clinic note from Dr. Alessandro Chiang ([facility]) dated 11/6/24 in which he detailed identifying nasal mass, no biopsy done at that time and referral to .    Pathology: I personally reviewed the pathology from 11/26/24 and 11/11/24 with results consistent with invasive poorly differentiated carcinoma, favor basal cell carcinoma. Additional stains pending.     ASSESSMENT and PLAN:    Cutaneous BCC  - Mass on the nasal sidewall extending to cheek and near eye. No LAD or chest disease  - Discussed at H&N Tumor Board, recommend immunotherapy/radiation treatment to attempt to shrink tumor as surgical resection with clear margins would be quite morbid. Referrals to rad and med onc placed next week. Encouraged patient/ to schedule  - reviewed wound care. Ordered bactroban for possible soft tissue infection  - I will follow along, they will reach out if any issues    Carlie Templeton MD

## 2024-12-26 NOTE — PROGRESS NOTES
Staff Physician: Chetna Gutierrez MD  Referring Physician: Carlie Templeton MD  Date of Service: 12/27/2024    RADIATION ONCOLOGY CONSULT NOTE    IDENTIFYING DATA:  Cancer Staging   No matching staging information was found for the patient.    Problem List Items Addressed This Visit    None      HISTORY OF PRESENT ILLNESS:    Ms. 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    ONCOLOGIC HISTORY    Patient first noted swelling/bump along the right side of her nasal bridge about 2 months ago.  Her glasses didn't fit right.  She was referred to ENT.    11/11/2024 ENT Evaluation (Dr Carlie Templeton)   Exam: Large firm mass right nasal dorsum/sidewall.  CN 2-12 intact  Nasal Endoscopy: discoloration and erythema of the right endonasal side wall, no over mass extending into nasal cavity.  Septum and turbinates are wnl.    Biopsies were taken of the nasal lesion.  Pathology: invasive poorly differentiated carcinoma  There were irregular shaped nests of basaloid epithelia cells with focal ductal differentiation in the deep dermis.  Notably no epidermal involvement was seen. P40 positive and negative for MOC31.  Ddx:  primary cutaneous neoplasm such as an infiltrative basal cell carcinoma or an adnexal carcinoma. As there is no epidermal involvement and MOC31 expression, tumor of sinonasal origin cannot be excluded.    11/24/2024 A second biopsy was taken by Dr Templeton for additional tissue for pathologic eval.  Additional immunostains sent: P16 positive, chromogranin and ISM1 negative.  Isolated tumor cells positive for BerEP4.  Ddx remains unchanged.       12/4/2024 CT Neck:   2.1 x 2.1 x 4.1 cm mass right/midline nasal soft tissues which eroides the adjacent right and anterior nasal bone and protrudes into the superior and anterior portion of the right nasal cavity and probably invades the cartilaginous portion of the osseous  nasal septum.  There is thinning of the anterior most portion of the osseous nasal septum which may be partially eroded.  Mass is in close proximity to the right nasolacrimal duct.  Mass does not extend into the right orbit.  No cervical JESUS.      12/4/2024 CT Chest:  - Indeterminate 0.9 x 1.3 x 2.2 cm soft tissue density along left lateral surface ascending thoracic aorta. May represent lymph node or cyst.  May characterize further with MRI.   - Soft tissue density in upper thoracic esophagus left of midline.  ?lesion in esophagus versus retained food product.  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  Aneurysmal dilatation of the thoracic aorta.  -s/p cervical laminectomy with hardware    12/16/2024 MRI Chest:  - 1.2 cm ovoid structure anterior mediastinum without contrast enhancement corresponding to lesion seen on chest CT c/w proteinaceous cyst.  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  Recommendation was radiation versus immunotherapy.  Surgery was deemed to be too morbid.    Today she reports that the nasal lesion is gradually growing week by week.  She notes there are more superficial skin changes.  She has some pain along the anterior maxilla from time to time.  She cannot breath through her right nostril and has started snoring.  She has some difficulty swallowing liquids but this is longstanding.  She has lost about 25 lbs in the last few months unintentionally.  She has no numbness in her face, no vision changes.  No difficulty closing her eyes.  She has never had radiation therapy before.      PAST MEDICAL HISTORY:  Reviewed today and documented in [facility]  PAST SURGICAL HISTORY:  Reviewed today and documented in [facility]  PAST GYNECOLOGIC  HISTORY:    ALLERGIES:  Allergies   Allergen Reactions    Propofol Nausea/vomiting    Sulfa (Sulfonamide Antibiotics) Other    Opioids - Morphine Analogues Nausea And Vomiting, Other and Unknown    Penicillins Unknown     Pt. Reports passing out upon taking penicillin when she was young.     MEDICATIONS:    Current Outpatient Medications:     ARIPiprazole (Abilify) 5 mg tablet, Take 1 tablet (5 mg) by mouth once daily., Disp: , Rfl:     Bystolic 5 mg tablet, 1 tab(s) orally half tablet three times daily for 30 days, Disp: , Rfl:     calcium carbonate 600 mg calcium (1,500 mg) tablet, Take 600 mg by mouth once daily., Disp: , Rfl:     cetirizine (ZyrTEC) 10 mg tablet, 1 tab(s) orally as needed, Disp: , Rfl:     cholecalciferol (Vitamin D-3) 25 MCG (1000 UT) capsule, 1 cap(s) orally three times daily, Disp: , Rfl:     clindamycin (Cleocin) 300 mg capsule, , Disp: , Rfl:     docusate sodium (Colace) 100 mg capsule, once every 24 hours., Disp: , Rfl:     fexofenadine (Allegra) 180 mg tablet, Take 1 tablet (180 mg) by mouth., Disp: , Rfl:     fluticasone (Flonase) 50 mcg/actuation nasal spray, Administer 2 sprays into affected nostril(s)., Disp: , Rfl:     levETIRAcetam (Keppra) 500 mg tablet, every 12 hours., Disp: , Rfl:     LORazepam (Ativan) 0.5 mg tablet, Take half tablet 1 hour before MRI, if needed take other half of tablet while getting checked in for MRI, Disp: 1 tablet, Rfl: 0    losartan (Cozaar) 100 mg tablet, Take 0.5 tablets (50 mg) by mouth twice a day., Disp: , Rfl:     melatonin 3 mg tablet, Take by mouth once daily., Disp: , Rfl:     memantine (Namenda) 5 mg tablet, Take 1 tablet (5 mg) by mouth once daily., Disp: , Rfl:     mirtazapine (Remeron) 15 mg tablet, 1 tab(s) orally half tablet bedtime daily for 30 days, Disp: , Rfl:     mupirocin (Bactroban) 2 % cream, Apply topically 2 times a day for 7 days., Disp: 30 g, Rfl: 0    mupirocin (Bactroban) 2 % ointment, Apply topically 2 times a day for 7  days., Disp: 22 g, Rfl: 1    nitrofurantoin, macrocrystal-monohydrate, (Macrobid) 100 mg capsule, , Disp: , Rfl:     omeprazole (PriLOSEC) 20 mg DR capsule, Take 1 capsule (20 mg) by mouth once daily., Disp: , Rfl:     PlaqueniL 200 mg tablet, Take 1 tablet (200 mg) by mouth once daily., Disp: , Rfl:     potassium chloride CR 20 mEq ER tablet, Take 1 tablet (20 mEq) by mouth once daily., Disp: , Rfl:     Premarin 0.3 mg tablet, once every 24 hours., Disp: , Rfl:     warfarin (Coumadin) 4 mg tablet, Take 1 tablet (4 mg) by mouth once daily., Disp: , Rfl:    SOCIAL HISTORY:  Social History     Tobacco Use    Smoking status: Not on file    Smokeless tobacco: Not on file   Substance Use Topics    Alcohol use: Not on file     FAMILY HISTORY:  No family history on file.    REVIEW OF SYSTEMS:  Review of systems and pain noted in the nursing note that accompanies this encounter.    RADIATION SCREENING QUESTIONS:  Prior radiation therapy: No  Pacemaker: No  Other implantable devices: No  Connective tissue disease: Yes.  Has SLE, well controlled on plaquenil monotherapy.  Followed by Dr Alfaro  2023 Rheumatologist Note:     Amrita's SLE appears to be well controlled on Plaquenil monotherapy. Other than the initial episode of pericarditis, no other serositis. No history of renal abnormalities, including lupus nephritis. Symptoms and disease activity monitoring labs have remained stable. We will continue current therapy. She is up-to-date on her eye exam. She remains on Coumadin for her AVR.        PERFORMANCE STATUS:  Karnofsky Performance Score/ECO, Requires occasional assistance, but is able to care for most of her personal needs (ECOG equivalent 2)     PHYSICAL EXAMINATION:  There were no vitals taken for this visit.  Pain score: 2/10  General: no acute distress, engaged in conversation.   HEENT:   Exam is notable asymmetry and swelling of the right side of her nose centered along the nasal bridge caused by a  subcutaneous mass.  There is associated skin erythema overlying the mass and a small area of skin ulceration centrally.  Overall the abnormally measures approximately 5 x 5 cm.  Superiorly it approaches the medial canthus of her right eye.  Her septum is deviated to the left.    CN 2-12 are intact.  OP/OC are clear on inspection and palpation, no mucosal lesions identified.  Neck is supple without palpable JESUS    Neurologic: Alert and oriented x3. Cranial nerves intact as above.          LABORATORY AND IMAGING DATA:  Imaging: All imaging was personally reviewed and interpreted in clinic. Findings as per HPI and EMR.    Laboratory/Pathology:  All pertinent labs and pathology were personally reviewed and interpreted in clinic. Findings as per HPI and EMR.    I personally reviewed the CT Neck from     IMPRESSION:  Ms. 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    The pathology report favored infiltrating basal cell carcinoma but to correlate clinically.  It has been progressing over a short interval of time.  ENT Tumor board favoring cT2/3 poorly differentiated carcinoma of skin.  The tumor board felt that surgery would be too morbid and recommended upfront radiation therapy versus immunotherapy followed by radiation therapy.    I discussed radiation therapy with the patient and her family today.  Radiation would be delivered using IMRT/VMAT to the right nasal/cutaneous tumor to minimize exposure to her right eye.  Given proximity to eye at this time I would favor a conventionally fractionationated approach over 6-7 weeks.  I reviewed the acute side effects of radiation therapy including but not limited to fatigue, skin irritation including burning and breakdown, eye irritation and burning including dry eye and discharge, nasal irritation and mucositis along the roof of her mouth.  I reviewed the late side effects of radiation  therapy including but limited to cosmetic changes and pigmentation changes due to radiation, dry eye syndrome, loss of eyelashes, potential vision changes, and rare risk of secondary cancers.    She has a referral to Dr Godwin in medical oncology but appointment has not been scheduled yet.  If she is a candidate for immunotherapy, it would be a reasonable first step prior to initiating radiation therapy given the proximity to the eye.  If we were able to achieve some down staging with immunotherapy, it may allow us to reduce the radiation exposure to her eye thereby reducing the risk of toxicity.    PLAN:  - Medical oncology consultation - appt pending.  I will message Dr. Godwin  - Upfront definitive radiation as outlined above versus pre-RT immunotherapy for down staging followed by radiation  - I will reach out to Dr Templeton from ENT to confirm final opinion of tumor board and notably review the discussion on pathology.  - The patient is anxious to start therapy as the lesion is growing each well.  I will try to discuss her care with the other physiccans involved within the week and outline a treatment plan.  - Patient noted to have history of SLE well controlled on plaquenil x years.  No flairs.  She is aware of higher risk of complications from radiation but not prohibitive.  Radiation benefits outweigh incremental small risk of enhanced toxicity due to SLE.  - All questions answered.  - If immunotherapy is recommended as first step, patient may seek RT closer to home as she lives 1 hour away and her  has dialysis 3 days per week.  - If no immunotherapy, I recommended we try to start RT here as it will minimize delays since she will not need to establish care with a local radiation oncologist.         Chetna Gutierrez MD  Radiation Oncology  12/27/2024    Over 60 minutes was spent in evaluating, counseling, and examining the patient. More than 50% of that time was spent in face to face discussion.

## 2024-12-27 ENCOUNTER — TELEPHONE (OUTPATIENT)
Dept: HEMATOLOGY/ONCOLOGY | Facility: HOSPITAL | Age: 79
End: 2024-12-27

## 2024-12-27 ENCOUNTER — TELEPHONE (OUTPATIENT)
Dept: HEMATOLOGY/ONCOLOGY | Facility: HOSPITAL | Age: 79
End: 2024-12-27
Payer: MEDICARE

## 2024-12-27 ENCOUNTER — HOSPITAL ENCOUNTER (OUTPATIENT)
Dept: RADIATION ONCOLOGY | Facility: CLINIC | Age: 79
Setting detail: RADIATION/ONCOLOGY SERIES
Discharge: HOME | End: 2024-12-27
Payer: MEDICARE

## 2024-12-27 VITALS
BODY MASS INDEX: 18.99 KG/M2 | OXYGEN SATURATION: 99 % | WEIGHT: 103.84 LBS | HEART RATE: 74 BPM | RESPIRATION RATE: 18 BRPM | TEMPERATURE: 97.5 F | SYSTOLIC BLOOD PRESSURE: 143 MMHG | DIASTOLIC BLOOD PRESSURE: 90 MMHG

## 2024-12-27 DIAGNOSIS — C44.301 MALIGNANT NEOPLASM OF SKIN OF NOSE: ICD-10-CM

## 2024-12-27 PROBLEM — I60.9 SUBARACHNOID HEMORRHAGE (MULTI): Status: ACTIVE | Noted: 2021-10-30

## 2024-12-27 PROCEDURE — 99205 OFFICE O/P NEW HI 60 MIN: CPT | Performed by: RADIOLOGY

## 2024-12-27 PROCEDURE — 99215 OFFICE O/P EST HI 40 MIN: CPT | Performed by: RADIOLOGY

## 2024-12-27 RX ORDER — ONDANSETRON 4 MG/1
4 TABLET, FILM COATED ORAL AS NEEDED
COMMUNITY

## 2024-12-27 ASSESSMENT — ENCOUNTER SYMPTOMS
SLEEP DISTURBANCE: 1
SEIZURES: 1
RECTAL PAIN: 0
NERVOUS/ANXIOUS: 0
VOICE CHANGE: 0
RESPIRATORY NEGATIVE: 1
DECREASED CONCENTRATION: 0
SORE THROAT: 0
CARDIOVASCULAR NEGATIVE: 1
DIAPHORESIS: 0
LIGHT-HEADEDNESS: 0
CHILLS: 0
BRUISES/BLEEDS EASILY: 1
BLOOD IN STOOL: 0
CONSTIPATION: 1
TROUBLE SWALLOWING: 1
EXTREMITY WEAKNESS: 1
LOSS OF SENSATION IN FEET: 1
APPETITE CHANGE: 1
VOMITING: 0
UNEXPECTED WEIGHT CHANGE: 1
NAUSEA: 0
WOUND: 1
HOT FLASHES: 0
NUMBNESS: 0
HEADACHES: 0
OCCASIONAL FEELINGS OF UNSTEADINESS: 1
FATIGUE: 1
ABDOMINAL PAIN: 0
SPEECH DIFFICULTY: 0
DEPRESSION: 0
EYE PROBLEMS: 1
ADENOPATHY: 0
SCLERAL ICTERUS: 0
CONFUSION: 0
DIZZINESS: 0
ABDOMINAL DISTENTION: 0
DIARRHEA: 0
FEVER: 0

## 2024-12-27 ASSESSMENT — PATIENT HEALTH QUESTIONNAIRE - PHQ9
SUM OF ALL RESPONSES TO PHQ9 QUESTIONS 1 AND 2: 0
2. FEELING DOWN, DEPRESSED OR HOPELESS: NOT AT ALL
1. LITTLE INTEREST OR PLEASURE IN DOING THINGS: NOT AT ALL

## 2024-12-27 ASSESSMENT — COLUMBIA-SUICIDE SEVERITY RATING SCALE - C-SSRS
6. HAVE YOU EVER DONE ANYTHING, STARTED TO DO ANYTHING, OR PREPARED TO DO ANYTHING TO END YOUR LIFE?: NO
2. HAVE YOU ACTUALLY HAD ANY THOUGHTS OF KILLING YOURSELF?: NO
1. IN THE PAST MONTH, HAVE YOU WISHED YOU WERE DEAD OR WISHED YOU COULD GO TO SLEEP AND NOT WAKE UP?: NO

## 2024-12-27 ASSESSMENT — PAIN SCALES - GENERAL: PAINLEVEL_OUTOF10: 0-NO PAIN

## 2024-12-27 NOTE — TELEPHONE ENCOUNTER
Called patient to schedule appointment with Dr Guzmán at [facility]. No answer, left message for patient to call back at [phone], option 1 to schedule.

## 2024-12-27 NOTE — PROGRESS NOTES
"Radiation Oncology Nursing Note    Prior Radiotherapy:  No  No radiation treatments to show. (Treatments may have been administered in another system.)     Current Systemic Treatment:  No     Presence of Pacemaker or ICD:  No    History of Autoimmune or Connective Tissue Disorders:  Yes, lupus    Pain: The patient's current pain level was assessed.  They report currently having a pain of 0 out of 10.  They feel their pain is under control without the use of pain medications.    Review of Systems:  Review of Systems   Constitutional:  Positive for appetite change (eating less; trouble swallowing), fatigue (\"always\" tired; naps during the day and 10-11 hours at night) and unexpected weight change (weight loss). Negative for chills, diaphoresis and fever.   HENT:   Positive for trouble swallowing (unable to swallow meats and breads; uses thickener in drinks). Negative for hearing loss, lump/mass, mouth sores, nosebleeds, sore throat, tinnitus and voice change.    Eyes:  Positive for eye problems (right eye itching). Negative for icterus.   Respiratory: Negative.     Cardiovascular: Negative.    Gastrointestinal:  Positive for constipation (takes miralax and metamucil). Negative for abdominal distention, abdominal pain, blood in stool, diarrhea, nausea, rectal pain and vomiting.   Endocrine: Negative for hot flashes.   Genitourinary: Negative.     Musculoskeletal:  Positive for gait problem (uses WC out in public; walks at home w/o walker).   Skin:  Positive for itching (on right side face) and wound (right side nose). Negative for rash.   Neurological:  Positive for extremity weakness (BL LE), gait problem (uses WC out in public; walks at home w/o walker) and seizures (on keppra). Negative for dizziness, headaches, light-headedness, numbness and speech difficulty.   Hematological:  Negative for adenopathy. Bruises/bleeds easily (+ thinners).   Psychiatric/Behavioral:  Positive for sleep disturbance (takes sleep aids). " Negative for confusion, decreased concentration, depression and suicidal ideas. The patient is not nervous/anxious.

## 2024-12-27 NOTE — TELEPHONE ENCOUNTER
Patient returned call regarding making appointment to see physician. Patient is scheduled for a New patient visit with Dr. Bindu Guzmán at 11:00am on January 6, 2025  at the [facility] location.    First navigation outgoing call placed today, 12/27/2024, to patient. As RN navigator I introduced myself, and explained what patient can expect at first visit. Patient instructed to arrive 15 minutes early for intake. Office location, address and phone number given to patient for [facility] and we discussed how to reach the office prior to first visit. Questions answered to pt's satisfaction. She verbalizes understanding of instructions. Pt appreciative of phone call.     TRINH LEÓN RN

## 2025-01-03 DIAGNOSIS — C44.301 SKIN CANCER OF NOSE: Primary | ICD-10-CM

## 2025-01-03 ASSESSMENT — NCCN CANCER DISTRESS MANAGEMENT
NCCN EMOTIONAL CONCERNS: 7
NCCN PHYSICAL CONCERNS: 6
NCCN EMOTIONAL CONCERNS: 4
NCCN EMOTIONAL CONCERNS: 2
NCCN SOCIAL CONCERNS: 4
NCCN EMOTIONAL CONCERNS: 3
NCCN EMOTIONAL CONCERNS: 5
NCCN SOCIAL CONCERNS: 2
NCCN SOCIAL CONCERNS: 3
NCCN PRACTICAL CONCERNS: 6
NCCN EMOTIONAL CONCERNS: 6
NCCN PRACTICAL CONCERNS: 8
NCCN EMOTIONAL CONCERNS: 1
NCCN PRACTICAL CONCERNS: 12
NCCN PHYSICAL CONCERNS: 9
NCCN PRACTICAL CONCERNS: 7
NCCN EMOTIONAL CONCERNS: 8

## 2025-01-06 ENCOUNTER — TELEPHONE (OUTPATIENT)
Dept: HEMATOLOGY/ONCOLOGY | Facility: CLINIC | Age: 80
End: 2025-01-06

## 2025-01-06 ENCOUNTER — TUMOR BOARD CONFERENCE (OUTPATIENT)
Dept: HEMATOLOGY/ONCOLOGY | Facility: HOSPITAL | Age: 80
End: 2025-01-06
Payer: MEDICARE

## 2025-01-06 ENCOUNTER — PATIENT OUTREACH (OUTPATIENT)
Dept: HEMATOLOGY/ONCOLOGY | Facility: CLINIC | Age: 80
End: 2025-01-06

## 2025-01-06 ENCOUNTER — OFFICE VISIT (OUTPATIENT)
Dept: HEMATOLOGY/ONCOLOGY | Facility: CLINIC | Age: 80
End: 2025-01-06
Payer: MEDICARE

## 2025-01-06 VITALS
OXYGEN SATURATION: 97 % | TEMPERATURE: 98.1 F | HEIGHT: 62 IN | HEART RATE: 80 BPM | SYSTOLIC BLOOD PRESSURE: 130 MMHG | BODY MASS INDEX: 18.79 KG/M2 | WEIGHT: 102.1 LBS | RESPIRATION RATE: 18 BRPM | DIASTOLIC BLOOD PRESSURE: 90 MMHG

## 2025-01-06 DIAGNOSIS — C44.301 MALIGNANT NEOPLASM OF SKIN OF NOSE: ICD-10-CM

## 2025-01-06 PROBLEM — M32.8 OTHER FORMS OF SYSTEMIC LUPUS ERYTHEMATOSUS: Status: ACTIVE | Noted: 2025-01-06

## 2025-01-06 PROBLEM — C44.310 FACIAL BASAL CELL CANCER: Status: ACTIVE | Noted: 2025-01-06

## 2025-01-06 PROBLEM — R56.9 SEIZURE (MULTI): Status: ACTIVE | Noted: 2025-01-06

## 2025-01-06 PROBLEM — I71.21 ANEURYSM OF ASCENDING AORTA WITHOUT RUPTURE (CMS-HCC): Status: ACTIVE | Noted: 2025-01-06

## 2025-01-06 PROCEDURE — 99215 OFFICE O/P EST HI 40 MIN: CPT | Performed by: INTERNAL MEDICINE

## 2025-01-06 PROCEDURE — 1036F TOBACCO NON-USER: CPT | Performed by: INTERNAL MEDICINE

## 2025-01-06 SDOH — ECONOMIC STABILITY: TRANSPORTATION INSECURITY
IN THE PAST 12 MONTHS, HAS LACK OF TRANSPORTATION KEPT YOU FROM MEETINGS, WORK, OR FROM GETTING THINGS NEEDED FOR DAILY LIVING?: NO

## 2025-01-06 SDOH — ECONOMIC STABILITY: FOOD INSECURITY: WITHIN THE PAST 12 MONTHS, THE FOOD YOU BOUGHT JUST DIDN'T LAST AND YOU DIDN'T HAVE MONEY TO GET MORE.: NEVER TRUE

## 2025-01-06 SDOH — ECONOMIC STABILITY: GENERAL
WHICH OF THE FOLLOWING WOULD YOU LIKE TO GET CONNECTED TO IN ORDER TO RECEIVE A DISCOUNT OR FOR FREE? (CHOOSE ALL THAT APPLY): PATIENT DECLINED

## 2025-01-06 SDOH — ECONOMIC STABILITY: INCOME INSECURITY: IN THE LAST 12 MONTHS, WAS THERE A TIME WHEN YOU WERE NOT ABLE TO PAY THE MORTGAGE OR RENT ON TIME?: NO

## 2025-01-06 SDOH — ECONOMIC STABILITY: FOOD INSECURITY: WITHIN THE PAST 12 MONTHS, YOU WORRIED THAT YOUR FOOD WOULD RUN OUT BEFORE YOU GOT MONEY TO BUY MORE.: NEVER TRUE

## 2025-01-06 SDOH — ECONOMIC STABILITY: GENERAL
WHICH OF THE FOLLOWING DO YOU KNOW HOW TO USE AND HAVE ACCESS TO EVERY DAY? (CHOOSE ALL THAT APPLY): DESKTOP COMPUTER, LAPTOP COMPUTER, OR TABLET WITH BROADBAND INTERNET CONNECTION;SMARTPHONE WITH CELLULAR DATA PLAN

## 2025-01-06 SDOH — HEALTH STABILITY: PHYSICAL HEALTH: ON AVERAGE, HOW MANY MINUTES DO YOU ENGAGE IN EXERCISE AT THIS LEVEL?: 0 MIN

## 2025-01-06 SDOH — HEALTH STABILITY: PHYSICAL HEALTH: ON AVERAGE, HOW MANY DAYS PER WEEK DO YOU ENGAGE IN MODERATE TO STRENUOUS EXERCISE (LIKE A BRISK WALK)?: 0 DAYS

## 2025-01-06 SDOH — ECONOMIC STABILITY: TRANSPORTATION INSECURITY
IN THE PAST 12 MONTHS, HAS THE LACK OF TRANSPORTATION KEPT YOU FROM MEDICAL APPOINTMENTS OR FROM GETTING MEDICATIONS?: YES

## 2025-01-06 ASSESSMENT — ENCOUNTER SYMPTOMS
ROS SKIN COMMENTS: NO NEW LESIONS OR MASSES
COUGH: 0
CHEST TIGHTNESS: 0
BACK PAIN: 0
ABDOMINAL DISTENTION: 0
SEIZURES: 0
ADENOPATHY: 0
LEG SWELLING: 0
VOMITING: 0
DEPRESSION: 1
DIARRHEA: 0
HEADACHES: 0
NAUSEA: 0
LOSS OF SENSATION IN FEET: 0
PALPITATIONS: 0
DIAPHORESIS: 0
ABDOMINAL PAIN: 0
SPEECH DIFFICULTY: 0
SHORTNESS OF BREATH: 0
FEVER: 0
EYE PROBLEMS: 0
LIGHT-HEADEDNESS: 0
CHILLS: 0
NUMBNESS: 0
APPETITE CHANGE: 0
CONSTIPATION: 0
FATIGUE: 0
ARTHRALGIAS: 0
EXTREMITY WEAKNESS: 0
WOUND: 0
TROUBLE SWALLOWING: 1
BRUISES/BLEEDS EASILY: 0
UNEXPECTED WEIGHT CHANGE: 0
OCCASIONAL FEELINGS OF UNSTEADINESS: 1

## 2025-01-06 ASSESSMENT — PATIENT HEALTH QUESTIONNAIRE - PHQ9
1. LITTLE INTEREST OR PLEASURE IN DOING THINGS: SEVERAL DAYS
10. IF YOU CHECKED OFF ANY PROBLEMS, HOW DIFFICULT HAVE THESE PROBLEMS MADE IT FOR YOU TO DO YOUR WORK, TAKE CARE OF THINGS AT HOME, OR GET ALONG WITH OTHER PEOPLE: SOMEWHAT DIFFICULT
SUM OF ALL RESPONSES TO PHQ9 QUESTIONS 1 & 2: 0
2. FEELING DOWN, DEPRESSED OR HOPELESS: SEVERAL DAYS
2. FEELING DOWN, DEPRESSED OR HOPELESS: NOT AT ALL
SUM OF ALL RESPONSES TO PHQ9 QUESTIONS 1 AND 2: 2
1. LITTLE INTEREST OR PLEASURE IN DOING THINGS: NOT AT ALL

## 2025-01-06 ASSESSMENT — SOCIAL DETERMINANTS OF HEALTH (SDOH)
WITHIN THE LAST YEAR, HAVE YOU BEEN HUMILIATED OR EMOTIONALLY ABUSED IN OTHER WAYS BY YOUR PARTNER OR EX-PARTNER?: PATIENT UNABLE TO ANSWER
WITHIN THE LAST YEAR, HAVE TO BEEN RAPED OR FORCED TO HAVE ANY KIND OF SEXUAL ACTIVITY BY YOUR PARTNER OR EX-PARTNER?: PATIENT UNABLE TO ANSWER
HOW OFTEN DO YOU ATTEND CHURCH OR RELIGIOUS SERVICES?: NEVER
IN A TYPICAL WEEK, HOW MANY TIMES DO YOU TALK ON THE PHONE WITH FAMILY, FRIENDS, OR NEIGHBORS?: MORE THAN THREE TIMES A WEEK
HOW OFTEN DO YOU ATTENT MEETINGS OF THE CLUB OR ORGANIZATION YOU BELONG TO?: NEVER
WITHIN THE LAST YEAR, HAVE YOU BEEN KICKED, HIT, SLAPPED, OR OTHERWISE PHYSICALLY HURT BY YOUR PARTNER OR EX-PARTNER?: PATIENT UNABLE TO ANSWER
WITHIN THE LAST YEAR, HAVE YOU BEEN AFRAID OF YOUR PARTNER OR EX-PARTNER?: PATIENT UNABLE TO ANSWER
IN THE PAST 12 MONTHS, HAS THE ELECTRIC, GAS, OIL, OR WATER COMPANY THREATENED TO SHUT OFF SERVICE IN YOUR HOME?: NO
HOW HARD IS IT FOR YOU TO PAY FOR THE VERY BASICS LIKE FOOD, HOUSING, MEDICAL CARE, AND HEATING?: NOT HARD AT ALL
DO YOU BELONG TO ANY CLUBS OR ORGANIZATIONS SUCH AS CHURCH GROUPS UNIONS, FRATERNAL OR ATHLETIC GROUPS, OR SCHOOL GROUPS?: NO
HOW OFTEN DO YOU GET TOGETHER WITH FRIENDS OR RELATIVES?: ONCE A WEEK

## 2025-01-06 ASSESSMENT — LIFESTYLE VARIABLES
HOW MANY STANDARD DRINKS CONTAINING ALCOHOL DO YOU HAVE ON A TYPICAL DAY: PATIENT DOES NOT DRINK
HOW OFTEN DO YOU HAVE A DRINK CONTAINING ALCOHOL: NEVER
HOW OFTEN DO YOU HAVE SIX OR MORE DRINKS ON ONE OCCASION: NEVER
AUDIT-C TOTAL SCORE: 0
SKIP TO QUESTIONS 9-10: 1

## 2025-01-06 ASSESSMENT — PAIN SCALES - GENERAL: PAINLEVEL_OUTOF10: 0-NO PAIN

## 2025-01-06 NOTE — PROGRESS NOTES
CUTANEOUS ONCOLOGY: INITIAL CONSULTATION    Referring Surgeon: Dr. Templeton  Radiation oncologist: Dr. Scott    Diagnosis: Advanced basal cell carcinoma  Primary site location: nasal dorsum, cheek and creeping towards her eye   Clinical lymph nodes: not present     Imaging:   MRI chest: 1. A 1.2 cm ovoid structure in the anterior mediastinum, without abnormal postcontrast enhancement, most consistent with underlying benign etiology such as a proteinaceous cyst. Given the prior documented history of cardiothoracic surgery, imaging differential also include a small chronic postoperative seroma.  2. 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 Aneurysmal dilatation of each of the proximal aspects of the right coronary and left main coronary arteries, measuring up to 2.6 cm, better assessed on CT  chest 12/04/2024.  3. 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    CT neck 12/5/24:  1. Compared to the CT face from 11/04/2024, soft tissue mass within the right and midline nasal soft tissues causing erosion of the adjacent nasal bone and protruding into the right nasal cavity is essentially unchanged in size, however there is slight increase in tumoral extension at the junction between the right nasal soft tissues and the right pre maxillary fat pad/skin.      2. There is no cervical lymphadenopathy by size criteria.      3. Old fracture of the right posterior arch of C1 with partial  healing, similar in appearance compared to the prior CT.    HPI: Amrita Grant is a 79 y.o. 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 Amrita notes that the mass has continued to grow and now she continues to have a runny nose out of her right nostril. No issues with moving her eye or vision.     She notes many sun burns (including blistering) of her face. She notes that  "if she walks outside, she will get a sun burn immediately after a short distance ([address] on [address]). She tries to avoid the sun as much as possible. Never used a tanning bed. Lived in [place] for 6 months when she was younger.     She has a \"bad immune system\" and has been on many courses of prednisone for her lupus. She remains on plaquenil now. She has not had a flare in 1 year. Her rheumatologist has recently changed and she cannot remember who he/she is.     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 It is being closely monitored.     She also suffers from dysphagia- of unknown causes. She has lost over 50 lbs. She had esophagus stretched which did not help.     In general, she can stand on her own and walk. Her  is very nurturing and helps her in all ways around the home. Today she presents in a wheelchair (for convenience).     PMH:  History of non-melanoma skin cancers: cancer of unknown origin on left upper lip  History of other malignancies: none    Family history:  History of atypical or dysplastic nevi: no  History of an unusually large number of nevi: no  History of melanoma in situ or malignant melanoma: no  History of non-melanoma skin cancers: no  History of other malignancies: maternal grandmother breast cancer 88; maternal cousin bone cancer dx 50s; paternal uncle prostate cancer    ROS: Review of Systems   Constitutional:  Negative for appetite change, chills, diaphoresis, fatigue, fever and unexpected weight change.   HENT:   Positive for lump/mass and trouble swallowing.    Eyes:  Negative for eye problems.   Respiratory:  Negative for chest tightness, cough and shortness of breath.    Cardiovascular:  Negative for chest pain, leg swelling and palpitations.   Gastrointestinal:  Negative for abdominal distention, abdominal pain, constipation, diarrhea, nausea and vomiting.   Musculoskeletal:  Negative for arthralgias, back pain and gait " problem.   Skin:  Negative for itching, rash and wound.        No new lesions or masses   Neurological:  Negative for extremity weakness, gait problem, headaches, light-headedness, numbness, seizures and speech difficulty.   Hematological:  Negative for adenopathy. Does not bruise/bleed easily.       PE:  Physical Exam  Vitals reviewed.   Constitutional:       Appearance: Normal appearance. She is normal weight.   HENT:      Head: Normocephalic and atraumatic.      Nose: Rhinorrhea present.      Comments: Reddish blue hue to cancer on the R nostril extending onto the cheek and below eye.   Eyes:      Extraocular Movements: Extraocular movements intact.      Conjunctiva/sclera: Conjunctivae normal.   Cardiovascular:      Rate and Rhythm: Normal rate and regular rhythm.      Pulses: Normal pulses.   Pulmonary:      Effort: Pulmonary effort is normal.      Breath sounds: Normal breath sounds.   Neurological:      General: No focal deficit present.      Mental Status: She is alert and oriented to person, place, and time. Mental status is at baseline.   Psychiatric:         Mood and Affect: Mood normal.         Behavior: Behavior normal.         Thought Content: Thought content normal.         Judgment: Judgment normal.     ECOG 2-3    Assessment: Locally advanced basal cell carcinoma    Plan: Amrita Grant is a 79 y.o. year old female patient with   referred for Referral To Hematology and Oncology for locally advanced basal cell carcinoma for consideration of neoadjuvant therapy. We went over her history, pathology, imaging thus far. While her pathology was not clear; pathology has suggested her cancer is a basal cell carcinoma.     We discussed that basal cell carcinomas are typically treated with radiation or surgery and that most do not metastasize. In her case, her providers would like her to be considered for systemic therapy given the size of the tumor and the proximity to the eye.     I am concerned that her  performance status is poor. She also has many comorbidities, lupus requiring treatment and dysphagia with significant weight loss. Her two options for treatment would be a hedgehog inhibitor or immunotherapy. Both can have significant, life altering side effects. The Vismodegib is hard to tolerate. She is already frail with significant weight loss. She has not had a solution to her dysphagia which would further confound her picture. As far as immunotherapy, we went over treatment schedule, efficacy, response assessment, potential EMILY. The data quoted was for patients without autoimmune conditions. She is theoretically at a higher risk of lupus flare or other IRAEs. Patient information for both given.    I will reach back out to her radiation oncologist and ENT if we can proceed without IO. She will try to look locally for a radiation and medical oncologist given she is far away. I will also present her at [facility].     If she decides to continue care with me and opts for IO, I will have her first three cycles at [facility] given we have all the specialities there and an inpatient service if needed.    Reconvene on Friday to finalize plan.    Amrita Grant understands the plan and has no further questions. she will contact us if there are any new concerns or change in clinical picture.     Bindu Guzmán MD  Attending Physician  [facility]     [facility]  [employer]

## 2025-01-06 NOTE — TELEPHONE ENCOUNTER
Called and spoke to patient and patients spouse to inform them that her virtual appointment with Dr. Guzmán has been moved to 1/8/2025 at 12:00 pm. Patient verbalized understanding and is in agreement with plan.

## 2025-01-06 NOTE — TUMOR BOARD NOTE
General Patient Information  Name:  Amrita Grant  Evaluation #:  1  Conference Date:  1/6/2025  YOB: 1945  MRN:  [id number]  Program Physician(s):  Jose Miguel Flores  Referring Physician(s):  Chetna Lomas (Rad Onc), Bindu Guzmán    Summary     Assessment:  Large infiltrating right nasal mass. S/p punch biopsy, showing invasive poorly differentiated carcinoma, present at the peripheral and deep margins. The findings in correlation with the clinical history could represent a primary cutaneous neoplasm such as infiltrative basal cell carcinoma or an adnexal carcinoma. Given the absence of epidermal involvement and MOC31 expression, recurrent or metastatic carcinoma or poorly differentiated carcinoma of sinonasal origin cannot be excluded.     CT CAP: Slightly hypodense mass adjacent to the ascending thoracic aorta, possibly a lymph node or cyst. Abnormal soft tissue density in the thoracic esophagus, potentially a mass or retained food.    CT Head/Neck: No cervical lymphadenopathy. Right nasal soft tissue mass causing adjacent nasal bone erosion and protruding into the nasal cavity is stable in size, with slight tumoral extension into the right premaxillary fat pad/skin.    MRI Chest: 1.2 cm ovoid structure in the anterior mediastinum, with no abnormal postcontrast enhancement, consistent with a likely benign proteinaceous cyst.    Referred to radiation oncology for consideration of radiation therapy and scheduled to see medical oncology on Jan 6th.    Recommendation: Definitive radiation therapy (preferred, patient hopes to get treatment in [place] if possible)    Review Multidisciplinary Cutaneous Oncology Conference recommendation with patient.  Continue routine follow up and total body skin exams with Dermatology.    Follow Up:  Chetna Lomas (Rad Onc), Medical Oncology      History and Physical Exam  Dermatologic History:   79 y.o. female presented to  ENT in  November 2024 for discolored mass on right nose. Uncertain how long it had been there. Denies any trouble breathing, trouble swallowing, weight loss, fevers, chills, or night sweats.    S/p biopsy of mass on 11/11/2024, showing invasive poorly differentiated carcinoma, present at the peripheral and deep margins. The findings in correlation with the clinical history could represent a primary cutaneous neoplasm such as infiltrative basal cell carcinoma or an adnexal carcinoma. Given the absence of epidermal involvement and MOC31 expression, recurrent or metastatic carcinoma or poorly differentiated carcinoma of sinonasal origin cannot be excluded.     Repeat biopsy taken on 11/26/2024, showing similar findings. Additional immunostains were reviewed. Tumor cells are positive for p16 and negative for chromogranin and INSM1. Isolated tumor cells are positive for BerEP4. The differential diagnosis remains unchanged.     CT CAP on 12/4/2024 shows slightly hypodense mass adjacent to the ascending thoracic aorta, possibly representing a lymph node or cyst. Abnormal soft tissue density within the thoracic esophagus, possibly representing soft tissue mass/neoplasm vs retained food products.     CT soft tissue head/neck on same day shows no cervical lymphadenopathy and soft tissue mass within the right and midline nasal soft tissues causing erosion of the adjacent nasal bone and protruding into the right nasal cavity is essentially unchanged in size, however there is slight increase in tumoral extension at the junction between the right nasal soft tissues and the right pre maxillary fat pad/skin.    MRI chest on 12/16/2024 shows a 1.2 cm ovoid structure in the anterior mediastinum, without abnormal postcontrast enhancement, most consistent with underlying benign etiology such as a proteinaceous cyst    Past Medical History:    Past Medical History:   Diagnosis Date    Aortic aneurysm (CMS-HCC)     Coronary artery disease      CVA (cerebrovascular accident due to intracerebral hemorrhage) (Multi) 2021    Fibromyalgia     HTN (hypertension)     Seizure disorder (Multi)     Skin cancer of nose     SLE (systemic lupus erythematosus related syndrome) (Multi)     Thyroid nodule        Family History of DNS/MM:  Unknown    Skin:  large firm mass right nasal dorsum/sideweall     Lymphatic:  The neck is soft and symmetric.  There is no palpable adenopathy.       Pathology  Surgical Pathology Exam: [id number]  Collected 11/26/2024 13:28     A. SKIN, NASAL MASS RIGHT, BIOPSY:  --INVASIVE POORLY DIFFERENTIATED CARCINOMA, PRESENT AT PERIPHERAL AND DEEP MARGINS     COMMENT: Sections show infiltrating nests of basaloid epithelial cells with a peripheral palisade and numerous dyskeratotic cells in actinically altered skin, transected at the peripheral and deep biopsy margins.  No epidermal involvement is seen.  Tumor cells are positive for CK5/6 (diffuse), EMA (patchy) and are negative for MOC-31 and bcl-2.       The findings are similar to the patient's prior biopsy, [id number], and could represent a primary cutaneous neoplasm such as infiltrative basal cell carcinoma or an adnexal carcinoma.  Given the absence of epidermal involvement and MOC31 expression, the possibility of recurrent or metastatic carcinoma, or a poorly differentiated carcinoma of sinonasal origin cannot be excluded.  Additional immunostains have been ordered and will be reported as an addendum.  Clinicopathologic correlation is required.   ___________________________________________________________________________________________________    Surgical Pathology Exam: [id number]   Collected 11/11/2024 11:54     A. SKIN, RIGHT NASAL MASS, BIOPSY:  --INVASIVE POORLY DIFFERENTIATED CARCINOMA, PRESENT AT PERIPHERAL AND DEEP TISSUE EDGES, SEE COMMENT     COMMENT: Routine and immunostained sections show actinically altered skin with irregularly shaped nests of basaloid epithelial cells with  "focal ductal differentiation in the deep dermis, transected at the peripheral and deep margins.  No retraction artifact is seen.  Lesional cells are positive for p40 and negative for MOC31.       The findings in correlation with the clinical history could represent a primary cutaneous neoplasm such as infiltrative basal cell carcinoma or an adnexal carcinoma.  Given the absence of epidermal involvement and MOC31 expression, recurrent or metastatic carcinoma or poorly differentiated carcinoma of sinonasal origin cannot be excluded.  Clinicopathologic correlation is required.     :  Donn Molina and GLENNY Singh    Radiology  MR CHEST W AND WO IV CONTRAST;  12/16/2024 2:18 pm      INDICATION:  Signs/Symptoms:evaluate soft tissue density adjacent to the left  lateral surface of the ascending thoracic aorta. A 76 y.o.  female  with a history of coronary artery disease s/p coronary artery bypass  grafting, hypertension,  s/p Aortic valve replacement and aortic root  repair 7/2017 (per notes [facility] composite valve  \"biosprosthetic\") on coumadin, SLE, fibromyalgia, and OA who on  10/30/21 at the [facility] developed symptoms of sudden  headache/vomiting and LOC.  CT angiogram head/neck + 4mm distal Right  PICA aneurysm, status post right PICA onxy embolization with distal  PICA sacrifice on 10/31/21. The most recent history of newly  diagnosed poorly differentiated carcinoma, now presents for follow-up  of mediastinal lesion/mass.      ,J98.59 Other diseases of mediastinum, not elsewhere classified      COMPARISON:  CT chest 12/04/2024.      ACCESSION NUMBER(S):  [id number]      ORDERING CLINICIAN:  WALE PETER      TECHNIQUE:  Kitty 1.5 Pamela MRI scanner.  Multiplanar, multisequence imaging of the chest was performed.  Multiplanar HASTE, DWI, precontrast and postcontrast T1 VIBE with  subtraction images. Patient received 10 ML of Dotarem intravenously.      FINDINGS:  Evaluation is limited " due to susceptibility and motion artifact.      VESSELS AND HEART:  Postsurgical changes from prior Bentall procedure. Similar aneurysmal  dilatation of the ascending thoracic aorta measuring up to 4.5 cm in  diameter, accounting for differences in imaging technique, though  better assessed on CT chest 12/04/2024. The distal descending  thoracic aorta again measures up to 3.3 cm in diameter. There is  fusiform aneurysmal dilatation of the proximal right coronary artery  measuring 2.6 cm and the proximal left coronary artery measuring 2.5  cm, also similar to and better assessed on recent CT chest. Main  pulmonary artery and its branches are normal in caliber. The cardiac  chambers are normal in size. No pericardial effusion.      LOWER NECK, MEDIASTINUM AND CHEST:  Thyroid gland appears unremarkable as visualized.  An ovoid circumscribed nonenhancing structure measuring 1.2 x 0.9 cm  is noted within the anterior mediastinum adjacent to the ascending  thoracic aorta (series 701, image 11). It is T1 hyperintense and  slightly T2 hyperintense. Diffusion restricted images are  noncontributory due to susceptibility artifact from adjacent cerclage  wire. Esophagus appears unremarkable as visualized.  Limited imaging through the lungs reveals no gross abnormalities. No  pleural effusion or consolidation. Within limitation of MR imaging,  no discrete pulmonary nodules/masses. Postsurgical changes from prior  median sternotomy. A linear focus T2 hyperintensity is noted within  the left subscapularis muscle. No associated enhancement or  restricted diffusion (series 1101, images 21-24). It corresponds to a  region of linear hypodensity with multifocal tiny calcific densities  on recent CT chest. Additional partially visualized postsurgical  changes from posterior cervical spinal fusion.      UPPER ABDOMEN  Limited imaging through the upper abdomen reveals no remarkable  findings.      IMPRESSION:  1. A 1.2 cm ovoid structure  in the anterior mediastinum, without  abnormal postcontrast enhancement, most consistent with underlying  benign etiology such as a proteinaceous cyst. Given the prior  documented history of cardiothoracic surgery, imaging differential  also include a small chronic postoperative seroma.  2. 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 Aneurysmal dilatation  of each of the proximal aspects of the right coronary and left main  coronary arteries, measuring up to 2.6 cm, better assessed on CT  chest 12/04/2024.  3. 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    ___________________________________________________________________________________________________  CT SOFT TISSUE NECK W IV CONTRAST; ;  12/4/2024 3:01 pm      INDICATION:  Signs/Symptoms:right nasal mass + invasive carcinoma, evaluate for  disease.      ,C44.301 Unspecified malignant neoplasm of skin of nose      COMPARISON:  CT face from 11/04/2024.      ACCESSION NUMBER(S):  [id number]      ORDERING CLINICIAN:  WALE PETER      TECHNIQUE:  CT of the neck was performed after administration of 73 mL of  Omnipaque 350 intravenously.      FINDINGS:  There is a 2.1 cm AP x 2.1 cm transverse x 4.1 cm craniocaudal mass  located within the right/midline nasal soft tissues which erodes the  adjacent right and anterior nasal bone and protrudes into the  superior and anterior portion of the right nasal cavity and probably  involves the cartilaginous portion of the osseous nasal septum. There  is thinning of the anterior-most portion of the osseous nasal septum  and may be partially eroded.      Mass is in close proximity to the right nasolacrimal duct (series 3,  image 31 of 129). Mass does not extend into the right orbit.      Overall mass is essentially unchanged in size since the prior CT,  noting small defect anteriorly, possibly from recent biopsy.  However,  there is slight increase in soft tissue extending into the right pre  maxillary fat pad/skin close to the inferior eyelid (series 3, image  37 of 129).      There is mild mucosal thickening located within the right maxillary  sinus, otherwise the visualized paranasal sinuses and mastoid air  cells are essentially clear.      There is no cervical lymphadenopathy by size criteria.      No other suspicious masses are seen within the soft tissues of the  neck. There is no mass or abnormal enhancement located within the  visualized aerodigestive tract. There is no prevertebral or  retropharyngeal edema.      The parotid and submandibular glands are unremarkable in appearance,  noting that evaluation is somewhat degraded due to streak artifact  from dental hardware and hardware within the cervical spine.      There is stable focal dilatation of the distal left cervical internal  carotid artery (series 6, image 86 of 256) measuring 0.9 cm in AP  diameter. There is return to normal caliber more distally.      There are postoperative changes from laminectomies at C2 through  C7-T1 with posterior spinal fusion including placement of pedicular  screws which which are interconnected by parallel rods. There is  fusion of multiple cervical vertebral bodies. There are osseous  degenerative changes at multiple levels involving the cervical spine.  There is an old fracture with partial healing involving the right  posterior arch of C1 (series 10, image 83 of 256) also seen on the  prior CT.      Thyroid gland contains multiple small nodules.      Partially visualized presumed aneurysm coils located within the  posterior fossa.      Findings within the chest will be detailed in a separate report.      IMPRESSION:  1. Compared to the CT face from 11/04/2024, soft tissue mass within  the right and midline nasal soft tissues causing erosion of the  adjacent nasal bone and protruding into the right nasal cavity is  essentially  unchanged in size, however there is slight increase in  tumoral extension at the junction between the right nasal soft  tissues and the right pre maxillary fat pad/skin.      2. There is no cervical lymphadenopathy by size criteria.      3. Old fracture of the right posterior arch of C1 with partial  healing, similar in appearance compared to the prior CT.    ADDENDUM:  Encephalomalacia located within the right posterior cerebellum and  cerebellar vermis with mild ex vacuo dilatation of the 4th ventricle  is again seen.  ___________________________________________________________________________________________________  CT CHEST W IV CONTRAST; 12/4/2024 2:59 pm      INDICATION:  Signs/Symptoms:right nasal mass + invasive carcinoma, evaluate for  disease. Cancer of skin, external nose.      COMPARISON:  None      ACCESSION NUMBER(S):  [id number]      ORDERING CLINICIAN:  WALE PETER      TECHNIQUE:  The examination was performed with the intravenous injection of 75 ml  of non-ionic iodinated contrast was injected intravenously.      A helical acquisition data was obtained with sagittal coronal  reconstructions performed.      One or more of the following dose reduction techniques were used:  Automated exposure control Adjustment of the mA and/or kV according  to patient size, and/or use of iterative reconstruction technique.      FINDINGS:  *There is a 9 x 13 x 22 mm indeterminate soft tissue density adjacent  to the left lateral surface of the ascending thoracic aorta, less  dense than the enhancing aortic lumen. Differential considerations  include enlarged lymph node versus slightly hyperdense cyst. (Series  5, Image 29) (Series 4, Image 129) *There is also a somewhat  elongated heterogeneous soft tissue density within the upper thoracic  esophagus to the left of midline measuring approximately 1.3 cm in  maximal diameter (Series 5, Image 48) (Series 4, Image 67). It is  uncertain whether this represents a  lesion within the esophagus or  some type of retained food product. This could be further assessed by  correlation with either upper endoscopy or barium swallow.          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 *The thoracic aorta is tortuous and dilated with a maximal  diameter of 4.7 cm for the ascending thoracic aorta. Descending  thoracic aortic diameter is also somewhat dilated measuring  approximately 3.3 cm. 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 *The  combination of the above findings raises concern for possible  connective tissue disorder in this patient. Has the patient been  evaluated for possible Marfan syndrome or Tomasa-Danlos syndrome?          Right lower lobe right lower lobe linear subsegmental atelectasis  versus scarring is present. No infiltrates or effusions are  identified. No pulmonary masses are identified. Patient is status  post aortic valve replacement. Associated sternal sutures are noted.      Chest Wall: No chest wall masses are identified.      Upper Abdominal Findings: No pathologic findings are identified  involving the visualized portions of the upper abdomen.      Skeletal System: There is a cervical laminectomy with bilateral  pedicle screw and bar fusion.      IMPRESSION:  1. Homogeneous slightly hypodense mass adjacent to the ascending  thoracic aorta on the left measuring 9 x 13 x 22 mm possibly  representing a lymph node or cyst. This could be further  characterized by correlation with MRI of the chest with and without  contrast if indicated.  2. Sinus of Valsalva aneurysm measuring 2.5 x 2.6 x 3.0 cm involving  the right coronary sinus. There is also aneurysmal dilatation of the  thoracic  aorta with a tortuous appearance. Patient also has a history  of previous PICA aneurysm. Does the patient have an underlying  connective tissue disorder?  3. Status post cervical laminectomy with bilateral screw and bar  fusion.  4. Abnormal soft tissue density within the thoracic esophagus  superiorly it is unclear whether this is a soft tissue mass/neoplasm  versus retained food products. This may be further assessed by  correlation with barium swallow or upper endoscopy.    Clinical Images  11/26/2024

## 2025-01-08 ENCOUNTER — TELEMEDICINE (OUTPATIENT)
Dept: HEMATOLOGY/ONCOLOGY | Facility: CLINIC | Age: 80
End: 2025-01-08
Payer: MEDICARE

## 2025-01-08 DIAGNOSIS — C44.301 MALIGNANT NEOPLASM OF SKIN OF NOSE: ICD-10-CM

## 2025-01-08 PROCEDURE — 99215 OFFICE O/P EST HI 40 MIN: CPT | Performed by: INTERNAL MEDICINE

## 2025-01-08 ASSESSMENT — ENCOUNTER SYMPTOMS
LIGHT-HEADEDNESS: 0
EYE PROBLEMS: 0
FEVER: 0
ABDOMINAL PAIN: 0
CONSTIPATION: 0
EXTREMITY WEAKNESS: 0
DIAPHORESIS: 0
CHILLS: 0
FATIGUE: 0
COUGH: 0
DIARRHEA: 0
PALPITATIONS: 0
NAUSEA: 0
SHORTNESS OF BREATH: 0
ROS SKIN COMMENTS: NO NEW LESIONS OR MASSES
WOUND: 0
APPETITE CHANGE: 0
BRUISES/BLEEDS EASILY: 0
UNEXPECTED WEIGHT CHANGE: 0
NUMBNESS: 0
CHEST TIGHTNESS: 0
ABDOMINAL DISTENTION: 0
VOMITING: 0
SEIZURES: 0
SPEECH DIFFICULTY: 0
HEADACHES: 0
ARTHRALGIAS: 0
ADENOPATHY: 0
LEG SWELLING: 0
BACK PAIN: 0

## 2025-01-08 NOTE — PROGRESS NOTES
CUTANEOUS ONCOLOGY: FOLLOW UP    Diagnosis: locally advanced basal cell carcinoma  Location: nose  Oncologic history (see initial consultation note for more detail)    Current therapy: under discussion    Interval history: Amrita Grant is a 79 y.o. year old female who presents today to clinic for melanoma fu. No new symptoms since the last time we spoke.     ROS: Review of Systems   Constitutional:  Negative for appetite change, chills, diaphoresis, fatigue, fever and unexpected weight change.   HENT:   Positive for lump/mass.         Runny nose   Eyes:  Negative for eye problems.        Eye tearing   Respiratory:  Negative for chest tightness, cough and shortness of breath.    Cardiovascular:  Negative for chest pain, leg swelling and palpitations.   Gastrointestinal:  Negative for abdominal distention, abdominal pain, constipation, diarrhea, nausea and vomiting.   Musculoskeletal:  Negative for arthralgias, back pain and gait problem.   Skin:  Negative for itching, rash and wound.        No new lesions or masses   Neurological:  Negative for extremity weakness, gait problem, headaches, light-headedness, numbness, seizures and speech difficulty.   Hematological:  Negative for adenopathy. Does not bruise/bleed easily.       PE:  Physical Exam  Constitutional:       Appearance: Normal appearance.   HENT:      Head: Normocephalic and atraumatic.      Nose:      Comments: Nasal mass  Neurological:      General: No focal deficit present.      Mental Status: She is alert and oriented to person, place, and time. Mental status is at baseline.   Psychiatric:         Mood and Affect: Mood normal.         Behavior: Behavior normal.         Thought Content: Thought content normal.         Judgment: Judgment normal.         Assessment: locally advanced basal cell carcinoma    Plan: Amrita Grant is a 79 y.o. year old female who presents today to clinic for basal cell carcinoma fu. I discussed Amrita's case at our  cutaneous oncology tumor board. Given her autoimmune disease, frailty, other comorbidities and rapid growth of her tumor- she is not a good candidate for immunotherapy. The recommendation is to proceed with radiation alone. They would like to speak with Dr. Templeton again about why surgery would not be a good option. I will reach out to Dr. Templeton.     They were contacted by their local radiation oncologist office today to schedule. Given distance, she would like to continue her care locally.     FU as needed.     Amrita Grant understands the plan and has no further questions. she will contact us if there are any new concerns or change in clinical picture.     Bindu Guzmán MD  Attending Physician  [facility]     of Medicine  [employer] [facility] [employer]

## 2025-01-10 ENCOUNTER — APPOINTMENT (OUTPATIENT)
Dept: HEMATOLOGY/ONCOLOGY | Facility: HOSPITAL | Age: 80
End: 2025-01-10
Payer: MEDICARE

## 2025-01-10 LAB — PROTHROMBIN TIME (PROTIME)PT.: 41 SECONDS (ref 11.7–14.9)

## 2025-01-13 ENCOUNTER — TELEMEDICINE (OUTPATIENT)
Dept: OTOLARYNGOLOGY | Facility: CLINIC | Age: 80
End: 2025-01-13
Payer: MEDICARE

## 2025-01-13 DIAGNOSIS — C44.301 MALIGNANT NEOPLASM OF SKIN OF NOSE: Primary | ICD-10-CM

## 2025-01-13 LAB — PROTHROMBIN TIME (PROTIME)PT.: 22.2 SECONDS (ref 11.7–14.9)

## 2025-01-13 NOTE — PROGRESS NOTES
Received message that patient wished to discuss surgery again prior to starting radiation, of note Cutaneous Tumor Board recommendation for primary radiation. I agree with Tumor board recommendations for radiation due to the morbidity posed by surgery. Set up patient for virtual visit today, unable to reach patient via virtual visit link or telephone, went to [employer] x 3. Left message asking her to call back.     Able to get ahold of , planning to start radiation Monday. They would like an appointment still to discuss, will set up another virtual.

## 2025-01-18 ENCOUNTER — HOSPITAL ENCOUNTER (OUTPATIENT)
Dept: HOSPITAL 100 - US | Age: 80
Discharge: HOME | End: 2025-01-18
Payer: MEDICARE

## 2025-01-18 DIAGNOSIS — E04.1: Primary | ICD-10-CM

## 2025-01-18 PROCEDURE — 76536 US EXAM OF HEAD AND NECK: CPT

## 2025-01-21 ENCOUNTER — TELEMEDICINE (OUTPATIENT)
Dept: OTOLARYNGOLOGY | Facility: HOSPITAL | Age: 80
End: 2025-01-21
Payer: MEDICARE

## 2025-01-21 DIAGNOSIS — C44.301 MALIGNANT NEOPLASM OF SKIN OF NOSE: Primary | ICD-10-CM

## 2025-01-22 NOTE — PROGRESS NOTES
Called  and patient to discuss care planning. They have met with the radiation oncologist, planning to start radiation soon after imaging. Reviewed concerns about primary surgical management and I do not recommend it at this time in favor of radiation. They are anxious to get started with treatment which I agree with. They know to call with any concerns

## 2025-01-24 ENCOUNTER — TELEPHONE (OUTPATIENT)
Dept: HEMATOLOGY/ONCOLOGY | Facility: CLINIC | Age: 80
End: 2025-01-24

## 2025-01-29 ENCOUNTER — HOSPITAL ENCOUNTER (OUTPATIENT)
Dept: HOSPITAL 100 - MTLAB | Age: 80
Discharge: HOME | End: 2025-01-29
Payer: MEDICARE

## 2025-01-29 DIAGNOSIS — Z79.01: Primary | ICD-10-CM

## 2025-01-29 LAB — PROTHROMBIN TIME (PROTIME)PT.: 21.2 SECONDS (ref 11.7–14.9)

## 2025-02-03 ENCOUNTER — OFFICE VISIT (OUTPATIENT)
Dept: HEMATOLOGY/ONCOLOGY | Facility: CLINIC | Age: 80
End: 2025-02-03
Payer: MEDICARE

## 2025-02-03 ENCOUNTER — SPECIALTY PHARMACY (OUTPATIENT)
Dept: PHARMACY | Facility: CLINIC | Age: 80
End: 2025-02-03

## 2025-02-03 ENCOUNTER — LAB (OUTPATIENT)
Dept: LAB | Facility: CLINIC | Age: 80
End: 2025-02-03
Payer: MEDICARE

## 2025-02-03 VITALS
SYSTOLIC BLOOD PRESSURE: 153 MMHG | OXYGEN SATURATION: 95 % | DIASTOLIC BLOOD PRESSURE: 92 MMHG | RESPIRATION RATE: 16 BRPM | WEIGHT: 105.05 LBS | BODY MASS INDEX: 19.14 KG/M2 | TEMPERATURE: 97.2 F | HEART RATE: 70 BPM

## 2025-02-03 DIAGNOSIS — C44.310 FACIAL BASAL CELL CANCER: Primary | ICD-10-CM

## 2025-02-03 DIAGNOSIS — C44.310 FACIAL BASAL CELL CANCER: ICD-10-CM

## 2025-02-03 LAB
ALBUMIN SERPL BCP-MCNC: 4.3 G/DL (ref 3.4–5)
ALP SERPL-CCNC: 77 U/L (ref 33–136)
ALT SERPL W P-5'-P-CCNC: 13 U/L (ref 7–45)
ANION GAP SERPL CALC-SCNC: 12 MMOL/L (ref 10–20)
AST SERPL W P-5'-P-CCNC: 18 U/L (ref 9–39)
BASOPHILS # BLD AUTO: 0.02 X10*3/UL (ref 0–0.1)
BASOPHILS NFR BLD AUTO: 0.3 %
BILIRUB SERPL-MCNC: 0.8 MG/DL (ref 0–1.2)
BUN SERPL-MCNC: 12 MG/DL (ref 6–23)
CALCIUM SERPL-MCNC: 10 MG/DL (ref 8.6–10.3)
CHLORIDE SERPL-SCNC: 101 MMOL/L (ref 98–107)
CO2 SERPL-SCNC: 29 MMOL/L (ref 21–32)
CREAT SERPL-MCNC: 0.67 MG/DL (ref 0.5–1.05)
EGFRCR SERPLBLD CKD-EPI 2021: 90 ML/MIN/1.73M*2
EOSINOPHIL # BLD AUTO: 0.01 X10*3/UL (ref 0–0.4)
EOSINOPHIL NFR BLD AUTO: 0.2 %
ERYTHROCYTE [DISTWIDTH] IN BLOOD BY AUTOMATED COUNT: 12.3 % (ref 11.5–14.5)
GLUCOSE SERPL-MCNC: 97 MG/DL (ref 74–99)
HCT VFR BLD AUTO: 42.2 % (ref 36–46)
HGB BLD-MCNC: 13.3 G/DL (ref 12–16)
IMM GRANULOCYTES # BLD AUTO: 0 X10*3/UL (ref 0–0.5)
IMM GRANULOCYTES NFR BLD AUTO: 0 % (ref 0–0.9)
LYMPHOCYTES # BLD AUTO: 0.73 X10*3/UL (ref 0.8–3)
LYMPHOCYTES NFR BLD AUTO: 11.3 %
MCH RBC QN AUTO: 31.9 PG (ref 26–34)
MCHC RBC AUTO-ENTMCNC: 31.5 G/DL (ref 32–36)
MCV RBC AUTO: 101 FL (ref 80–100)
MONOCYTES # BLD AUTO: 0.38 X10*3/UL (ref 0.05–0.8)
MONOCYTES NFR BLD AUTO: 5.9 %
NEUTROPHILS # BLD AUTO: 5.34 X10*3/UL (ref 1.6–5.5)
NEUTROPHILS NFR BLD AUTO: 82.3 %
NRBC BLD-RTO: ABNORMAL /100{WBCS}
PLATELET # BLD AUTO: 223 X10*3/UL (ref 150–450)
POTASSIUM SERPL-SCNC: 4 MMOL/L (ref 3.5–5.3)
PROT SERPL-MCNC: 7 G/DL (ref 6.4–8.2)
RBC # BLD AUTO: 4.17 X10*6/UL (ref 4–5.2)
SODIUM SERPL-SCNC: 138 MMOL/L (ref 136–145)
WBC # BLD AUTO: 6.5 X10*3/UL (ref 4.4–11.3)

## 2025-02-03 PROCEDURE — 85025 COMPLETE CBC W/AUTO DIFF WBC: CPT

## 2025-02-03 PROCEDURE — 1036F TOBACCO NON-USER: CPT | Performed by: INTERNAL MEDICINE

## 2025-02-03 PROCEDURE — 1126F AMNT PAIN NOTED NONE PRSNT: CPT | Performed by: INTERNAL MEDICINE

## 2025-02-03 PROCEDURE — 80053 COMPREHEN METABOLIC PANEL: CPT

## 2025-02-03 PROCEDURE — 99215 OFFICE O/P EST HI 40 MIN: CPT | Performed by: INTERNAL MEDICINE

## 2025-02-03 PROCEDURE — 86704 HEP B CORE ANTIBODY TOTAL: CPT

## 2025-02-03 PROCEDURE — 86706 HEP B SURFACE ANTIBODY: CPT

## 2025-02-03 PROCEDURE — 87340 HEPATITIS B SURFACE AG IA: CPT

## 2025-02-03 PROCEDURE — 36415 COLL VENOUS BLD VENIPUNCTURE: CPT

## 2025-02-03 PROCEDURE — 1159F MED LIST DOCD IN RCRD: CPT | Performed by: INTERNAL MEDICINE

## 2025-02-03 ASSESSMENT — ENCOUNTER SYMPTOMS
PALPITATIONS: 0
VOMITING: 0
FATIGUE: 0
TROUBLE SWALLOWING: 1
ROS SKIN COMMENTS: NO NEW LESIONS OR MASSES
CHILLS: 0
ABDOMINAL PAIN: 0
DIAPHORESIS: 0
HEADACHES: 0
ABDOMINAL DISTENTION: 0
WOUND: 0
COUGH: 0
EXTREMITY WEAKNESS: 0
NUMBNESS: 0
SEIZURES: 0
FEVER: 0
SHORTNESS OF BREATH: 0
LIGHT-HEADEDNESS: 0
EYE PROBLEMS: 0
BACK PAIN: 0
NAUSEA: 0
BRUISES/BLEEDS EASILY: 0
ADENOPATHY: 0
CHEST TIGHTNESS: 0
UNEXPECTED WEIGHT CHANGE: 0
LEG SWELLING: 0
DIARRHEA: 0
APPETITE CHANGE: 0
SPEECH DIFFICULTY: 0
ARTHRALGIAS: 0
CONSTIPATION: 0

## 2025-02-03 ASSESSMENT — PAIN SCALES - GENERAL: PAINLEVEL_OUTOF10: 0-NO PAIN

## 2025-02-03 NOTE — PROGRESS NOTES
CUTANEOUS ONCOLOGY: FOLLOW UP    Referring Surgeon: Dr. Templeton  Radiation oncologist: Dr. Scott    Diagnosis: Advanced basal cell carcinoma, with suspected metastatic disease   Primary site location: nasal dorsum, cheek and creeping towards her eye   Clinical lymph nodes: not present     Interval history:  Amrita Grant is a 79 y.o. year old female who presents today to clinic for fu of basal cell carcinoma. She and her family are frustrated about the delays in care that led her cancer to be unresectable/amenable to local therapy alone.    Since the last time we met, her basal cell has grown- she is no longer able to wear her glasses. She notes her R eye continues to tear incessantly. She feels fatigued. Mood is down as she does not like to go outside/be around others. She continues to have issues with dysphagia- maintaining weight has been difficult. She is active at home- able to do household chores and take care of herself. She does take some breaks but able to complete tasks.     She notes her main manifestation of lupus is fatigue. She has tried being off of therapy but was not successful and required long tapers of prednisone for disease control. She has been on plaquenil and has not had a flare in a year.    ROS: Review of Systems   Constitutional:  Negative for appetite change, chills, diaphoresis, fatigue, fever and unexpected weight change.   HENT:   Positive for lump/mass and trouble swallowing.    Eyes:  Negative for eye problems.   Respiratory:  Negative for chest tightness, cough and shortness of breath.    Cardiovascular:  Negative for chest pain, leg swelling and palpitations.   Gastrointestinal:  Negative for abdominal distention, abdominal pain, constipation, diarrhea, nausea and vomiting.   Musculoskeletal:  Negative for arthralgias, back pain and gait problem.   Skin:  Negative for itching, rash and wound.        No new lesions or masses   Neurological:  Negative for extremity  weakness, gait problem, headaches, light-headedness, numbness, seizures and speech difficulty.   Hematological:  Negative for adenopathy. Does not bruise/bleed easily.     PE:  Physical Exam  Vitals reviewed.   Constitutional:       Appearance: Normal appearance. She is normal weight.   HENT:      Head: Normocephalic and atraumatic.      Nose: Rhinorrhea present.      Comments: Reddish blue hue to cancer on the R nostril extending onto the cheek and below eye.   Eyes:      Extraocular Movements: Extraocular movements intact.      Conjunctiva/sclera: Conjunctivae normal.   Cardiovascular:      Rate and Rhythm: Normal rate and regular rhythm.      Pulses: Normal pulses.   Pulmonary:      Effort: Pulmonary effort is normal.      Breath sounds: Normal breath sounds.   Neurological:      General: No focal deficit present.      Mental Status: She is alert and oriented to person, place, and time. Mental status is at baseline.   Psychiatric:         Mood and Affect: Mood normal.         Behavior: Behavior normal.         Thought Content: Thought content normal.         Judgment: Judgment normal.     ECOG 1    Imaging:  PET 1/20/25:  *  FDG avid mass involving right nasal soft tissues correlating with   known primary.     BETY DISEASE:   *  FDG avid small right level 1 cervical lymph node.     METASTATIC DISEASE:   *  Multiple foci of increased uptake involving right ribs and spinous   process of L1 vertebra.     ADDITIONAL FINDINGS:   *  Right upper lobe lung nodule below PET resolution, for follow-up.     Assessment: Locally advanced basal cell carcinoma, suspected metastasis.     Plan: Amrita Grant is a 79 y.o. year old female patient with   referred for   for suspected metastatic basal cell carcinoma.     Her pre-radiation scans show new lesions, concerns for metastatic disease. I spoke with her radiologist, Dr. Salamanca and recommended biopsy. I also recommended she establish care with someone closer to home as they find  transport to my center difficult. Neither of which was completed.     We discussed that for metastatic basal cell carcinoma that there are only two treatment options: 1. Vismodegib 2. Cemiplimab. We discussed that IO in patients with active autoimmune conditions is not studied. I would recommend using it second line if there are no other options.     Thus, my recommendation would be for vismodegib. We discussed that this is a hard regimen so I would give it every other day instead of daily. We went over treatment schedule, efficacy, response assessment, potential EMILY. Consent signed today. Oncopharmacy teach placed.     Plan is for biopsy of one of those lesions and to start vismodegib when she receives it.     I will reach out to her rad onc to see if palliative RT to the facial lesion for QoL is feasible.     RTC 1 mo after vismodegib starts.     Amrita Grant understands the plan and has no further questions. she will contact us if there are any new concerns or change in clinical picture.     Bindu Guzmán MD  Attending Physician  [facility]     of Medicine  [facility] [employer]

## 2025-02-04 ENCOUNTER — SPECIALTY PHARMACY (OUTPATIENT)
Dept: PHARMACY | Facility: CLINIC | Age: 80
End: 2025-02-04

## 2025-02-04 LAB
HBV CORE AB SER QL: NONREACTIVE
HBV SURFACE AB SER-ACNC: 4 MIU/ML
HBV SURFACE AG SERPL QL IA: NONREACTIVE
INR FINGERSTICK: 1.2

## 2025-02-04 PROCEDURE — RXMED WILLOW AMBULATORY MEDICATION CHARGE

## 2025-02-05 ENCOUNTER — HOSPITAL ENCOUNTER (OUTPATIENT)
Dept: RADIATION ONCOLOGY | Facility: CLINIC | Age: 80
Setting detail: RADIATION/ONCOLOGY SERIES
Discharge: HOME | End: 2025-02-05
Payer: MEDICARE

## 2025-02-05 ENCOUNTER — HOSPITAL ENCOUNTER (OUTPATIENT)
Dept: RADIOLOGY | Facility: EXTERNAL LOCATION | Age: 80
Discharge: HOME | End: 2025-02-05

## 2025-02-05 VITALS
TEMPERATURE: 97 F | RESPIRATION RATE: 18 BRPM | OXYGEN SATURATION: 98 % | DIASTOLIC BLOOD PRESSURE: 88 MMHG | SYSTOLIC BLOOD PRESSURE: 143 MMHG | HEART RATE: 74 BPM

## 2025-02-05 VITALS
WEIGHT: 104.28 LBS | RESPIRATION RATE: 18 BRPM | HEART RATE: 74 BPM | OXYGEN SATURATION: 98 % | DIASTOLIC BLOOD PRESSURE: 88 MMHG | TEMPERATURE: 97 F | BODY MASS INDEX: 19 KG/M2 | SYSTOLIC BLOOD PRESSURE: 143 MMHG

## 2025-02-05 DIAGNOSIS — C30.0 MALIGNANT NEOPLASM OF NASAL CAVITIES (MULTI): ICD-10-CM

## 2025-02-05 DIAGNOSIS — C44.310 FACIAL BASAL CELL CANCER: Primary | ICD-10-CM

## 2025-02-05 PROCEDURE — 77333 RADIATION TREATMENT AID(S): CPT | Mod: 59 | Performed by: RADIOLOGY

## 2025-02-05 PROCEDURE — 99215 OFFICE O/P EST HI 40 MIN: CPT | Performed by: RADIOLOGY

## 2025-02-05 PROCEDURE — 77334 RADIATION TREATMENT AID(S): CPT | Performed by: RADIOLOGY

## 2025-02-05 PROCEDURE — G2211 COMPLEX E/M VISIT ADD ON: HCPCS | Performed by: RADIOLOGY

## 2025-02-05 ASSESSMENT — ENCOUNTER SYMPTOMS
EYE PROBLEMS: 1
PSYCHIATRIC NEGATIVE: 1
SCLERAL ICTERUS: 0
MUSCULOSKELETAL NEGATIVE: 1
FATIGUE: 1
NEUROLOGICAL NEGATIVE: 1
VOICE CHANGE: 0
GASTROINTESTINAL NEGATIVE: 1
UNEXPECTED WEIGHT CHANGE: 0
ENDOCRINE NEGATIVE: 1
CHILLS: 0
APPETITE CHANGE: 0
TROUBLE SWALLOWING: 1
CARDIOVASCULAR NEGATIVE: 1
FEVER: 0
BRUISES/BLEEDS EASILY: 1
DIAPHORESIS: 0
SORE THROAT: 0
RESPIRATORY NEGATIVE: 1

## 2025-02-05 ASSESSMENT — PAIN SCALES - GENERAL: PAINLEVEL_OUTOF10: 0-NO PAIN

## 2025-02-05 NOTE — PROGRESS NOTES
Procedure for CT Simulation with IV Contrast    Safety Checks  Patient identified using 2 identifiers  Allergies reviewed: {YN:75610}  Contrast allergy: {YN CONTRAST ALLERGY:59255}    Physician order for IV contrast verified in [facility]: {YN:22378}  Consent verified: {YN:27168}  Fall risk:  {YN:62207}    Creatinine and GFR within normal limits: {YN:72114}  Lab Results   Component Value Date    CREATININE 0.67 02/03/2025     {GFR CONTRAST:77854}    IV Contrast screening form completed and reviewed with patient.  Patient verbalized understanding of CT Sim/IV Contrast process and signed the screening form.  Written education material provided.  Emphasized importance to increase water, 8oz/hour, for the remainder of the day to help flush the kidneys.  Patient verbalized understanding of all education/instructions.  Denied further questions, at this time.    IV Start Time: {ASTERICK:97565}  Angiocath location: {Angiocath Location:28408}  Angiocath size: {Angiocath Size:47471}  Positive blood return noted    Time contrast administered: {ASTERICK:69979}  Contrast reaction: {YN:95677}  Mental status: Alert and oriented    IV Contrast Name: {ASTERICK:57345}  Lot: {ASTERICK:71423}    Patient Disposition  IV site free of signs of infiltration or phlebitis  Patient denies pain at injection site  IV flushed with 10mL 0.9% Normal Saline  Removal Time: {ASTERICK:53981}  Patient safely discharged from [facility] Radiation Oncology Department    Carried out orders of  {Provider List:12942}, under {hisher:65296} supervision    Jennifer Hannah RN

## 2025-02-05 NOTE — PROGRESS NOTES
Radiation Oncology Nursing Note    Pain: The patient's current pain level was assessed.  They report currently having a pain of 0 out of 10.  They feel their pain is under control without the use of pain medications.    Review of Systems:  Review of Systems   Constitutional:  Positive for fatigue. Negative for appetite change, chills, diaphoresis, fever and unexpected weight change.   HENT:   Positive for trouble swallowing (baseline). Negative for hearing loss, lump/mass, mouth sores, nosebleeds, sore throat, tinnitus and voice change.    Eyes:  Positive for eye problems (tearing on right eye). Negative for icterus.   Respiratory: Negative.     Cardiovascular: Negative.    Gastrointestinal: Negative.    Endocrine: Negative.    Genitourinary: Negative.     Musculoskeletal: Negative.    Skin: Negative.    Neurological: Negative.    Hematological:  Bruises/bleeds easily.   Psychiatric/Behavioral: Negative.

## 2025-02-06 ENCOUNTER — PHARMACY VISIT (OUTPATIENT)
Dept: PHARMACY | Facility: CLINIC | Age: 80
End: 2025-02-06
Payer: COMMERCIAL

## 2025-02-06 NOTE — PROGRESS NOTES
Staff Physician: Chetna uGtierrez MD  Referring Physician: Carlie Templeton MD  Date of Service: 2/5/2025  Patient name: Amrita Grant   MRN: [id number]    RADIATION ONCOLOGY FOLLOW-UP NOTE    IDENTIFYING DATA:  DIAGNOSIS: Newly diagnosed, metastatic  Cancer Staging   Facial basal cell cancer  Staging form: Cutaneous Carcinoma of the Head and Neck, AJCC 8th Edition  - Clinical stage from 1/6/2025: Stage III (cT3, cN0, cM0) - Signed by Bindu Guzmán MD on 1/6/2025  - Clinical stage from 2/3/2025: Stage IV (cT4a, cN2a, cM1) - Signed by Bindu Guzmán MD on 2/3/2025    DISEASE STATE: No prior treatment  DISEASE STATUS: Treatment pending work-up  Problem List Items Addressed This Visit       Facial basal cell cancer - Primary    Relevant Orders    Rad Onc Intent to Treat       IDENTIFICATION  Ms. Amrita Grant is a 79 y.o.-year-old with rapidly growing right nasal subcutaneous tumor with pathology demonstrating poorly differentiated carcinoma favoring basal cell given clinical context, cT3N0 with plan to treat with definitive radiation at [facility], with updated imaging studies demonstrating metastatic disease (2 rib lesions, L1 lesion, level 1 node right).  She presents for consideration of palliative RT.     ONCOLOGIC HISTORY     Patient first noted swelling/bump along the right side of her nasal bridge about 2 months ago.  Her glasses didn't fit right.  She was referred to ENT.     11/11/2024 ENT Evaluation (Dr Carlie Templeton)   Exam: Large firm mass right nasal dorsum/sidewall.  CN 2-12 intact  Nasal Endoscopy: discoloration and erythema of the right endonasal side wall, no over mass extending into nasal cavity.  Septum and turbinates are wnl.     Biopsies were taken of the nasal lesion.  Pathology: invasive poorly differentiated carcinoma  There were irregular shaped nests of basaloid epithelia cells with focal ductal differentiation in the deep dermis.  Notably no epidermal involvement was seen. P40 positive and  negative for MOC31.  Ddx:  primary cutaneous neoplasm such as an infiltrative basal cell carcinoma or an adnexal carcinoma. As there is no epidermal involvement and MOC31 expression, tumor of sinonasal origin cannot be excluded.     11/24/2024 A second biopsy was taken by Dr Templeton for additional tissue for pathologic eval.  Additional immunostains sent: P16 positive, chromogranin and ISM1 negative.  Isolated tumor cells positive for BerEP4.  Ddx remains unchanged.         12/4/2024 CT Neck:   2.1 x 2.1 x 4.1 cm mass right/midline nasal soft tissues which eroides the adjacent right and anterior nasal bone and protrudes into the superior and anterior portion of the right nasal cavity and probably invades the cartilaginous portion of the osseous nasal septum.  There is thinning of the anterior most portion of the osseous nasal septum which may be partially eroded.  Mass is in close proximity to the right nasolacrimal duct.  Mass does not extend into the right orbit.  No cervical JESUS.        12/4/2024 CT Chest:  - Indeterminate 0.9 x 1.3 x 2.2 cm soft tissue density along left lateral surface ascending thoracic aorta. May represent lymph node or cyst.  May characterize further with MRI.   - Soft tissue density in upper thoracic esophagus left of midline.  ?lesion in esophagus versus retained food product.  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  Aneurysmal dilatation of the thoracic aorta.  -s/p cervical laminectomy with hardware     12/16/2024 MRI Chest:  - 1.2 cm ovoid structure anterior mediastinum without contrast enhancement corresponding to lesion seen on chest CT c/w proteinaceous cyst.  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  Recommendation was radiation versus  immunotherapy.  Surgery was deemed to be too morbid    1/6/2025 Med Onc Eval (Dr Bindu Guzmán): given active Lupus and co-morbidities, favored radiation over systemic therapy.  Patient lives > 1 hour from [facility], requested referral to [facility] for radiation    1/10/2025 [facility] Rad Onc Eval (): ordered staging scans, planned for radiation.    1/20/2025 PET-CT:  FDG avid mass involving right nasal soft tissues 3.2 x 2.9 cm SUV 22.5  FDG avid small right level 1 cervical node 0.6 cm SUV 6.9  FDG avid bone mets: right second rib SUV 8.6, Right 7th rib SUV 6.6, L1 VB transverse process SUV 5.5      1/23/2025 MRI Brain: large heterogeneous enhancing mass centered on nasal bridge with direct invasion/erosion of nasal bone, anterior nasal septum and right frontal maxillary process.  Mass measures 3.2 x 2.9 x 4.8 cm.  It extends to pre-septal soft tissues medially, no definite involvement of globe.  Mass extends to right lacrimal duct likely due to direct invasion.  No definite extension to right frontal sinus or intracranial extension    Referred back to Dr Guzmán for consideration of systemic therapy in setting of mets.    2/3/2025 DR Guzmán: Plan for Visomogenib, biopsy bone lesion (IR referral placed), NGS planned, referred for palliative RT    INTERVAL HISTORY:    Scans demonstrated metastatic disease as above.  Plan for vismogenib.      The mass has continued to grow over last 6 weeks.  The skin is no ulcerated.  She has more tearing her right eye.  No vision changes.  No facial weakness.  She does have numbness over the tip of her nose.    She has never had radiation therapy.  She does have lupus on plaquenil (no discoid type, no active skin lesions).  She has no implantable devices.          PAST MEDICAL HISTORY:  Past Medical History:   Diagnosis Date    Aortic aneurysm (CMS-HCC)     Coronary artery disease     CVA (cerebrovascular accident due to intracerebral hemorrhage) (Multi) 2021    Fibromyalgia      HTN (hypertension)     Seizure disorder (Multi)     Skin cancer of nose     SLE (systemic lupus erythematosus related syndrome) (Multi)     Thyroid nodule      PAST SURGICAL HISTORY:  Past Surgical History:   Procedure Laterality Date    AORTIC VALVE REPLACEMENT      APPENDECTOMY      BUNIONECTOMY      CERVICAL FUSION      CORONARY ARTERY BYPASS GRAFT      HYSTERECTOMY      TONSILLECTOMY       ALLERGIES:  Allergies   Allergen Reactions    Propofol Nausea/vomiting    Sulfa (Sulfonamide Antibiotics) Other    Opioids - Morphine Analogues Nausea And Vomiting, Other and Unknown    Penicillins Unknown     Pt. Reports passing out upon taking penicillin when she was young.     MEDICATIONS:    Current Outpatient Medications:     ARIPiprazole (Abilify) 5 mg tablet, Take 1 tablet (5 mg) by mouth once daily., Disp: , Rfl:     Bystolic 5 mg tablet, 1 tab(s) orally half tablet three times daily for 30 days, Disp: , Rfl:     calcium carbonate 600 mg calcium (1,500 mg) tablet, Take 600 mg by mouth once daily., Disp: , Rfl:     cetirizine (ZyrTEC) 10 mg tablet, 1 tab(s) orally as needed, Disp: , Rfl:     cholecalciferol (Vitamin D-3) 25 MCG (1000 UT) capsule, 1 cap(s) orally three times daily, Disp: , Rfl:     docusate sodium (Colace) 100 mg capsule, once every 24 hours., Disp: , Rfl:     fluticasone (Flonase) 50 mcg/actuation nasal spray, Administer 2 sprays into affected nostril(s)., Disp: , Rfl:     levETIRAcetam (Keppra) 500 mg tablet, every 12 hours., Disp: , Rfl:     LORazepam (Ativan) 0.5 mg tablet, Take half tablet 1 hour before MRI, if needed take other half of tablet while getting checked in for MRI, Disp: 1 tablet, Rfl: 0    losartan (Cozaar) 100 mg tablet, Take 0.5 tablets (50 mg) by mouth twice a day., Disp: , Rfl:     melatonin 3 mg tablet, Take by mouth once daily., Disp: , Rfl:     memantine (Namenda) 5 mg tablet, Take 1 tablet (5 mg) by mouth once daily., Disp: , Rfl:     mirtazapine (Remeron) 15 mg tablet, 1 tab(s)  orally half tablet bedtime daily for 30 days, Disp: , Rfl:     omeprazole (PriLOSEC) 20 mg DR capsule, Take 1 capsule (20 mg) by mouth once daily., Disp: , Rfl:     ondansetron (Zofran) 4 mg tablet, Take 1 tablet (4 mg) by mouth if needed for nausea or vomiting., Disp: , Rfl:     PlaqueniL 200 mg tablet, Take 1 tablet (200 mg) by mouth once daily., Disp: , Rfl:     potassium chloride CR 20 mEq ER tablet, Take 1 tablet (20 mEq) by mouth once daily., Disp: , Rfl:     Premarin 0.3 mg tablet, once every 24 hours., Disp: , Rfl:     [START ON 2/10/2025] vismodegib (Erivedge) 150 mg capsule, Take 1 capsule (150 mg total) by mouth every other day.  Take with or without food. Swallow whole. Do not open or crush., Disp: 14 capsule, Rfl: 3    warfarin (Coumadin) 4 mg tablet, Take 1 tablet (4 mg) by mouth once daily., Disp: , Rfl:    SOCIAL HISTORY:  Social History     Tobacco Use    Smoking status: Never     Passive exposure: Past    Smokeless tobacco: Never   Substance Use Topics    Alcohol use: Not Currently     FAMILY HISTORY:  No family history on file.    REVIEW OF SYSTEMS:  Please refer to RN note.    PHYSICAL EXAMINATION:  /88 (BP Location: Right arm, Patient Position: Sitting, BP Cuff Size: Adult)   Pulse 74   Temp 36.1 °C (97 °F) (Temporal)   Resp 18   Wt 47.3 kg (104 lb 4.4 oz)   SpO2 98%   BMI 19.00 kg/m²   Physical Exam   Well appearing NAD  She has a large subcutaneous mass centered on right nasal area.  It has now ulcerated through skin .  It extends to medial canthus.  There is ptosis of right eye.  She has some numbness at the tip of her nose but otherwise not other deficits.    See clinical photos:      CTCAE (v5) ADVERSE EVENTS:      PERFORMANCE STATUS:  KPS/ECO, Cares for self; unable to carry on normal activity or to do active work (ECOG equivalent 1)    LABORATORY AND IMAGING DATA:  Imaging: All imaging was personally reviewed and interpreted in clinic. Findings as per HPI and EMR.    No  valid procedures specified.     Laboratory/Pathology:  All pertinent labs and pathology were personally reviewed and interpreted in clinic. Findings as per HPI and EMR.      IMPRESSION:    Ms. Amrita Grant is a 79 y.o.-year-old with rapidly growing right nasal subcutaneous tumor with pathology demonstrating poorly differentiated carcinoma favoring basal cell given clinical context, cT3N0 with plan to treat with definitive radiation at [facility], with updated imaging studies demonstrating metastatic disease (2 rib lesions, L1 lesion, level 1 node right).  She presents for consideration of palliative RT.      I offered her a course of palliative radiation therapy.  I outlined the goal of RT would be to shrink the tumor and hopefully give her durable local control.  Systemic therapy will address her distant metastatic disease as well as primary tumor.  I reviewed the acute side effects of radiation therapy including but not limited to skin irritation/burning/breakdown, eye irritation, loss of eyelashes/eyebrows, fatigue.  I also reviewed late toxicities which include permanent vision changes, dry eye syndrome, lacrimal duct damage, permanent loss of eyelashes and eyebrons, cosmetic changes.  I also discussed that radiation side effects can be more pronounced in the setting of SLE.  Consent was obtained.    PLAN:  - Palliative radiation therapy 40 Gy in 15 fractions VMAT with bolus to right nasal tumor  - Biopsy of bone met - referred, pending appt  - Systemic therapy - vismogenib planned, NGS ordered.  Ok for vismogenib concurrent with RT (d/w Dr Guzmán)  - Anticipate RT to start in 1-2 weeks, as soon as a plan can be ready.    Thank you for the opportunity to participate in the care of this kind patient.    Chetna Gutierrez MD  Radiation Oncology    MD Scales met with Mother and pt, evaluated lung sounds advised it would be a 5-10 minutes till a room was ready, Mom states cough was still bad even though lung sounds were clear, and she wanted to leave.

## 2025-02-07 ENCOUNTER — SPECIALTY PHARMACY (OUTPATIENT)
Dept: HEMATOLOGY/ONCOLOGY | Facility: HOSPITAL | Age: 80
End: 2025-02-07
Payer: MEDICARE

## 2025-02-07 ENCOUNTER — TELEPHONE (OUTPATIENT)
Dept: HEMATOLOGY/ONCOLOGY | Facility: CLINIC | Age: 80
End: 2025-02-07
Payer: MEDICARE

## 2025-02-07 LAB — PROTHROMBIN TIME (PROTIME)PT.: 25.5 SECONDS (ref 11.7–14.9)

## 2025-02-07 NOTE — PROGRESS NOTES
[facility] Clinical Note  Initial Patient Education     Introduction  Amrita Grant is a 79 y.o. female who is on the specialty pharmacy service for management of: Oncology Core.    Amrita Grant is initiating the following therapy:   Erivedge (Vismodegib):  Take 1 capsule (150 mg total) by mouth every other day.  Take with or without food. Swallow whole. Do not open or crush.     Medication receipt date: 2/7/25 (planned)  Duration of therapy: Until drug toxicity or progression    The most recent encounter visit with the referring prescriber Dr. Bindu Guzmán on 2/3/25 was reviewed.  Pharmacy will continue to collaborate in the care of this patient with the referring prescriber.    Clinical Background  An initial assessment was conducted prior to first fill of the medication to determine the appropriateness of therapy given the patient's diagnosis, medication list, comorbidities, allergies, medical history, patient's ability to self administer medication, and therapeutic goals based on possible outcomes of therapy. Refer to initial assessment task completed on 2/4/25.    Labs/Procedures for clinical appropriateness that were reviewed include:   Oncology - CBC-diff:   Lab Results   Component Value Date    WBC 6.5 02/03/2025    RBC 4.17 02/03/2025    HGB 13.3 02/03/2025    HCT 42.2 02/03/2025     (H) 02/03/2025    MCHC 31.5 (L) 02/03/2025     02/03/2025    RDW 12.3 02/03/2025    NEUTOPHILPCT 82.3 02/03/2025    IGPCT 0.0 02/03/2025    LYMPHOPCT 11.3 02/03/2025    MONOPCT 5.9 02/03/2025    EOSPCT 0.2 02/03/2025    BASOPCT 0.3 02/03/2025    NEUTROABS 5.34 02/03/2025    LYMPHSABS 0.73 (L) 02/03/2025    MONOSABS 0.38 02/03/2025    EOSABS 0.01 02/03/2025    BASOSABS 0.02 02/03/2025    and CMP:   Lab Results   Component Value Date    GLUCOSE 97 02/03/2025     02/03/2025    K 4.0 02/03/2025     02/03/2025    CO2 29 02/03/2025    ANIONGAP 12 02/03/2025    BUN 12 02/03/2025     CREATININE 0.67 02/03/2025    CALCIUM 10.0 02/03/2025    ALBUMIN 4.3 02/03/2025    ALKPHOS 77 02/03/2025    PROT 7.0 02/03/2025    AST 18 02/03/2025    BILITOT 0.8 02/03/2025    ALT 13 02/03/2025       Education/Discussion  Amrita was contacted on 2/7/2025 at 9:27 AM for a pharmacy visit with encounter number [id number] from:   [facility]  [address]  [address] [facility] [address]  Dept: [phone]  Loc: [phone]  Amirta consented to a/an Telephone visit, which was performed.    Medication Start Date (planned or actual): 2/7/25 (planned)  Education was conducted prior to start of therapy? Yes    Education discussed includes the following:  Patient Education  Counseled the Patient on the Following : Adherence and missed doses, Doses and administration, Possible side effects and management, Possible drug interactions, Safe handling, storage, and disposal, Lab monitoring and follow-up  Learner: Patient  Education Method: Explanation  Education Response: Verbalizes understanding  Additional details of the medication specific counseling are found within the linked patient education flowsheet.     The follow up timeline was discussed. Every person responds to and reacts to therapy differently. Patient should be assessed for efficacy and tolerability in approximately: 8-12 weeks    Provided education on goals and possible outcomes of therapy:  Adherence with therapy  Timely completion of appropriate labs  Timely and appropriate follow up with provider  Identify and address medication interactions with presciption medications, OTC medications and supplements  Optimize or maintain quality of life  Oncology: Prolong life/No disease progression  Manage side effects (ex: nausea/vomiting, constipation, fatigue) in conjunction with care team    The importance of adherence was discussed and they were advised to take the medication as prescribed by their provider.      Impression/Plan  Review and Assessment   Reviewed During This Encounter: Medications  Medications Assessed for Appropriate Use, Dose, Route, Frequency, and Duration: Yes  Medication Reconciliation Completed: No (Comment) (Pt unable to recall what medications they are currently taking, requested I use current list in Epic.)  Drug Interactions Evaluated: Yes  Clinically Relevant Drug Interactions Identified: Yes  List Interactions and Management Plan: Vismodegib - Warfarin, Category C, may enhance anticoagulant effect of warfarin - reviewed with pt, they will reach out to Dr. Ribeiro (prescriber) for INR montioring, pt advised to monitor for bleeding/bruising while on both medications    This patient has been identified as high risk due to Geriatric (over 65 years of age).  The following action was taken: N/A.         The  Specialty Pharmacy Welcome packet may be viewed here:   Specialty Pharmacy Welcome Packet     Or by scanning QR code:      Provided contact information ([phone]) for [facility] and reviewed dispensing process, refill timeline and patient management follow up. Advised to contact the pharmacy if there are any adverse effects and/or changes to medication list, including prescriptions, OTC medications, herbal products, or supplements. Confirmed understanding of education conducted during assessment. All questions and concerns were addressed and patient was encouraged to reach out for additional questions or concerns.      Connor Quispe, JayroD

## 2025-02-10 ENCOUNTER — APPOINTMENT (OUTPATIENT)
Dept: HEMATOLOGY/ONCOLOGY | Facility: CLINIC | Age: 80
End: 2025-02-10
Payer: MEDICARE

## 2025-02-10 ENCOUNTER — TELEPHONE (OUTPATIENT)
Dept: RADIATION ONCOLOGY | Facility: CLINIC | Age: 80
End: 2025-02-10
Payer: MEDICARE

## 2025-02-10 ENCOUNTER — HOSPITAL ENCOUNTER (OUTPATIENT)
Dept: RADIATION ONCOLOGY | Facility: CLINIC | Age: 80
Setting detail: RADIATION/ONCOLOGY SERIES
Discharge: HOME | End: 2025-02-10
Payer: MEDICARE

## 2025-02-10 PROCEDURE — 77300 RADIATION THERAPY DOSE PLAN: CPT | Performed by: RADIOLOGY

## 2025-02-10 PROCEDURE — 77338 DESIGN MLC DEVICE FOR IMRT: CPT | Performed by: RADIOLOGY

## 2025-02-10 PROCEDURE — 77301 RADIOTHERAPY DOSE PLAN IMRT: CPT | Performed by: RADIOLOGY

## 2025-02-10 NOTE — TELEPHONE ENCOUNTER
Pt , Sekou, called to confirm that he and his wife has decided to stay at [facility] for treatment vs [facility]. Dr Gutierrez notified.

## 2025-02-14 LAB — PROTHROMBIN TIME (PROTIME)PT.: 41.5 SECONDS (ref 11.7–14.9)

## 2025-02-18 ENCOUNTER — HOSPITAL ENCOUNTER (OUTPATIENT)
Dept: RADIATION ONCOLOGY | Facility: CLINIC | Age: 80
Setting detail: RADIATION/ONCOLOGY SERIES
Discharge: HOME | End: 2025-02-18
Payer: MEDICARE

## 2025-02-18 VITALS — DIASTOLIC BLOOD PRESSURE: 99 MMHG | HEART RATE: 95 BPM | OXYGEN SATURATION: 96 % | SYSTOLIC BLOOD PRESSURE: 156 MMHG

## 2025-02-18 DIAGNOSIS — C44.311 BASAL CELL CARCINOMA OF SKIN OF NOSE: ICD-10-CM

## 2025-02-18 LAB
RAD ONC MSQ ACTUAL FRACTIONS DELIVERED: 1
RAD ONC MSQ ACTUAL SESSION DELIVERED DOSE: 267 CGRAY
RAD ONC MSQ ACTUAL TOTAL DOSE: 267 CGRAY
RAD ONC MSQ ELAPSED DAYS: 0
RAD ONC MSQ LAST DATE: NORMAL
RAD ONC MSQ PRESCRIBED FRACTIONAL DOSE: 267 CGRAY
RAD ONC MSQ PRESCRIBED NUMBER OF FRACTIONS: 15
RAD ONC MSQ PRESCRIBED TECHNIQUE: NORMAL
RAD ONC MSQ PRESCRIBED TOTAL DOSE: 4005 CGRAY
RAD ONC MSQ PRESCRIPTION PATTERN COMMENT: NORMAL
RAD ONC MSQ START DATE: NORMAL
RAD ONC MSQ TREATMENT COURSE NUMBER: 1
RAD ONC MSQ TREATMENT SITE: NORMAL

## 2025-02-18 PROCEDURE — 77386 HC INTENSITY-MODULATED RADIATION THERAPY (IMRT), COMPLEX: CPT | Performed by: RADIOLOGY

## 2025-02-18 ASSESSMENT — PAIN SCALES - GENERAL: PAINLEVEL_OUTOF10: 8

## 2025-02-19 ENCOUNTER — HOSPITAL ENCOUNTER (OUTPATIENT)
Dept: RADIATION ONCOLOGY | Facility: CLINIC | Age: 80
Setting detail: RADIATION/ONCOLOGY SERIES
Discharge: HOME | End: 2025-02-19
Payer: MEDICARE

## 2025-02-19 LAB — PROTHROMBIN TIME (PROTIME)PT.: 15.6 SECONDS (ref 11.7–14.9)

## 2025-02-19 PROCEDURE — 77386 HC INTENSITY-MODULATED RADIATION THERAPY (IMRT), COMPLEX: CPT | Performed by: RADIOLOGY

## 2025-02-19 PROCEDURE — 77336 RADIATION PHYSICS CONSULT: CPT | Performed by: RADIOLOGY

## 2025-02-19 NOTE — ADDENDUM NOTE
Encounter addended by: Chetna Gutierrez MD on: 2/19/2025 8:59 AM   Actions taken: Clinical Note Signed

## 2025-02-19 NOTE — PROGRESS NOTES
Radiation Oncology On Treatment Visit    Patient Name:  Amrita Grant  MRN:  [id number]  :  1945    Referring Provider: Carlie Templeton MD  Primary Care Provider: Charito Ribeiro DO  Care Team: Patient Care Team:  Charito Ribeiro DO as PCP - General (Internal Medicine)  Bindu Guzmán MD as Consulting Physician (Hematology and Oncology)  Kristen Carr, RN as Nurse Navigator (Hematology and Oncology)    Date of Service: 2025     Diagnosis:   Specialty Problems          Radiation Oncology Problems    Facial basal cell cancer         Treatment Summary:  Radiation Therapy    Treatment Period Technique Fraction Dose Fractions Total Dose   Course 1 2025-2025  (days elapsed: 0)         Right Nose 2025-2025 VMAT 267 / 267 cGy 1 / 15 267 / 4,005 cGy     SUBJECTIVE: @2.67 Gy, got first treatment today.  On vismodegib.  No issues yet.  Lesion has grown a bit since I last saw her.  Biopsy to rib versus spine lesion set for Friday this week      OBJECTIVE:   Vital Signs:  BP (!) 156/99 (BP Location: Right arm)   Pulse 95   SpO2 96%     Ulcerating nasal mass on right.  It appears slightly larger than at time of simulation.  CBCT on set up encompassed all disease.     Other Pertinent Findings:     Toxicity Assessment          2025    17:25   Toxicity Assessment   Adverse Events Reviewed (WDL) Yes (Within Defined Limits)   Treatment Site Skin   Anorexia Grade 1       dysphagia. Decreased appetite   Dehydration Grade 0       aquaphor   Dermatitis Radiation Grade 0   Fatigue Grade 1   Nausea Grade 0   Pain Grade 1       8/10 Pain to nose. Tylenol with relief   Tumor Pain Grade 1   Pain of Skin Grade 1   Pruritus Grade 1        Assessment / Plan:  The patient is tolerating radiation therapy as anticipated.  Continue per current treatment plan.

## 2025-02-20 ENCOUNTER — HOSPITAL ENCOUNTER (OUTPATIENT)
Dept: RADIATION ONCOLOGY | Facility: CLINIC | Age: 80
Setting detail: RADIATION/ONCOLOGY SERIES
Discharge: HOME | End: 2025-02-20
Payer: MEDICARE

## 2025-02-20 ENCOUNTER — APPOINTMENT (OUTPATIENT)
Dept: RADIATION ONCOLOGY | Facility: CLINIC | Age: 80
End: 2025-02-20
Payer: MEDICARE

## 2025-02-20 DIAGNOSIS — Z51.0 ENCOUNTER FOR ANTINEOPLASTIC RADIATION THERAPY: ICD-10-CM

## 2025-02-20 DIAGNOSIS — C44.311 BASAL CELL CARCINOMA OF SKIN OF NOSE: ICD-10-CM

## 2025-02-20 LAB
RAD ONC MSQ ACTUAL FRACTIONS DELIVERED: 3
RAD ONC MSQ ACTUAL SESSION DELIVERED DOSE: 267 CGRAY
RAD ONC MSQ ACTUAL TOTAL DOSE: 801 CGRAY
RAD ONC MSQ ELAPSED DAYS: 2
RAD ONC MSQ LAST DATE: NORMAL
RAD ONC MSQ PRESCRIBED FRACTIONAL DOSE: 267 CGRAY
RAD ONC MSQ PRESCRIBED NUMBER OF FRACTIONS: 15
RAD ONC MSQ PRESCRIBED TECHNIQUE: NORMAL
RAD ONC MSQ PRESCRIBED TOTAL DOSE: 4005 CGRAY
RAD ONC MSQ PRESCRIPTION PATTERN COMMENT: NORMAL
RAD ONC MSQ START DATE: NORMAL
RAD ONC MSQ TREATMENT COURSE NUMBER: 1
RAD ONC MSQ TREATMENT SITE: NORMAL

## 2025-02-20 PROCEDURE — 77386 HC INTENSITY-MODULATED RADIATION THERAPY (IMRT), COMPLEX: CPT | Performed by: RADIOLOGY

## 2025-02-21 ENCOUNTER — APPOINTMENT (OUTPATIENT)
Dept: RADIATION ONCOLOGY | Facility: CLINIC | Age: 80
End: 2025-02-21
Payer: MEDICARE

## 2025-02-21 ENCOUNTER — HOSPITAL ENCOUNTER (OUTPATIENT)
Dept: RADIATION ONCOLOGY | Facility: CLINIC | Age: 80
Setting detail: RADIATION/ONCOLOGY SERIES
Discharge: HOME | End: 2025-02-21
Payer: MEDICARE

## 2025-02-21 ENCOUNTER — SOCIAL WORK (OUTPATIENT)
Dept: CASE MANAGEMENT | Facility: HOSPITAL | Age: 80
End: 2025-02-21
Payer: MEDICARE

## 2025-02-21 ENCOUNTER — APPOINTMENT (OUTPATIENT)
Dept: RADIOLOGY | Facility: HOSPITAL | Age: 80
End: 2025-02-21
Payer: MEDICARE

## 2025-02-21 DIAGNOSIS — C44.311 BASAL CELL CARCINOMA OF SKIN OF NOSE: ICD-10-CM

## 2025-02-21 DIAGNOSIS — Z51.0 ENCOUNTER FOR ANTINEOPLASTIC RADIATION THERAPY: ICD-10-CM

## 2025-02-21 LAB
RAD ONC MSQ ACTUAL FRACTIONS DELIVERED: 4
RAD ONC MSQ ACTUAL SESSION DELIVERED DOSE: 267 CGRAY
RAD ONC MSQ ACTUAL TOTAL DOSE: 1068 CGRAY
RAD ONC MSQ ELAPSED DAYS: 3
RAD ONC MSQ LAST DATE: NORMAL
RAD ONC MSQ PRESCRIBED FRACTIONAL DOSE: 267 CGRAY
RAD ONC MSQ PRESCRIBED NUMBER OF FRACTIONS: 15
RAD ONC MSQ PRESCRIBED TECHNIQUE: NORMAL
RAD ONC MSQ PRESCRIBED TOTAL DOSE: 4005 CGRAY
RAD ONC MSQ PRESCRIPTION PATTERN COMMENT: NORMAL
RAD ONC MSQ START DATE: NORMAL
RAD ONC MSQ TREATMENT COURSE NUMBER: 1
RAD ONC MSQ TREATMENT SITE: NORMAL

## 2025-02-21 PROCEDURE — 77386 HC INTENSITY-MODULATED RADIATION THERAPY (IMRT), COMPLEX: CPT | Performed by: RADIOLOGY

## 2025-02-21 NOTE — PROGRESS NOTES
Social Work Note  2/21/2025 SW met with patient and  prior to her radiation today.  SW introduced social work resources and referral sources.  Neither had any current needs or questions.   stated patient had a week left of radiation.  Contact information to reach SW was given to both. They were encouraged to let this writer know if anything changed.    Patient and  live in [place].   is on dialysis.  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  She is covered under Medicare A, B and [employer]ARP.  SW will remain available to assist patient.  LUDA Jarrell, [facility] [phone]

## 2025-02-24 ENCOUNTER — SPECIALTY PHARMACY (OUTPATIENT)
Dept: HEMATOLOGY/ONCOLOGY | Facility: HOSPITAL | Age: 80
End: 2025-02-24
Payer: MEDICARE

## 2025-02-24 ENCOUNTER — APPOINTMENT (OUTPATIENT)
Dept: RADIATION ONCOLOGY | Facility: CLINIC | Age: 80
End: 2025-02-24
Payer: MEDICARE

## 2025-02-24 ENCOUNTER — SOCIAL WORK (OUTPATIENT)
Dept: CASE MANAGEMENT | Facility: HOSPITAL | Age: 80
End: 2025-02-24
Payer: MEDICARE

## 2025-02-24 ENCOUNTER — HOSPITAL ENCOUNTER (OUTPATIENT)
Dept: RADIATION ONCOLOGY | Facility: CLINIC | Age: 80
Setting detail: RADIATION/ONCOLOGY SERIES
Discharge: HOME | End: 2025-02-24
Payer: MEDICARE

## 2025-02-24 DIAGNOSIS — C44.311 BASAL CELL CARCINOMA OF SKIN OF NOSE: ICD-10-CM

## 2025-02-24 DIAGNOSIS — Z51.0 ENCOUNTER FOR ANTINEOPLASTIC RADIATION THERAPY: ICD-10-CM

## 2025-02-24 LAB
RAD ONC MSQ ACTUAL FRACTIONS DELIVERED: 5
RAD ONC MSQ ACTUAL SESSION DELIVERED DOSE: 267 CGRAY
RAD ONC MSQ ACTUAL TOTAL DOSE: 1335 CGRAY
RAD ONC MSQ ELAPSED DAYS: 6
RAD ONC MSQ LAST DATE: NORMAL
RAD ONC MSQ PRESCRIBED FRACTIONAL DOSE: 267 CGRAY
RAD ONC MSQ PRESCRIBED NUMBER OF FRACTIONS: 15
RAD ONC MSQ PRESCRIBED TECHNIQUE: NORMAL
RAD ONC MSQ PRESCRIBED TOTAL DOSE: 4005 CGRAY
RAD ONC MSQ PRESCRIPTION PATTERN COMMENT: NORMAL
RAD ONC MSQ START DATE: NORMAL
RAD ONC MSQ TREATMENT COURSE NUMBER: 1
RAD ONC MSQ TREATMENT SITE: NORMAL

## 2025-02-24 PROCEDURE — 77386 HC INTENSITY-MODULATED RADIATION THERAPY (IMRT), COMPLEX: CPT | Performed by: RADIOLOGY

## 2025-02-24 NOTE — PROGRESS NOTES
Social Work Note  2/24/2025 SW emailed patient information on [facility].  We discussed the facility last week and it was emailed today per request.  She was encouraged to let this writer know if she had any questions about the information or wished to complete the intake form.  MASSIMO will remain available to assist patient.  LUDA Jarrell, W [phone]

## 2025-02-25 ENCOUNTER — HOSPITAL ENCOUNTER (OUTPATIENT)
Dept: RADIATION ONCOLOGY | Facility: CLINIC | Age: 80
Setting detail: RADIATION/ONCOLOGY SERIES
Discharge: HOME | End: 2025-02-25
Payer: MEDICARE

## 2025-02-25 ENCOUNTER — APPOINTMENT (OUTPATIENT)
Dept: RADIATION ONCOLOGY | Facility: CLINIC | Age: 80
End: 2025-02-25
Payer: MEDICARE

## 2025-02-25 ENCOUNTER — TELEPHONE (OUTPATIENT)
Dept: HEMATOLOGY/ONCOLOGY | Facility: HOSPITAL | Age: 80
End: 2025-02-25
Payer: MEDICARE

## 2025-02-25 ENCOUNTER — HOSPITAL ENCOUNTER (OUTPATIENT)
Dept: HOSPITAL 100 - MTLAB | Age: 80
LOS: 3 days | Discharge: HOME | End: 2025-02-28
Payer: MEDICARE

## 2025-02-25 VITALS
OXYGEN SATURATION: 98 % | RESPIRATION RATE: 18 BRPM | HEART RATE: 61 BPM | BODY MASS INDEX: 18.83 KG/M2 | WEIGHT: 103.4 LBS | TEMPERATURE: 97 F | SYSTOLIC BLOOD PRESSURE: 148 MMHG | DIASTOLIC BLOOD PRESSURE: 92 MMHG

## 2025-02-25 DIAGNOSIS — C44.311 BASAL CELL CARCINOMA OF SKIN OF NOSE: ICD-10-CM

## 2025-02-25 DIAGNOSIS — Z95.2: ICD-10-CM

## 2025-02-25 DIAGNOSIS — Z79.01: Primary | ICD-10-CM

## 2025-02-25 DIAGNOSIS — Z51.0 ENCOUNTER FOR ANTINEOPLASTIC RADIATION THERAPY: ICD-10-CM

## 2025-02-25 LAB
PROTHROMBIN TIME (PROTIME)PT.: 30.4 SECONDS (ref 11.7–14.9)
RAD ONC MSQ ACTUAL FRACTIONS DELIVERED: 6
RAD ONC MSQ ACTUAL SESSION DELIVERED DOSE: 267 CGRAY
RAD ONC MSQ ACTUAL TOTAL DOSE: 1602 CGRAY
RAD ONC MSQ ELAPSED DAYS: 7
RAD ONC MSQ LAST DATE: NORMAL
RAD ONC MSQ PRESCRIBED FRACTIONAL DOSE: 267 CGRAY
RAD ONC MSQ PRESCRIBED NUMBER OF FRACTIONS: 15
RAD ONC MSQ PRESCRIBED TECHNIQUE: NORMAL
RAD ONC MSQ PRESCRIBED TOTAL DOSE: 4005 CGRAY
RAD ONC MSQ PRESCRIPTION PATTERN COMMENT: NORMAL
RAD ONC MSQ START DATE: NORMAL
RAD ONC MSQ TREATMENT COURSE NUMBER: 1
RAD ONC MSQ TREATMENT SITE: NORMAL

## 2025-02-25 PROCEDURE — 36415 COLL VENOUS BLD VENIPUNCTURE: CPT

## 2025-02-25 PROCEDURE — 36416 COLLJ CAPILLARY BLOOD SPEC: CPT

## 2025-02-25 PROCEDURE — 85610 PROTHROMBIN TIME: CPT

## 2025-02-25 PROCEDURE — 77336 RADIATION PHYSICS CONSULT: CPT | Performed by: RADIOLOGY

## 2025-02-25 PROCEDURE — 77386 HC INTENSITY-MODULATED RADIATION THERAPY (IMRT), COMPLEX: CPT | Performed by: RADIOLOGY

## 2025-02-25 ASSESSMENT — PAIN SCALES - GENERAL: PAINLEVEL_OUTOF10: 0-NO PAIN

## 2025-02-25 NOTE — TELEPHONE ENCOUNTER
Amrita's  Sekou calls to inquire when to stop anticoagulant for biopsy on 3/5 and when she may resume it.    Message sent to team.

## 2025-02-25 NOTE — PROGRESS NOTES
"Radiation Oncology On Treatment Visit    Patient Name:  Amrita Grant  MRN:  [id number]  :  1945    Referring Provider: Carlie Templeton MD  Primary Care Provider: Charito Ribeiro DO  Care Team: Patient Care Team:  Charito Ribeiro DO as PCP - General (Internal Medicine)  Bindu Guzmán MD as Consulting Physician (Hematology and Oncology)  Kristen Carr, GARRET as Nurse Navigator (Hematology and Oncology)    Date of Service: 2025     Diagnosis:   Specialty Problems          Radiation Oncology Problems    Facial basal cell cancer         Treatment Summary:  Radiation Therapy    Treatment Period Technique Fraction Dose Fractions Total Dose   Course 1 2025-2025  (days elapsed: 7)         Right Nose 2025-2025 VMAT 267 / 267 cGy 6 / 15 1602 / 4,005 cGy     SUBJECTIVE: @16.02 Gy / 40.05 Gy.  Some \"pustules\" have opened.  She hasn't noticed significant regression of tumor.  Biopsy of bone met scheduled for 3/5/2025. Was postponed as machine went down.  No eye irritation.        OBJECTIVE:   Vital Signs:  BP (!) 148/92 (BP Location: Right arm, Patient Position: Sitting, BP Cuff Size: Adult)   Pulse 61   Temp 36.1 °C (97 °F) (Temporal)   Resp 18   Wt 46.9 kg (103 lb 6.3 oz)   SpO2 98%   BMI 18.83 kg/m²     Stable mass invoving right nose encroaching on eye.  There is erythema over treatment field.  Small ulcerative area over right nose - stable  Other Pertinent Findings:     Toxicity Assessment          2025    17:25 2025    13:00   Toxicity Assessment   Adverse Events Reviewed (WDL) Yes (Within Defined Limits) Yes (Within Defined Limits)   Treatment Site Skin Skin   Anorexia Grade 1       dysphagia. Decreased appetite Grade 1       dysphagia baseline   Dehydration Grade 0       aquaphor Grade 0   Dermatitis Radiation Grade 0 Grade 2       using aquaphor; has bumps breaking open seeping clear liquid on nose   Fatigue Grade 1 Grade 1   Nausea Grade 0 Grade 0   Pain Grade 1       " 8/10 Pain to nose. Tylenol with relief Grade 1   Tumor Pain Grade 1 Grade 1   Pain of Skin Grade 1 Grade 1   Pruritus Grade 1 Grade 0        Assessment / Plan:  The patient is tolerating radiation therapy as anticipated.  Continue per current treatment plan.   She has stable disease after 6 treatments with mild radiation dermatitis.  Will continue to eval. Biopsy of bone met now scheduled for next week.  Continue RT per plan.

## 2025-02-26 ENCOUNTER — APPOINTMENT (OUTPATIENT)
Dept: RADIATION ONCOLOGY | Facility: CLINIC | Age: 80
End: 2025-02-26
Payer: MEDICARE

## 2025-02-26 ENCOUNTER — SPECIALTY PHARMACY (OUTPATIENT)
Dept: PHARMACY | Facility: CLINIC | Age: 80
End: 2025-02-26

## 2025-02-26 ENCOUNTER — HOSPITAL ENCOUNTER (OUTPATIENT)
Dept: RADIATION ONCOLOGY | Facility: CLINIC | Age: 80
Setting detail: RADIATION/ONCOLOGY SERIES
Discharge: HOME | End: 2025-02-26
Payer: MEDICARE

## 2025-02-26 DIAGNOSIS — Z51.0 ENCOUNTER FOR ANTINEOPLASTIC RADIATION THERAPY: ICD-10-CM

## 2025-02-26 DIAGNOSIS — C44.311 BASAL CELL CARCINOMA OF SKIN OF NOSE: ICD-10-CM

## 2025-02-26 LAB
RAD ONC MSQ ACTUAL FRACTIONS DELIVERED: 7
RAD ONC MSQ ACTUAL SESSION DELIVERED DOSE: 267 CGRAY
RAD ONC MSQ ACTUAL TOTAL DOSE: 1869 CGRAY
RAD ONC MSQ ELAPSED DAYS: 8
RAD ONC MSQ LAST DATE: NORMAL
RAD ONC MSQ PRESCRIBED FRACTIONAL DOSE: 267 CGRAY
RAD ONC MSQ PRESCRIBED NUMBER OF FRACTIONS: 15
RAD ONC MSQ PRESCRIBED TECHNIQUE: NORMAL
RAD ONC MSQ PRESCRIBED TOTAL DOSE: 4005 CGRAY
RAD ONC MSQ PRESCRIPTION PATTERN COMMENT: NORMAL
RAD ONC MSQ START DATE: NORMAL
RAD ONC MSQ TREATMENT COURSE NUMBER: 1
RAD ONC MSQ TREATMENT SITE: NORMAL

## 2025-02-26 PROCEDURE — RXMED WILLOW AMBULATORY MEDICATION CHARGE

## 2025-02-26 PROCEDURE — 77386 HC INTENSITY-MODULATED RADIATION THERAPY (IMRT), COMPLEX: CPT | Performed by: RADIOLOGY

## 2025-02-27 ENCOUNTER — APPOINTMENT (OUTPATIENT)
Dept: RADIATION ONCOLOGY | Facility: CLINIC | Age: 80
End: 2025-02-27
Payer: MEDICARE

## 2025-02-27 ENCOUNTER — PHARMACY VISIT (OUTPATIENT)
Dept: PHARMACY | Facility: CLINIC | Age: 80
End: 2025-02-27
Payer: COMMERCIAL

## 2025-02-27 ENCOUNTER — HOSPITAL ENCOUNTER (OUTPATIENT)
Dept: RADIATION ONCOLOGY | Facility: CLINIC | Age: 80
Setting detail: RADIATION/ONCOLOGY SERIES
Discharge: HOME | End: 2025-02-27
Payer: MEDICARE

## 2025-02-27 PROCEDURE — 77386 HC INTENSITY-MODULATED RADIATION THERAPY (IMRT), COMPLEX: CPT | Performed by: RADIOLOGY

## 2025-02-28 ENCOUNTER — APPOINTMENT (OUTPATIENT)
Dept: RADIATION ONCOLOGY | Facility: CLINIC | Age: 80
End: 2025-02-28
Payer: MEDICARE

## 2025-02-28 ENCOUNTER — HOSPITAL ENCOUNTER (OUTPATIENT)
Dept: RADIATION ONCOLOGY | Facility: CLINIC | Age: 80
Setting detail: RADIATION/ONCOLOGY SERIES
Discharge: HOME | End: 2025-02-28
Payer: MEDICARE

## 2025-02-28 DIAGNOSIS — C44.311 BASAL CELL CARCINOMA OF SKIN OF NOSE: ICD-10-CM

## 2025-02-28 DIAGNOSIS — Z51.0 ENCOUNTER FOR ANTINEOPLASTIC RADIATION THERAPY: ICD-10-CM

## 2025-02-28 LAB
RAD ONC MSQ ACTUAL FRACTIONS DELIVERED: 9
RAD ONC MSQ ACTUAL SESSION DELIVERED DOSE: 267 CGRAY
RAD ONC MSQ ACTUAL TOTAL DOSE: 2403 CGRAY
RAD ONC MSQ ELAPSED DAYS: 10
RAD ONC MSQ LAST DATE: NORMAL
RAD ONC MSQ PRESCRIBED FRACTIONAL DOSE: 267 CGRAY
RAD ONC MSQ PRESCRIBED NUMBER OF FRACTIONS: 15
RAD ONC MSQ PRESCRIBED TECHNIQUE: NORMAL
RAD ONC MSQ PRESCRIBED TOTAL DOSE: 4005 CGRAY
RAD ONC MSQ PRESCRIPTION PATTERN COMMENT: NORMAL
RAD ONC MSQ START DATE: NORMAL
RAD ONC MSQ TREATMENT COURSE NUMBER: 1
RAD ONC MSQ TREATMENT SITE: NORMAL

## 2025-02-28 PROCEDURE — 77386 HC INTENSITY-MODULATED RADIATION THERAPY (IMRT), COMPLEX: CPT | Performed by: RADIOLOGY

## 2025-03-03 ENCOUNTER — HOSPITAL ENCOUNTER (OUTPATIENT)
Dept: RADIATION ONCOLOGY | Facility: CLINIC | Age: 80
Setting detail: RADIATION/ONCOLOGY SERIES
Discharge: HOME | End: 2025-03-03
Payer: MEDICARE

## 2025-03-03 ENCOUNTER — TELEMEDICINE (OUTPATIENT)
Dept: HEMATOLOGY/ONCOLOGY | Facility: CLINIC | Age: 80
End: 2025-03-03
Payer: MEDICARE

## 2025-03-03 ENCOUNTER — APPOINTMENT (OUTPATIENT)
Dept: RADIATION ONCOLOGY | Facility: CLINIC | Age: 80
End: 2025-03-03
Payer: MEDICARE

## 2025-03-03 ENCOUNTER — TELEPHONE (OUTPATIENT)
Dept: HEMATOLOGY/ONCOLOGY | Facility: CLINIC | Age: 80
End: 2025-03-03

## 2025-03-03 ENCOUNTER — TELEPHONE (OUTPATIENT)
Dept: ADMISSION | Facility: HOSPITAL | Age: 80
End: 2025-03-03
Payer: MEDICARE

## 2025-03-03 DIAGNOSIS — C44.310 FACIAL BASAL CELL CANCER: Primary | ICD-10-CM

## 2025-03-03 DIAGNOSIS — C44.92 SCC (SQUAMOUS CELL CARCINOMA): ICD-10-CM

## 2025-03-03 DIAGNOSIS — C44.301 MALIGNANT NEOPLASM OF SKIN OF NOSE: Primary | ICD-10-CM

## 2025-03-03 PROCEDURE — 99215 OFFICE O/P EST HI 40 MIN: CPT | Performed by: INTERNAL MEDICINE

## 2025-03-03 ASSESSMENT — ENCOUNTER SYMPTOMS
APPETITE CHANGE: 0
BACK PAIN: 0
ABDOMINAL PAIN: 0
EYE PROBLEMS: 0
HEADACHES: 0
PALPITATIONS: 0
NAUSEA: 0
ROS SKIN COMMENTS: NO NEW LESIONS OR MASSES
SHORTNESS OF BREATH: 0
ARTHRALGIAS: 0
LIGHT-HEADEDNESS: 0
BRUISES/BLEEDS EASILY: 0
FEVER: 0
CHILLS: 0
WOUND: 0
TROUBLE SWALLOWING: 1
UNEXPECTED WEIGHT CHANGE: 0
DIAPHORESIS: 0
CHEST TIGHTNESS: 0
ABDOMINAL DISTENTION: 0
SPEECH DIFFICULTY: 0
COUGH: 0
EXTREMITY WEAKNESS: 0
DIARRHEA: 0
NUMBNESS: 0
ADENOPATHY: 0
FATIGUE: 0
SEIZURES: 0
LEG SWELLING: 0
CONSTIPATION: 0
VOMITING: 0

## 2025-03-03 NOTE — TELEPHONE ENCOUNTER
Amrita's  Sekou called and said they received a call from Dr. Silvestre Salamanca's office in [place] today who told them not to go to radiation since this wouldn't help and may need chemo? He said he isn't sure of care plan going forward and about Biopsy scheduled this week on 3/5? They would like a call from team to explain how do they proceed moving forward with care plan. Messaged team.

## 2025-03-03 NOTE — PROGRESS NOTES
CUTANEOUS ONCOLOGY: FOLLOW UP    Referring Surgeon: Dr. Templeton  Radiation oncologist: Dr. Scott    Diagnosis: Advanced basal cell carcinoma, with suspected metastatic disease   Primary site location: nasal dorsum, cheek and creeping towards her eye   Clinical lymph nodes: not present   Treatment: vismodegib and palliative RT    Interval history:  Amrita Grant is a 79 y.o. year old female who presents today to clinic for fu of basal cell carcinoma. Patient is tolerating therapy without issue.     They requested a meeting as their [facility] radiologist Dr. Salamanca told her to stop therapy.     ROS: Review of Systems   Constitutional:  Negative for appetite change, chills, diaphoresis, fatigue, fever and unexpected weight change.   HENT:   Positive for lump/mass and trouble swallowing.    Eyes:  Negative for eye problems.   Respiratory:  Negative for chest tightness, cough and shortness of breath.    Cardiovascular:  Negative for chest pain, leg swelling and palpitations.   Gastrointestinal:  Negative for abdominal distention, abdominal pain, constipation, diarrhea, nausea and vomiting.   Musculoskeletal:  Negative for arthralgias, back pain and gait problem.   Skin:  Negative for itching, rash and wound.        No new lesions or masses   Neurological:  Negative for extremity weakness, gait problem, headaches, light-headedness, numbness, seizures and speech difficulty.   Hematological:  Negative for adenopathy. Does not bruise/bleed easily.     PE:  Physical Exam  Vitals reviewed.   Constitutional:       Appearance: Normal appearance. She is normal weight.   HENT:      Head: Normocephalic and atraumatic.      Nose: Rhinorrhea present.      Comments: Reddish blue hue to cancer on the R nostril extending onto the cheek and below eye.   Eyes:      Extraocular Movements: Extraocular movements intact.      Conjunctiva/sclera: Conjunctivae normal.   Cardiovascular:      Rate and Rhythm: Normal rate and regular rhythm.       Pulses: Normal pulses.   Pulmonary:      Effort: Pulmonary effort is normal.      Breath sounds: Normal breath sounds.   Neurological:      General: No focal deficit present.      Mental Status: She is alert and oriented to person, place, and time. Mental status is at baseline.   Psychiatric:         Mood and Affect: Mood normal.         Behavior: Behavior normal.         Thought Content: Thought content normal.         Judgment: Judgment normal.     ECOG 1    Imaging:  PET 1/20/25:  *  FDG avid mass involving right nasal soft tissues correlating with   known primary.     BETY DISEASE:   *  FDG avid small right level 1 cervical lymph node.     METASTATIC DISEASE:   *  Multiple foci of increased uptake involving right ribs and spinous   process of L1 vertebra.     ADDITIONAL FINDINGS:   *  Right upper lobe lung nodule below PET resolution, for follow-up.     Assessment: Locally advanced basal cell carcinoma, suspected metastasis.     Plan: Amrita Grant is a 79 y.o. year old female patient with   referred for   for suspected metastatic basal cell carcinoma on vismodegib and palliative RT.     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 Of note, we have had her tissue reviewed here multiple times and her carcinoma was never conclusively basal cell- but was deemed to favor it. That is why we treated her as a basal cell. I did add a HPV at that time to see if it could sway the pathology in one direction or another. We will await official report from [facility]- who will send the report to Dr. Gutierrez.     However, given the above, I think it is even more imperative to go through with the bone biopsy, scheduled for 3/5. I would also recommend continuing the vismodegib for now until she can see one of the H&N specialists. Dr. Gutierrez already contacted Dr. Mancini but [facility] is [facility]. I have discussed her  case with Dr. Perrin and her RN who will get her into either her clinic or Dr. Spann in [place].     Will cancel March 10 apt for now with me. Based on her conversation with H&N, she will either stay with them if it is truly a HPV + SCC. If they do not think so, she will return to my clinic.     Amrita Grant understands the plan and has no further questions. she will contact us if there are any new concerns or change in clinical picture.     Bindu Guzmán MD  Attending Physician  [facility]  [facility] [employer]

## 2025-03-04 ENCOUNTER — HOSPITAL ENCOUNTER (OUTPATIENT)
Dept: RADIATION ONCOLOGY | Facility: CLINIC | Age: 80
Setting detail: RADIATION/ONCOLOGY SERIES
Discharge: HOME | End: 2025-03-04
Payer: MEDICARE

## 2025-03-04 ENCOUNTER — APPOINTMENT (OUTPATIENT)
Dept: RADIATION ONCOLOGY | Facility: CLINIC | Age: 80
End: 2025-03-04
Payer: MEDICARE

## 2025-03-04 VITALS
DIASTOLIC BLOOD PRESSURE: 87 MMHG | RESPIRATION RATE: 18 BRPM | OXYGEN SATURATION: 96 % | TEMPERATURE: 97.5 F | HEART RATE: 108 BPM | WEIGHT: 101.63 LBS | SYSTOLIC BLOOD PRESSURE: 133 MMHG | BODY MASS INDEX: 18.51 KG/M2

## 2025-03-04 DIAGNOSIS — C44.311 BASAL CELL CARCINOMA OF SKIN OF NOSE: ICD-10-CM

## 2025-03-04 DIAGNOSIS — Z51.0 ENCOUNTER FOR ANTINEOPLASTIC RADIATION THERAPY: ICD-10-CM

## 2025-03-04 LAB
PROTHROMBIN TIME (PROTIME)PT.: 18.9 SECONDS (ref 11.7–14.9)
RAD ONC MSQ ACTUAL FRACTIONS DELIVERED: 10
RAD ONC MSQ ACTUAL SESSION DELIVERED DOSE: 267 CGRAY
RAD ONC MSQ ACTUAL TOTAL DOSE: 2670 CGRAY
RAD ONC MSQ ELAPSED DAYS: 14
RAD ONC MSQ LAST DATE: NORMAL
RAD ONC MSQ PRESCRIBED FRACTIONAL DOSE: 267 CGRAY
RAD ONC MSQ PRESCRIBED NUMBER OF FRACTIONS: 15
RAD ONC MSQ PRESCRIBED TECHNIQUE: NORMAL
RAD ONC MSQ PRESCRIBED TOTAL DOSE: 4005 CGRAY
RAD ONC MSQ PRESCRIPTION PATTERN COMMENT: NORMAL
RAD ONC MSQ START DATE: NORMAL
RAD ONC MSQ TREATMENT COURSE NUMBER: 1
RAD ONC MSQ TREATMENT SITE: NORMAL

## 2025-03-04 PROCEDURE — 77386 HC INTENSITY-MODULATED RADIATION THERAPY (IMRT), COMPLEX: CPT | Performed by: RADIOLOGY

## 2025-03-04 ASSESSMENT — PAIN SCALES - GENERAL: PAINLEVEL_OUTOF10: 0-NO PAIN

## 2025-03-04 NOTE — PROGRESS NOTES
Radiation Oncology On Treatment Visit    Patient Name:  Amrita Grant  MRN:  [id number]  :  1945    Referring Provider: Carlie Templeton MD  Primary Care Provider: Charito Ribeiro DO  Care Team: Patient Care Team:  Charito Ribeiro DO as PCP - General (Internal Medicine)  Bindu Guzmán MD as Consulting Physician (Hematology and Oncology)  Kristen Carr, RN as Nurse Navigator (Hematology and Oncology)    Date of Service: 3/4/2025     Diagnosis:   Specialty Problems          Radiation Oncology Problems    Facial basal cell cancer         Treatment Summary:  Radiation Therapy    Treatment Period Technique Fraction Dose Fractions Total Dose   Course 1 2025-3/4/2025  (days elapsed: 14)         Right Nose 2025-3/4/2025 VMAT 267 / 267 cGy 10 / 15 2670 / 4,005 cGy     SUBJECTIVE: @26.7 Gy / 40 Gy.    In 2025, she was evaluated at [facility] for consideration of RT closer to home.  They ordered PET-CT and MRI which demonstrated metastatic disease in 3 bone lesions.  Biopsy of these lesions was scheduled for 10 days ago, but machine was not functioning therefore rescheduled for tomorrow 3.5  Due to rapid growth of the mass (as evidence by clinical photos from Dr Templeton Note 2024) and my clinical note 2025, we offered her palliative radiation therapy.  The mass was growing and encroaching the eye and involving the lacrimal duct.  We initiated palliative RT on 2025.  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  This information became available yesterday 3/3/2025.  I discussed her case with medical oncology Dr Guzmán (cutaneous med onc), Dr Mancini (head and neckmed onc), and Dr Templeton (ENT).  The recommendation of the group was to continue focal radiation with palliative intent due to rapid growth and symptomatic nature of disease.  Systemic therapy to change.  Vismogenib to be  discontinued and she will see Dr Perrin in  head and neck medical oncology on 3/17.      I saw Amrita today before treatment to review and update her on the new results and our course of action.      She feels like the mass has finally stabilized/started to shrink a bit.     OBJECTIVE:   Vital Signs:  /87 (BP Location: Right arm, Patient Position: Sitting, BP Cuff Size: Adult)   Pulse 108   Temp 36.4 °C (97.5 °F) (Temporal)   Resp 18   Wt 46.1 kg (101 lb 10.1 oz)   SpO2 96%   BMI 18.51 kg/m²     Radiation erythema over cheek and nose.  Persistent nasal and subcutaneous swelling.  Slight regression of ulcerative component on right bridge of nose.    Other Pertinent Findings:     Toxicity Assessment          2/18/2025    17:25 2/25/2025    13:00 3/4/2025    12:00   Toxicity Assessment   Adverse Events Reviewed (WDL) Yes (Within Defined Limits) Yes (Within Defined Limits) Yes (Within Defined Limits)   Treatment Site Skin Skin Skin   Anorexia Grade 1       dysphagia. Decreased appetite Grade 1       dysphagia baseline Grade 1       dysphagia   Dehydration Grade 0       aquaphor Grade 0 Grade 0   Dermatitis Radiation Grade 0 Grade 2       using aquaphor; has bumps breaking open seeping clear liquid on nose Grade 2       still using aquaphor and atb ointment   Fatigue Grade 1 Grade 1 Grade 1   Nausea Grade 0 Grade 0 Grade 0   Pain Grade 1       8/10 Pain to nose. Tylenol with relief Grade 1 Grade 1   Tumor Pain Grade 1 Grade 1 Grade 1   Pain of Skin Grade 1 Grade 1 Grade 1   Pruritus Grade 1 Grade 0 Grade 0        Assessment / Plan:  The patient is tolerating radiation therapy as anticipated.  We are starting to see a response to treatment.  After discussing updated results and plan, we will continue with palliative intent radiation therapy.  40 Gy in 15 fractions.  We may add a couple fractions based on clinical response next week.  She has med onc appt set up on 3/17/2025.  She will discontinue vismodegib.   Continue RT per this plan.  All questions were answered.

## 2025-03-05 ENCOUNTER — APPOINTMENT (OUTPATIENT)
Dept: RADIATION ONCOLOGY | Facility: CLINIC | Age: 80
End: 2025-03-05
Payer: MEDICARE

## 2025-03-05 ENCOUNTER — HOSPITAL ENCOUNTER (OUTPATIENT)
Dept: RADIOLOGY | Facility: HOSPITAL | Age: 80
Discharge: HOME | End: 2025-03-05
Payer: MEDICARE

## 2025-03-05 ENCOUNTER — HOSPITAL ENCOUNTER (OUTPATIENT)
Dept: RADIATION ONCOLOGY | Facility: CLINIC | Age: 80
Setting detail: RADIATION/ONCOLOGY SERIES
Discharge: HOME | End: 2025-03-05
Payer: MEDICARE

## 2025-03-05 VITALS
TEMPERATURE: 97.7 F | OXYGEN SATURATION: 98 % | WEIGHT: 101 LBS | BODY MASS INDEX: 18.58 KG/M2 | HEIGHT: 62 IN | SYSTOLIC BLOOD PRESSURE: 136 MMHG | DIASTOLIC BLOOD PRESSURE: 70 MMHG | HEART RATE: 80 BPM | RESPIRATION RATE: 16 BRPM

## 2025-03-05 DIAGNOSIS — Z51.0 ENCOUNTER FOR ANTINEOPLASTIC RADIATION THERAPY: ICD-10-CM

## 2025-03-05 DIAGNOSIS — C44.310 FACIAL BASAL CELL CANCER: ICD-10-CM

## 2025-03-05 DIAGNOSIS — C44.311 BASAL CELL CARCINOMA OF SKIN OF NOSE: ICD-10-CM

## 2025-03-05 LAB
INR PPP: 1.3 (ref 0.9–1.1)
PROTHROMBIN TIME: 14.6 SECONDS (ref 9.8–12.4)
RAD ONC MSQ ACTUAL FRACTIONS DELIVERED: 11
RAD ONC MSQ ACTUAL SESSION DELIVERED DOSE: 267 CGRAY
RAD ONC MSQ ACTUAL TOTAL DOSE: 2937 CGRAY
RAD ONC MSQ ELAPSED DAYS: 15
RAD ONC MSQ LAST DATE: NORMAL
RAD ONC MSQ PRESCRIBED FRACTIONAL DOSE: 267 CGRAY
RAD ONC MSQ PRESCRIBED NUMBER OF FRACTIONS: 15
RAD ONC MSQ PRESCRIBED TECHNIQUE: NORMAL
RAD ONC MSQ PRESCRIBED TOTAL DOSE: 4005 CGRAY
RAD ONC MSQ PRESCRIPTION PATTERN COMMENT: NORMAL
RAD ONC MSQ START DATE: NORMAL
RAD ONC MSQ TREATMENT COURSE NUMBER: 1
RAD ONC MSQ TREATMENT SITE: NORMAL

## 2025-03-05 PROCEDURE — 77012 CT SCAN FOR NEEDLE BIOPSY: CPT

## 2025-03-05 PROCEDURE — 2500000005 HC RX 250 GENERAL PHARMACY W/O HCPCS: Performed by: RADIOLOGY

## 2025-03-05 PROCEDURE — 77336 RADIATION PHYSICS CONSULT: CPT | Performed by: RADIOLOGY

## 2025-03-05 PROCEDURE — 2720000007 HC OR 272 NO HCPCS

## 2025-03-05 PROCEDURE — 99152 MOD SED SAME PHYS/QHP 5/>YRS: CPT | Performed by: RADIOLOGY

## 2025-03-05 PROCEDURE — 7100000009 HC PHASE TWO TIME - INITIAL BASE CHARGE

## 2025-03-05 PROCEDURE — 2500000004 HC RX 250 GENERAL PHARMACY W/ HCPCS (ALT 636 FOR OP/ED): Performed by: RADIOLOGY

## 2025-03-05 PROCEDURE — 36415 COLL VENOUS BLD VENIPUNCTURE: CPT | Performed by: RADIOLOGY

## 2025-03-05 PROCEDURE — 85610 PROTHROMBIN TIME: CPT | Performed by: RADIOLOGY

## 2025-03-05 PROCEDURE — 99153 MOD SED SAME PHYS/QHP EA: CPT

## 2025-03-05 PROCEDURE — 7100000010 HC PHASE TWO TIME - EACH INCREMENTAL 1 MINUTE

## 2025-03-05 PROCEDURE — 99152 MOD SED SAME PHYS/QHP 5/>YRS: CPT

## 2025-03-05 PROCEDURE — 99153 MOD SED SAME PHYS/QHP EA: CPT | Performed by: RADIOLOGY

## 2025-03-05 PROCEDURE — 77386 HC INTENSITY-MODULATED RADIATION THERAPY (IMRT), COMPLEX: CPT | Performed by: RADIOLOGY

## 2025-03-05 RX ORDER — LIDOCAINE HYDROCHLORIDE 10 MG/ML
INJECTION, SOLUTION EPIDURAL; INFILTRATION; INTRACAUDAL; PERINEURAL
Status: COMPLETED | OUTPATIENT
Start: 2025-03-05 | End: 2025-03-05

## 2025-03-05 RX ORDER — FENTANYL CITRATE 50 UG/ML
INJECTION, SOLUTION INTRAMUSCULAR; INTRAVENOUS
Status: COMPLETED | OUTPATIENT
Start: 2025-03-05 | End: 2025-03-05

## 2025-03-05 RX ORDER — MIDAZOLAM HYDROCHLORIDE 1 MG/ML
INJECTION, SOLUTION INTRAMUSCULAR; INTRAVENOUS
Status: COMPLETED | OUTPATIENT
Start: 2025-03-05 | End: 2025-03-05

## 2025-03-05 RX ADMIN — Medication 3 L/MIN: at 08:22

## 2025-03-05 RX ADMIN — FENTANYL CITRATE 50 MCG: 50 INJECTION, SOLUTION INTRAMUSCULAR; INTRAVENOUS at 08:58

## 2025-03-05 RX ADMIN — MIDAZOLAM 0.5 MG: 1 INJECTION INTRAMUSCULAR; INTRAVENOUS at 08:58

## 2025-03-05 RX ADMIN — LIDOCAINE HYDROCHLORIDE 2 ML: 10 INJECTION, SOLUTION EPIDURAL; INFILTRATION; INTRACAUDAL; PERINEURAL at 08:58

## 2025-03-05 ASSESSMENT — PAIN SCALES - GENERAL

## 2025-03-05 ASSESSMENT — PAIN - FUNCTIONAL ASSESSMENT: PAIN_FUNCTIONAL_ASSESSMENT: 0-10

## 2025-03-05 NOTE — PRE-PROCEDURE NOTE
Interventional Radiology Preprocedure Note    Indication for procedure: The encounter diagnosis was Facial basal cell cancer.    Relevant review of systems: NA    Relevant Labs:   Lab Results   Component Value Date    CREATININE 0.67 02/03/2025    EGFR 90 02/03/2025    INR 1.3 (H) 03/05/2025    PROTIME 14.6 (H) 03/05/2025       Planned Sedation/Anesthesia: Moderate    Airway assessment: normal    Directed physical examination:    RRR, lungs CTA-B    Mallampati: II (hard and soft palate, upper portion of tonsils and uvula visible)    ASA Score: ASA 3 - Patient with moderate systemic disease with functional limitations    Benefits, risks and alternatives of procedure and planned sedation have been discussed with the patient and/or their representative. All questions answered and they agree to proceed.

## 2025-03-05 NOTE — Clinical Note
Patient tolerated procedure well, sample collected and sent to lab. Site covered with 4x4 gauze and tegaderm. No bleeding or hematoma noted. Patient taken back to [facility] for recovery.

## 2025-03-05 NOTE — POST-PROCEDURE NOTE
Interventional Radiology Post-Procedure Note    L1 spinous process biopsy    Procedure Details:  Technically successful and uncomplicated L1 spinous process biopsy.  Please see PACS for full procedural details.    Patient Tolerance: good  Complications: None    Indication for procedure: The encounter diagnosis was Facial basal cell cancer.    Pre-Procedure Verification and Time Out:  · Procedure Location procedure area   · HUDDLE - Pre-procedure Verification completed   · TIME OUT - Final Verification completed immediately prior to procedure start   · DEBRIEF completed     General Information:  Date/Time of Procedure: 03/05/25 at 9:24 AM  Indication(s): basal cell carcinoma  Findings: See PACS  Procedure performed by: Sekou Schafer MD   Assistant(s): Dr. Fransisco Mejia MD  Estimated Blood Loss (mL): none  Specimen: Yes, 5 core specimens  Informed Consent: written consent obtained    Prep:  Ultrasound Guided Insertion: No  Large Drape, Hand Hygiene, Surgical Cap, Surgical Mask, Sterile Gown, Sterile Gloves, Glasses, and Scrubs  Patient Position: Prone  Site Prep: chlorhexidine, draped, usual sterile procedure followed    Anesthesia/Medications:  Procedural Sedation:  Fentanyl: 50 mcg  Versed: 0.5 mg  1% Lidocaine: 5 mL    Sekou Schafer MD, PGY-6  Interventional Radiology  IR pager: [phone]    NON-Urgent on call weekends and after hours weekdays (5pm - 5am) IR pager: [phone]  Urgent & emergent on call weekends and after hours weekdays (5pm-7am) IR pager: [phone]

## 2025-03-06 ENCOUNTER — HOSPITAL ENCOUNTER (OUTPATIENT)
Dept: RADIATION ONCOLOGY | Facility: CLINIC | Age: 80
Setting detail: RADIATION/ONCOLOGY SERIES
Discharge: HOME | End: 2025-03-06
Payer: MEDICARE

## 2025-03-06 ENCOUNTER — APPOINTMENT (OUTPATIENT)
Dept: RADIATION ONCOLOGY | Facility: CLINIC | Age: 80
End: 2025-03-06
Payer: MEDICARE

## 2025-03-06 DIAGNOSIS — C44.311 BASAL CELL CARCINOMA OF SKIN OF NOSE: ICD-10-CM

## 2025-03-06 DIAGNOSIS — Z51.0 ENCOUNTER FOR ANTINEOPLASTIC RADIATION THERAPY: ICD-10-CM

## 2025-03-06 LAB
RAD ONC MSQ ACTUAL FRACTIONS DELIVERED: 12
RAD ONC MSQ ACTUAL SESSION DELIVERED DOSE: 267 CGRAY
RAD ONC MSQ ACTUAL TOTAL DOSE: 3204 CGRAY
RAD ONC MSQ ELAPSED DAYS: 16
RAD ONC MSQ LAST DATE: NORMAL
RAD ONC MSQ PRESCRIBED FRACTIONAL DOSE: 267 CGRAY
RAD ONC MSQ PRESCRIBED NUMBER OF FRACTIONS: 15
RAD ONC MSQ PRESCRIBED TECHNIQUE: NORMAL
RAD ONC MSQ PRESCRIBED TOTAL DOSE: 4005 CGRAY
RAD ONC MSQ PRESCRIPTION PATTERN COMMENT: NORMAL
RAD ONC MSQ START DATE: NORMAL
RAD ONC MSQ TREATMENT COURSE NUMBER: 1
RAD ONC MSQ TREATMENT SITE: NORMAL

## 2025-03-06 PROCEDURE — 77386 HC INTENSITY-MODULATED RADIATION THERAPY (IMRT), COMPLEX: CPT | Performed by: RADIOLOGY

## 2025-03-07 ENCOUNTER — TUMOR BOARD CONFERENCE (OUTPATIENT)
Dept: HEMATOLOGY/ONCOLOGY | Facility: HOSPITAL | Age: 80
End: 2025-03-07
Payer: MEDICARE

## 2025-03-07 ENCOUNTER — HOSPITAL ENCOUNTER (OUTPATIENT)
Dept: RADIATION ONCOLOGY | Facility: CLINIC | Age: 80
Setting detail: RADIATION/ONCOLOGY SERIES
Discharge: HOME | End: 2025-03-07
Payer: MEDICARE

## 2025-03-07 ENCOUNTER — APPOINTMENT (OUTPATIENT)
Dept: RADIATION ONCOLOGY | Facility: CLINIC | Age: 80
End: 2025-03-07
Payer: MEDICARE

## 2025-03-07 DIAGNOSIS — C44.311 BASAL CELL CARCINOMA OF SKIN OF NOSE: ICD-10-CM

## 2025-03-07 DIAGNOSIS — Z51.0 ENCOUNTER FOR ANTINEOPLASTIC RADIATION THERAPY: ICD-10-CM

## 2025-03-07 LAB
RAD ONC MSQ ACTUAL FRACTIONS DELIVERED: 13
RAD ONC MSQ ACTUAL SESSION DELIVERED DOSE: 267 CGRAY
RAD ONC MSQ ACTUAL TOTAL DOSE: 3471 CGRAY
RAD ONC MSQ ELAPSED DAYS: 17
RAD ONC MSQ LAST DATE: NORMAL
RAD ONC MSQ PRESCRIBED FRACTIONAL DOSE: 267 CGRAY
RAD ONC MSQ PRESCRIBED NUMBER OF FRACTIONS: 15
RAD ONC MSQ PRESCRIBED TECHNIQUE: NORMAL
RAD ONC MSQ PRESCRIBED TOTAL DOSE: 4005 CGRAY
RAD ONC MSQ PRESCRIPTION PATTERN COMMENT: NORMAL
RAD ONC MSQ START DATE: NORMAL
RAD ONC MSQ TREATMENT COURSE NUMBER: 1
RAD ONC MSQ TREATMENT SITE: NORMAL

## 2025-03-07 PROCEDURE — 77386 HC INTENSITY-MODULATED RADIATION THERAPY (IMRT), COMPLEX: CPT | Performed by: RADIOLOGY

## 2025-03-07 NOTE — TUMOR BOARD NOTE
[facility] HEAD AND NECK TUMOR BOARD NOTE:    Amrita Grant Is a 79 y.o. female who was presented by Dr. Templeton at [facility] Head & Neck Tumor Board on 3/7 which included representatives from all Head & Neck disciplines (Medical oncology/Radiation oncology/Otolaryngology/Radiology/Pathology).     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   If felt to be mucosal primary, systemic chemotherapy would be indicated  Continued palliative radiation to the primary  Biopsy of bony mets from spine were biopsied and awaiting results from this - TEMPUS testing to be performed on this    Clinical Trial Status:   N/A      -----------------------------------------------------------------------------------------------------------------------------------------------------------------------------------------------------------------------    History and Physical in Brief:  79 y.o. female with a history of prior unspecified cancer of left lip who presented to Dr. Templeton on 11/11/24 with a nasal mass of unknown duration. Exam showed a large firm mass on the right nasal dorsum/sidewall, with endoscopy showing discoloration and erythema of the right endonasal side wall without overt extension into the nasal cavity. Pathology initially was favored to be basal cell carcinoma, however  on independent review at an OSH the lesion was consistent with HPV+ SCCa. Patient has been receiving radiation therapy for the lesion with palliative intent.     Imaging:  CT Sinus (11/4/24):   Soft tissue mass involving right nasal soft tissue, possibly extending internally     CT Neck (12/4/24):  IMPRESSION:  1. Compared to the CT face from 11/04/2024, soft tissue mass within  the right and midline nasal soft tissues causing erosion of the  adjacent nasal bone and protruding into the right nasal cavity is essentially unchanged in size (2.1 cm AP x 2.1 cm transverse x 4.1 cm), however there is slight increase in tumoral extension at the junction between the right nasal soft tissues and the right pre maxillary fat pad/skin.      2. There is no cervical lymphadenopathy by size criteria.      3. Old fracture of the right posterior arch of C1 with partial  healing, similar in appearance compared to the prior CT.       CT chest (12/4/24):  FINDINGS:  *There is a 9 x 13 x 22 mm indeterminate soft tissue density adjacent  to the left lateral surface of the ascending thoracic aorta, less  dense than the enhancing aortic lumen. Differential considerations  include enlarged lymph node versus slightly hyperdense cyst. (Series  5, Image 29) (Series 4, Image 129) *There is also a somewhat  elongated heterogeneous soft tissue density within the upper thoracic  esophagus to the left of midline measuring approximately 1.3 cm in  maximal diameter (Series 5, Image 48) (Series 4, Image 67). It is  uncertain whether this represents a lesion within the esophagus or  some type of retained food product. This could be further assessed by  correlation with either upper endoscopy or barium swallow.          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 *The thoracic aorta is  tortuous and dilated with a maximal  diameter of 4.7 cm for the ascending thoracic aorta. Descending  thoracic aortic diameter is also somewhat dilated measuring  approximately 3.3 cm. 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 *The  combination of the above findings raises concern for possible  connective tissue disorder in this patient. Has the patient been  evaluated for possible Marfan syndrome or Tomasa-Danlos syndrome?          Right lower lobe right lower lobe linear subsegmental atelectasis  versus scarring is present. No infiltrates or effusions are  identified. No pulmonary masses are identified. Patient is status  post aortic valve replacement. Associated sternal sutures are noted.      Chest Wall: No chest wall masses are identified.      Upper Abdominal Findings: No pathologic findings are identified  involving the visualized portions of the upper abdomen.      Skeletal System: There is a cervical laminectomy with bilateral  pedicle screw and bar fusion.      IMPRESSION:  1. Homogeneous slightly hypodense mass adjacent to the ascending  thoracic aorta on the left measuring 9 x 13 x 22 mm possibly  representing a lymph node or cyst. This could be further  characterized by correlation with MRI of the chest with and without  contrast if indicated.  2. Sinus of Valsalva aneurysm measuring 2.5 x 2.6 x 3.0 cm involving  the right coronary sinus. There is also aneurysmal dilatation of the  thoracic aorta with a tortuous appearance. Patient also has a history  of previous PICA aneurysm. Does the patient have an underlying  connective tissue disorder?  3. Status post cervical laminectomy with bilateral screw and bar  fusion.  4. Abnormal soft tissue density within the thoracic esophagus  superiorly it is unclear whether this is a soft tissue mass/neoplasm  versus retained food products. This may be  further assessed by  correlation with barium swallow or upper endoscopy.    PET/CT Head and Neck (1/20):   HEAD AND NECK:   Slight misregistration between CT and PET images in the head region.   Head: No radiotracer avid lesion or mass effect in the imaged   intracranial compartment.   Aerodigestive Tract: FDG avid mass involving right nasal soft tissues and   eroding the adjacent nasal bone measuring approximately 3.2 x 2.9 cm in   transaxial dimensions in SUV max 22.5.   Lymph Nodes: Right level 1 cervical lymph node 0.6 cm with SUV max 6.9   Neck Soft Tissues: No radiotracer avid thyroid nodule.   CHEST:   Lungs & Pleura: No radiotracer avid mass, nodule, or consolidation.  No   pleural effusion.  Nonavid 0.3 cm right upper lobe lung nodule (3:160).   Lymph Nodes: No radiotracer avid lymphadenopathy.   Mediastinum: No radiotracer avid mass.   Cardiovascular: Blood pool activity.  No pericardial effusion.  Normal   heart size.  Ascending thoracic aorta dilation 4.5 cm.   Chest Wall: No radiotracer avid soft tissue lesion.  Sternotomy changes.   ABDOMEN AND PELVIS:   Hepatobiliary: No radiotracer avid lesion.  No measurable mass.   Spleen: No radiotracer avid lesion.  No splenomegaly.   Pancreas: No radiotracer avid lesion.   Adrenals: No radiotracer avid nodule.   Urinary Tract: Physiologic radiotracer excretion in the renal collecting   systems and urinary bladder.  No hydronephrosis.   GI Tract: No radiotracer avid lesion.  No bowel dilation.   Peritoneum: No radiotracer avid lesion.  No ascites.   Lymph Nodes: No radiotracer avid lymphadenopathy.   Vasculature: Blood pool activity.  Abdominal aortic atherosclerotic   calcification without aneurysm.   Pelvic Organs: No radiotracer avid lesion.   MUSCULOSKELETAL:   Bones: Increased focal uptake in posterior right second rib adjacent to   costovertebral junction SUV max 8.6.  Focal uptake with SUV max 6.6 in   anterolateral right seventh rib at costochondral  junction.  Focal uptake   in spinous process of L1 vertebra with SUV max 5.5.     Procedures to date:  In office biopsy x2     Pertinent Pathology:  Surgical Pathology Exam: [id number]  Order: [id number]   Collected 11/26/2024 13:28       Status: Final result       Visible to patient: Yes (seen)       Dx: Nasal mass    0 Result Notes      Component    FINAL DIAGNOSIS   A. SKIN, NASAL MASS RIGHT, BIOPSY:  --INVASIVE POORLY DIFFERENTIATED CARCINOMA, PRESENT AT PERIPHERAL AND DEEP MARGINS     COMMENT: Sections show infiltrating nests of basaloid epithelial cells with a peripheral palisade and numerous dyskeratotic cells in actinically altered skin, transected at the peripheral and deep biopsy margins.  No epidermal involvement is seen.  Tumor cells are positive for CK5/6 (diffuse), EMA (patchy) and are negative for MOC-31 and bcl-2.       The findings are similar to the patient's prior biopsy, [id number], and could represent a primary cutaneous neoplasm such as infiltrative basal cell carcinoma or an adnexal carcinoma.  Given the absence of epidermal involvement and MOC31 expression, the possibility of recurrent or metastatic carcinoma, or a poorly differentiated carcinoma of sinonasal origin cannot be excluded.  Additional immunostains have been ordered and will be reported as an addendum.  Clinicopathologic correlation is required.     Basal Cell Carcinoma is Favored.          Review from OSH:  FINAL DIAGNOSIS    A.  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   - See comment.     B.  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   - See comment.     National site-specific guidelines were discussed with respect to the case.

## 2025-03-10 ENCOUNTER — HOSPITAL ENCOUNTER (OUTPATIENT)
Dept: RADIATION ONCOLOGY | Facility: CLINIC | Age: 80
Setting detail: RADIATION/ONCOLOGY SERIES
Discharge: HOME | End: 2025-03-10
Payer: MEDICARE

## 2025-03-10 ENCOUNTER — APPOINTMENT (OUTPATIENT)
Dept: HEMATOLOGY/ONCOLOGY | Facility: CLINIC | Age: 80
End: 2025-03-10
Payer: MEDICARE

## 2025-03-10 ENCOUNTER — APPOINTMENT (OUTPATIENT)
Dept: RADIATION ONCOLOGY | Facility: CLINIC | Age: 80
End: 2025-03-10
Payer: MEDICARE

## 2025-03-10 DIAGNOSIS — C44.311 BASAL CELL CARCINOMA OF SKIN OF NOSE: ICD-10-CM

## 2025-03-10 DIAGNOSIS — Z51.0 ENCOUNTER FOR ANTINEOPLASTIC RADIATION THERAPY: ICD-10-CM

## 2025-03-10 LAB
PROTHROMBIN TIME (PROTIME)PT.: 20.8 SECONDS (ref 11.7–14.9)
RAD ONC MSQ ACTUAL FRACTIONS DELIVERED: 14
RAD ONC MSQ ACTUAL SESSION DELIVERED DOSE: 267 CGRAY
RAD ONC MSQ ACTUAL TOTAL DOSE: 3738 CGRAY
RAD ONC MSQ ELAPSED DAYS: 20
RAD ONC MSQ LAST DATE: NORMAL
RAD ONC MSQ PRESCRIBED FRACTIONAL DOSE: 267 CGRAY
RAD ONC MSQ PRESCRIBED NUMBER OF FRACTIONS: 15
RAD ONC MSQ PRESCRIBED TECHNIQUE: NORMAL
RAD ONC MSQ PRESCRIBED TOTAL DOSE: 4005 CGRAY
RAD ONC MSQ PRESCRIPTION PATTERN COMMENT: NORMAL
RAD ONC MSQ START DATE: NORMAL
RAD ONC MSQ TREATMENT COURSE NUMBER: 1
RAD ONC MSQ TREATMENT SITE: NORMAL

## 2025-03-10 PROCEDURE — 77386 HC INTENSITY-MODULATED RADIATION THERAPY (IMRT), COMPLEX: CPT | Performed by: RADIOLOGY

## 2025-03-11 ENCOUNTER — HOSPITAL ENCOUNTER (OUTPATIENT)
Dept: RADIATION ONCOLOGY | Facility: CLINIC | Age: 80
Setting detail: RADIATION/ONCOLOGY SERIES
Discharge: HOME | End: 2025-03-11
Payer: MEDICARE

## 2025-03-11 ENCOUNTER — DOCUMENTATION (OUTPATIENT)
Dept: RADIATION ONCOLOGY | Facility: CLINIC | Age: 80
End: 2025-03-11

## 2025-03-11 ENCOUNTER — APPOINTMENT (OUTPATIENT)
Dept: RADIATION ONCOLOGY | Facility: CLINIC | Age: 80
End: 2025-03-11
Payer: MEDICARE

## 2025-03-11 VITALS
OXYGEN SATURATION: 96 % | HEART RATE: 70 BPM | TEMPERATURE: 97.3 F | DIASTOLIC BLOOD PRESSURE: 85 MMHG | WEIGHT: 101.41 LBS | BODY MASS INDEX: 18.55 KG/M2 | SYSTOLIC BLOOD PRESSURE: 136 MMHG | RESPIRATION RATE: 18 BRPM

## 2025-03-11 DIAGNOSIS — C44.311 BASAL CELL CARCINOMA OF SKIN OF NOSE: ICD-10-CM

## 2025-03-11 DIAGNOSIS — Z51.0 ENCOUNTER FOR ANTINEOPLASTIC RADIATION THERAPY: ICD-10-CM

## 2025-03-11 LAB
LABORATORY COMMENT REPORT: NORMAL
PATH REPORT.FINAL DX SPEC: NORMAL
PATH REPORT.GROSS SPEC: NORMAL
PATH REPORT.RELEVANT HX SPEC: NORMAL
PATH REPORT.TOTAL CANCER: NORMAL
RAD ONC MSQ ACTUAL FRACTIONS DELIVERED: 15
RAD ONC MSQ ACTUAL SESSION DELIVERED DOSE: 267 CGRAY
RAD ONC MSQ ACTUAL TOTAL DOSE: 4005 CGRAY
RAD ONC MSQ ELAPSED DAYS: 21
RAD ONC MSQ LAST DATE: NORMAL
RAD ONC MSQ PRESCRIBED FRACTIONAL DOSE: 267 CGRAY
RAD ONC MSQ PRESCRIBED NUMBER OF FRACTIONS: 15
RAD ONC MSQ PRESCRIBED TECHNIQUE: NORMAL
RAD ONC MSQ PRESCRIBED TOTAL DOSE: 4005 CGRAY
RAD ONC MSQ PRESCRIPTION PATTERN COMMENT: NORMAL
RAD ONC MSQ START DATE: NORMAL
RAD ONC MSQ TREATMENT COURSE NUMBER: 1
RAD ONC MSQ TREATMENT SITE: NORMAL
RESIDENT REVIEW: NORMAL

## 2025-03-11 PROCEDURE — 77386 HC INTENSITY-MODULATED RADIATION THERAPY (IMRT), COMPLEX: CPT | Performed by: RADIOLOGY

## 2025-03-11 ASSESSMENT — PAIN SCALES - GENERAL: PAINLEVEL_OUTOF10: 0-NO PAIN

## 2025-03-11 NOTE — PROGRESS NOTES
Radiation Oncology On Treatment Visit    Patient Name:  Amrita Grant  MRN:  [id number]  :  1945    Referring Provider: Carlie Templeton MD  Primary Care Provider: Charito Ribeiro DO  Care Team: Patient Care Team:  Charito Ribeiro DO as PCP - General (Internal Medicine)  Bindu Guzmán MD as Consulting Physician (Hematology and Oncology)  Kristen Carr, RN as Nurse Navigator (Hematology and Oncology)    Date of Service: 3/11/2025     Diagnosis:   Specialty Problems          Radiation Oncology Problems    Facial basal cell cancer         Treatment Summary:  Radiation Therapy    Treatment Period Technique Fraction Dose Fractions Total Dose   Course 1 2025-3/11/2025  (days elapsed: 21)         Right Nose 2025-3/11/2025 VMAT 267 / 267 cGy 15 / 15 4005 / 4,005 cGy     SUBJECTIVE: @40 Gy in 15 fractions, biopsy of L1 met demonstrated metastatic carcinoma.  She feels like the mass has shrunk this week.  She has more of a sunburn over her face.  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  Minimal sclera injuection.  Other Pertinent Findings:     Toxicity Assessment          2025    17:25 2025    13:00 3/4/2025    12:00 3/11/2025    13:00   Toxicity Assessment   Adverse Events Reviewed (WDL) Yes (Within Defined Limits) Yes (Within Defined Limits) Yes (Within Defined Limits) Yes (Within Defined Limits)   Treatment Site Skin Skin Skin Skin   Anorexia Grade 1       dysphagia. Decreased appetite Grade 1       dysphagia baseline Grade 1       dysphagia Grade 1       dysphagia   Dehydration Grade 0       aquaphor Grade 0 Grade 0 Grade 0    Dermatitis Radiation Grade 0 Grade 2       using aquaphor; has bumps breaking open seeping clear liquid on nose Grade 2       still using aquaphor and atb ointment Grade 2       aquaphor   Fatigue Grade 1 Grade 1 Grade 1 Grade 1   Nausea Grade 0 Grade 0 Grade 0 Grade 0   Pain Grade 1       8/10 Pain to nose. Tylenol with relief Grade 1 Grade 1 Grade 1   Tumor Pain Grade 1 Grade 1 Grade 1 Grade 0   Pain of Skin Grade 1 Grade 1 Grade 1 Grade 0   Pruritus Grade 1 Grade 0 Grade 0 Grade 0        Assessment / Plan:  She completed therapy today.  We had discussed adding 2 more fractions to 45 Gy total.  However, she is having a clinical response and more skin irritation.  As this is palliative intent, we will discontinue treatment now at planned dose of 40 GY.  She was advised to discontinue vismodegib.  She has medical oncology appt next week.  Virtual zoom visit with me in 2 weeks. She knows to call if worsening of skin or concerns.

## 2025-03-12 ENCOUNTER — APPOINTMENT (OUTPATIENT)
Dept: RADIATION ONCOLOGY | Facility: CLINIC | Age: 80
End: 2025-03-12
Payer: MEDICARE

## 2025-03-13 ENCOUNTER — APPOINTMENT (OUTPATIENT)
Dept: RADIATION ONCOLOGY | Facility: CLINIC | Age: 80
End: 2025-03-13
Payer: MEDICARE

## 2025-03-13 LAB — PROTHROMBIN TIME (PROTIME)PT.: 30.4 SECONDS (ref 11.7–14.9)

## 2025-03-14 ENCOUNTER — APPOINTMENT (OUTPATIENT)
Dept: RADIATION ONCOLOGY | Facility: CLINIC | Age: 80
End: 2025-03-14
Payer: MEDICARE

## 2025-03-14 NOTE — PROGRESS NOTES
Radiation Oncology Treatment Summary    Patient Name:  Amrita Grant  MRN:  [id number]  :  1945    Referring Provider: No ref. provider found  Primary Care Provider: Charito Ribeiro DO    Brief History: Amrita Grant is a 79 y.o. female who presented with a growing subcutaneous mass along right nose adjacent to eye.  Her lesion was biopsied twice at  and was found to be a carcinoma favoring basal cell.  Her pathology and case were reviewed extensively ENT Tumor Board and cutaneous tumor board.  The lesion was initially staged as cT3N0 and was not deemed resectable.  She was referred for radiation to [facility].  Prior to therapy she had a PET-CT and MRI performed.  PET-CT demonstrated 2 very suspicious lesions and 1 lesion in vertebral body and a level IB right neck node.  She was referred back to med onc at  for systemic therapy.  Given basal cell favored she was started on vismodegib, she was not considered an ideal candidate for immunotherapy given active SLE.  The mass was growing and ulcerating and involving lacrimal duct.  Therefore I offered her palliative RT.  She received 40.05 Gy in 15 fractions with response using VMAT and daily bolus.  During the course of RT, her pathology was independently reviewed at [facility] and their impression was a P16 positive squamous cell carcinoma.  We re-discussed her case with ENT and Med Onc and as goal was palliation we continued with radiation therapy.  Her case was reviewed again at  ENT tumor board and they were in agreement with [facility] pathology second opinion.  Recommendation of TB was to continue palliative RT and change systemic therapy after RT. Her vismodegib was discontinued.  Biopsy of the spine lesion confirmed metastatic carcinoma.  She tolerated RT well with initial partial response to treatment.  She has FU with medical oncology to discuss systemic therapy recommendations.  She will FU with me virtually in 2-3 weeks via Zoom for skin and tumor  assessment.    Radiation Treatment Summary:    Radiation Therapy    Treatment Period Technique Fraction Dose Fractions Total Dose   Course 1 2/18/2025-3/11/2025  (days elapsed: 21)         Right Nose 2/18/2025-3/11/2025 VMAT 267 / 267 cGy 15 / 15 4005 / 4,005 cGy       Please see the patient's Mos[facility] chart for further details regarding the radiation plan, including beam energy.    Concurrent Chemotherapy:  Treatment Plans       Name Type Plan Dates Plan Provider         Active    Vismodegib, 28 Day [facility]cles Oncology Treatment 2/9/2025 - Present Bindu Guzmán MD                    CTCAE Toxicity Overview:   Toxicity Assessment          2/18/2025    17:25 2/25/2025    13:00 3/4/2025    12:00 3/11/2025    13:00   Toxicity Assessment   Adverse Events Reviewed (WDL) Yes (Within Defined Limits) Yes (Within Defined Limits) Yes (Within Defined Limits) Yes (Within Defined Limits)   Treatment Site Skin Skin Skin Skin   Anorexia Grade 1       dysphagia. Decreased appetite Grade 1       dysphagia baseline Grade 1       dysphagia Grade 1       dysphagia   Dehydration Grade 0       aquaphor Grade 0 Grade 0 Grade 0   Dermatitis Radiation Grade 0 Grade 2       using aquaphor; has bumps breaking open seeping clear liquid on nose Grade 2       still using aquaphor and atb ointment Grade 2       aquaphor   Fatigue Grade 1 Grade 1 Grade 1 Grade 1   Nausea Grade 0 Grade 0 Grade 0 Grade 0   Pain Grade 1       8/10 Pain to nose. Tylenol with relief Grade 1 Grade 1 Grade 1   Tumor Pain Grade 1 Grade 1 Grade 1 Grade 0   Pain of Skin Grade 1 Grade 1 Grade 1 Grade 0   Pruritus Grade 1 Grade 0 Grade 0 Grade 0     Patient Disposition:Follow up 3/26/25

## 2025-03-17 ENCOUNTER — OFFICE VISIT (OUTPATIENT)
Dept: HEMATOLOGY/ONCOLOGY | Facility: CLINIC | Age: 80
End: 2025-03-17
Payer: MEDICARE

## 2025-03-17 ENCOUNTER — APPOINTMENT (OUTPATIENT)
Dept: RADIATION ONCOLOGY | Facility: CLINIC | Age: 80
End: 2025-03-17
Payer: MEDICARE

## 2025-03-17 VITALS
OXYGEN SATURATION: 96 % | HEART RATE: 72 BPM | RESPIRATION RATE: 16 BRPM | WEIGHT: 100.5 LBS | TEMPERATURE: 97.5 F | DIASTOLIC BLOOD PRESSURE: 78 MMHG | BODY MASS INDEX: 18.38 KG/M2 | SYSTOLIC BLOOD PRESSURE: 126 MMHG

## 2025-03-17 DIAGNOSIS — N34.3 DYSURIA-FREQUENCY SYNDROME: ICD-10-CM

## 2025-03-17 DIAGNOSIS — C44.92 SCC (SQUAMOUS CELL CARCINOMA): Primary | ICD-10-CM

## 2025-03-17 PROCEDURE — G2211 COMPLEX E/M VISIT ADD ON: HCPCS | Performed by: STUDENT IN AN ORGANIZED HEALTH CARE EDUCATION/TRAINING PROGRAM

## 2025-03-17 PROCEDURE — 99215 OFFICE O/P EST HI 40 MIN: CPT | Performed by: STUDENT IN AN ORGANIZED HEALTH CARE EDUCATION/TRAINING PROGRAM

## 2025-03-17 PROCEDURE — 1126F AMNT PAIN NOTED NONE PRSNT: CPT | Performed by: STUDENT IN AN ORGANIZED HEALTH CARE EDUCATION/TRAINING PROGRAM

## 2025-03-17 PROCEDURE — 1159F MED LIST DOCD IN RCRD: CPT | Performed by: STUDENT IN AN ORGANIZED HEALTH CARE EDUCATION/TRAINING PROGRAM

## 2025-03-17 RX ORDER — HEPARIN 100 UNIT/ML
500 SYRINGE INTRAVENOUS AS NEEDED
OUTPATIENT
Start: 2025-03-17

## 2025-03-17 RX ORDER — PROCHLORPERAZINE MALEATE 10 MG
10 TABLET ORAL EVERY 6 HOURS PRN
Qty: 30 TABLET | Refills: 5 | Status: SHIPPED | OUTPATIENT
Start: 2025-03-17

## 2025-03-17 RX ORDER — ONDANSETRON HYDROCHLORIDE 8 MG/1
8 TABLET, FILM COATED ORAL EVERY 8 HOURS PRN
Qty: 30 TABLET | Refills: 5 | Status: SHIPPED | OUTPATIENT
Start: 2025-03-17

## 2025-03-17 RX ORDER — HEPARIN SODIUM,PORCINE/PF 10 UNIT/ML
50 SYRINGE (ML) INTRAVENOUS AS NEEDED
OUTPATIENT
Start: 2025-03-17

## 2025-03-17 ASSESSMENT — PAIN SCALES - GENERAL: PAINLEVEL_OUTOF10: 0-NO PAIN

## 2025-03-17 NOTE — PROGRESS NOTES
[facility] - Medical Oncology New Patient Visit    Patient ID: Amrita Grant is a 79 y.o. female.  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 Grant is a 80 yo with PMH significant for lupus, aortic valve repair, fibromyalgia, who first noticed a mass on her nose in fall of 2024. Met Dr. Templeton who did an in-office biopsy 11/11/24 with invasive, poorly differentiated carcinoma, p40+, negative MOC31, unclear etiology, and recommended for repeat biopsy. Biopsy on 11/26/24 with the same, felt to be a primary cutaneous neoplasm such as basal cell carcinoma. Discussed at [facility] tumor board, and due to significant morbidity associated with resection, recommended against surgery and referred to radiation oncology and medical oncology. Met Dr. Guzmán, who recommended against immunotherapy given her lupus and poor performance status, and recommended discussion with radiation oncology, which cutaneous tumor board also agreed with. She planned to have radiation closer to home, but unfortunately her pre-radiation scans showed concern for metastatic disease, with PET/CT on 1/20/25 with FDG avidity of the R nasal soft tissue as well as R level 1 cervical lymph node, R rights, L1, and RUL nodule below PET resolution. She was ultimately started on vismodegib as well as started on palliative radiation (2/18-3/11/25) for the nasal lesion. In the meantime, [facility] pathology revealed invasive carcinoma,  HPV associated, positive for p40 CK5/6, EMA, p16, and negative for MOC, BerEP4, INSM1, and chromogranin. As this was felt to no longer be a basal cell carcinoma, she stopped the vismodegib. Biopsy of the L1 lesion morphologically the same.     Regarding her lupus - she has been on plaquenil monotherapy for many years, stable on the same dose. She was originally diagnosed with lupus in 2001 - she was initially diagnosed with pericarditis. She has fatigue and joint aches with the lupus.     She is here today with her  and friend. Her last day of radiation was last Tuesday or Wednesday. Radiation went ok overall. Her last dose of the visdomegib was last Tuesday or Wednesday. No side effects.  She is very tired, naps every day. In the last year, she has stopped doing the cooking, cleaning, and laundry. She is very careful, leans on her  to help her get up the stairs. Her fatigue has been getting worse and worse. She continues to lose weight - 50 lbs in the last year and a half. She has trouble swallowing - some things are harder to swallow like bread, meat. Gets tickles in her throat. She's had this for a couple years, had her throat stretched - this was also just repeated in December. Done at [facility] typically, helps for a little while, but don't last.      PAST MEDICAL HISTORY  Lupus  Fibromyalgia  Osteoarthritis  Aortic valve repair (mechanical)  on warfarin  HTN  CVA - (2021)     SOCIAL HISTORY  Lives at home with her . They've been  45 years. Previously was a  for [employer].   Tob: none  EtOH: none, previously a little  Illicits: none     FAMILY HISTORY  Mother - uterine cancer  Maternal grandmother - breast cancer  Maternal cousin - unknown metastatic cancer    Meds (Current):    Current Outpatient Medications:     ARIPiprazole (Abilify) 5 mg tablet, Take 1 tablet (5 mg) by mouth once daily., Disp: , Rfl:     Bystolic 5 mg tablet, 1 tab(s) orally half tablet three times daily for  30 days, Disp: , Rfl:     calcium carbonate 600 mg calcium (1,500 mg) tablet, Take 600 mg by mouth once daily., Disp: , Rfl:     cetirizine (ZyrTEC) 10 mg tablet, 1 tab(s) orally as needed, Disp: , Rfl:     cholecalciferol (Vitamin D-3) 25 MCG (1000 UT) capsule, 1 cap(s) orally three times daily, Disp: , Rfl:     docusate sodium (Colace) 100 mg capsule, once every 24 hours., Disp: , Rfl:     fluticasone (Flonase) 50 mcg/actuation nasal spray, Administer 2 sprays into affected nostril(s)., Disp: , Rfl:     levETIRAcetam (Keppra) 500 mg tablet, every 12 hours., Disp: , Rfl:     LORazepam (Ativan) 0.5 mg tablet, Take half tablet 1 hour before MRI, if needed take other half of tablet while getting checked in for MRI, Disp: 1 tablet, Rfl: 0    losartan (Cozaar) 100 mg tablet, Take 0.5 tablets (50 mg) by mouth twice a day., Disp: , Rfl:     melatonin 3 mg tablet, Take by mouth once daily., Disp: , Rfl:     memantine (Namenda) 5 mg tablet, Take 1 tablet (5 mg) by mouth once daily., Disp: , Rfl:     mirtazapine (Remeron) 15 mg tablet, 1 tab(s) orally half tablet bedtime daily for 30 days, Disp: , Rfl:     omeprazole (PriLOSEC) 20 mg DR capsule, Take 1 capsule (20 mg) by mouth once daily., Disp: , Rfl:     ondansetron (Zofran) 4 mg tablet, Take 1 tablet (4 mg) by mouth if needed for nausea or vomiting., Disp: , Rfl:     PlaqueniL 200 mg tablet, Take 1 tablet (200 mg) by mouth once daily., Disp: , Rfl:     potassium chloride CR 20 mEq ER tablet, Take 1 tablet (20 mEq) by mouth once daily., Disp: , Rfl:     Premarin 0.3 mg tablet, once every 24 hours., Disp: , Rfl:     vismodegib (Erivedge) 150 mg capsule, Take 1 capsule (150 mg total) by mouth every other day.  Take with or without food. Swallow whole. Do not open or crush., Disp: 14 capsule, Rfl: 3    warfarin (Coumadin) 4 mg tablet, Take 1 tablet (4 mg) by mouth once daily., Disp: , Rfl:     Allergies   Allergen Reactions    Propofol Nausea/vomiting    Sulfa (Sulfonamide  Antibiotics) Other    Opioids - Morphine Analogues Nausea And Vomiting, Other and Unknown    Penicillins Unknown     Pt. Reports passing out upon taking penicillin when she was young.       Review of Systems   All other systems reviewed and are negative.       Objective   BSA: 1.41 meters squared  Wt Readings from Last 5 Encounters:   03/17/25 45.6 kg (100 lb 8 oz)   03/11/25 46 kg (101 lb 6.6 oz)   03/05/25 45.8 kg (101 lb)   03/04/25 46.1 kg (101 lb 10.1 oz)   02/25/25 46.9 kg (103 lb 6.3 oz)     /78 (BP Location: Left arm, Patient Position: Sitting)   Pulse 72   Temp 36.4 °C (97.5 °F) (Temporal)   Resp 16   Wt 45.6 kg (100 lb 8 oz)   SpO2 96%   BMI 18.38 kg/m²     ECOG Score: 3- Capable of only limited selfcare, confined to bed/chair > 50% of waking hrs.      Physical Exam  Vitals reviewed.   Constitutional:       General: She is not in acute distress.  HENT:      Head: Normocephalic.      Nose:      Comments: Large, erythematous, nasal lesion with scabbing     Mouth/Throat:      Mouth: Mucous membranes are moist.   Eyes:      Conjunctiva/sclera: Conjunctivae normal.      Pupils: Pupils are equal, round, and reactive to light.   Cardiovascular:      Rate and Rhythm: Normal rate and regular rhythm.      Heart sounds: Normal heart sounds. No murmur heard.  Pulmonary:      Effort: Pulmonary effort is normal. No respiratory distress.      Breath sounds: Normal breath sounds.   Abdominal:      General: Abdomen is flat. Bowel sounds are normal.      Palpations: Abdomen is soft.   Skin:     General: Skin is warm and dry.   Neurological:      General: No focal deficit present.      Mental Status: She is alert and oriented to person, place, and time.   Psychiatric:         Mood and Affect: Mood normal.         Behavior: Behavior normal.         Thought Content: Thought content normal.          Results:  Labs:  Lab Results   Component Value Date    WBC 6.5 02/03/2025    HGB 13.3 02/03/2025    HCT 42.2  02/03/2025     (H) 02/03/2025     02/03/2025      Lab Results   Component Value Date    NEUTROABS 5.34 02/03/2025      Lab Results   Component Value Date    GLUCOSE 97 02/03/2025    CALCIUM 10.0 02/03/2025     02/03/2025    K 4.0 02/03/2025    CO2 29 02/03/2025     02/03/2025    BUN 12 02/03/2025    CREATININE 0.67 02/03/2025     Lab Results   Component Value Date    ALT 13 02/03/2025    AST 18 02/03/2025    ALKPHOS 77 02/03/2025    BILITOT 0.8 02/03/2025        Imaging:  I have personally reviewed the below imaging and concur with the reported findings unless otherwise stated:    === Results for orders placed during the hospital encounter of 03/05/25 ===    CT guided percutaneous biopsy bone [[id number]] 03/05/2025    Status: Normal  1. Technically successful CT-guided biopsy of a lesion within the  spinous process of L1. Samples were sent to surgical pathology for  further evaluation.    I was present for and/or performed the critical portions of the  procedure and immediately available throughout the entire procedure.    I personally reviewed the image(s)/study and interpretation. I agree  with the findings as stated.    Performed and dictated at [facility].    MACRO:  None    Signed by: Fransisco Mejia 3/5/2025 2:40 PM  Dictation workstation:   [id number]      === Results for orders placed during the hospital encounter of 12/04/24 ===    CT soft tissue neck w IV contrast [[id number]] 12/04/2024    Status: Normal    Addendum 12/5/2024  9:45 AM ------------------------------------------------  Interpreted By:  Hema Hagan,  ADDENDUM:  Encephalomalacia located within the right posterior cerebellum and  cerebellar vermis with mild ex vacuo dilatation of the 4th ventricle  is again seen.    Signed by: Hema Hagan 12/5/2024 9:45 AM    -------- ORIGINAL REPORT --------  Dictation workstation:   [id number]  1. Compared to the CT face from 11/04/2024, soft  tissue mass within  the right and midline nasal soft tissues causing erosion of the  adjacent nasal bone and protruding into the right nasal cavity is  essentially unchanged in size, however there is slight increase in  tumoral extension at the junction between the right nasal soft  tissues and the right pre maxillary fat pad/skin.    2. There is no cervical lymphadenopathy by size criteria.    3. Old fracture of the right posterior arch of C1 with partial  healing, similar in appearance compared to the prior CT.    This study was interpreted at [facility].    MACRO:  None    Signed by: Hema Hagan 12/5/2024 9:43 AM  Dictation workstation:   [id number]    ___________________________________________________________________________    CT chest w IV contrast [[id number]] 12/04/2024    Status: Normal  1. Homogeneous slightly hypodense mass adjacent to the ascending  thoracic aorta on the left measuring 9 x 13 x 22 mm possibly  representing a lymph node or cyst. This could be further  characterized by correlation with MRI of the chest with and without  contrast if indicated.  2. Sinus of Valsalva aneurysm measuring 2.5 x 2.6 x 3.0 cm involving  the right coronary sinus. There is also aneurysmal dilatation of the  thoracic aorta with a tortuous appearance. Patient also has a history  of previous PICA aneurysm. Does the patient have an underlying  connective tissue disorder?  3. Status post cervical laminectomy with bilateral screw and bar  fusion.  4. Abnormal soft tissue density within the thoracic esophagus  superiorly it is unclear whether this is a soft tissue mass/neoplasm  versus retained food products. This may be further assessed by  correlation with barium swallow or upper endoscopy.    MACRO:  Critical Finding:  See findings. Notification was initiated on  12/9/2024 at 10:59 am by  Rajendra Patrick.  (**-[facility]-**) Instructions:  See Impression for specific recommendations.    Signed  by: Rajendra Patrick 12/9/2024 10:59 AM  Dictation workstation:   [id number]  === Results for orders placed during the hospital encounter of 12/16/24 ===    MR chest w and wo IV contrast [[id number]] 12/16/2024    Status: Normal  1. A 1.2 cm ovoid structure in the anterior mediastinum, without  abnormal postcontrast enhancement, most consistent with underlying  benign etiology such as a proteinaceous cyst. Given the prior  documented history of cardiothoracic surgery, imaging differential  also include a small chronic postoperative seroma.  2. 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 Aneurysmal dilatation  of each of the proximal aspects of the right coronary and left main  coronary arteries, measuring up to 2.6 cm, better assessed on CT  chest 12/04/2024.  3. 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    I personally reviewed the images/study and I agree with the findings  as stated by Taurus Gabriel MD. This study was interpreted at  [facility], [place], [place].    MACRO:  Taurus Gabriel discussed the significance and urgency of this  critical finding by epic secure chat with  WALE PETER on  12/17/2024 at 10:44 am.  (**-[facility]-**) Findings:  See findings.    Signed by: Chemo Odom 12/17/2024 11:48 AM  Dictation workstation:   [id number]  No results found for this or any previous visit.  No results found for this or any previous visit.  No results found for this or any previous visit.  No results found for this or any previous visit.    Pathology:    Lab Results   Component Value Date    ADD1  11/26/2024     Additional immunostains were reviewed.  Tumor cells are positive for p16 and negative for chromogranin and INSM1.  Isolated tumor cells are positive for BerEP4.  The differential diagnosis remains unchanged.         Assessment/Plan      Amrita Grant is a 79  y.o. female here for recommendations and to establish care for poorly differentiated carcinoma of the face.     # Poorly differentiated carcinoma  - HPV+  - L1 biopsy proven metastasis - will send tempus for better delineation  - discussed the likelihood that this is metastatic, HPV+ nasopharyngeal carcinoma, although tempus may help us better determine this  - s/p palliative radiation  - discussed that typically would recommend combination therapy with chemotherapy+immunotherapy. However, given her lupus which was originally diagnosed with pericarditis, do not recommend immunotherapy. Also discussed that given her current poor performance status, do not believe she could tolerate dual chemotherapy  - discussed single-agent weekly paclitaxel, and consented. Plan to repeat scans after 3 cycles   - will obtain new baseline images, as she has not had imaging since January 2025  - she would like to have her infusions closer to home at [place], but will continue to see us at [facility] for pre-treatment visits    # Lupus  - she follows with a rheumatologist at [facility], and will notify them we are starting chemotherapy    # Dysuria, urgency, frequency  - offered obtaining UA and urine culture, and she deferred to PCP    # Unintentional weight loss  - may be partly due to dysphagia  - oncology dietician referral    # Advanced care planning  - discussed incurable but treatable nature of disease, with goal to prolong life while maintaining/improving quality of life. She understands that risks of systemic therapy may outweigh benefits at some point in the future.       Dipika Perrin MD

## 2025-03-18 ENCOUNTER — LAB (OUTPATIENT)
Dept: LAB | Facility: CLINIC | Age: 80
End: 2025-03-18
Payer: MEDICARE

## 2025-03-18 ENCOUNTER — APPOINTMENT (OUTPATIENT)
Dept: LAB | Facility: HOSPITAL | Age: 80
End: 2025-03-18
Payer: MEDICARE

## 2025-03-18 ENCOUNTER — SOCIAL WORK (OUTPATIENT)
Dept: CASE MANAGEMENT | Facility: HOSPITAL | Age: 80
End: 2025-03-18
Payer: MEDICARE

## 2025-03-18 ENCOUNTER — APPOINTMENT (OUTPATIENT)
Dept: RADIATION ONCOLOGY | Facility: CLINIC | Age: 80
End: 2025-03-18
Payer: MEDICARE

## 2025-03-18 DIAGNOSIS — C44.92 SCC (SQUAMOUS CELL CARCINOMA): ICD-10-CM

## 2025-03-18 LAB
BASOPHILS # BLD AUTO: 0.03 X10*3/UL (ref 0–0.1)
BASOPHILS NFR BLD AUTO: 0.4 %
EOSINOPHIL # BLD AUTO: 0.06 X10*3/UL (ref 0–0.4)
EOSINOPHIL NFR BLD AUTO: 0.9 %
ERYTHROCYTE [DISTWIDTH] IN BLOOD BY AUTOMATED COUNT: 12.5 % (ref 11.5–14.5)
HCT VFR BLD AUTO: 38.9 % (ref 36–46)
HGB BLD-MCNC: 12.5 G/DL (ref 12–16)
IMM GRANULOCYTES # BLD AUTO: 0.01 X10*3/UL (ref 0–0.5)
IMM GRANULOCYTES NFR BLD AUTO: 0.1 % (ref 0–0.9)
LYMPHOCYTES # BLD AUTO: 0.7 X10*3/UL (ref 0.8–3)
LYMPHOCYTES NFR BLD AUTO: 10.4 %
MCH RBC QN AUTO: 32.1 PG (ref 26–34)
MCHC RBC AUTO-ENTMCNC: 32.1 G/DL (ref 32–36)
MCV RBC AUTO: 100 FL (ref 80–100)
MONOCYTES # BLD AUTO: 0.54 X10*3/UL (ref 0.05–0.8)
MONOCYTES NFR BLD AUTO: 8 %
NEUTROPHILS # BLD AUTO: 5.4 X10*3/UL (ref 1.6–5.5)
NEUTROPHILS NFR BLD AUTO: 80.2 %
NRBC BLD-RTO: ABNORMAL /100{WBCS}
PLATELET # BLD AUTO: 216 X10*3/UL (ref 150–450)
PROTHROMBIN TIME (PROTIME)PT.: 22.3 SECONDS (ref 11.7–14.9)
RBC # BLD AUTO: 3.89 X10*6/UL (ref 4–5.2)
WBC # BLD AUTO: 6.7 X10*3/UL (ref 4.4–11.3)

## 2025-03-18 PROCEDURE — 80053 COMPREHEN METABOLIC PANEL: CPT

## 2025-03-18 PROCEDURE — 36415 COLL VENOUS BLD VENIPUNCTURE: CPT

## 2025-03-18 PROCEDURE — 85025 COMPLETE CBC W/AUTO DIFF WBC: CPT

## 2025-03-18 NOTE — PROGRESS NOTES
Social Work Note  3/18/2025 SW met with patient and  yesterday as patient has been having Medicare co-pays that have not been picked up by [employer]. SW asked financial navigator to look into this and she is unable to tell why [employer] is now paying.  She emailed specifically on 1/6/25 that there is a charge of $135.31 that hasn't been picked up by [employer].  SW talked with  today and encourage him to call the billing question line @[phone].  He will let this writer know if they are unable to assist.  SW will remain available to assist patient.  Cassie Reynolds, LUDA, [phone]

## 2025-03-19 ENCOUNTER — HOSPITAL ENCOUNTER (OUTPATIENT)
Dept: RADIOLOGY | Facility: HOSPITAL | Age: 80
Discharge: HOME | End: 2025-03-19
Payer: MEDICARE

## 2025-03-19 ENCOUNTER — APPOINTMENT (OUTPATIENT)
Dept: RADIATION ONCOLOGY | Facility: CLINIC | Age: 80
End: 2025-03-19
Payer: MEDICARE

## 2025-03-19 DIAGNOSIS — C44.92 SCC (SQUAMOUS CELL CARCINOMA): ICD-10-CM

## 2025-03-19 LAB
ALBUMIN SERPL BCP-MCNC: 4.1 G/DL (ref 3.4–5)
ALP SERPL-CCNC: 73 U/L (ref 33–136)
ALT SERPL W P-5'-P-CCNC: 16 U/L (ref 7–45)
ANION GAP SERPL CALC-SCNC: 12 MMOL/L (ref 10–20)
AST SERPL W P-5'-P-CCNC: 17 U/L (ref 9–39)
BILIRUB SERPL-MCNC: 0.5 MG/DL (ref 0–1.2)
BUN SERPL-MCNC: 24 MG/DL (ref 6–23)
CALCIUM SERPL-MCNC: 9.4 MG/DL (ref 8.6–10.6)
CHLORIDE SERPL-SCNC: 105 MMOL/L (ref 98–107)
CO2 SERPL-SCNC: 29 MMOL/L (ref 21–32)
CREAT SERPL-MCNC: 0.81 MG/DL (ref 0.5–1.05)
EGFRCR SERPLBLD CKD-EPI 2021: 75 ML/MIN/1.73M*2
GLUCOSE SERPL-MCNC: 81 MG/DL (ref 74–99)
POTASSIUM SERPL-SCNC: 4.3 MMOL/L (ref 3.5–5.3)
PROT SERPL-MCNC: 6.6 G/DL (ref 6.4–8.2)
SODIUM SERPL-SCNC: 142 MMOL/L (ref 136–145)

## 2025-03-19 PROCEDURE — 71260 CT THORAX DX C+: CPT

## 2025-03-19 PROCEDURE — 2550000001 HC RX 255 CONTRASTS: Performed by: STUDENT IN AN ORGANIZED HEALTH CARE EDUCATION/TRAINING PROGRAM

## 2025-03-19 PROCEDURE — 70488 CT MAXILLOFACIAL W/O & W/DYE: CPT

## 2025-03-19 RX ADMIN — IOHEXOL 68 ML: 350 INJECTION, SOLUTION INTRAVENOUS at 17:41

## 2025-03-20 ENCOUNTER — APPOINTMENT (OUTPATIENT)
Dept: RADIATION ONCOLOGY | Facility: CLINIC | Age: 80
End: 2025-03-20
Payer: MEDICARE

## 2025-03-21 ENCOUNTER — APPOINTMENT (OUTPATIENT)
Dept: RADIATION ONCOLOGY | Facility: CLINIC | Age: 80
End: 2025-03-21
Payer: MEDICARE

## 2025-03-24 ENCOUNTER — APPOINTMENT (OUTPATIENT)
Dept: HEMATOLOGY/ONCOLOGY | Facility: CLINIC | Age: 80
End: 2025-03-24
Payer: MEDICARE

## 2025-03-24 ENCOUNTER — APPOINTMENT (OUTPATIENT)
Dept: RADIATION ONCOLOGY | Facility: CLINIC | Age: 80
End: 2025-03-24
Payer: MEDICARE

## 2025-03-25 ENCOUNTER — SOCIAL WORK (OUTPATIENT)
Dept: CASE MANAGEMENT | Facility: HOSPITAL | Age: 80
End: 2025-03-25

## 2025-03-25 ENCOUNTER — INFUSION (OUTPATIENT)
Dept: HEMATOLOGY/ONCOLOGY | Facility: CLINIC | Age: 80
End: 2025-03-25
Payer: MEDICARE

## 2025-03-25 ENCOUNTER — NUTRITION (OUTPATIENT)
Dept: HEMATOLOGY/ONCOLOGY | Facility: CLINIC | Age: 80
End: 2025-03-25
Payer: MEDICARE

## 2025-03-25 ENCOUNTER — APPOINTMENT (OUTPATIENT)
Dept: RADIATION ONCOLOGY | Facility: CLINIC | Age: 80
End: 2025-03-25
Payer: MEDICARE

## 2025-03-25 VITALS — WEIGHT: 101.63 LBS | BODY MASS INDEX: 18.7 KG/M2 | HEIGHT: 62 IN

## 2025-03-25 VITALS
HEART RATE: 79 BPM | TEMPERATURE: 98.2 F | WEIGHT: 101.63 LBS | BODY MASS INDEX: 18.59 KG/M2 | DIASTOLIC BLOOD PRESSURE: 92 MMHG | OXYGEN SATURATION: 95 % | RESPIRATION RATE: 16 BRPM | SYSTOLIC BLOOD PRESSURE: 144 MMHG

## 2025-03-25 DIAGNOSIS — C44.92 SCC (SQUAMOUS CELL CARCINOMA): Primary | ICD-10-CM

## 2025-03-25 DIAGNOSIS — C44.92 SCC (SQUAMOUS CELL CARCINOMA): ICD-10-CM

## 2025-03-25 PROCEDURE — 96375 TX/PRO/DX INJ NEW DRUG ADDON: CPT | Mod: INF

## 2025-03-25 PROCEDURE — 2500000001 HC RX 250 WO HCPCS SELF ADMINISTERED DRUGS (ALT 637 FOR MEDICARE OP): Performed by: INTERNAL MEDICINE

## 2025-03-25 PROCEDURE — 2500000004 HC RX 250 GENERAL PHARMACY W/ HCPCS (ALT 636 FOR OP/ED): Performed by: INTERNAL MEDICINE

## 2025-03-25 PROCEDURE — 96413 CHEMO IV INFUSION 1 HR: CPT

## 2025-03-25 RX ORDER — FAMOTIDINE 10 MG/ML
20 INJECTION, SOLUTION INTRAVENOUS ONCE AS NEEDED
OUTPATIENT
Start: 2025-04-15

## 2025-03-25 RX ORDER — DIPHENHYDRAMINE HYDROCHLORIDE 50 MG/ML
50 INJECTION, SOLUTION INTRAMUSCULAR; INTRAVENOUS AS NEEDED
OUTPATIENT
Start: 2025-04-01

## 2025-03-25 RX ORDER — PROCHLORPERAZINE EDISYLATE 5 MG/ML
10 INJECTION INTRAMUSCULAR; INTRAVENOUS EVERY 6 HOURS PRN
Status: CANCELLED | OUTPATIENT
Start: 2025-03-25

## 2025-03-25 RX ORDER — DIPHENHYDRAMINE HYDROCHLORIDE 50 MG/ML
50 INJECTION, SOLUTION INTRAMUSCULAR; INTRAVENOUS AS NEEDED
Status: DISCONTINUED | OUTPATIENT
Start: 2025-03-25 | End: 2025-03-25 | Stop reason: HOSPADM

## 2025-03-25 RX ORDER — FAMOTIDINE 10 MG/ML
20 INJECTION, SOLUTION INTRAVENOUS ONCE
OUTPATIENT
Start: 2025-04-08

## 2025-03-25 RX ORDER — PROCHLORPERAZINE EDISYLATE 5 MG/ML
10 INJECTION INTRAMUSCULAR; INTRAVENOUS EVERY 6 HOURS PRN
OUTPATIENT
Start: 2025-04-08

## 2025-03-25 RX ORDER — DIPHENHYDRAMINE HYDROCHLORIDE 50 MG/ML
50 INJECTION, SOLUTION INTRAMUSCULAR; INTRAVENOUS AS NEEDED
Status: CANCELLED | OUTPATIENT
Start: 2025-03-25

## 2025-03-25 RX ORDER — DIPHENHYDRAMINE HCL 50 MG
50 CAPSULE ORAL ONCE
OUTPATIENT
Start: 2025-04-08

## 2025-03-25 RX ORDER — FAMOTIDINE 10 MG/ML
20 INJECTION, SOLUTION INTRAVENOUS ONCE AS NEEDED
Status: DISCONTINUED | OUTPATIENT
Start: 2025-03-25 | End: 2025-03-25 | Stop reason: HOSPADM

## 2025-03-25 RX ORDER — EPINEPHRINE 0.3 MG/.3ML
0.3 INJECTION SUBCUTANEOUS EVERY 5 MIN PRN
OUTPATIENT
Start: 2025-04-01

## 2025-03-25 RX ORDER — PROCHLORPERAZINE MALEATE 10 MG
10 TABLET ORAL EVERY 6 HOURS PRN
OUTPATIENT
Start: 2025-04-01

## 2025-03-25 RX ORDER — DIPHENHYDRAMINE HCL 50 MG
50 CAPSULE ORAL ONCE
OUTPATIENT
Start: 2025-04-15

## 2025-03-25 RX ORDER — ALBUTEROL SULFATE 0.83 MG/ML
3 SOLUTION RESPIRATORY (INHALATION) AS NEEDED
Status: DISCONTINUED | OUTPATIENT
Start: 2025-03-25 | End: 2025-03-25 | Stop reason: HOSPADM

## 2025-03-25 RX ORDER — ALBUTEROL SULFATE 0.83 MG/ML
3 SOLUTION RESPIRATORY (INHALATION) AS NEEDED
OUTPATIENT
Start: 2025-04-08

## 2025-03-25 RX ORDER — ALBUTEROL SULFATE 0.83 MG/ML
3 SOLUTION RESPIRATORY (INHALATION) AS NEEDED
OUTPATIENT
Start: 2025-04-15

## 2025-03-25 RX ORDER — HEPARIN SODIUM,PORCINE/PF 10 UNIT/ML
50 SYRINGE (ML) INTRAVENOUS AS NEEDED
OUTPATIENT
Start: 2025-03-25

## 2025-03-25 RX ORDER — DIPHENHYDRAMINE HCL 50 MG
50 CAPSULE ORAL ONCE
OUTPATIENT
Start: 2025-04-01

## 2025-03-25 RX ORDER — DIPHENHYDRAMINE HCL 25 MG
50 CAPSULE ORAL ONCE
Status: COMPLETED | OUTPATIENT
Start: 2025-03-25 | End: 2025-03-25

## 2025-03-25 RX ORDER — PROCHLORPERAZINE EDISYLATE 5 MG/ML
10 INJECTION INTRAMUSCULAR; INTRAVENOUS EVERY 6 HOURS PRN
Status: DISCONTINUED | OUTPATIENT
Start: 2025-03-25 | End: 2025-03-25 | Stop reason: HOSPADM

## 2025-03-25 RX ORDER — EPINEPHRINE 0.3 MG/.3ML
0.3 INJECTION SUBCUTANEOUS EVERY 5 MIN PRN
Status: DISCONTINUED | OUTPATIENT
Start: 2025-03-25 | End: 2025-03-25 | Stop reason: HOSPADM

## 2025-03-25 RX ORDER — EPINEPHRINE 0.3 MG/.3ML
0.3 INJECTION SUBCUTANEOUS EVERY 5 MIN PRN
OUTPATIENT
Start: 2025-04-15

## 2025-03-25 RX ORDER — ALBUTEROL SULFATE 0.83 MG/ML
3 SOLUTION RESPIRATORY (INHALATION) AS NEEDED
OUTPATIENT
Start: 2025-04-01

## 2025-03-25 RX ORDER — ALBUTEROL SULFATE 0.83 MG/ML
3 SOLUTION RESPIRATORY (INHALATION) AS NEEDED
Status: CANCELLED | OUTPATIENT
Start: 2025-03-25

## 2025-03-25 RX ORDER — FAMOTIDINE 10 MG/ML
20 INJECTION, SOLUTION INTRAVENOUS ONCE AS NEEDED
OUTPATIENT
Start: 2025-04-08

## 2025-03-25 RX ORDER — FAMOTIDINE 10 MG/ML
20 INJECTION, SOLUTION INTRAVENOUS ONCE
OUTPATIENT
Start: 2025-04-01

## 2025-03-25 RX ORDER — EPINEPHRINE 0.3 MG/.3ML
0.3 INJECTION SUBCUTANEOUS EVERY 5 MIN PRN
OUTPATIENT
Start: 2025-04-08

## 2025-03-25 RX ORDER — EPINEPHRINE 0.3 MG/.3ML
0.3 INJECTION SUBCUTANEOUS EVERY 5 MIN PRN
Status: CANCELLED | OUTPATIENT
Start: 2025-03-25

## 2025-03-25 RX ORDER — PROCHLORPERAZINE MALEATE 10 MG
10 TABLET ORAL EVERY 6 HOURS PRN
Status: DISCONTINUED | OUTPATIENT
Start: 2025-03-25 | End: 2025-03-25 | Stop reason: HOSPADM

## 2025-03-25 RX ORDER — DIPHENHYDRAMINE HCL 50 MG
50 CAPSULE ORAL ONCE
Status: CANCELLED | OUTPATIENT
Start: 2025-03-25

## 2025-03-25 RX ORDER — FAMOTIDINE 10 MG/ML
20 INJECTION, SOLUTION INTRAVENOUS ONCE AS NEEDED
OUTPATIENT
Start: 2025-04-01

## 2025-03-25 RX ORDER — FAMOTIDINE 10 MG/ML
20 INJECTION, SOLUTION INTRAVENOUS ONCE
Status: COMPLETED | OUTPATIENT
Start: 2025-03-25 | End: 2025-03-25

## 2025-03-25 RX ORDER — PROCHLORPERAZINE MALEATE 10 MG
10 TABLET ORAL EVERY 6 HOURS PRN
Status: CANCELLED | OUTPATIENT
Start: 2025-03-25

## 2025-03-25 RX ORDER — HEPARIN 100 UNIT/ML
500 SYRINGE INTRAVENOUS AS NEEDED
OUTPATIENT
Start: 2025-03-25

## 2025-03-25 RX ORDER — PROCHLORPERAZINE MALEATE 10 MG
10 TABLET ORAL EVERY 6 HOURS PRN
OUTPATIENT
Start: 2025-04-15

## 2025-03-25 RX ORDER — DIPHENHYDRAMINE HYDROCHLORIDE 50 MG/ML
50 INJECTION, SOLUTION INTRAMUSCULAR; INTRAVENOUS AS NEEDED
OUTPATIENT
Start: 2025-04-08

## 2025-03-25 RX ORDER — PROCHLORPERAZINE MALEATE 10 MG
10 TABLET ORAL EVERY 6 HOURS PRN
OUTPATIENT
Start: 2025-04-08

## 2025-03-25 RX ORDER — PROCHLORPERAZINE EDISYLATE 5 MG/ML
10 INJECTION INTRAMUSCULAR; INTRAVENOUS EVERY 6 HOURS PRN
OUTPATIENT
Start: 2025-04-01

## 2025-03-25 RX ORDER — FAMOTIDINE 10 MG/ML
20 INJECTION, SOLUTION INTRAVENOUS ONCE AS NEEDED
Status: CANCELLED | OUTPATIENT
Start: 2025-03-25

## 2025-03-25 RX ORDER — FAMOTIDINE 10 MG/ML
20 INJECTION, SOLUTION INTRAVENOUS ONCE
OUTPATIENT
Start: 2025-04-15

## 2025-03-25 RX ORDER — PROCHLORPERAZINE EDISYLATE 5 MG/ML
10 INJECTION INTRAMUSCULAR; INTRAVENOUS EVERY 6 HOURS PRN
OUTPATIENT
Start: 2025-04-15

## 2025-03-25 RX ORDER — DIPHENHYDRAMINE HYDROCHLORIDE 50 MG/ML
50 INJECTION, SOLUTION INTRAMUSCULAR; INTRAVENOUS AS NEEDED
OUTPATIENT
Start: 2025-04-15

## 2025-03-25 RX ORDER — FAMOTIDINE 10 MG/ML
20 INJECTION, SOLUTION INTRAVENOUS ONCE
Status: CANCELLED | OUTPATIENT
Start: 2025-03-25

## 2025-03-25 RX ADMIN — DEXAMETHASONE SODIUM PHOSPHATE 10 MG: 4 INJECTION, SOLUTION INTRA-ARTICULAR; INTRALESIONAL; INTRAMUSCULAR; INTRAVENOUS; SOFT TISSUE at 12:20

## 2025-03-25 RX ADMIN — DIPHENHYDRAMINE HYDROCHLORIDE 50 MG: 25 CAPSULE ORAL at 12:19

## 2025-03-25 RX ADMIN — FAMOTIDINE 20 MG: 10 INJECTION INTRAVENOUS at 12:19

## 2025-03-25 RX ADMIN — PACLITAXEL 114 MG: 6 INJECTION, SOLUTION INTRAVENOUS at 12:57

## 2025-03-25 ASSESSMENT — PAIN SCALES - GENERAL: PAINLEVEL_OUTOF10: 0-NO PAIN

## 2025-03-25 NOTE — PROGRESS NOTES
Social Work Note  3/25/25 SW received a call from  regarding patient's co-pays.  We had talked earlier in the month.  SW asked if he had talked with billing which he had not.  SW gave him the billing ? Number and asked him to call to be sure the co-pays are being sent to [facility].  SW asked him to call back if he has any difficulties.  SW will remain available to assist patient.  LUDA Jarrell, [facility] [phone]

## 2025-03-25 NOTE — PROGRESS NOTES
"NUTRITION Assessment NOTE    Nutrition Assessment     Reason for Visit:  Amrita Grant is a 79 y.o. female seen today during her initial infusion of PACLitaxel (Weekly), 28 Day Cycles for diagnosis of Poorly differentiated carcinoma.  Consult received for weight loss and trouble swallowing.        Patient Active Problem List   Diagnosis    Subarachnoid hemorrhage (Multi)    Nontoxic multinodular goiter    Facial basal cell cancer    Other forms of systemic lupus erythematosus    Seizure (Multi)    Aneurysm of ascending aorta without rupture (CMS-HCC)    SCC (squamous cell carcinoma)       Nutrition Significant Labs:  Lab Results   Component Value Date/Time    GLUCOSE 81 03/18/2025 1259     03/18/2025 1259    K 4.3 03/18/2025 1259     03/18/2025 1259    CO2 29 03/18/2025 1259    ANIONGAP 12 03/18/2025 1259    BUN 24 (H) 03/18/2025 1259    CREATININE 0.81 03/18/2025 1259    EGFR 75 03/18/2025 1259    CALCIUM 9.4 03/18/2025 1259    ALBUMIN 4.1 03/18/2025 1259    ALKPHOS 73 03/18/2025 1259    PROT 6.6 03/18/2025 1259    AST 17 03/18/2025 1259    BILITOT 0.5 03/18/2025 1259    ALT 16 03/18/2025 1259     No results found for: \"VITD25\"      Anthropometrics:  Height: 157.5 cm (5' 2.01\") (height taken from ER visit on 1/27/2025)   Weight: 46.1 kg (101 lb 10.1 oz)   BMI (Calculated): 18.58    IBW/kg (Dietitian Calculated): 50 kg Percent of IBW: 92 %          Weight History:   Daily Weight  03/25/25 : 46.1 kg (101 lb 10.1 oz)  03/25/25 : 46.1 kg (101 lb 10.1 oz)  03/17/25 : 45.6 kg (100 lb 8 oz)  03/11/25 : 46 kg (101 lb 6.6 oz)  03/05/25 : 45.8 kg (101 lb)  03/04/25 : 46.1 kg (101 lb 10.1 oz)  02/25/25 : 46.9 kg (103 lb 6.3 oz)  02/05/25 : 47.3 kg (104 lb 4.4 oz)  02/03/25 : 47.7 kg (105 lb 0.8 oz)  01/06/25 : 46.3 kg (102 lb 1.6 oz)    Weight Change %:  Weight History / % Weight Change: 33% wt loss in the past year  Significant Weight Loss: Yes  Interpretation of Weight Loss: >20% in 1 year    Nutrition " History:  Food & Nutrition History: Patient reported appetite is decreased with trouble swallowing.  Reports trouble swallowing meats, breads, and some liquids are hard to drink.  Food Allergies:    Food Intolerances:    Vitamin/mineral intake:       Herbal supplements:    Medication and Complementary/Alternative Medicine Use:    Dentition:    Sleep Habits:      Diet Recall:  Meal 1: bowl of life cereal with almond milk  Snack 1:    Meal 2:    Snack 2:    Meal 3: pork steak with barbecue sauce.  Snack 3:    Food Variety:    Oral Nutrition Supplement Use: Oral Nutrition Supplements: Other Frequency: maybe 1 per day Calories: 230 calories Protein: 42g  Fluid Intake: Poor  Energy Intake: Poor < 50 %    Food Preparation:  Cooking: Spouse/Significant Other  Grocery Shopping:    Dining Out:      Medications:  Current Outpatient Medications   Medication Instructions    ARIPiprazole (Abilify) 5 mg tablet 1 tablet, Daily    Bystolic 5 mg tablet 1 tab(s) orally half tablet three times daily for 30 days    calcium carbonate 600 mg, Daily RT    cetirizine (ZyrTEC) 10 mg tablet 1 tab(s) orally as needed    cholecalciferol (Vitamin D-3) 25 MCG (1000 UT) capsule 1 cap(s) orally three times daily    docusate sodium (Colace) 100 mg capsule Every 24 hours    Erivedge 150 mg, oral, Every other day, Take with or without food. Swallow whole. Do not open or crush.    fluticasone (Flonase) 50 mcg/actuation nasal spray 2 sprays    levETIRAcetam (Keppra) 500 mg tablet Every 12 hours    LORazepam (Ativan) 0.5 mg tablet Take half tablet 1 hour before MRI, if needed take other half of tablet while getting checked in for MRI    losartan (COZAAR) 50 mg, 2 times daily    melatonin 3 mg tablet Daily RT    memantine (NAMENDA) 5 mg, Daily RT    mirtazapine (Remeron) 15 mg tablet 1 tab(s) orally half tablet bedtime daily for 30 days    omeprazole (PRILOSEC) 20 mg, Daily RT    ondansetron (ZOFRAN) 4 mg, As needed    ondansetron (ZOFRAN) 8 mg, oral,  Every 8 hours PRN    PlaqueniL 200 mg tablet 1 tablet, Daily    potassium chloride CR 20 mEq ER tablet 1 tablet, Daily    Premarin 0.3 mg tablet Every 24 hours    prochlorperazine (COMPAZINE) 10 mg, oral, Every 6 hours PRN    warfarin (COUMADIN) 4 mg, Daily RT       Nutrition Focused Physical Exam Findings:    Subcutaneous Fat Loss:   Orbital Fat Pads: Severe (dark circles, hollowing and loose skin)  Buccal Fat Pads: Severe (hollow, sunken and narrow face)    Muscle Wasting:  Temporalis: Severe (hollowed scooping depression)  Pectoralis (Clavicular Region): Severe (protruding prominent clavicle)  Interosseous: Severe (depressed area between thumb and forefinger)    Physical Findings:  Mouth Findings: Dysphagia       Estimated Needs:       Total Energy Estimated Needs in 24 hours (kCal): 1600 kCal  Method for Estimating Needs: 1435-7711 kcals/day (35-40kcals/kg)  Total Protein Estimated Needs in 24 Hours (g): 60 g  Method for Estimating 24 Hour Protein Needs: 60-70g/day (1.3-1.5g/kg)  Total Fluid Estimated Needs in 24 Hours (mL): 1600 mL  Method for Estimating 24 Hour Fluid Needs: 1ml/kcal             Nutrition Diagnosis   Malnutrition Diagnosis  Patient has Malnutrition Diagnosis: Yes  Diagnosis Status: New  Malnutrition Diagnosis: Severe malnutrition related to chronic disease or condition  Related to: dysphagia  As Evidenced by: wt loss >20% in 1 year, and meeting <75% of estimated nutritional needs for > 1 month.    Nutrition Diagnosis  Patient has Nutrition Diagnosis: Yes  Diagnosis Status (1): New  Nutrition Diagnosis 1: Increased nutrient needs  Related to (1): increased protein, calorie, and protein needs  As Evidenced by (1): chemotherapy for diagnosis of squamous cell carcinoma       Nutrition Interventions/Recommendations   Nutrition Prescription: Oral nutrition Smaller frequent meals including softer/pureed protein rich foods; meeting adequate hydration.    Nutrition Interventions:   Food and Nutrient  Delivery: Meals & Snacks: Energy-modified diet, Fluid-modified diet, Modification of schedule of oral intake, Protein-modified diet, Texture-Modified Diet  Goals: Encouraged intake of high calorie high protein softer/pureed foods in small frequent meals throughout the day; aim for at least 6 meals/ snacks daily. ONS may be used to help meet nutritional needs.  Medical Food Supplement: Commercial beverage medical food supplement therapy  Goals: Recommend high calorie high protein options; suggested changing from 42g protein supplement to Ensure plus; consume 1-3 per day as tolerated to closer meet estimated nutritional needs.  Other:: Hydration  Goals: Encouraged adequate fluid intake; discussed foods that count towards fluid     Coordination of Care: Collaboration and referral of nutrition care: Collaboration and Referral of Nutrition Care  Goals: will continue to follow up throughout treatments; Recommend referral to SLP for swallow evaluation.     Nutrition Education:   Nutrition Education Content: Content related nutrition education  High calorie high protein diet; adjusted consistency for swallowing; provided eating hints book and discussed.           Nutrition Monitoring and Evaluation   Food and Nutrient Intake  Monitoring and Evaluation Plan: Energy intake, Fluid intake, Meal/snack pattern, Protein intake, Amount of food  Energy Intake: Estimated energy intake  Criteria: Meet 100% of caloric needs; dietary recall  Fluid Intake: Estimated fluid intake  Criteria: Maintain hydration of at least 64 oz per day.  Amount of Food: Medical food intake  Criteria: Ensure plus drink 1-3 per day between meals as tolerated.  Meal/Snack Pattern: Estimated meal and snack pattern  Criteria: increase frequency to smaller frequent meals/snacks, dietary recall  Estimated protein intake: Estimated protein intake  Criteria: Meet 100% of protein needs, dietary recall    Anthropometric measurements  Monitoring and Evaluation Plan:  Weight  Body Weight: Measured body weight  Criteria: Maintain weight    Biochemical Data, Medical Tests and Procedures  Monitoring and Evaluation Plan: Electrolyte/renal panel  Criteria: labs WNL              Follow Up: Planned follow up visit: will follow up at next [facility] infusion in 1 week.      Time Spent  Prep time on day of patient encounter: 10 minutes  Time spent directly with patient, family or caregiver: 15 minutes  Additional Time Spent on Patient Care Activities: 5 minutes  Documentation Time: 15 minutes  Other Time Spent: 0 minutes  Total: 45 minutes

## 2025-03-25 NOTE — SIGNIFICANT EVENT
03/25/25 1201   Prechemo Checklist   Has the patient been in the hospital, ED, or urgent care since last date of service N/A  (1st dose)   Chemo/Immuno Consent Completed and Signed Yes   Protocol/Indications Verified Yes   Confirmed to previous date/time of medication N/A  (1st dose)   Compared to previous dose N/A  (1st dose)   All medications are dated accurately Yes   Pregnancy Test Negative Not applicable   Parameters Met Yes   BSA/Weight-Height Verified Yes   Dose Calculations Verified (current, total, cumulative) Yes

## 2025-03-26 ENCOUNTER — APPOINTMENT (OUTPATIENT)
Dept: RADIATION ONCOLOGY | Facility: CLINIC | Age: 80
End: 2025-03-26
Payer: MEDICARE

## 2025-03-26 ENCOUNTER — HOSPITAL ENCOUNTER (OUTPATIENT)
Dept: RADIATION ONCOLOGY | Facility: CLINIC | Age: 80
Setting detail: RADIATION/ONCOLOGY SERIES
Discharge: HOME | End: 2025-03-26
Payer: MEDICARE

## 2025-03-26 DIAGNOSIS — C44.92 SCC (SQUAMOUS CELL CARCINOMA): Primary | ICD-10-CM

## 2025-03-26 DIAGNOSIS — Z95.828 H/O INSERTION OF CENTRAL VENOUS ACCESS PORT: ICD-10-CM

## 2025-03-26 PROCEDURE — 99211 OFF/OP EST MAY X REQ PHY/QHP: CPT | Performed by: RADIOLOGY

## 2025-03-26 ASSESSMENT — ENCOUNTER SYMPTOMS
FEVER: 0
PSYCHIATRIC NEGATIVE: 1
CHILLS: 0
GASTROINTESTINAL NEGATIVE: 1
HEMATOLOGIC/LYMPHATIC NEGATIVE: 1
SCLERAL ICTERUS: 0
UNEXPECTED WEIGHT CHANGE: 0
APPETITE CHANGE: 0
RESPIRATORY NEGATIVE: 1
MUSCULOSKELETAL NEGATIVE: 1
ENDOCRINE NEGATIVE: 1
WOUND: 1
DIAPHORESIS: 0
SORE THROAT: 0
CARDIOVASCULAR NEGATIVE: 1
FATIGUE: 1
TROUBLE SWALLOWING: 1
VOICE CHANGE: 0
NEUROLOGICAL NEGATIVE: 1
EYE PROBLEMS: 1

## 2025-03-26 NOTE — PROGRESS NOTES
"Radiation Oncology Nursing Note    Pain: The patient's current pain level was assessed.  They report currently having a pain of 0 out of 10.  They feel their pain is under control without the use of pain medications.    Review of Systems:  Review of Systems   Constitutional:  Positive for fatigue. Negative for appetite change, chills, diaphoresis, fever and unexpected weight change.   HENT:   Positive for lump/mass (right nose) and trouble swallowing (baseline). Negative for hearing loss, mouth sores, nosebleeds, sore throat, tinnitus and voice change.    Eyes:  Positive for eye problems (right side itchy and tearing). Negative for icterus.   Respiratory: Negative.     Cardiovascular: Negative.    Gastrointestinal: Negative.    Endocrine: Negative.    Genitourinary: Negative.     Musculoskeletal: Negative.    Skin:  Positive for wound (right nose- using \"yaron\"). Negative for itching and rash.   Neurological: Negative.    Hematological: Negative.    Psychiatric/Behavioral: Negative.           "

## 2025-03-27 ENCOUNTER — APPOINTMENT (OUTPATIENT)
Dept: RADIATION ONCOLOGY | Facility: CLINIC | Age: 80
End: 2025-03-27
Payer: MEDICARE

## 2025-03-27 NOTE — PROGRESS NOTES
Radiation Oncology Follow-Up VIRTUAL    Patient Name:  Amrita Grant  MRN:  [id number]  :  1945    Referring Provider: Carlie Templeton MD  Primary Care Provider: Charito Ribeiro DO  Care Team: Patient Care Team:  Charito Ribeiro DO as PCP - General (Internal Medicine)  Bindu Guzmán MD as Consulting Physician (Hematology and Oncology)  Kristen Carr RN as Nurse Navigator (Hematology and Oncology)  Dipika Perrin MD as On Call Attending Physician (Hematology and Oncology)  Libby Spann MD as Consulting Physician (Hematology and Oncology)    Date of Service: 3/26/2025    Virtual Visit Statement: An interactive audio and video telecommunications system which permits real-time communications between the patient at the originating site and provider at the distant site was utilized to provide this telehealth service.    Verbal consent for encounter: Verbal consent was requested and obtained from the patient on this date for a telehealth visit.    IDENTIFICATION:  Ms. Amrita Grant is a 79 y.o.-year-old with rapidly growing right nasal subcutaneous tumor with pathology demonstrating poorly differentiated carcinoma along right nasal region initially pathology favoring basal celll carcinoma, later reviewed and modified to squamous cell carcinoma.  She had metastatic disease on imaging (biopsy L1 confirmed).  She completed palliative radiation therapy to right subcutaneous nasal lesion on 3/11/2025 40.05 Gy in 15 fractions.  She presents for FU.      Treatment Rendered:   Radiation Therapy    Treatment Period Technique Fraction Dose Fractions Total Dose   Course 1 2025-3/11/2025  (days elapsed: 21)         Right Nose 2025-3/11/2025 VMAT 267 / 267 cGy 15 / 15 4005 / 4,005 cGy        ONCOLOGIC HISTORY     Patient first noted swelling/bump along the right side of her nasal bridge about 2 months ago.  Her glasses didn't fit right.  She was referred to ENT.     2024 ENT Evaluation (Dr Sexton  Jareth)   Exam: Large firm mass right nasal dorsum/sidewall.  CN 2-12 intact  Nasal Endoscopy: discoloration and erythema of the right endonasal side wall, no over mass extending into nasal cavity.  Septum and turbinates are wnl.     Biopsies were taken of the nasal lesion.  Pathology: invasive poorly differentiated carcinoma  There were irregular shaped nests of basaloid epithelia cells with focal ductal differentiation in the deep dermis.  Notably no epidermal involvement was seen. P40 positive and negative for MOC31.  Ddx:  primary cutaneous neoplasm such as an infiltrative basal cell carcinoma or an adnexal carcinoma. As there is no epidermal involvement and MOC31 expression, tumor of sinonasal origin cannot be excluded.     11/24/2024 A second biopsy was taken by Dr Templeton for additional tissue for pathologic eval.  Additional immunostains sent: P16 positive, chromogranin and ISM1 negative.  Isolated tumor cells positive for BerEP4.  Ddx remains unchanged.         12/4/2024 CT Neck:   2.1 x 2.1 x 4.1 cm mass right/midline nasal soft tissues which eroides the adjacent right and anterior nasal bone and protrudes into the superior and anterior portion of the right nasal cavity and probably invades the cartilaginous portion of the osseous nasal septum.  There is thinning of the anterior most portion of the osseous nasal septum which may be partially eroded.  Mass is in close proximity to the right nasolacrimal duct.  Mass does not extend into the right orbit.  No cervical JESUS.        12/4/2024 CT Chest:  - Indeterminate 0.9 x 1.3 x 2.2 cm soft tissue density along left lateral surface ascending thoracic aorta. May represent lymph node or cyst.  May characterize further with MRI.   - Soft tissue density in upper thoracic esophagus left of midline.  ?lesion in esophagus versus retained food product.  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  Aneurysmal dilatation of the thoracic aorta.  -s/p cervical  laminectomy with hardware     12/16/2024 MRI Chest:  - 1.2 cm ovoid structure anterior mediastinum without contrast enhancement corresponding to lesion seen on chest CT c/w proteinaceous cyst.  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  Recommendation was radiation versus immunotherapy.  Surgery was deemed to be too morbid     1/6/2025 Med Onc Eval (Dr Bindu Guzmán): given active Lupus and co-morbidities, favored radiation over systemic therapy.  Patient lives > 1 hour from [facility], requested referral to [facility] for radiation     1/10/2025 [facility] Rad Onc Eval (): ordered staging scans, planned for radiation.     1/20/2025 PET-CT:  FDG avid mass involving right nasal soft tissues 3.2 x 2.9 cm SUV 22.5  FDG avid small right level 1 cervical node 0.6 cm SUV 6.9  FDG avid bone mets: right second rib SUV 8.6, Right 7th rib SUV 6.6, L1 VB transverse process SUV 5.5        1/23/2025 MRI Brain: large heterogeneous enhancing mass centered on nasal bridge with direct invasion/erosion of nasal bone, anterior nasal septum and right frontal maxillary process.  Mass measures 3.2 x 2.9 x 4.8 cm.  It extends to pre-septal soft tissues medially, no definite involvement of globe.  Mass extends to right lacrimal duct likely due to direct invasion.  No definite extension to right frontal sinus or intracranial extension     Referred back to Dr Guzmán for consideration of systemic therapy in setting of mets.     2/3/2025 DR Guzmán: Plan for Visomogenib, biopsy bone lesion (IR referral placed), NGS planned, referred for palliative RT    2/18/2025-3/11/2025 Palliative RT to right subcutaneous nasal mass (rapidly growing causing cosmetic changes and tearing of eye).  40.05 Gy in 15 fractions.    3/3/2025  [facility] path review - squamous cell carcinoma P16 positive, multi-d discussion, plan to continue palliative intent radiation therapy, systemic therapy modifcation.  DC vismodegib, consider palliative chemo post-RT    3/5/2025 CT Guided biopsy L1: metastatic carcinoma c/w with patients primary tumor    3/17/2025 Dr Perrin: paclitaxel chemotherapy recommended, she started last week.    INTERVAL HISTORY:  Her tumor continues to regress and she notes improvement each day.  She has no issues with her right eye.  No visual changes or dryness.  She tolerated her first cycle of chemotherapy ok.    Review of Systems:   Review of Systems - Oncology       OBJECTIVE  Vital Signs:  There were no vitals taken for this visit.  Physical Exam  Well appearing NAD  Significant regression of ulcerating mass along right nose, no sclera injection.         ASSESSMENT:   Ms. Amrita Grant is a 79 y.o.-year-old with rapidly growing right nasal subcutaneous tumor with pathology demonstrating poorly differentiated carcinoma along right nasal region initially pathology favoring basal celll carcinoma, later reviewed and modified to squamous cell carcinoma.  She had metastatic disease on imaging (biopsy L1 confirmed).  She completed palliative radiation therapy to right subcutaneous nasal lesion on 3/11/2025 40.05 Gy in 15 fractions.  She presents for FU.      She is clinically improving after completion of palliative RT.  She feels much better.  She has started chemo and tolerating ok    PLAN:    - Continue systemic therapy with Dr Perrin  -FU with me in 3 months- virtual  - FU sooner prn.    Chetna Gutierrez MD

## 2025-03-28 ENCOUNTER — APPOINTMENT (OUTPATIENT)
Dept: RADIATION ONCOLOGY | Facility: CLINIC | Age: 80
End: 2025-03-28
Payer: MEDICARE

## 2025-03-28 ENCOUNTER — SOCIAL WORK (OUTPATIENT)
Dept: CASE MANAGEMENT | Facility: HOSPITAL | Age: 80
End: 2025-03-28
Payer: MEDICARE

## 2025-03-28 ENCOUNTER — HOSPITAL ENCOUNTER (OUTPATIENT)
Dept: HOSPITAL 100 - MTLAB | Age: 80
Discharge: HOME | End: 2025-03-28
Payer: MEDICARE

## 2025-03-28 DIAGNOSIS — Z79.01: Primary | ICD-10-CM

## 2025-03-28 DIAGNOSIS — Z95.2: ICD-10-CM

## 2025-03-28 LAB — PROTHROMBIN TIME (PROTIME)PT.: 32 SECONDS (ref 11.7–14.9)

## 2025-03-28 PROCEDURE — 36415 COLL VENOUS BLD VENIPUNCTURE: CPT

## 2025-03-28 PROCEDURE — 85610 PROTHROMBIN TIME: CPT

## 2025-03-28 NOTE — PROGRESS NOTES
Social Work Note  3/28/2025 SW reached out to  to see if he had called billing about patient's co-pays.  He said he hadn't and had to find the number.  He was able to locate it and said he may try today.  SW asked if we could call together and he wanted to do this.  We will try to meet on Monday while wife is in infusion and call the  Billing department.  SW will remain available to assist patient.  LUDA Jarrell, [facility] [phone]

## 2025-03-31 ENCOUNTER — SOCIAL WORK (OUTPATIENT)
Dept: CASE MANAGEMENT | Facility: HOSPITAL | Age: 80
End: 2025-03-31

## 2025-03-31 ENCOUNTER — APPOINTMENT (OUTPATIENT)
Dept: HEMATOLOGY/ONCOLOGY | Facility: CLINIC | Age: 80
End: 2025-03-31
Payer: MEDICARE

## 2025-03-31 ENCOUNTER — INFUSION (OUTPATIENT)
Dept: HEMATOLOGY/ONCOLOGY | Facility: CLINIC | Age: 80
End: 2025-03-31
Payer: MEDICARE

## 2025-03-31 ENCOUNTER — OFFICE VISIT (OUTPATIENT)
Dept: HEMATOLOGY/ONCOLOGY | Facility: CLINIC | Age: 80
End: 2025-03-31
Payer: MEDICARE

## 2025-03-31 ENCOUNTER — LAB (OUTPATIENT)
Dept: LAB | Facility: CLINIC | Age: 80
End: 2025-03-31
Payer: MEDICARE

## 2025-03-31 ENCOUNTER — APPOINTMENT (OUTPATIENT)
Dept: RADIATION ONCOLOGY | Facility: CLINIC | Age: 80
End: 2025-03-31
Payer: MEDICARE

## 2025-03-31 VITALS
SYSTOLIC BLOOD PRESSURE: 156 MMHG | DIASTOLIC BLOOD PRESSURE: 96 MMHG | TEMPERATURE: 97.5 F | WEIGHT: 105.9 LBS | OXYGEN SATURATION: 98 % | RESPIRATION RATE: 16 BRPM | BODY MASS INDEX: 19.36 KG/M2 | HEART RATE: 83 BPM

## 2025-03-31 VITALS — HEIGHT: 62 IN | BODY MASS INDEX: 19.29 KG/M2

## 2025-03-31 DIAGNOSIS — C44.92 SCC (SQUAMOUS CELL CARCINOMA): ICD-10-CM

## 2025-03-31 DIAGNOSIS — C44.92 SCC (SQUAMOUS CELL CARCINOMA): Primary | ICD-10-CM

## 2025-03-31 LAB
ALBUMIN SERPL BCP-MCNC: 4.2 G/DL (ref 3.4–5)
ALP SERPL-CCNC: 58 U/L (ref 33–136)
ALT SERPL W P-5'-P-CCNC: 17 U/L (ref 7–45)
ANION GAP SERPL CALC-SCNC: 13 MMOL/L (ref 10–20)
AST SERPL W P-5'-P-CCNC: 21 U/L (ref 9–39)
BASOPHILS # BLD AUTO: 0.03 X10*3/UL (ref 0–0.1)
BASOPHILS NFR BLD AUTO: 0.6 %
BILIRUB SERPL-MCNC: 0.8 MG/DL (ref 0–1.2)
BUN SERPL-MCNC: 16 MG/DL (ref 6–23)
CALCIUM SERPL-MCNC: 9.6 MG/DL (ref 8.6–10.3)
CHLORIDE SERPL-SCNC: 101 MMOL/L (ref 98–107)
CO2 SERPL-SCNC: 29 MMOL/L (ref 21–32)
CREAT SERPL-MCNC: 0.66 MG/DL (ref 0.5–1.05)
EGFRCR SERPLBLD CKD-EPI 2021: 90 ML/MIN/1.73M*2
EOSINOPHIL # BLD AUTO: 0.06 X10*3/UL (ref 0–0.4)
EOSINOPHIL NFR BLD AUTO: 1.2 %
ERYTHROCYTE [DISTWIDTH] IN BLOOD BY AUTOMATED COUNT: 12.6 % (ref 11.5–14.5)
GLUCOSE SERPL-MCNC: 95 MG/DL (ref 74–99)
HCT VFR BLD AUTO: 38.8 % (ref 36–46)
HGB BLD-MCNC: 12.3 G/DL (ref 12–16)
IMM GRANULOCYTES # BLD AUTO: 0.01 X10*3/UL (ref 0–0.5)
IMM GRANULOCYTES NFR BLD AUTO: 0.2 % (ref 0–0.9)
LYMPHOCYTES # BLD AUTO: 0.73 X10*3/UL (ref 0.8–3)
LYMPHOCYTES NFR BLD AUTO: 14.1 %
MCH RBC QN AUTO: 32.1 PG (ref 26–34)
MCHC RBC AUTO-ENTMCNC: 31.7 G/DL (ref 32–36)
MCV RBC AUTO: 101 FL (ref 80–100)
MONOCYTES # BLD AUTO: 0.27 X10*3/UL (ref 0.05–0.8)
MONOCYTES NFR BLD AUTO: 5.2 %
NEUTROPHILS # BLD AUTO: 4.09 X10*3/UL (ref 1.6–5.5)
NEUTROPHILS NFR BLD AUTO: 78.7 %
NRBC BLD-RTO: ABNORMAL /100{WBCS}
PLATELET # BLD AUTO: 181 X10*3/UL (ref 150–450)
POTASSIUM SERPL-SCNC: 4.5 MMOL/L (ref 3.5–5.3)
PROT SERPL-MCNC: 6.8 G/DL (ref 6.4–8.2)
RBC # BLD AUTO: 3.83 X10*6/UL (ref 4–5.2)
SODIUM SERPL-SCNC: 138 MMOL/L (ref 136–145)
WBC # BLD AUTO: 5.2 X10*3/UL (ref 4.4–11.3)

## 2025-03-31 PROCEDURE — 99214 OFFICE O/P EST MOD 30 MIN: CPT | Performed by: NURSE PRACTITIONER

## 2025-03-31 PROCEDURE — 85025 COMPLETE CBC W/AUTO DIFF WBC: CPT

## 2025-03-31 PROCEDURE — G2211 COMPLEX E/M VISIT ADD ON: HCPCS | Performed by: NURSE PRACTITIONER

## 2025-03-31 PROCEDURE — 80053 COMPREHEN METABOLIC PANEL: CPT

## 2025-03-31 PROCEDURE — 1125F AMNT PAIN NOTED PAIN PRSNT: CPT | Performed by: NURSE PRACTITIONER

## 2025-03-31 PROCEDURE — 36415 COLL VENOUS BLD VENIPUNCTURE: CPT

## 2025-03-31 PROCEDURE — 1159F MED LIST DOCD IN RCRD: CPT | Performed by: NURSE PRACTITIONER

## 2025-03-31 ASSESSMENT — PAIN SCALES - GENERAL: PAINLEVEL_OUTOF10: 3

## 2025-03-31 NOTE — PROGRESS NOTES
Pt infusion appointment rescheduled for next Monday 4/7/25, after mediport placement on Wednesday 4/2/25.  After several attempts, it was determined by nursing management that the pt veins were unable to be safely accessed today.  Pt and family given new appointment times:    Lab draw 10:45  SARA Escobedo, NP visit at 11:00  Infusion (chemo) at 12:15

## 2025-03-31 NOTE — PROGRESS NOTES
[facility] - Medical Oncology Follow-Up Visit    Patient ID: Amrita Grant is a 79 y.o. female.  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 Grant is a 80 yo with PMH significant for lupus, aortic valve repair, fibromyalgia, who first noticed a mass on her nose in fall of 2024. Met Dr. Templeton who did an in-office biopsy 11/11/24 with invasive, poorly differentiated carcinoma, p40+, negative MOC31, unclear etiology, and recommended for repeat biopsy. Biopsy on 11/26/24 with the same, felt to be a primary cutaneous neoplasm such as basal cell carcinoma. Discussed at [facility] tumor board, and due to significant morbidity associated with resection, recommended against surgery and referred to radiation oncology and medical oncology. Met Dr. Guzmán, who recommended against immunotherapy given her lupus and poor performance status, and recommended discussion with radiation oncology, which cutaneous tumor board also agreed with. She planned to have radiation closer to home, but unfortunately her pre-radiation scans showed concern for metastatic disease, with PET/CT on 1/20/25 with FDG avidity of the R nasal soft tissue as well as R level 1 cervical lymph node, R rights, L1, and RUL nodule below PET resolution. She was ultimately started on vismodegib as well as started on palliative radiation (2/18-3/11/25) for the nasal lesion. In the meantime, [facility] pathology revealed invasive carcinoma,  HPV associated, positive for p40 CK5/6, EMA, p16, and negative for MOC, BerEP4, INSM1, and chromogranin. As this was felt to no longer be a basal cell carcinoma, she stopped the vismodegib. Biopsy of the L1 lesion morphologically the same.     Regarding her lupus - she has been on plaquenil monotherapy for many years, stable on the same dose. She was originally diagnosed with lupus in 2001 - she was initially diagnosed with pericarditis. She has fatigue and joint aches with the lupus.     She is here today with her  and friend. Her last day of radiation was last Tuesday or Wednesday. Radiation went ok overall. Her last dose of the visdomegib was last Tuesday or Wednesday. No side effects.  She is very tired, naps every day. In the last year, she has stopped doing the cooking, cleaning, and laundry. She is very careful, leans on her  to help her get up the stairs. Her fatigue has been getting worse and worse. She continues to lose weight - 50 lbs in the last year and a half. She has trouble swallowing - some things are harder to swallow like bread, meat. Gets tickles in her throat. She's had this for a couple years, had her throat stretched - this was also just repeated in December. Done at [facility] typically, helps for a little while, but don't last.      PAST MEDICAL HISTORY  Lupus  Fibromyalgia  Osteoarthritis  Aortic valve repair (mechanical)  on warfarin  HTN  CVA - (2021)     SOCIAL HISTORY  Lives at home with her . They've been  45 years. Previously was a  for [employer].   Tob: none  EtOH: none, previously a little  Illicits: none     FAMILY HISTORY  Mother - uterine cancer  Maternal grandmother - breast cancer  Maternal cousin - unknown metastatic cancer    HPI    Present in clinic for follow-up and readiness to treat visit.  Spouse and friend Amy present.  Reports mild fatigue with her 1st tx.  Always tired with routine naps.  Hungry, has an appetite with noted wt gain.   Denies n/v/d.  Constipated - since resolved.  Denies cough with intake. No odynophagia.  Current treatment for UTI, 10-day course.  ATB course started prior to C1D1.  Rx provided by PCP.  Plans to  wig tomorrow.  Reports pain to [facility] - for reported valve growth - not new.  Denies fevers, chills, or CP.  No other concerns.  Labs prior to treatment.     Meds (Current):    Current Outpatient Medications:     ARIPiprazole (Abilify) 5 mg tablet, Take 1 tablet (5 mg) by mouth once daily., Disp: , Rfl:     Bystolic 5 mg tablet, 1 tab(s) orally half tablet three times daily for 30 days, Disp: , Rfl:     calcium carbonate 600 mg calcium (1,500 mg) tablet, Take 600 mg by mouth once daily., Disp: , Rfl:     cetirizine (ZyrTEC) 10 mg tablet, 1 tab(s) orally as needed, Disp: , Rfl:     cholecalciferol (Vitamin D-3) 25 MCG (1000 UT) capsule, 1 cap(s) orally three times daily, Disp: , Rfl:     docusate sodium (Colace) 100 mg capsule, once every 24 hours., Disp: , Rfl:     fluticasone (Flonase) 50 mcg/actuation nasal spray, Administer 2 sprays into affected nostril(s)., Disp: , Rfl:     levETIRAcetam (Keppra) 500 mg tablet, every 12 hours., Disp: , Rfl:     LORazepam (Ativan) 0.5 mg tablet, Take half tablet 1 hour before MRI, if needed take other half of tablet while getting checked in for MRI, Disp: 1 tablet, Rfl: 0    losartan (Cozaar) 100 mg tablet, Take 0.5 tablets (50 mg) by mouth twice a day., Disp: , Rfl:     melatonin 3 mg tablet, Take by mouth once daily., Disp: , Rfl:     memantine (Namenda) 5 mg tablet, Take 1 tablet (5 mg) by mouth once daily., Disp: , Rfl:     mirtazapine (Remeron) 15 mg tablet, 1 tab(s) orally half tablet bedtime daily for 30 days, Disp: , Rfl:     omeprazole (PriLOSEC) 20 mg DR capsule, Take 1 capsule (20 mg) by mouth once daily., Disp: , Rfl:     ondansetron (Zofran) 4 mg tablet, Take 1 tablet (4 mg) by mouth if needed for nausea or vomiting., Disp: , Rfl:     ondansetron (Zofran) 8 mg tablet,  Take 1 tablet (8 mg) by mouth every 8 hours if needed for nausea or vomiting., Disp: 30 tablet, Rfl: 5    PlaqueniL 200 mg tablet, Take 1 tablet (200 mg) by mouth once daily., Disp: , Rfl:     potassium chloride CR 20 mEq ER tablet, Take 1 tablet (20 mEq) by mouth once daily., Disp: , Rfl:     Premarin 0.3 mg tablet, once every 24 hours., Disp: , Rfl:     prochlorperazine (Compazine) 10 mg tablet, Take 1 tablet (10 mg) by mouth every 6 hours if needed for nausea or vomiting., Disp: 30 tablet, Rfl: 5    vismodegib (Erivedge) 150 mg capsule, Take 1 capsule (150 mg total) by mouth every other day.  Take with or without food. Swallow whole. Do not open or crush., Disp: 14 capsule, Rfl: 3    warfarin (Coumadin) 4 mg tablet, Take 1 tablet (4 mg) by mouth once daily., Disp: , Rfl:     Allergies   Allergen Reactions    Propofol Nausea/vomiting    Sulfa (Sulfonamide Antibiotics) Other    Opioids - Morphine Analogues Nausea And Vomiting, Other and Unknown    Penicillins Unknown     Pt. Reports passing out upon taking penicillin when she was young.       Review of Systems   Constitutional:  Positive for fatigue.   HENT:   Positive for sore throat.    Cardiovascular: Negative.    Gastrointestinal:  Positive for constipation.   All other systems reviewed and are negative.       Objective   BSA: 1.45 meters squared  Wt Readings from Last 5 Encounters:   03/31/25 48 kg (105 lb 14.4 oz)   03/25/25 46.1 kg (101 lb 10.1 oz)   03/25/25 46.1 kg (101 lb 10.1 oz)   03/17/25 45.6 kg (100 lb 8 oz)   03/11/25 46 kg (101 lb 6.6 oz)     BP (!) 156/96   Pulse 83   Temp 36.4 °C (97.5 °F)   Resp 16   Wt 48 kg (105 lb 14.4 oz)   SpO2 98%   BMI 19.36 kg/m²     ECOG Score: 3- Capable of only limited selfcare, confined to bed/chair > 50% of waking hrs.      Physical Exam  Vitals reviewed.   Constitutional:       General: She is not in acute distress.  HENT:      Head: Normocephalic.      Nose:      Comments: Large, erythematous, nasal lesion  with scabbing - improved.     Mouth/Throat:      Mouth: Mucous membranes are moist.   Eyes:      Conjunctiva/sclera: Conjunctivae normal.      Pupils: Pupils are equal, round, and reactive to light.   Cardiovascular:      Rate and Rhythm: Normal rate and regular rhythm.      Heart sounds: Normal heart sounds. No murmur heard.  Pulmonary:      Effort: Pulmonary effort is normal. No respiratory distress.      Breath sounds: Normal breath sounds.   Abdominal:      General: Abdomen is flat. Bowel sounds are normal.      Palpations: Abdomen is soft.   Musculoskeletal:         General: Normal range of motion.      Cervical back: Normal range of motion.   Skin:     General: Skin is warm and dry.      Findings: No erythema.   Neurological:      General: No focal deficit present.      Mental Status: She is alert and oriented to person, place, and time.   Psychiatric:         Mood and Affect: Mood normal.         Behavior: Behavior normal.         Thought Content: Thought content normal.          Results:  Labs:  Lab Results   Component Value Date    WBC 5.2 03/31/2025    HGB 12.3 03/31/2025    HCT 38.8 03/31/2025     (H) 03/31/2025     03/31/2025      Lab Results   Component Value Date    NEUTROABS 4.09 03/31/2025      Lab Results   Component Value Date    GLUCOSE 95 03/31/2025    CALCIUM 9.6 03/31/2025     03/31/2025    K 4.5 03/31/2025    CO2 29 03/31/2025     03/31/2025    BUN 16 03/31/2025    CREATININE 0.66 03/31/2025     Lab Results   Component Value Date    ALT 17 03/31/2025    AST 21 03/31/2025    ALKPHOS 58 03/31/2025    BILITOT 0.8 03/31/2025        Imaging:  No new imaging.      Pathology:    Lab Results   Component Value Date    ADD1  11/26/2024     Additional immunostains were reviewed.  Tumor cells are positive for p16 and negative for chromogranin and INSM1.  Isolated tumor cells are positive for BerEP4.  The differential diagnosis remains unchanged.         Assessment/Plan       Amrita Grant is a 79 y.o. female here for recommendations and to establish care for poorly differentiated carcinoma of the face.     # Poorly differentiated carcinoma  - HPV+  - L1 biopsy proven metastasis - will send tempus for better delineation  - discussed the likelihood that this is metastatic, HPV+ nasopharyngeal carcinoma, although tempus may help us better determine this  - s/p palliative radiation  - discussed that typically would recommend combination therapy with chemotherapy+immunotherapy. However, given her lupus which was originally diagnosed with pericarditis, do not recommend immunotherapy. Also discussed that given her current poor performance status, do not believe she could tolerate dual chemotherapy  - discussed single-agent weekly paclitaxel, and consented. Plan to repeat scans after 3 cycles   - will obtain new baseline images, as she has not had imaging since January 2025  - she would like to have her infusions closer to home at [place], but will continue to see us at [place] for pre-treatment visits  - Pt agreeable to infusions and visits on same day at [place].    - Update: Unable to obtain IV access after multiple attempts.  Existing mediport placement scheduled 4/2/25.  Infusion rescheduled to 4/7/25.      # Lupus  - she follows with a rheumatologist at [facility], and will notify them we are starting chemotherapy    # Dysuria, urgency, frequency  - offered obtaining UA and urine culture, and she deferred to PCP  - 3/31:  Current 10-day ATB course.  Pt/family not able to recall medication name.  ATB started prior to her tx C1D1.  Tolerating ATB.  Reports up to 8 UTI's last year.  PCP (non- provider) - any further follow-up with PCP.      # Unintentional weight loss  - may be partly due to dysphagia  - oncology dietician referral    # Advanced care planning  - discussed incurable but treatable nature of disease, with goal to prolong life while maintaining/improving quality of  life. She understands that risks of systemic therapy may outweigh benefits at some point in the future.

## 2025-03-31 NOTE — PROGRESS NOTES
Social Work Note  3/31/2025 SW greeted patient and  and asked if he would like to call billing together.  RN was working with patient in infusion so we went to a quieter area.   went through the bills that made up patient's owed amount.  All the bills had gone through both insurances and patient did owe them.  SW explained what the rep had told us (SW had the call on speaker).  Patient did not have any other questions.  He did not have any other questions at this time.  SW will remain available to assist patient.  LUDA Jarrell, [facility] [phone]

## 2025-03-31 NOTE — SIGNIFICANT EVENT

## 2025-04-01 ENCOUNTER — APPOINTMENT (OUTPATIENT)
Dept: RADIATION ONCOLOGY | Facility: CLINIC | Age: 80
End: 2025-04-01
Payer: MEDICARE

## 2025-04-01 ASSESSMENT — ENCOUNTER SYMPTOMS
CONSTIPATION: 1
CARDIOVASCULAR NEGATIVE: 1
SORE THROAT: 1
FATIGUE: 1

## 2025-04-02 ENCOUNTER — HOSPITAL ENCOUNTER (OUTPATIENT)
Dept: RADIOLOGY | Facility: HOSPITAL | Age: 80
Discharge: HOME | End: 2025-04-02
Payer: MEDICARE

## 2025-04-02 ENCOUNTER — APPOINTMENT (OUTPATIENT)
Dept: RADIATION ONCOLOGY | Facility: CLINIC | Age: 80
End: 2025-04-02
Payer: MEDICARE

## 2025-04-02 VITALS
SYSTOLIC BLOOD PRESSURE: 156 MMHG | WEIGHT: 105 LBS | HEART RATE: 72 BPM | RESPIRATION RATE: 16 BRPM | BODY MASS INDEX: 19.32 KG/M2 | DIASTOLIC BLOOD PRESSURE: 88 MMHG | OXYGEN SATURATION: 99 % | HEIGHT: 62 IN

## 2025-04-02 DIAGNOSIS — C44.92 SCC (SQUAMOUS CELL CARCINOMA): ICD-10-CM

## 2025-04-02 PROCEDURE — C1751 CATH, INF, PER/CENT/MIDLINE: HCPCS

## 2025-04-02 PROCEDURE — 2500000005 HC RX 250 GENERAL PHARMACY W/O HCPCS: Performed by: RADIOLOGY

## 2025-04-02 PROCEDURE — 2720000007 HC OR 272 NO HCPCS

## 2025-04-02 PROCEDURE — 99153 MOD SED SAME PHYS/QHP EA: CPT

## 2025-04-02 PROCEDURE — 36561 INSERT TUNNELED CV CATH: CPT | Mod: RT | Performed by: RADIOLOGY

## 2025-04-02 PROCEDURE — 2780000003 HC OR 278 NO HCPCS

## 2025-04-02 PROCEDURE — 76937 US GUIDE VASCULAR ACCESS: CPT | Mod: RT | Performed by: RADIOLOGY

## 2025-04-02 PROCEDURE — 7100000009 HC PHASE TWO TIME - INITIAL BASE CHARGE

## 2025-04-02 PROCEDURE — 2500000004 HC RX 250 GENERAL PHARMACY W/ HCPCS (ALT 636 FOR OP/ED): Performed by: RADIOLOGY

## 2025-04-02 PROCEDURE — 7100000010 HC PHASE TWO TIME - EACH INCREMENTAL 1 MINUTE

## 2025-04-02 PROCEDURE — C1788 PORT, INDWELLING, IMP: HCPCS

## 2025-04-02 PROCEDURE — 99152 MOD SED SAME PHYS/QHP 5/>YRS: CPT

## 2025-04-02 RX ORDER — MIDAZOLAM HYDROCHLORIDE 1 MG/ML
INJECTION, SOLUTION INTRAMUSCULAR; INTRAVENOUS
Status: COMPLETED | OUTPATIENT
Start: 2025-04-02 | End: 2025-04-02

## 2025-04-02 RX ORDER — FENTANYL CITRATE 50 UG/ML
INJECTION, SOLUTION INTRAMUSCULAR; INTRAVENOUS
Status: COMPLETED | OUTPATIENT
Start: 2025-04-02 | End: 2025-04-02

## 2025-04-02 RX ORDER — LIDOCAINE HYDROCHLORIDE AND EPINEPHRINE 10; 10 UG/ML; MG/ML
INJECTION, SOLUTION INFILTRATION; PERINEURAL
Status: COMPLETED | OUTPATIENT
Start: 2025-04-02 | End: 2025-04-02

## 2025-04-02 RX ORDER — VANCOMYCIN HYDROCHLORIDE 1 G/20ML
INJECTION, POWDER, LYOPHILIZED, FOR SOLUTION INTRAVENOUS DAILY PRN
Status: DISCONTINUED | OUTPATIENT
Start: 2025-04-02 | End: 2025-04-02

## 2025-04-02 RX ORDER — VANCOMYCIN HYDROCHLORIDE 1 G/200ML
INJECTION, SOLUTION INTRAVENOUS CONTINUOUS PRN
Status: COMPLETED | OUTPATIENT
Start: 2025-04-02 | End: 2025-04-02

## 2025-04-02 RX ORDER — VANCOMYCIN HYDROCHLORIDE 1 G/200ML
1000 INJECTION, SOLUTION INTRAVENOUS ONCE
Status: DISCONTINUED | OUTPATIENT
Start: 2025-04-02 | End: 2025-04-03 | Stop reason: HOSPADM

## 2025-04-02 RX ORDER — HEPARIN 100 UNIT/ML
SYRINGE INTRAVENOUS
Status: COMPLETED | OUTPATIENT
Start: 2025-04-02 | End: 2025-04-02

## 2025-04-02 RX ADMIN — LIDOCAINE HYDROCHLORIDE,EPINEPHRINE BITARTRATE 10 ML: 10; .01 INJECTION, SOLUTION INFILTRATION; PERINEURAL at 13:58

## 2025-04-02 RX ADMIN — MIDAZOLAM 1 MG: 1 INJECTION INTRAMUSCULAR; INTRAVENOUS at 13:52

## 2025-04-02 RX ADMIN — Medication 5 ML: at 14:11

## 2025-04-02 RX ADMIN — VANCOMYCIN HYDROCHLORIDE 1000 MG: 1 INJECTION, SOLUTION INTRAVENOUS at 13:53

## 2025-04-02 RX ADMIN — LIDOCAINE HYDROCHLORIDE,EPINEPHRINE BITARTRATE 4 ML: 10; .01 INJECTION, SOLUTION INFILTRATION; PERINEURAL at 13:59

## 2025-04-02 RX ADMIN — Medication 3 L/MIN: at 13:28

## 2025-04-02 RX ADMIN — LIDOCAINE HYDROCHLORIDE,EPINEPHRINE BITARTRATE 2 ML: 10; .01 INJECTION, SOLUTION INFILTRATION; PERINEURAL at 13:52

## 2025-04-02 RX ADMIN — FENTANYL CITRATE 50 MCG: 50 INJECTION, SOLUTION INTRAMUSCULAR; INTRAVENOUS at 13:52

## 2025-04-02 ASSESSMENT — PAIN SCALES - GENERAL

## 2025-04-02 ASSESSMENT — PAIN - FUNCTIONAL ASSESSMENT: PAIN_FUNCTIONAL_ASSESSMENT: 0-10

## 2025-04-02 NOTE — Clinical Note
Right Mediport placement completed. Right venotomy and right chest site closed with Dermabond and steri strip. Venotomy site covered with 4x4 gauze and tegaderm. Both sites without bleeding or hematoma. Patient tolerated procedure well, denies nausea or pain. Patient taken back to [facility] for recovery.

## 2025-04-02 NOTE — POST-PROCEDURE NOTE
Interventional Radiology Brief Postprocedure Note    Attending: Fransisco Mejia MD    Assistant:   Staff Role   Fransisco Mejia MD Radiologist   Elvis Desai Radiology Technologist   Roma Pinedo, RN Radiology Nurse   Pippa Omer Radiology Technologist   Magalys Salgado, GARRET Circulator       Diagnosis:   1. SCC (squamous cell carcinoma)  IR CVC port    IR CVC port          Description of procedure: right chest port placement.    Timeout:  Yes    Procedure Area: Procedure Area     Anesthesia:   Conscious Sedation    Complications: None    Estimated Blood Loss: minimal    Medications (Filter: Administrations occurring from 1323 to 1423 on 04/02/25) As of 04/02/25 1423      vancomycin (Vancocin) in dextrose 5% IV (mL/hr) Total volume:  Not documented*   *Total volume has not been documented. View each administration to see the amount administered.     Date/Time Rate/Dose/Volume Action       04/02/25  1353 1,000 mg - 200 mL/hr New Bag               oxygen (O2) therapy (L/min) Total volume:  Not documented*   *Total volume has not been documented. View each administration to see the amount administered.     Date/Time Rate/Dose/Volume Action       04/02/25  1328 3 L/min - 180,000 mL/hr Given               fentaNYL PF (Sublimaze) injection (mcg) Total dose:  50 mcg      Date/Time Rate/Dose/Volume Action       04/02/25  1352 50 mcg Given               midazolam (Versed) injection (mg) Total dose:  1 mg      Date/Time Rate/Dose/Volume Action       04/02/25  1352 1 mg Given               lidocaine-epinephrine (Xylocaine W/EPI) 1 %-1:100,000 injection (mL) Total volume:  16 mL      Date/Time Rate/Dose/Volume Action       04/02/25  1352 2 mL Given      1358 10 mL Given      1359 4 mL Given               heparin flush 100 unit/mL syringe (mL) Total volume:  5 mL      Date/Time Rate/Dose/Volume Action       04/02/25  1411 5 mL Given                   No specimens collected      See detailed result report with images  in PACS.    The patient tolerated the procedure well without incident or complication and is in stable condition.

## 2025-04-03 ENCOUNTER — APPOINTMENT (OUTPATIENT)
Dept: RADIATION ONCOLOGY | Facility: CLINIC | Age: 80
End: 2025-04-03
Payer: MEDICARE

## 2025-04-04 ENCOUNTER — APPOINTMENT (OUTPATIENT)
Dept: RADIATION ONCOLOGY | Facility: CLINIC | Age: 80
End: 2025-04-04
Payer: MEDICARE

## 2025-04-07 ENCOUNTER — OFFICE VISIT (OUTPATIENT)
Dept: HEMATOLOGY/ONCOLOGY | Facility: CLINIC | Age: 80
End: 2025-04-07
Payer: MEDICARE

## 2025-04-07 ENCOUNTER — INFUSION (OUTPATIENT)
Dept: HEMATOLOGY/ONCOLOGY | Facility: CLINIC | Age: 80
End: 2025-04-07
Payer: MEDICARE

## 2025-04-07 ENCOUNTER — APPOINTMENT (OUTPATIENT)
Dept: RADIATION ONCOLOGY | Facility: CLINIC | Age: 80
End: 2025-04-07
Payer: MEDICARE

## 2025-04-07 VITALS
RESPIRATION RATE: 18 BRPM | BODY MASS INDEX: 19.06 KG/M2 | TEMPERATURE: 98.1 F | OXYGEN SATURATION: 96 % | SYSTOLIC BLOOD PRESSURE: 152 MMHG | WEIGHT: 104.2 LBS | DIASTOLIC BLOOD PRESSURE: 94 MMHG | HEART RATE: 90 BPM

## 2025-04-07 DIAGNOSIS — Z51.81 MEDICATION MONITORING ENCOUNTER: ICD-10-CM

## 2025-04-07 DIAGNOSIS — C44.92 SCC (SQUAMOUS CELL CARCINOMA): ICD-10-CM

## 2025-04-07 DIAGNOSIS — K59.00 CONSTIPATION, UNSPECIFIED CONSTIPATION TYPE: ICD-10-CM

## 2025-04-07 DIAGNOSIS — Z51.11 ENCOUNTER FOR ANTINEOPLASTIC CHEMOTHERAPY: ICD-10-CM

## 2025-04-07 DIAGNOSIS — C44.92 SCC (SQUAMOUS CELL CARCINOMA): Primary | ICD-10-CM

## 2025-04-07 PROCEDURE — 2500000004 HC RX 250 GENERAL PHARMACY W/ HCPCS (ALT 636 FOR OP/ED): Performed by: INTERNAL MEDICINE

## 2025-04-07 PROCEDURE — 1126F AMNT PAIN NOTED NONE PRSNT: CPT | Performed by: NURSE PRACTITIONER

## 2025-04-07 PROCEDURE — 99214 OFFICE O/P EST MOD 30 MIN: CPT | Performed by: NURSE PRACTITIONER

## 2025-04-07 PROCEDURE — 96375 TX/PRO/DX INJ NEW DRUG ADDON: CPT | Mod: INF

## 2025-04-07 PROCEDURE — 1159F MED LIST DOCD IN RCRD: CPT | Performed by: NURSE PRACTITIONER

## 2025-04-07 PROCEDURE — 2500000004 HC RX 250 GENERAL PHARMACY W/ HCPCS (ALT 636 FOR OP/ED): Performed by: STUDENT IN AN ORGANIZED HEALTH CARE EDUCATION/TRAINING PROGRAM

## 2025-04-07 PROCEDURE — G2211 COMPLEX E/M VISIT ADD ON: HCPCS | Performed by: NURSE PRACTITIONER

## 2025-04-07 PROCEDURE — 96413 CHEMO IV INFUSION 1 HR: CPT

## 2025-04-07 PROCEDURE — 2500000001 HC RX 250 WO HCPCS SELF ADMINISTERED DRUGS (ALT 637 FOR MEDICARE OP): Performed by: INTERNAL MEDICINE

## 2025-04-07 RX ORDER — HEPARIN 100 UNIT/ML
500 SYRINGE INTRAVENOUS AS NEEDED
Status: DISCONTINUED | OUTPATIENT
Start: 2025-04-07 | End: 2025-04-07 | Stop reason: HOSPADM

## 2025-04-07 RX ORDER — EPINEPHRINE 0.3 MG/.3ML
0.3 INJECTION SUBCUTANEOUS EVERY 5 MIN PRN
Status: DISCONTINUED | OUTPATIENT
Start: 2025-04-07 | End: 2025-04-07 | Stop reason: HOSPADM

## 2025-04-07 RX ORDER — HEPARIN SODIUM,PORCINE/PF 10 UNIT/ML
50 SYRINGE (ML) INTRAVENOUS AS NEEDED
Status: DISCONTINUED | OUTPATIENT
Start: 2025-04-07 | End: 2025-04-07 | Stop reason: HOSPADM

## 2025-04-07 RX ORDER — LIDOCAINE AND PRILOCAINE 25; 25 MG/G; MG/G
CREAM TOPICAL ONCE
Qty: 30 G | Refills: 3 | Status: SHIPPED | OUTPATIENT
Start: 2025-04-07 | End: 2025-04-07

## 2025-04-07 RX ORDER — FAMOTIDINE 10 MG/ML
20 INJECTION, SOLUTION INTRAVENOUS ONCE AS NEEDED
Status: COMPLETED | OUTPATIENT
Start: 2025-04-07 | End: 2025-04-07

## 2025-04-07 RX ORDER — ALBUTEROL SULFATE 0.83 MG/ML
3 SOLUTION RESPIRATORY (INHALATION) AS NEEDED
Status: DISCONTINUED | OUTPATIENT
Start: 2025-04-07 | End: 2025-04-07 | Stop reason: HOSPADM

## 2025-04-07 RX ORDER — SENNOSIDES 8.6 MG/1
1 TABLET ORAL 2 TIMES DAILY
Qty: 60 TABLET | Refills: 2 | Status: SHIPPED | OUTPATIENT
Start: 2025-04-07 | End: 2025-07-06

## 2025-04-07 RX ORDER — DIPHENHYDRAMINE HYDROCHLORIDE 50 MG/ML
50 INJECTION, SOLUTION INTRAMUSCULAR; INTRAVENOUS AS NEEDED
Status: DISCONTINUED | OUTPATIENT
Start: 2025-04-07 | End: 2025-04-07 | Stop reason: HOSPADM

## 2025-04-07 RX ORDER — DIPHENHYDRAMINE HCL 25 MG
50 CAPSULE ORAL ONCE
Status: COMPLETED | OUTPATIENT
Start: 2025-04-07 | End: 2025-04-07

## 2025-04-07 RX ORDER — HEPARIN 100 UNIT/ML
500 SYRINGE INTRAVENOUS AS NEEDED
OUTPATIENT
Start: 2025-04-07

## 2025-04-07 RX ORDER — PROCHLORPERAZINE EDISYLATE 5 MG/ML
10 INJECTION INTRAMUSCULAR; INTRAVENOUS EVERY 6 HOURS PRN
Status: DISCONTINUED | OUTPATIENT
Start: 2025-04-07 | End: 2025-04-07 | Stop reason: HOSPADM

## 2025-04-07 RX ORDER — FAMOTIDINE 10 MG/ML
20 INJECTION, SOLUTION INTRAVENOUS ONCE
Status: COMPLETED | OUTPATIENT
Start: 2025-04-07 | End: 2025-04-07

## 2025-04-07 RX ORDER — HEPARIN SODIUM,PORCINE/PF 10 UNIT/ML
50 SYRINGE (ML) INTRAVENOUS AS NEEDED
OUTPATIENT
Start: 2025-04-07

## 2025-04-07 RX ORDER — PROCHLORPERAZINE MALEATE 10 MG
10 TABLET ORAL EVERY 6 HOURS PRN
Status: DISCONTINUED | OUTPATIENT
Start: 2025-04-07 | End: 2025-04-07 | Stop reason: HOSPADM

## 2025-04-07 RX ADMIN — FAMOTIDINE 20 MG: 10 INJECTION INTRAVENOUS at 12:56

## 2025-04-07 RX ADMIN — DIPHENHYDRAMINE HYDROCHLORIDE 50 MG: 25 CAPSULE ORAL at 12:56

## 2025-04-07 RX ADMIN — FAMOTIDINE 20 MG: 10 INJECTION INTRAVENOUS at 13:30

## 2025-04-07 RX ADMIN — PACLITAXEL 114 MG: 6 INJECTION, SOLUTION INTRAVENOUS at 13:35

## 2025-04-07 RX ADMIN — DEXAMETHASONE SODIUM PHOSPHATE 10 MG: 4 INJECTION, SOLUTION INTRA-ARTICULAR; INTRALESIONAL; INTRAMUSCULAR; INTRAVENOUS; SOFT TISSUE at 12:55

## 2025-04-07 RX ADMIN — HEPARIN 500 UNITS: 100 SYRINGE at 14:44

## 2025-04-07 ASSESSMENT — COLUMBIA-SUICIDE SEVERITY RATING SCALE - C-SSRS
1. IN THE PAST MONTH, HAVE YOU WISHED YOU WERE DEAD OR WISHED YOU COULD GO TO SLEEP AND NOT WAKE UP?: NO
6. HAVE YOU EVER DONE ANYTHING, STARTED TO DO ANYTHING, OR PREPARED TO DO ANYTHING TO END YOUR LIFE?: NO
2. HAVE YOU ACTUALLY HAD ANY THOUGHTS OF KILLING YOURSELF?: NO

## 2025-04-07 ASSESSMENT — PATIENT HEALTH QUESTIONNAIRE - PHQ9
2. FEELING DOWN, DEPRESSED OR HOPELESS: NOT AT ALL
SUM OF ALL RESPONSES TO PHQ9 QUESTIONS 1 AND 2: 0
1. LITTLE INTEREST OR PLEASURE IN DOING THINGS: NOT AT ALL

## 2025-04-07 ASSESSMENT — ENCOUNTER SYMPTOMS
CARDIOVASCULAR NEGATIVE: 1
FATIGUE: 1
CONSTIPATION: 1

## 2025-04-07 ASSESSMENT — PAIN SCALES - GENERAL: PAINLEVEL_OUTOF10: 0-NO PAIN

## 2025-04-07 NOTE — PROGRESS NOTES
Patient requires weekly INR checks. INRs going forward after this treatment will need to be drawn from the port and results sent to [facility]. Fax # [phone]. Signed release form scanned into chart.

## 2025-04-07 NOTE — PROGRESS NOTES
[facility] - Medical Oncology Follow-Up Visit    Patient ID: Amrita Grant is a 79 y.o. female.  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 Grant is a 80 yo with PMH significant for lupus, aortic valve repair, fibromyalgia, who first noticed a mass on her nose in fall of 2024. Met Dr. Templeton who did an in-office biopsy 11/11/24 with invasive, poorly differentiated carcinoma, p40+, negative MOC31, unclear etiology, and recommended for repeat biopsy. Biopsy on 11/26/24 with the same, felt to be a primary cutaneous neoplasm such as basal cell carcinoma. Discussed at [facility] tumor board, and due to significant morbidity associated with resection, recommended against surgery and referred to radiation oncology and medical oncology. Met Dr. Guzmán, who recommended against immunotherapy given her lupus and poor performance status, and recommended discussion with radiation oncology, which cutaneous tumor board also agreed with. She planned to have radiation closer to home, but unfortunately her pre-radiation scans showed concern for metastatic disease, with PET/CT on 1/20/25 with FDG avidity of the R nasal soft tissue as well as R level 1 cervical lymph node, R rights, L1, and RUL nodule below PET resolution. She was ultimately started on vismodegib as well as started on palliative radiation (2/18-3/11/25) for the nasal lesion. In the meantime, [facility] pathology revealed invasive carcinoma,  HPV associated, positive for p40 CK5/6, EMA, p16, and negative for MOC, BerEP4, INSM1, and chromogranin. As this was felt to no longer be a basal cell carcinoma, she stopped the vismodegib. Biopsy of the L1 lesion morphologically the same.     Regarding her lupus - she has been on plaquenil monotherapy for many years, stable on the same dose. She was originally diagnosed with lupus in 2001 - she was initially diagnosed with pericarditis. She has fatigue and joint aches with the lupus.     She is here today with her  and friend. Her last day of radiation was last Tuesday or Wednesday. Radiation went ok overall. Her last dose of the visdomegib was last Tuesday or Wednesday. No side effects.  She is very tired, naps every day. In the last year, she has stopped doing the cooking, cleaning, and laundry. She is very careful, leans on her  to help her get up the stairs. Her fatigue has been getting worse and worse. She continues to lose weight - 50 lbs in the last year and a half. She has trouble swallowing - some things are harder to swallow like bread, meat. Gets tickles in her throat. She's had this for a couple years, had her throat stretched - this was also just repeated in December. Done at [facility] typically, helps for a little while, but don't last.      PAST MEDICAL HISTORY  Lupus  Fibromyalgia  Osteoarthritis  Aortic valve repair (mechanical)  on warfarin  HTN  CVA - (2021)     SOCIAL HISTORY  Lives at home with her . They've been  45 years. Previously was a  for [employer].   Tob: none  EtOH: none, previously a little  Illicits: none     FAMILY HISTORY  Mother - uterine cancer  Maternal grandmother - breast cancer  Maternal cousin - unknown metastatic cancer    HPI  Present with spouse in clinic for follow-up and readiness to treat visit.  Feeling good today.  Appetite - spouse reports decreased within the last couple of days.  Wt is stable.  Denies n/v/c/d.  Last BM 2 days ago.   Rx sent for senna.  No c/o pain.  B/p elevated - pt will follow-up with PCP.  No fevers, chills, or CP.   UTI symptoms resolved.  Port placed 4/2/25 - area sore.  Discussed INR lab for warfarin dosing - will arrange along with pt routine lab port draws.  No other concerns.  Okay to use 3/31/25 labs for today's treatment.     Meds (Current):    Current Outpatient Medications:     ARIPiprazole (Abilify) 5 mg tablet, Take 1 tablet (5 mg) by mouth once daily., Disp: , Rfl:     Bystolic 5 mg tablet, 1 tab(s) orally half tablet three times daily for 30 days, Disp: , Rfl:     calcium carbonate 600 mg calcium (1,500 mg) tablet, Take 600 mg by mouth once daily., Disp: , Rfl:     cetirizine (ZyrTEC) 10 mg tablet, 1 tab(s) orally as needed, Disp: , Rfl:     cholecalciferol (Vitamin D-3) 25 MCG (1000 UT) capsule, 1 cap(s) orally three times daily, Disp: , Rfl:     docusate sodium (Colace) 100 mg capsule, once every 24 hours., Disp: , Rfl:     fluticasone (Flonase) 50 mcg/actuation nasal spray, Administer 2 sprays into affected nostril(s)., Disp: , Rfl:     levETIRAcetam (Keppra) 500 mg tablet, every 12 hours., Disp: , Rfl:     LORazepam (Ativan) 0.5 mg tablet, Take half tablet 1 hour before MRI, if needed take other half of tablet while getting checked in for MRI, Disp: 1 tablet, Rfl: 0    losartan (Cozaar) 100 mg tablet, Take 0.5 tablets (50 mg) by mouth twice a day., Disp: , Rfl:     melatonin 3 mg tablet, Take by mouth once daily., Disp: , Rfl:     memantine (Namenda) 5 mg tablet, Take 1 tablet (5 mg) by mouth once daily., Disp: , Rfl:     mirtazapine (Remeron) 15 mg tablet, 1 tab(s) orally half tablet bedtime daily for 30 days, Disp: , Rfl:     omeprazole (PriLOSEC) 20 mg DR capsule, Take 1 capsule (20 mg) by mouth once daily., Disp: , Rfl:     ondansetron (Zofran) 4 mg tablet, Take 1 tablet (4 mg) by mouth if needed for nausea or vomiting., Disp: , Rfl:     ondansetron (Zofran) 8 mg tablet, Take 1 tablet (8 mg) by mouth  every 8 hours if needed for nausea or vomiting., Disp: 30 tablet, Rfl: 5    PlaqueniL 200 mg tablet, Take 1 tablet (200 mg) by mouth once daily., Disp: , Rfl:     potassium chloride CR 20 mEq ER tablet, Take 1 tablet (20 mEq) by mouth once daily., Disp: , Rfl:     Premarin 0.3 mg tablet, once every 24 hours., Disp: , Rfl:     prochlorperazine (Compazine) 10 mg tablet, Take 1 tablet (10 mg) by mouth every 6 hours if needed for nausea or vomiting., Disp: 30 tablet, Rfl: 5    vismodegib (Erivedge) 150 mg capsule, Take 1 capsule (150 mg total) by mouth every other day.  Take with or without food. Swallow whole. Do not open or crush., Disp: 14 capsule, Rfl: 3    warfarin (Coumadin) 4 mg tablet, Take 1 tablet (4 mg) by mouth once daily., Disp: , Rfl:     Allergies   Allergen Reactions    Propofol Nausea/vomiting    Sulfa (Sulfonamide Antibiotics) Other    Opioids - Morphine Analogues Nausea And Vomiting, Other and Unknown    Penicillins Unknown     Pt. Reports passing out upon taking penicillin when she was young.       Review of Systems   Constitutional:  Positive for fatigue.   HENT:  Negative.  Negative for sore throat.    Cardiovascular: Negative.    Gastrointestinal:  Positive for constipation.   Musculoskeletal: Negative.    All other systems reviewed and are negative.       Objective   BSA: 1.44 meters squared  Wt Readings from Last 5 Encounters:   04/07/25 47.3 kg (104 lb 3.2 oz)   04/02/25 47.6 kg (105 lb)   03/31/25 48 kg (105 lb 14.4 oz)   03/25/25 46.1 kg (101 lb 10.1 oz)   03/25/25 46.1 kg (101 lb 10.1 oz)     BP (!) 152/94 (BP Location: Left arm, Patient Position: Sitting)   Pulse 90   Temp 36.7 °C (98.1 °F) (Temporal)   Resp 18   Wt 47.3 kg (104 lb 3.2 oz)   SpO2 96%   BMI 19.06 kg/m²     ECOG Score: 2    0          Fully active; no performance restrictions.  1          Strenuous physical activity restricted; fully ambulatory and able to carry out light work.  2          Capable of all self-care but  unable to carry out any work activities. Up and about >50% of waking hours.  3          Capable of only limited self-care; confined to bed or chair >50% of waking hours.  4          Completely disabled; cannot carry out any self-care; totally confined to bed or chair.     Physical Exam  Vitals reviewed.   Constitutional:       General: She is not in acute distress.  HENT:      Head: Normocephalic.      Nose:      Comments: Large, erythematous, nasal lesion with scabbing - improved.     Mouth/Throat:      Mouth: Mucous membranes are moist.   Eyes:      Conjunctiva/sclera: Conjunctivae normal.      Pupils: Pupils are equal, round, and reactive to light.   Cardiovascular:      Rate and Rhythm: Normal rate and regular rhythm.      Heart sounds: Normal heart sounds. No murmur heard.  Pulmonary:      Effort: Pulmonary effort is normal. No respiratory distress.      Breath sounds: Normal breath sounds.   Abdominal:      General: Abdomen is flat. Bowel sounds are normal.      Palpations: Abdomen is soft.   Musculoskeletal:         General: Normal range of motion.      Cervical back: Normal range of motion.   Skin:     General: Skin is warm and dry.      Findings: No erythema.   Neurological:      General: No focal deficit present.      Mental Status: She is alert and oriented to person, place, and time.   Psychiatric:         Mood and Affect: Mood normal.         Behavior: Behavior normal.         Thought Content: Thought content normal.        Results:  Labs:  Lab Results   Component Value Date    WBC 5.2 03/31/2025    HGB 12.3 03/31/2025    HCT 38.8 03/31/2025     (H) 03/31/2025     03/31/2025      Lab Results   Component Value Date    NEUTROABS 4.09 03/31/2025      Lab Results   Component Value Date    GLUCOSE 95 03/31/2025    CALCIUM 9.6 03/31/2025     03/31/2025    K 4.5 03/31/2025    CO2 29 03/31/2025     03/31/2025    BUN 16 03/31/2025    CREATININE 0.66 03/31/2025     Lab Results    Component Value Date    ALT 17 03/31/2025    AST 21 03/31/2025    ALKPHOS 58 03/31/2025    BILITOT 0.8 03/31/2025        Imaging:  No new imaging.      Pathology:    Lab Results   Component Value Date    ADD1  11/26/2024     Additional immunostains were reviewed.  Tumor cells are positive for p16 and negative for chromogranin and INSM1.  Isolated tumor cells are positive for BerEP4.  The differential diagnosis remains unchanged.         Assessment/Plan      Amrita Grant is a 79 y.o. female here for recommendations and to establish care for poorly differentiated carcinoma of the face.     # Poorly differentiated carcinoma  - HPV+  - L1 biopsy proven metastasis - will send tempus for better delineation  - discussed the likelihood that this is metastatic, HPV+ nasopharyngeal carcinoma, although tempus may help us better determine this  - s/p palliative radiation  - discussed that typically would recommend combination therapy with chemotherapy+immunotherapy. However, given her lupus which was originally diagnosed with pericarditis, do not recommend immunotherapy. Also discussed that given her current poor performance status, do not believe she could tolerate dual chemotherapy  - discussed single-agent weekly paclitaxel, and consented. Plan to repeat scans after 3 cycles   - will obtain new baseline images, as she has not had imaging since January 2025  - she would like to have her infusions closer to home at [facility], but will continue to see us at [facility] for pre-treatment visits  - Pt agreeable to infusions and visits on same day at [facility].    - Update: Unable to obtain IV access after multiple attempts.  Existing mediport placement scheduled 4/2/25.  Infusion rescheduled to 4/7/25.    - 4/2/25 Mediport placed    # Lupus  - she follows with a rheumatologist at [facility], and will notify them we are starting chemotherapy    # Dysuria, urgency, frequency  - offered obtaining UA and urine culture, and she  deferred to PCP  - 3/31:  Current 10-day ATB course.  Pt/family not able to recall medication name.  ATB started prior to her tx C1D1.  Tolerating ATB.  Reports up to 8 UTI's last year.  PCP (non- provider) - any further follow-up with PCP.    - 4/7:  Completed 10-day ATB course.  Denies current UTI symptoms.       # Unintentional weight loss  - may be partly due to dysphagia  - oncology dietician referral    # INR/chronic Warfarin  - INR ordered for coumadin dosing, infusion nurse provided medical release form per facility protocol.  Pt follows with [facility] for dosing/monitoring.  INR to be draw via port with treatment labs, fax to [facility].      # Advanced care planning  - discussed incurable but treatable nature of disease, with goal to prolong life while maintaining/improving quality of life. She understands that risks of systemic therapy may outweigh benefits at some point in the future.

## 2025-04-08 ENCOUNTER — APPOINTMENT (OUTPATIENT)
Dept: RADIATION ONCOLOGY | Facility: CLINIC | Age: 80
End: 2025-04-08
Payer: MEDICARE

## 2025-04-08 ENCOUNTER — APPOINTMENT (OUTPATIENT)
Dept: HEMATOLOGY/ONCOLOGY | Facility: CLINIC | Age: 80
End: 2025-04-08
Payer: MEDICARE

## 2025-04-08 DIAGNOSIS — C44.92 SCC (SQUAMOUS CELL CARCINOMA): Primary | ICD-10-CM

## 2025-04-08 PROBLEM — K59.00 CONSTIPATION: Status: ACTIVE | Noted: 2025-04-08

## 2025-04-08 PROBLEM — Z51.81 MEDICATION MONITORING ENCOUNTER: Status: ACTIVE | Noted: 2025-04-08

## 2025-04-08 PROBLEM — Z51.11 ENCOUNTER FOR ANTINEOPLASTIC CHEMOTHERAPY: Status: ACTIVE | Noted: 2025-04-08

## 2025-04-08 ASSESSMENT — ENCOUNTER SYMPTOMS
SORE THROAT: 0
MUSCULOSKELETAL NEGATIVE: 1

## 2025-04-09 ENCOUNTER — APPOINTMENT (OUTPATIENT)
Dept: RADIATION ONCOLOGY | Facility: CLINIC | Age: 80
End: 2025-04-09
Payer: MEDICARE

## 2025-04-10 ENCOUNTER — APPOINTMENT (OUTPATIENT)
Dept: RADIATION ONCOLOGY | Facility: CLINIC | Age: 80
End: 2025-04-10
Payer: MEDICARE

## 2025-04-11 ENCOUNTER — APPOINTMENT (OUTPATIENT)
Dept: RADIATION ONCOLOGY | Facility: CLINIC | Age: 80
End: 2025-04-11
Payer: MEDICARE

## 2025-04-11 LAB
DNA RANGE(S) EXAMINED NAR: NORMAL
GENE DIS ANL INTERP-IMP: POSITIVE
GENE DIS ASSESSED: NORMAL
GENE MUT TESTED BLD/T: 4.7 M/MB
MSI CA SPEC-IMP: NORMAL
PD-L1 BY 22C3 TISS IMSTN DOC: NEGATIVE
REASON FOR STUDY: NORMAL
TEMPUS GERMLINE NOTE: NORMAL
TEMPUS LCA: NORMAL
TEMPUS PD-L1 (22C3) COMBINED POSITIVE SCORE: <1
TEMPUS PD-L1 (22C3) TUMOR PROPORTION SCORE: <1 %
TEMPUS PORTAL: NORMAL
TEMPUS TREATMENT IMPLICATIONS NOTE: NORMAL

## 2025-04-12 ENCOUNTER — SPECIALTY PHARMACY (OUTPATIENT)
Dept: PHARMACY | Facility: CLINIC | Age: 80
End: 2025-04-12

## 2025-04-14 ENCOUNTER — OFFICE VISIT (OUTPATIENT)
Dept: HEMATOLOGY/ONCOLOGY | Facility: CLINIC | Age: 80
End: 2025-04-14
Payer: MEDICARE

## 2025-04-14 ENCOUNTER — LAB (OUTPATIENT)
Dept: HEMATOLOGY/ONCOLOGY | Facility: CLINIC | Age: 80
End: 2025-04-14
Payer: MEDICARE

## 2025-04-14 ENCOUNTER — APPOINTMENT (OUTPATIENT)
Dept: RADIATION ONCOLOGY | Facility: CLINIC | Age: 80
End: 2025-04-14
Payer: MEDICARE

## 2025-04-14 VITALS
TEMPERATURE: 98.4 F | OXYGEN SATURATION: 97 % | WEIGHT: 104 LBS | HEART RATE: 82 BPM | BODY MASS INDEX: 19.02 KG/M2 | SYSTOLIC BLOOD PRESSURE: 139 MMHG | RESPIRATION RATE: 18 BRPM | DIASTOLIC BLOOD PRESSURE: 87 MMHG

## 2025-04-14 DIAGNOSIS — K59.09 OTHER CONSTIPATION: ICD-10-CM

## 2025-04-14 DIAGNOSIS — Z51.81 MEDICATION MONITORING ENCOUNTER: ICD-10-CM

## 2025-04-14 DIAGNOSIS — C44.92 SCC (SQUAMOUS CELL CARCINOMA): Primary | ICD-10-CM

## 2025-04-14 DIAGNOSIS — Z51.11 ENCOUNTER FOR ANTINEOPLASTIC CHEMOTHERAPY: ICD-10-CM

## 2025-04-14 DIAGNOSIS — R53.0 NEOPLASTIC MALIGNANT RELATED FATIGUE: ICD-10-CM

## 2025-04-14 DIAGNOSIS — C44.92 SCC (SQUAMOUS CELL CARCINOMA): ICD-10-CM

## 2025-04-14 LAB
ALBUMIN SERPL BCP-MCNC: 3.9 G/DL (ref 3.4–5)
ALP SERPL-CCNC: 59 U/L (ref 33–136)
ALT SERPL W P-5'-P-CCNC: 10 U/L (ref 7–45)
ANION GAP SERPL CALC-SCNC: 9 MMOL/L (ref 10–20)
AST SERPL W P-5'-P-CCNC: 14 U/L (ref 9–39)
BASOPHILS # BLD AUTO: 0.02 X10*3/UL (ref 0–0.1)
BASOPHILS NFR BLD AUTO: 0.6 %
BILIRUB SERPL-MCNC: 0.5 MG/DL (ref 0–1.2)
BUN SERPL-MCNC: 17 MG/DL (ref 6–23)
CALCIUM SERPL-MCNC: 9.4 MG/DL (ref 8.6–10.3)
CHLORIDE SERPL-SCNC: 104 MMOL/L (ref 98–107)
CO2 SERPL-SCNC: 30 MMOL/L (ref 21–32)
CREAT SERPL-MCNC: 0.74 MG/DL (ref 0.5–1.05)
EGFRCR SERPLBLD CKD-EPI 2021: 83 ML/MIN/1.73M*2
EOSINOPHIL # BLD AUTO: 0.03 X10*3/UL (ref 0–0.4)
EOSINOPHIL NFR BLD AUTO: 0.9 %
ERYTHROCYTE [DISTWIDTH] IN BLOOD BY AUTOMATED COUNT: 13 % (ref 11.5–14.5)
GLUCOSE SERPL-MCNC: 95 MG/DL (ref 74–99)
HCT VFR BLD AUTO: 36 % (ref 36–46)
HGB BLD-MCNC: 11.3 G/DL (ref 12–16)
IMM GRANULOCYTES # BLD AUTO: 0 X10*3/UL (ref 0–0.5)
IMM GRANULOCYTES NFR BLD AUTO: 0 % (ref 0–0.9)
LYMPHOCYTES # BLD AUTO: 0.65 X10*3/UL (ref 0.8–3)
LYMPHOCYTES NFR BLD AUTO: 18.6 %
MCH RBC QN AUTO: 31.8 PG (ref 26–34)
MCHC RBC AUTO-ENTMCNC: 31.4 G/DL (ref 32–36)
MCV RBC AUTO: 101 FL (ref 80–100)
MONOCYTES # BLD AUTO: 0.34 X10*3/UL (ref 0.05–0.8)
MONOCYTES NFR BLD AUTO: 9.7 %
NEUTROPHILS # BLD AUTO: 2.45 X10*3/UL (ref 1.6–5.5)
NEUTROPHILS NFR BLD AUTO: 70.2 %
NRBC BLD-RTO: ABNORMAL /100{WBCS}
PLATELET # BLD AUTO: 176 X10*3/UL (ref 150–450)
POTASSIUM SERPL-SCNC: 4.5 MMOL/L (ref 3.5–5.3)
PROT SERPL-MCNC: 6.7 G/DL (ref 6.4–8.2)
RBC # BLD AUTO: 3.55 X10*6/UL (ref 4–5.2)
SODIUM SERPL-SCNC: 138 MMOL/L (ref 136–145)
WBC # BLD AUTO: 3.5 X10*3/UL (ref 4.4–11.3)

## 2025-04-14 PROCEDURE — 1126F AMNT PAIN NOTED NONE PRSNT: CPT | Performed by: NURSE PRACTITIONER

## 2025-04-14 PROCEDURE — 1159F MED LIST DOCD IN RCRD: CPT | Performed by: NURSE PRACTITIONER

## 2025-04-14 PROCEDURE — 99215 OFFICE O/P EST HI 40 MIN: CPT | Performed by: NURSE PRACTITIONER

## 2025-04-14 PROCEDURE — 85610 PROTHROMBIN TIME: CPT

## 2025-04-14 PROCEDURE — 85025 COMPLETE CBC W/AUTO DIFF WBC: CPT

## 2025-04-14 PROCEDURE — 84075 ASSAY ALKALINE PHOSPHATASE: CPT

## 2025-04-14 PROCEDURE — G2211 COMPLEX E/M VISIT ADD ON: HCPCS | Performed by: NURSE PRACTITIONER

## 2025-04-14 RX ORDER — HEPARIN 100 UNIT/ML
500 SYRINGE INTRAVENOUS AS NEEDED
Status: CANCELLED | OUTPATIENT
Start: 2025-04-14

## 2025-04-14 RX ORDER — HEPARIN SODIUM,PORCINE/PF 10 UNIT/ML
50 SYRINGE (ML) INTRAVENOUS AS NEEDED
Status: DISCONTINUED | OUTPATIENT
Start: 2025-04-14 | End: 2025-04-14 | Stop reason: HOSPADM

## 2025-04-14 RX ORDER — HEPARIN SODIUM,PORCINE/PF 10 UNIT/ML
50 SYRINGE (ML) INTRAVENOUS AS NEEDED
Status: CANCELLED | OUTPATIENT
Start: 2025-04-14

## 2025-04-14 RX ORDER — HEPARIN 100 UNIT/ML
500 SYRINGE INTRAVENOUS AS NEEDED
Status: DISCONTINUED | OUTPATIENT
Start: 2025-04-14 | End: 2025-04-14 | Stop reason: HOSPADM

## 2025-04-14 ASSESSMENT — ENCOUNTER SYMPTOMS
SORE THROAT: 0
FATIGUE: 1
CARDIOVASCULAR NEGATIVE: 1
MUSCULOSKELETAL NEGATIVE: 1
CONSTIPATION: 1

## 2025-04-14 ASSESSMENT — PAIN SCALES - GENERAL: PAINLEVEL_OUTOF10: 0-NO PAIN

## 2025-04-14 NOTE — PROGRESS NOTES
[facility] - Medical Oncology Follow-Up Visit    Patient ID: Amrita Gratn is a 79 y.o. female.  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 Grant is a 80 yo with PMH significant for lupus, aortic valve repair, fibromyalgia, who first noticed a mass on her nose in fall of 2024. Met Dr. Templeton who did an in-office biopsy 11/11/24 with invasive, poorly differentiated carcinoma, p40+, negative MOC31, unclear etiology, and recommended for repeat biopsy. Biopsy on 11/26/24 with the same, felt to be a primary cutaneous neoplasm such as basal cell carcinoma. Discussed at [facility] tumor board, and due to significant morbidity associated with resection, recommended against surgery and referred to radiation oncology and medical oncology. Met Dr. Guzmán, who recommended against immunotherapy given her lupus and poor performance status, and recommended discussion with radiation oncology, which cutaneous tumor board also agreed with. She planned to have radiation closer to home, but unfortunately her pre-radiation scans showed concern for metastatic disease, with PET/CT on 1/20/25 with FDG avidity of the R nasal soft tissue as well as R level 1 cervical lymph node, R rights, L1, and RUL nodule below PET resolution. She was ultimately started on vismodegib as well as started on palliative radiation (2/18-3/11/25) for the nasal lesion. In the meantime, [facility] pathology revealed invasive  carcinoma, HPV associated, positive for p40 CK5/6, EMA, p16, and negative for MOC, BerEP4, INSM1, and chromogranin. As this was felt to no longer be a basal cell carcinoma, she stopped the vismodegib. Biopsy of the L1 lesion morphologically the same.     Regarding her lupus - she has been on plaquenil monotherapy for many years, stable on the same dose. She was originally diagnosed with lupus in 2001 - she was initially diagnosed with pericarditis. She has fatigue and joint aches with the lupus.     She is here today with her  and friend. Her last day of radiation was last Tuesday or Wednesday. Radiation went ok overall. Her last dose of the visdomegib was last Tuesday or Wednesday. No side effects.  She is very tired, naps every day. In the last year, she has stopped doing the cooking, cleaning, and laundry. She is very careful, leans on her  to help her get up the stairs. Her fatigue has been getting worse and worse. She continues to lose weight - 50 lbs in the last year and a half. She has trouble swallowing - some things are harder to swallow like bread, meat. Gets tickles in her throat. She's had this for a couple years, had her throat stretched - this was also just repeated in December. Done at [place] typically, helps for a little while, but don't last.      PAST MEDICAL HISTORY  Lupus  Fibromyalgia  Osteoarthritis  Aortic valve repair (mechanical)  on warfarin  HTN  CVA - (2021)     SOCIAL HISTORY  Lives at home with her . They've been  45 years. Previously was a  for [employer].   Tob: none  EtOH: none, previously a little  Illicits: none     FAMILY HISTORY  Mother - uterine cancer  Maternal grandmother - breast cancer  Maternal cousin - unknown metastatic cancer    HPI  Present with spouse in clinic for follow-up and readiness to treat visit.  Fatigue - rates 7/10.  Tried to do laundry this weekend, not able to complete.  She is able to dress and bathe herself.  Majority of  the day is spent  sitting.  Appetite is good with noted wt gain.   Denies n/v.  +Constipated - working on bowel routine.  No other concerns.  Per pt request, will fax INR result to [facility].  No other concerns.  Labs WNL.  Ready for treatment.      Meds (Current):    Current Outpatient Medications:     ARIPiprazole (Abilify) 5 mg tablet, Take 1 tablet (5 mg) by mouth once daily., Disp: , Rfl:     Bystolic 5 mg tablet, 1 tab(s) orally half tablet three times daily for 30 days, Disp: , Rfl:     calcium carbonate 600 mg calcium (1,500 mg) tablet, Take 600 mg by mouth once daily., Disp: , Rfl:     cetirizine (ZyrTEC) 10 mg tablet, 1 tab(s) orally as needed, Disp: , Rfl:     cholecalciferol (Vitamin D-3) 25 MCG (1000 UT) capsule, 1 cap(s) orally three times daily, Disp: , Rfl:     docusate sodium (Colace) 100 mg capsule, once every 24 hours., Disp: , Rfl:     fluticasone (Flonase) 50 mcg/actuation nasal spray, Administer 2 sprays into affected nostril(s)., Disp: , Rfl:     levETIRAcetam (Keppra) 500 mg tablet, every 12 hours., Disp: , Rfl:     LORazepam (Ativan) 0.5 mg tablet, Take half tablet 1 hour before MRI, if needed take other half of tablet while getting checked in for MRI, Disp: 1 tablet, Rfl: 0    losartan (Cozaar) 100 mg tablet, Take 0.5 tablets (50 mg) by mouth twice a day., Disp: , Rfl:     melatonin 3 mg tablet, Take by mouth once daily., Disp: , Rfl:     memantine (Namenda) 5 mg tablet, Take 1 tablet (5 mg) by mouth once daily., Disp: , Rfl:     mirtazapine (Remeron) 15 mg tablet, 1 tab(s) orally half tablet bedtime daily for 30 days, Disp: , Rfl:     omeprazole (PriLOSEC) 20 mg DR capsule, Take 1 capsule (20 mg) by mouth once daily., Disp: , Rfl:     ondansetron (Zofran) 4 mg tablet, Take 1 tablet (4 mg) by mouth if needed for nausea or vomiting., Disp: , Rfl:     ondansetron (Zofran) 8 mg tablet, Take 1 tablet (8 mg) by mouth every 8 hours if needed for nausea or vomiting., Disp: 30 tablet,  Rfl: 5    PlaqueniL 200 mg tablet, Take 1 tablet (200 mg) by mouth once daily., Disp: , Rfl:     potassium chloride CR 20 mEq ER tablet, Take 1 tablet (20 mEq) by mouth once daily., Disp: , Rfl:     Premarin 0.3 mg tablet, once every 24 hours., Disp: , Rfl:     prochlorperazine (Compazine) 10 mg tablet, Take 1 tablet (10 mg) by mouth every 6 hours if needed for nausea or vomiting., Disp: 30 tablet, Rfl: 5    sennosides (senna) 8.6 mg tablet, Take 1 tablet (8.6 mg) by mouth 2 times a day., Disp: 60 tablet, Rfl: 2    vismodegib (Erivedge) 150 mg capsule, Take 1 capsule (150 mg total) by mouth every other day.  Take with or without food. Swallow whole. Do not open or crush., Disp: 14 capsule, Rfl: 3    warfarin (Coumadin) 4 mg tablet, Take 1 tablet (4 mg) by mouth once daily., Disp: , Rfl:     lidocaine-prilocaine (Emla) 2.5-2.5 % cream, Apply topically 1 time for 1 dose. Apply 30-45 minutes prior to port access., Disp: 30 g, Rfl: 3  No current facility-administered medications for this visit.    Facility-Administered Medications Ordered in Other Visits:     alteplase (Cathflo Activase) injection 2 mg, 2 mg, intra-catheter, PRNDipika MD    heparin flush 10 unit/mL syringe 50 Units, 50 Units, intra-catheter, PRDipika SHARPE MD    heparin flush 100 unit/mL syringe 500 Units, 500 Units, intra-catheter, PRDipika SHARPE MD    Allergies   Allergen Reactions    Propofol Nausea/vomiting    Sulfa (Sulfonamide Antibiotics) Other    Opioids - Morphine Analogues Nausea And Vomiting, Other and Unknown    Penicillins Unknown     Pt. Reports passing out upon taking penicillin when she was young.       Review of Systems   Constitutional:  Positive for fatigue.   HENT:  Negative.  Negative for sore throat.    Cardiovascular: Negative.    Gastrointestinal:  Positive for constipation.   Musculoskeletal: Negative.    All other systems reviewed and are negative.       Objective   BSA: 1.44 meters squared  Wt Readings from  Last 5 Encounters:   04/14/25 47.2 kg (104 lb)   04/07/25 47.3 kg (104 lb 3.2 oz)   04/02/25 47.6 kg (105 lb)   03/31/25 48 kg (105 lb 14.4 oz)   03/25/25 46.1 kg (101 lb 10.1 oz)     /87 (BP Location: Left arm, Patient Position: Sitting, BP Cuff Size: Child)   Pulse 82   Temp 36.9 °C (98.4 °F) (Temporal)   Resp 18   Wt 47.2 kg (104 lb)   SpO2 97%   BMI 19.02 kg/m²     ECOG Score: 2    0          Fully active; no performance restrictions.  1          Strenuous physical activity restricted; fully ambulatory and able to carry out light work.  2          Capable of all self-care but unable to carry out any work activities. Up and about >50% of waking hours.  3          Capable of only limited self-care; confined to bed or chair >50% of waking hours.  4          Completely disabled; cannot carry out any self-care; totally confined to bed or chair.     Physical Exam  Vitals reviewed.   Constitutional:       General: She is not in acute distress.  HENT:      Head: Normocephalic.      Nose:      Comments: Large, erythematous, nasal lesion with scabbing - improved.     Mouth/Throat:      Mouth: Mucous membranes are moist.   Eyes:      Conjunctiva/sclera: Conjunctivae normal.      Pupils: Pupils are equal, round, and reactive to light.   Cardiovascular:      Rate and Rhythm: Normal rate and regular rhythm.      Heart sounds: Normal heart sounds. No murmur heard.  Pulmonary:      Effort: Pulmonary effort is normal. No respiratory distress.      Breath sounds: Normal breath sounds.   Abdominal:      General: Bowel sounds are normal.      Palpations: Abdomen is soft.   Musculoskeletal:         General: Normal range of motion.      Cervical back: Normal range of motion.   Skin:     General: Skin is warm and dry.      Findings: No erythema.   Neurological:      General: No focal deficit present.      Mental Status: She is alert and oriented to person, place, and time.   Psychiatric:         Mood and Affect: Mood  normal.         Behavior: Behavior normal.         Thought Content: Thought content normal.          Results:  Labs:  Lab Results   Component Value Date    WBC 3.5 (L) 04/14/2025    HGB 11.3 (L) 04/14/2025    HCT 36.0 04/14/2025     (H) 04/14/2025     04/14/2025      Lab Results   Component Value Date    NEUTROABS 2.45 04/14/2025      Lab Results   Component Value Date    GLUCOSE 95 04/14/2025    CALCIUM 9.4 04/14/2025     04/14/2025    K 4.5 04/14/2025    CO2 30 04/14/2025     04/14/2025    BUN 17 04/14/2025    CREATININE 0.74 04/14/2025     Lab Results   Component Value Date    ALT 10 04/14/2025    AST 14 04/14/2025    ALKPHOS 59 04/14/2025    BILITOT 0.5 04/14/2025        Imaging:  No new imaging.      Pathology:    Lab Results   Component Value Date    ADD1  11/26/2024     Additional immunostains were reviewed.  Tumor cells are positive for p16 and negative for chromogranin and INSM1.  Isolated tumor cells are positive for BerEP4.  The differential diagnosis remains unchanged.         Assessment/Plan      Amrita Grant is a 79 y.o. female here for recommendations and to establish care for poorly differentiated carcinoma of the face.     # Poorly differentiated carcinoma  - HPV+  - L1 biopsy proven metastasis - will send tempus for better delineation  - discussed the likelihood that this is metastatic, HPV+ nasopharyngeal carcinoma, although tempus may help us better determine this  - s/p palliative radiation  - discussed that typically would recommend combination therapy with chemotherapy+immunotherapy. However, given her lupus which was originally diagnosed with pericarditis, do not recommend immunotherapy. Also discussed that given her current poor performance status, do not believe she could tolerate dual chemotherapy  - discussed single-agent weekly paclitaxel, and consented. Plan to repeat scans after 3 cycles   - will obtain new baseline images, as she has not had imaging since  January 2025  - she would like to have her infusions closer to home at [place], but will continue to see us at [facility] for pre-treatment visits  - Pt agreeable to infusions and visits on same day at [facility].    - Update: Unable to obtain IV access after multiple attempts.  Existing mediport placement scheduled 4/2/25.  Infusion rescheduled to 4/7/25.    - 4/2/25 Mediport placed  - RTC in 1 week for C1D22.      # Lupus  - she follows with a rheumatologist at [facility], and will notify them we are starting chemotherapy    # Dysuria, urgency, frequency  - offered obtaining UA and urine culture, and she deferred to PCP  - 3/31:  Current 10-day ATB course.  Pt/family not able to recall medication name.  ATB started prior to her tx C1D1.  Tolerating ATB.  Reports up to 8 UTI's last year.  PCP (non- provider) - any further follow-up with PCP.    - 4/7:  Completed 10-day ATB course.  Denies current UTI symptoms.       # Unintentional weight loss  - may be partly due to dysphagia  - oncology dietician referral in place   - wt loss has stabilized     # Fatigue (g1)  - Recommended she increase her physical activity level - during waking house, get up Q2-3H, walk within the home, pace activities.     # Constipation  - Senna too effective, pt to start routine Miralax - 1/2 capful to start and adjust as needed.      # INR/chronic Warfarin  - INR ordered for coumadin dosing, infusion nurse provided medical release form per facility protocol.  Pt follows with [facility] for dosing/monitoring.  INR to be draw via port with treatment labs, fax to [facility] [phone].    - 4/14/25 INR 2.0 result faxed to [facility] 4/15/25       # Advanced care planning  - discussed incurable but treatable nature of disease, with goal to prolong life while maintaining/improving quality of life. She understands that risks of systemic therapy may outweigh benefits at some point in the future.

## 2025-04-15 ENCOUNTER — APPOINTMENT (OUTPATIENT)
Dept: RADIATION ONCOLOGY | Facility: CLINIC | Age: 80
End: 2025-04-15
Payer: MEDICARE

## 2025-04-15 ENCOUNTER — INFUSION (OUTPATIENT)
Dept: HEMATOLOGY/ONCOLOGY | Facility: CLINIC | Age: 80
End: 2025-04-15
Payer: MEDICARE

## 2025-04-15 VITALS
OXYGEN SATURATION: 99 % | TEMPERATURE: 98.4 F | HEIGHT: 62 IN | BODY MASS INDEX: 19.35 KG/M2 | DIASTOLIC BLOOD PRESSURE: 102 MMHG | RESPIRATION RATE: 14 BRPM | SYSTOLIC BLOOD PRESSURE: 149 MMHG | HEART RATE: 74 BPM | WEIGHT: 105.16 LBS

## 2025-04-15 DIAGNOSIS — C44.92 SCC (SQUAMOUS CELL CARCINOMA): ICD-10-CM

## 2025-04-15 PROBLEM — R53.0 NEOPLASTIC MALIGNANT RELATED FATIGUE: Status: ACTIVE | Noted: 2025-04-15

## 2025-04-15 LAB
INR PPP: 2 (ref 0.9–1.1)
PROTHROMBIN TIME: 21.6 SECONDS (ref 9.8–12.4)

## 2025-04-15 PROCEDURE — 96413 CHEMO IV INFUSION 1 HR: CPT

## 2025-04-15 PROCEDURE — 2500000001 HC RX 250 WO HCPCS SELF ADMINISTERED DRUGS (ALT 637 FOR MEDICARE OP): Performed by: INTERNAL MEDICINE

## 2025-04-15 PROCEDURE — 2500000004 HC RX 250 GENERAL PHARMACY W/ HCPCS (ALT 636 FOR OP/ED): Performed by: INTERNAL MEDICINE

## 2025-04-15 PROCEDURE — 2500000004 HC RX 250 GENERAL PHARMACY W/ HCPCS (ALT 636 FOR OP/ED): Performed by: STUDENT IN AN ORGANIZED HEALTH CARE EDUCATION/TRAINING PROGRAM

## 2025-04-15 PROCEDURE — 96375 TX/PRO/DX INJ NEW DRUG ADDON: CPT | Mod: INF

## 2025-04-15 RX ORDER — DIPHENHYDRAMINE HYDROCHLORIDE 50 MG/ML
50 INJECTION, SOLUTION INTRAMUSCULAR; INTRAVENOUS AS NEEDED
Status: DISCONTINUED | OUTPATIENT
Start: 2025-04-15 | End: 2025-04-15 | Stop reason: HOSPADM

## 2025-04-15 RX ORDER — PROCHLORPERAZINE EDISYLATE 5 MG/ML
10 INJECTION INTRAMUSCULAR; INTRAVENOUS EVERY 6 HOURS PRN
Status: DISCONTINUED | OUTPATIENT
Start: 2025-04-15 | End: 2025-04-15 | Stop reason: HOSPADM

## 2025-04-15 RX ORDER — ALBUTEROL SULFATE 0.83 MG/ML
3 SOLUTION RESPIRATORY (INHALATION) AS NEEDED
Status: DISCONTINUED | OUTPATIENT
Start: 2025-04-15 | End: 2025-04-15 | Stop reason: HOSPADM

## 2025-04-15 RX ORDER — HEPARIN 100 UNIT/ML
500 SYRINGE INTRAVENOUS AS NEEDED
Status: DISCONTINUED | OUTPATIENT
Start: 2025-04-15 | End: 2025-04-15 | Stop reason: HOSPADM

## 2025-04-15 RX ORDER — HEPARIN SODIUM,PORCINE/PF 10 UNIT/ML
50 SYRINGE (ML) INTRAVENOUS AS NEEDED
OUTPATIENT
Start: 2025-04-15

## 2025-04-15 RX ORDER — FAMOTIDINE 10 MG/ML
20 INJECTION, SOLUTION INTRAVENOUS ONCE AS NEEDED
Status: DISCONTINUED | OUTPATIENT
Start: 2025-04-15 | End: 2025-04-15 | Stop reason: HOSPADM

## 2025-04-15 RX ORDER — PROCHLORPERAZINE MALEATE 10 MG
10 TABLET ORAL EVERY 6 HOURS PRN
Status: DISCONTINUED | OUTPATIENT
Start: 2025-04-15 | End: 2025-04-15 | Stop reason: HOSPADM

## 2025-04-15 RX ORDER — FAMOTIDINE 10 MG/ML
20 INJECTION, SOLUTION INTRAVENOUS ONCE
Status: COMPLETED | OUTPATIENT
Start: 2025-04-15 | End: 2025-04-15

## 2025-04-15 RX ORDER — EPINEPHRINE 0.3 MG/.3ML
0.3 INJECTION SUBCUTANEOUS EVERY 5 MIN PRN
Status: DISCONTINUED | OUTPATIENT
Start: 2025-04-15 | End: 2025-04-15 | Stop reason: HOSPADM

## 2025-04-15 RX ORDER — DIPHENHYDRAMINE HCL 50 MG
50 CAPSULE ORAL ONCE
Status: COMPLETED | OUTPATIENT
Start: 2025-04-15 | End: 2025-04-15

## 2025-04-15 RX ORDER — HEPARIN 100 UNIT/ML
500 SYRINGE INTRAVENOUS AS NEEDED
OUTPATIENT
Start: 2025-04-15

## 2025-04-15 RX ADMIN — HEPARIN 500 UNITS: 100 SYRINGE at 15:47

## 2025-04-15 RX ADMIN — DEXAMETHASONE SODIUM PHOSPHATE 10 MG: 4 INJECTION, SOLUTION INTRAMUSCULAR; INTRAVENOUS at 14:01

## 2025-04-15 RX ADMIN — PACLITAXEL 114 MG: 6 INJECTION, SOLUTION INTRAVENOUS at 14:42

## 2025-04-15 RX ADMIN — DIPHENHYDRAMINE HYDROCHLORIDE 50 MG: 50 CAPSULE ORAL at 14:00

## 2025-04-15 RX ADMIN — FAMOTIDINE 20 MG: 10 INJECTION, SOLUTION INTRAVENOUS at 14:01

## 2025-04-15 ASSESSMENT — PAIN SCALES - GENERAL: PAINLEVEL_OUTOF10: 0-NO PAIN

## 2025-04-15 NOTE — PROGRESS NOTES
Patient is here today for Paclitaxel infusion - no complication since last being seen.  Independent double check done prior to chemotherapy today.  B/L lung sounds not auscultated.  Denies current chest pain. No acute distress noted.  Patient verbalizes understanding of plan of care. Patient tolerated infusion well.  Ambulated off unit - gait steady.  no complaints. Call back instructions reviewed.  Verbalized understanding.

## 2025-04-16 ENCOUNTER — APPOINTMENT (OUTPATIENT)
Dept: RADIATION ONCOLOGY | Facility: CLINIC | Age: 80
End: 2025-04-16
Payer: MEDICARE

## 2025-04-16 LAB
DNA RANGE(S) EXAMINED NAR: NORMAL
GENE DIS ANL INTERP-IMP: POSITIVE
GENE DIS ASSESSED: NORMAL
GENE MUT TESTED BLD/T: 4.7 M/MB
MSI CA SPEC-IMP: NORMAL
PD-L1 BY 22C3 TISS IMSTN DOC: NEGATIVE
REASON FOR STUDY: NORMAL
TEMPUS GERMLINE NOTE: NORMAL
TEMPUS LCA: NORMAL
TEMPUS PD-L1 (22C3) COMBINED POSITIVE SCORE: <1
TEMPUS PD-L1 (22C3) TUMOR PROPORTION SCORE: <1 %
TEMPUS PORTAL: NORMAL
TEMPUS TREATMENT IMPLICATIONS NOTE: NORMAL
TEMPUS XR RESULT 1: NORMAL

## 2025-04-17 ENCOUNTER — SPECIALTY PHARMACY (OUTPATIENT)
Dept: PHARMACY | Facility: CLINIC | Age: 80
End: 2025-04-17

## 2025-04-17 ENCOUNTER — APPOINTMENT (OUTPATIENT)
Dept: RADIATION ONCOLOGY | Facility: CLINIC | Age: 80
End: 2025-04-17
Payer: MEDICARE

## 2025-04-18 ENCOUNTER — APPOINTMENT (OUTPATIENT)
Dept: RADIATION ONCOLOGY | Facility: CLINIC | Age: 80
End: 2025-04-18
Payer: MEDICARE

## 2025-04-21 ENCOUNTER — APPOINTMENT (OUTPATIENT)
Dept: RADIATION ONCOLOGY | Facility: CLINIC | Age: 80
End: 2025-04-21
Payer: MEDICARE

## 2025-04-21 ENCOUNTER — LAB (OUTPATIENT)
Dept: HEMATOLOGY/ONCOLOGY | Facility: CLINIC | Age: 80
End: 2025-04-21
Payer: MEDICARE

## 2025-04-21 ENCOUNTER — INFUSION (OUTPATIENT)
Dept: HEMATOLOGY/ONCOLOGY | Facility: CLINIC | Age: 80
End: 2025-04-21
Payer: MEDICARE

## 2025-04-21 ENCOUNTER — OFFICE VISIT (OUTPATIENT)
Dept: HEMATOLOGY/ONCOLOGY | Facility: CLINIC | Age: 80
End: 2025-04-21
Payer: MEDICARE

## 2025-04-21 VITALS
RESPIRATION RATE: 18 BRPM | TEMPERATURE: 97.2 F | DIASTOLIC BLOOD PRESSURE: 91 MMHG | OXYGEN SATURATION: 97 % | HEART RATE: 76 BPM | BODY MASS INDEX: 18.76 KG/M2 | SYSTOLIC BLOOD PRESSURE: 134 MMHG | WEIGHT: 103 LBS

## 2025-04-21 DIAGNOSIS — D61.818 PANCYTOPENIA: ICD-10-CM

## 2025-04-21 DIAGNOSIS — C44.92 SCC (SQUAMOUS CELL CARCINOMA): ICD-10-CM

## 2025-04-21 DIAGNOSIS — I35.9 AORTIC VALVE DISORDER: ICD-10-CM

## 2025-04-21 DIAGNOSIS — Z95.828 H/O INSERTION OF CENTRAL VENOUS ACCESS PORT: ICD-10-CM

## 2025-04-21 DIAGNOSIS — Z51.11 ENCOUNTER FOR ANTINEOPLASTIC CHEMOTHERAPY: Primary | ICD-10-CM

## 2025-04-21 LAB
ALBUMIN SERPL BCP-MCNC: 3.7 G/DL (ref 3.4–5)
ALP SERPL-CCNC: 55 U/L (ref 33–136)
ALT SERPL W P-5'-P-CCNC: 11 U/L (ref 7–45)
ANION GAP SERPL CALC-SCNC: 9 MMOL/L (ref 10–20)
AST SERPL W P-5'-P-CCNC: 15 U/L (ref 9–39)
BASOPHILS # BLD AUTO: 0.03 X10*3/UL (ref 0–0.1)
BASOPHILS NFR BLD AUTO: 0.9 %
BILIRUB SERPL-MCNC: 0.8 MG/DL (ref 0–1.2)
BUN SERPL-MCNC: 15 MG/DL (ref 6–23)
CALCIUM SERPL-MCNC: 8.8 MG/DL (ref 8.6–10.3)
CHLORIDE SERPL-SCNC: 106 MMOL/L (ref 98–107)
CO2 SERPL-SCNC: 27 MMOL/L (ref 21–32)
CREAT SERPL-MCNC: 0.59 MG/DL (ref 0.5–1.05)
EGFRCR SERPLBLD CKD-EPI 2021: >90 ML/MIN/1.73M*2
EOSINOPHIL # BLD AUTO: 0.03 X10*3/UL (ref 0–0.4)
EOSINOPHIL NFR BLD AUTO: 0.9 %
ERYTHROCYTE [DISTWIDTH] IN BLOOD BY AUTOMATED COUNT: 13.2 % (ref 11.5–14.5)
GLUCOSE SERPL-MCNC: 93 MG/DL (ref 74–99)
HCT VFR BLD AUTO: 34.1 % (ref 36–46)
HGB BLD-MCNC: 10.8 G/DL (ref 12–16)
IMM GRANULOCYTES # BLD AUTO: 0.01 X10*3/UL (ref 0–0.5)
IMM GRANULOCYTES NFR BLD AUTO: 0.3 % (ref 0–0.9)
LYMPHOCYTES # BLD AUTO: 0.44 X10*3/UL (ref 0.8–3)
LYMPHOCYTES NFR BLD AUTO: 13.5 %
MCH RBC QN AUTO: 32 PG (ref 26–34)
MCHC RBC AUTO-ENTMCNC: 31.7 G/DL (ref 32–36)
MCV RBC AUTO: 101 FL (ref 80–100)
MONOCYTES # BLD AUTO: 0.18 X10*3/UL (ref 0.05–0.8)
MONOCYTES NFR BLD AUTO: 5.5 %
NEUTROPHILS # BLD AUTO: 2.57 X10*3/UL (ref 1.6–5.5)
NEUTROPHILS NFR BLD AUTO: 78.9 %
NRBC BLD-RTO: ABNORMAL /100{WBCS}
PLATELET # BLD AUTO: 173 X10*3/UL (ref 150–450)
POTASSIUM SERPL-SCNC: 3.9 MMOL/L (ref 3.5–5.3)
PROT SERPL-MCNC: 6 G/DL (ref 6.4–8.2)
RBC # BLD AUTO: 3.37 X10*6/UL (ref 4–5.2)
SODIUM SERPL-SCNC: 138 MMOL/L (ref 136–145)
WBC # BLD AUTO: 3.3 X10*3/UL (ref 4.4–11.3)

## 2025-04-21 PROCEDURE — 2500000004 HC RX 250 GENERAL PHARMACY W/ HCPCS (ALT 636 FOR OP/ED): Mod: JZ | Performed by: STUDENT IN AN ORGANIZED HEALTH CARE EDUCATION/TRAINING PROGRAM

## 2025-04-21 PROCEDURE — 96413 CHEMO IV INFUSION 1 HR: CPT

## 2025-04-21 PROCEDURE — G2211 COMPLEX E/M VISIT ADD ON: HCPCS | Performed by: NURSE PRACTITIONER

## 2025-04-21 PROCEDURE — 2500000004 HC RX 250 GENERAL PHARMACY W/ HCPCS (ALT 636 FOR OP/ED): Performed by: INTERNAL MEDICINE

## 2025-04-21 PROCEDURE — 99214 OFFICE O/P EST MOD 30 MIN: CPT | Performed by: NURSE PRACTITIONER

## 2025-04-21 PROCEDURE — 80053 COMPREHEN METABOLIC PANEL: CPT

## 2025-04-21 PROCEDURE — 1126F AMNT PAIN NOTED NONE PRSNT: CPT | Performed by: NURSE PRACTITIONER

## 2025-04-21 PROCEDURE — 1036F TOBACCO NON-USER: CPT | Performed by: NURSE PRACTITIONER

## 2025-04-21 PROCEDURE — 96375 TX/PRO/DX INJ NEW DRUG ADDON: CPT | Mod: INF

## 2025-04-21 PROCEDURE — 85025 COMPLETE CBC W/AUTO DIFF WBC: CPT

## 2025-04-21 PROCEDURE — 2500000001 HC RX 250 WO HCPCS SELF ADMINISTERED DRUGS (ALT 637 FOR MEDICARE OP): Performed by: INTERNAL MEDICINE

## 2025-04-21 PROCEDURE — 1159F MED LIST DOCD IN RCRD: CPT | Performed by: NURSE PRACTITIONER

## 2025-04-21 RX ORDER — DIPHENHYDRAMINE HYDROCHLORIDE 50 MG/ML
50 INJECTION, SOLUTION INTRAMUSCULAR; INTRAVENOUS AS NEEDED
Status: DISCONTINUED | OUTPATIENT
Start: 2025-04-21 | End: 2025-04-21 | Stop reason: HOSPADM

## 2025-04-21 RX ORDER — PROCHLORPERAZINE MALEATE 10 MG
10 TABLET ORAL EVERY 6 HOURS PRN
Status: DISCONTINUED | OUTPATIENT
Start: 2025-04-21 | End: 2025-04-21 | Stop reason: HOSPADM

## 2025-04-21 RX ORDER — FAMOTIDINE 10 MG/ML
20 INJECTION, SOLUTION INTRAVENOUS ONCE
Status: COMPLETED | OUTPATIENT
Start: 2025-04-21 | End: 2025-04-21

## 2025-04-21 RX ORDER — ALBUTEROL SULFATE 0.83 MG/ML
3 SOLUTION RESPIRATORY (INHALATION) AS NEEDED
Status: DISCONTINUED | OUTPATIENT
Start: 2025-04-21 | End: 2025-04-21 | Stop reason: HOSPADM

## 2025-04-21 RX ORDER — HEPARIN 100 UNIT/ML
500 SYRINGE INTRAVENOUS AS NEEDED
OUTPATIENT
Start: 2025-04-21

## 2025-04-21 RX ORDER — FAMOTIDINE 10 MG/ML
20 INJECTION, SOLUTION INTRAVENOUS ONCE AS NEEDED
Status: DISCONTINUED | OUTPATIENT
Start: 2025-04-21 | End: 2025-04-21 | Stop reason: HOSPADM

## 2025-04-21 RX ORDER — DIPHENHYDRAMINE HCL 25 MG
50 CAPSULE ORAL ONCE
Status: COMPLETED | OUTPATIENT
Start: 2025-04-21 | End: 2025-04-21

## 2025-04-21 RX ORDER — PROCHLORPERAZINE EDISYLATE 5 MG/ML
10 INJECTION INTRAMUSCULAR; INTRAVENOUS EVERY 6 HOURS PRN
Status: DISCONTINUED | OUTPATIENT
Start: 2025-04-21 | End: 2025-04-21 | Stop reason: HOSPADM

## 2025-04-21 RX ORDER — HEPARIN SODIUM,PORCINE/PF 10 UNIT/ML
50 SYRINGE (ML) INTRAVENOUS AS NEEDED
OUTPATIENT
Start: 2025-04-21

## 2025-04-21 RX ORDER — HEPARIN 100 UNIT/ML
500 SYRINGE INTRAVENOUS AS NEEDED
Status: DISCONTINUED | OUTPATIENT
Start: 2025-04-21 | End: 2025-04-21 | Stop reason: HOSPADM

## 2025-04-21 RX ORDER — EPINEPHRINE 0.3 MG/.3ML
0.3 INJECTION SUBCUTANEOUS EVERY 5 MIN PRN
Status: DISCONTINUED | OUTPATIENT
Start: 2025-04-21 | End: 2025-04-21 | Stop reason: HOSPADM

## 2025-04-21 RX ADMIN — DIPHENHYDRAMINE HYDROCHLORIDE 50 MG: 25 CAPSULE ORAL at 14:41

## 2025-04-21 RX ADMIN — DEXAMETHASONE SODIUM PHOSPHATE 10 MG: 4 INJECTION, SOLUTION INTRA-ARTICULAR; INTRALESIONAL; INTRAMUSCULAR; INTRAVENOUS; SOFT TISSUE at 14:41

## 2025-04-21 RX ADMIN — PACLITAXEL 114 MG: 6 INJECTION, SOLUTION INTRAVENOUS at 15:04

## 2025-04-21 RX ADMIN — HEPARIN 500 UNITS: 100 SYRINGE at 16:09

## 2025-04-21 RX ADMIN — FAMOTIDINE 20 MG: 10 INJECTION INTRAVENOUS at 14:41

## 2025-04-21 ASSESSMENT — PAIN SCALES - GENERAL: PAINLEVEL_OUTOF10: 0-NO PAIN

## 2025-04-21 ASSESSMENT — ENCOUNTER SYMPTOMS
APPETITE CHANGE: 1
CARDIOVASCULAR NEGATIVE: 1
CONSTIPATION: 0
MUSCULOSKELETAL NEGATIVE: 1
FATIGUE: 1
SORE THROAT: 0

## 2025-04-21 NOTE — PROGRESS NOTES
[facility] - Medical Oncology Follow-Up Visit    Patient ID: Amrita Grant is a 79 y.o. female.  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 Grant is a 78 yo with PMH significant for lupus, aortic valve repair, fibromyalgia, who first noticed a mass on her nose in fall of 2024. Met Dr. Templeton who did an in-office biopsy 11/11/24 with invasive, poorly differentiated carcinoma, p40+, negative MOC31, unclear etiology, and recommended for repeat biopsy. Biopsy on 11/26/24 with the same, felt to be a primary cutaneous neoplasm such as basal cell carcinoma. Discussed at [facility] tumor board, and due to significant morbidity associated with resection, recommended against surgery and referred to radiation oncology and medical oncology. Met Dr. Guzmán, who recommended against immunotherapy given her lupus and poor performance status, and recommended discussion with radiation oncology, which cutaneous tumor board also agreed with. She planned to have radiation closer to home, but unfortunately her pre-radiation scans showed concern for metastatic disease, with PET/CT on 1/20/25 with FDG avidity of the R nasal soft tissue as well as R level 1 cervical lymph node, R rights, L1, and RUL nodule below PET resolution. She was ultimately started on vismodegib as well as started on palliative radiation (2/18-3/11/25) for the nasal lesion. In the meantime, [facility] pathology revealed invasive  carcinoma, HPV associated, positive for p40 CK5/6, EMA, p16, and negative for MOC, BerEP4, INSM1, and chromogranin. As this was felt to no longer be a basal cell carcinoma, she stopped the vismodegib. Biopsy of the L1 lesion morphologically the same.     Regarding her lupus - she has been on plaquenil monotherapy for many years, stable on the same dose. She was originally diagnosed with lupus in 2001 - she was initially diagnosed with pericarditis. She has fatigue and joint aches with the lupus.     She is here today with her  and friend. Her last day of radiation was last Tuesday or Wednesday. Radiation went ok overall. Her last dose of the visdomegib was last Tuesday or Wednesday. No side effects.  She is very tired, naps every day. In the last year, she has stopped doing the cooking, cleaning, and laundry. She is very careful, leans on her  to help her get up the stairs. Her fatigue has been getting worse and worse. She continues to lose weight - 50 lbs in the last year and a half. She has trouble swallowing - some things are harder to swallow like bread, meat. Gets tickles in her throat. She's had this for a couple years, had her throat stretched - this was also just repeated in December. Done at [place] typically, helps for a little while, but don't last.      PAST MEDICAL HISTORY  Lupus  Fibromyalgia  Osteoarthritis  Aortic valve repair (mechanical)  on warfarin  HTN  CVA - (2021)     SOCIAL HISTORY  Lives at home with her . They've been  45 years. Previously was a  for [employer].   Tob: none  EtOH: none, previously a little  Illicits: none     FAMILY HISTORY  Mother - uterine cancer  Maternal grandmother - breast cancer  Maternal cousin - unknown metastatic cancer    HPI    Present with spouse in clinic for follow-up and readiness to treat visit.  Feeling okay today.  One day last week,  Wed - states she had more energy.   No worse since last week.  Slight wt loss noted.   +Mild dysgeusia.  Denies n/v/c/d.  No fevers, chills, or CP.   No change to peripheal neuropathy - no worse.   No c/o pain.  No other concerns.  Labs WNL.  Ready for treatment.      Meds (Current):    Current Outpatient Medications:     ARIPiprazole (Abilify) 5 mg tablet, Take 1 tablet (5 mg) by mouth once daily., Disp: , Rfl:     Bystolic 5 mg tablet, 1 tab(s) orally half tablet three times daily for 30 days, Disp: , Rfl:     calcium carbonate 600 mg calcium (1,500 mg) tablet, Take 600 mg by mouth once daily., Disp: , Rfl:     cetirizine (ZyrTEC) 10 mg tablet, 1 tab(s) orally as needed, Disp: , Rfl:     cholecalciferol (Vitamin D-3) 25 MCG (1000 UT) capsule, 1 cap(s) orally three times daily, Disp: , Rfl:     docusate sodium (Colace) 100 mg capsule, once every 24 hours., Disp: , Rfl:     fluticasone (Flonase) 50 mcg/actuation nasal spray, Administer 2 sprays into affected nostril(s)., Disp: , Rfl:     levETIRAcetam (Keppra) 500 mg tablet, every 12 hours., Disp: , Rfl:     LORazepam (Ativan) 0.5 mg tablet, Take half tablet 1 hour before MRI, if needed take other half of tablet while getting checked in for MRI, Disp: 1 tablet, Rfl: 0    losartan (Cozaar) 100 mg tablet, Take 0.5 tablets (50 mg) by mouth twice a day., Disp: , Rfl:     melatonin 3 mg tablet, Take by mouth once daily., Disp: , Rfl:     memantine (Namenda) 5 mg tablet, Take 1 tablet (5 mg) by mouth once daily., Disp: , Rfl:     mirtazapine (Remeron) 15 mg tablet, 1 tab(s) orally half tablet bedtime daily for 30 days, Disp: , Rfl:     omeprazole (PriLOSEC) 20 mg DR capsule, Take 1 capsule (20 mg) by mouth once daily., Disp: , Rfl:     ondansetron (Zofran) 4 mg tablet, Take 1 tablet (4 mg) by mouth if needed for nausea or vomiting., Disp: , Rfl:     ondansetron (Zofran) 8 mg tablet, Take 1 tablet (8 mg) by mouth every 8 hours if needed for nausea or vomiting., Disp: 30 tablet, Rfl: 5    PlaqueniL 200 mg tablet, Take 1 tablet (200 mg) by mouth once daily.,  Disp: , Rfl:     potassium chloride CR 20 mEq ER tablet, Take 1 tablet (20 mEq) by mouth once daily., Disp: , Rfl:     Premarin 0.3 mg tablet, once every 24 hours., Disp: , Rfl:     prochlorperazine (Compazine) 10 mg tablet, Take 1 tablet (10 mg) by mouth every 6 hours if needed for nausea or vomiting., Disp: 30 tablet, Rfl: 5    sennosides (senna) 8.6 mg tablet, Take 1 tablet (8.6 mg) by mouth 2 times a day., Disp: 60 tablet, Rfl: 2    vismodegib (Erivedge) 150 mg capsule, Take 1 capsule (150 mg total) by mouth every other day.  Take with or without food. Swallow whole. Do not open or crush., Disp: 14 capsule, Rfl: 3    warfarin (Coumadin) 4 mg tablet, Take 1 tablet (4 mg) by mouth once daily., Disp: , Rfl:     lidocaine-prilocaine (Emla) 2.5-2.5 % cream, Apply topically 1 time for 1 dose. Apply 30-45 minutes prior to port access., Disp: 30 g, Rfl: 3  No current facility-administered medications for this visit.    Facility-Administered Medications Ordered in Other Visits:     albuterol 2.5 mg /3 mL (0.083 %) nebulizer solution 3 mL, 3 mL, nebulization, PRN, Libby Spann MD    dextrose 5 % in water (D5W) bolus 500 mL, 500 mL, intravenous, PRN, Libby Spann MD    diphenhydrAMINE (BENADryl) injection 50 mg, 50 mg, intravenous, PRN, Libby Spann MD    EPINEPHrine (Epipen) injection syringe 0.3 mg, 0.3 mg, intramuscular, q5 min PRN, Libby Spann MD    famotidine PF (Pepcid) injection 20 mg, 20 mg, intravenous, Once PRN, Libby Spann MD    heparin flush 100 unit/mL syringe 500 Units, 500 Units, intra-catheter, PRN, Dipika Perrin MD    methylPREDNISolone sod succinate (SOLU-Medrol) 40 mg/mL injection 40 mg, 40 mg, intravenous, PRN, iLbby Spann MD    PACLitaxeL (Taxol) 114 mg in dextrose 5% 129 mL IV, 80 mg/m2 (Treatment Plan Recorded), intravenous, Once, Libby Spann MD, 114 mg at 04/21/25 1504    prochlorperazine (Compazine) injection 10 mg, 10 mg, intravenous, q6h PRN, Libby Spann MD     prochlorperazine (Compazine) tablet 10 mg, 10 mg, oral, q6h PRN, Libby Spann MD    sodium chloride 0.9 % bolus 500 mL, 500 mL, intravenous, PRN, Libby Spann MD    Allergies   Allergen Reactions    Propofol Nausea/vomiting    Sulfa (Sulfonamide Antibiotics) Other    Opioids - Morphine Analogues Nausea And Vomiting, Other and Unknown    Penicillins Unknown     Pt. Reports passing out upon taking penicillin when she was young.       Review of Systems   Constitutional:  Positive for appetite change and fatigue.   HENT:  Negative.  Negative for sore throat.    Cardiovascular: Negative.    Gastrointestinal:  Negative for constipation.   Musculoskeletal: Negative.    All other systems reviewed and are negative.       Objective   BSA: 1.43 meters squared  Wt Readings from Last 5 Encounters:   04/21/25 46.7 kg (103 lb)   04/15/25 47.7 kg (105 lb 2.6 oz)   04/14/25 47.2 kg (104 lb)   04/07/25 47.3 kg (104 lb 3.2 oz)   04/02/25 47.6 kg (105 lb)     BP (!) 134/91 (BP Location: Left arm, Patient Position: Sitting, BP Cuff Size: Child) Comment: NOTIFIED RONALDO OATES NP.  Pulse 76   Temp 36.2 °C (97.2 °F) (Temporal)   Resp 18   Wt 46.7 kg (103 lb)   SpO2 97%   BMI 18.76 kg/m²     ECOG Score: 2    0          Fully active; no performance restrictions.  1          Strenuous physical activity restricted; fully ambulatory and able to carry out light work.  2          Capable of all self-care but unable to carry out any work activities. Up and about >50% of waking hours.  3          Capable of only limited self-care; confined to bed or chair >50% of waking hours.  4          Completely disabled; cannot carry out any self-care; totally confined to bed or chair.     Physical Exam  Vitals reviewed.   Constitutional:       General: She is not in acute distress.  HENT:      Head: Normocephalic.      Nose:      Comments: Large, erythematous, nasal lesion with scabbing - improved.     Mouth/Throat:      Mouth: Mucous membranes  are moist.   Eyes:      Conjunctiva/sclera: Conjunctivae normal.      Pupils: Pupils are equal, round, and reactive to light.   Cardiovascular:      Rate and Rhythm: Normal rate and regular rhythm.      Heart sounds: Normal heart sounds. No murmur heard.  Pulmonary:      Effort: Pulmonary effort is normal. No respiratory distress.      Breath sounds: Normal breath sounds.   Abdominal:      General: Bowel sounds are normal.      Palpations: Abdomen is soft.   Musculoskeletal:         General: Normal range of motion.      Cervical back: Normal range of motion.   Skin:     General: Skin is warm and dry.      Findings: No erythema.   Neurological:      General: No focal deficit present.      Mental Status: She is alert and oriented to person, place, and time.   Psychiatric:         Mood and Affect: Mood normal.         Behavior: Behavior normal.         Thought Content: Thought content normal.          Results:  Labs:  Lab Results   Component Value Date    WBC 3.3 (L) 04/21/2025    HGB 10.8 (L) 04/21/2025    HCT 34.1 (L) 04/21/2025     (H) 04/21/2025     04/21/2025      Lab Results   Component Value Date    NEUTROABS 2.57 04/21/2025      Lab Results   Component Value Date    GLUCOSE 93 04/21/2025    CALCIUM 8.8 04/21/2025     04/21/2025    K 3.9 04/21/2025    CO2 27 04/21/2025     04/21/2025    BUN 15 04/21/2025    CREATININE 0.59 04/21/2025     Lab Results   Component Value Date    ALT 11 04/21/2025    AST 15 04/21/2025    ALKPHOS 55 04/21/2025    BILITOT 0.8 04/21/2025        Imaging:  No new imaging.      Pathology:    Lab Results   Component Value Date    ADD1  11/26/2024     Additional immunostains were reviewed.  Tumor cells are positive for p16 and negative for chromogranin and INSM1.  Isolated tumor cells are positive for BerEP4.  The differential diagnosis remains unchanged.         Assessment/Plan      Amrita Grant is a 79 y.o. female here for recommendations and to establish care  for poorly differentiated carcinoma of the face.     # Poorly differentiated carcinoma  - HPV+  - L1 biopsy proven metastasis - will send tempus for better delineation  - discussed the likelihood that this is metastatic, HPV+ nasopharyngeal carcinoma, although tempus may help us better determine this  - s/p palliative radiation  - discussed that typically would recommend combination therapy with chemotherapy+immunotherapy. However, given her lupus which was originally diagnosed with pericarditis, do not recommend immunotherapy. Also discussed that given her current poor performance status, do not believe she could tolerate dual chemotherapy  - discussed single-agent weekly paclitaxel, and consented. Plan to repeat scans after 3 cycles   - will obtain new baseline images, as she has not had imaging since January 2025  - she would like to have her infusions closer to home at [place], but will continue to see us at [place] for pre-treatment visits  - Pt agreeable to infusions and visits on same day at [place].    - Update: Unable to obtain IV access after multiple attempts.  Existing mediport placement scheduled 4/2/25.  Infusion rescheduled to 4/7/25.    - 4/2/25 Mediport placed  - RTC in 1 week for C2D1.      # Lupus  - she follows with a rheumatologist at [facility], and will notify them we are starting chemotherapy    # Dysuria, urgency, frequency  - offered obtaining UA and urine culture, and she deferred to PCP  - 3/31:  Current 10-day ATB course.  Pt/family not able to recall medication name.  ATB started prior to her tx C1D1.  Tolerating ATB.  Reports up to 8 UTI's last year.  PCP (non- provider) - any further follow-up with PCP.    - 4/7:  Completed 10-day ATB course.  Denies current UTI symptoms.       # Unintentional weight loss  - may be partly due to dysphagia  - oncology dietician referral in place   - wt loss has stabilized     # Fatigue (g1)  - Recommended she increase her physical activity  level - during waking house, get up Q2-3H, walk within the home, pace activities.     # Constipation  - Senna too effective, pt to start routine Miralax - 1/2 capful to start and adjust as needed.      # Pancytopenia - chemo-induced  - Weekly labs, ongoing monitoring     # INR/chronic Warfarin  - INR ordered for coumadin dosing, infusion nurse provided medical release form per facility protocol.  Pt follows with [facility] for dosing/monitoring.  INR to be draw via port with treatment labs, fax to [facility] [phone].  Goal INR 2.5-3.0.   - 4/14: INR 2.0 result faxed to [facility] 4/15/25  - 4/21:  Will monitor for INR result and fax to [facility] 4/22/25.      # Advanced care planning  - discussed incurable but treatable nature of disease, with goal to prolong life while maintaining/improving quality of life. She understands that risks of systemic therapy may outweigh benefits at some point in the future.

## 2025-04-21 NOTE — SIGNIFICANT EVENT

## 2025-04-22 ENCOUNTER — APPOINTMENT (OUTPATIENT)
Dept: HEMATOLOGY/ONCOLOGY | Facility: CLINIC | Age: 80
End: 2025-04-22
Payer: MEDICARE

## 2025-04-22 ENCOUNTER — APPOINTMENT (OUTPATIENT)
Dept: RADIATION ONCOLOGY | Facility: CLINIC | Age: 80
End: 2025-04-22
Payer: MEDICARE

## 2025-04-22 DIAGNOSIS — C44.92 SCC (SQUAMOUS CELL CARCINOMA): Primary | ICD-10-CM

## 2025-04-22 LAB — PROTHROMBIN TIME (PROTIME)PT.: 38.6 SECONDS (ref 11.7–14.9)

## 2025-04-23 ENCOUNTER — APPOINTMENT (OUTPATIENT)
Dept: RADIATION ONCOLOGY | Facility: CLINIC | Age: 80
End: 2025-04-23
Payer: MEDICARE

## 2025-04-24 ENCOUNTER — APPOINTMENT (OUTPATIENT)
Dept: RADIATION ONCOLOGY | Facility: CLINIC | Age: 80
End: 2025-04-24
Payer: MEDICARE

## 2025-04-25 ENCOUNTER — APPOINTMENT (OUTPATIENT)
Dept: RADIATION ONCOLOGY | Facility: CLINIC | Age: 80
End: 2025-04-25
Payer: MEDICARE

## 2025-04-28 ENCOUNTER — LAB (OUTPATIENT)
Dept: HEMATOLOGY/ONCOLOGY | Facility: CLINIC | Age: 80
End: 2025-04-28
Payer: MEDICARE

## 2025-04-28 ENCOUNTER — LAB (OUTPATIENT)
Dept: LAB | Facility: HOSPITAL | Age: 80
End: 2025-04-28
Payer: MEDICARE

## 2025-04-28 ENCOUNTER — APPOINTMENT (OUTPATIENT)
Dept: RADIATION ONCOLOGY | Facility: CLINIC | Age: 80
End: 2025-04-28
Payer: MEDICARE

## 2025-04-28 ENCOUNTER — TELEPHONE (OUTPATIENT)
Dept: HEMATOLOGY/ONCOLOGY | Facility: CLINIC | Age: 80
End: 2025-04-28
Payer: MEDICARE

## 2025-04-28 ENCOUNTER — OFFICE VISIT (OUTPATIENT)
Dept: HEMATOLOGY/ONCOLOGY | Facility: CLINIC | Age: 80
End: 2025-04-28
Payer: MEDICARE

## 2025-04-28 ENCOUNTER — INFUSION (OUTPATIENT)
Dept: HEMATOLOGY/ONCOLOGY | Facility: CLINIC | Age: 80
End: 2025-04-28
Payer: MEDICARE

## 2025-04-28 VITALS
WEIGHT: 103.4 LBS | OXYGEN SATURATION: 94 % | RESPIRATION RATE: 18 BRPM | BODY MASS INDEX: 18.84 KG/M2 | DIASTOLIC BLOOD PRESSURE: 95 MMHG | HEART RATE: 72 BPM | TEMPERATURE: 98.1 F | SYSTOLIC BLOOD PRESSURE: 151 MMHG

## 2025-04-28 DIAGNOSIS — C44.92 SCC (SQUAMOUS CELL CARCINOMA): ICD-10-CM

## 2025-04-28 DIAGNOSIS — C44.92 SCC (SQUAMOUS CELL CARCINOMA): Primary | ICD-10-CM

## 2025-04-28 DIAGNOSIS — Z51.11 ENCOUNTER FOR ANTINEOPLASTIC CHEMOTHERAPY: ICD-10-CM

## 2025-04-28 DIAGNOSIS — C44.92 SQUAMOUS CELL CARCINOMA OF SKIN, UNSPECIFIED: Primary | ICD-10-CM

## 2025-04-28 LAB
ALBUMIN SERPL BCP-MCNC: 4 G/DL (ref 3.4–5)
ALP SERPL-CCNC: 53 U/L (ref 33–136)
ALT SERPL W P-5'-P-CCNC: 13 U/L (ref 7–45)
ANION GAP SERPL CALC-SCNC: 8 MMOL/L (ref 10–20)
AST SERPL W P-5'-P-CCNC: 15 U/L (ref 9–39)
BASOPHILS # BLD AUTO: 0.03 X10*3/UL (ref 0–0.1)
BASOPHILS NFR BLD AUTO: 0.8 %
BILIRUB SERPL-MCNC: 0.5 MG/DL (ref 0–1.2)
BUN SERPL-MCNC: 13 MG/DL (ref 6–23)
CALCIUM SERPL-MCNC: 8.9 MG/DL (ref 8.6–10.3)
CHLORIDE SERPL-SCNC: 106 MMOL/L (ref 98–107)
CO2 SERPL-SCNC: 30 MMOL/L (ref 21–32)
CREAT SERPL-MCNC: 0.72 MG/DL (ref 0.5–1.05)
EGFRCR SERPLBLD CKD-EPI 2021: 86 ML/MIN/1.73M*2
EOSINOPHIL # BLD AUTO: 0.04 X10*3/UL (ref 0–0.4)
EOSINOPHIL NFR BLD AUTO: 1 %
ERYTHROCYTE [DISTWIDTH] IN BLOOD BY AUTOMATED COUNT: 13.6 % (ref 11.5–14.5)
GLUCOSE SERPL-MCNC: 93 MG/DL (ref 74–99)
HCT VFR BLD AUTO: 35.3 % (ref 36–46)
HGB BLD-MCNC: 11.1 G/DL (ref 12–16)
IMM GRANULOCYTES # BLD AUTO: 0.02 X10*3/UL (ref 0–0.5)
IMM GRANULOCYTES NFR BLD AUTO: 0.5 % (ref 0–0.9)
LYMPHOCYTES # BLD AUTO: 0.58 X10*3/UL (ref 0.8–3)
LYMPHOCYTES NFR BLD AUTO: 15.1 %
MCH RBC QN AUTO: 31.6 PG (ref 26–34)
MCHC RBC AUTO-ENTMCNC: 31.4 G/DL (ref 32–36)
MCV RBC AUTO: 101 FL (ref 80–100)
MONOCYTES # BLD AUTO: 0.39 X10*3/UL (ref 0.05–0.8)
MONOCYTES NFR BLD AUTO: 10.2 %
NEUTROPHILS # BLD AUTO: 2.78 X10*3/UL (ref 1.6–5.5)
NEUTROPHILS NFR BLD AUTO: 72.4 %
NRBC BLD-RTO: 0 /100 WBCS (ref 0–0)
PLATELET # BLD AUTO: 239 X10*3/UL (ref 150–450)
POTASSIUM SERPL-SCNC: 4.4 MMOL/L (ref 3.5–5.3)
PROT SERPL-MCNC: 6 G/DL (ref 6.4–8.2)
RBC # BLD AUTO: 3.51 X10*6/UL (ref 4–5.2)
SODIUM SERPL-SCNC: 140 MMOL/L (ref 136–145)
WBC # BLD AUTO: 3.8 X10*3/UL (ref 4.4–11.3)

## 2025-04-28 PROCEDURE — 1159F MED LIST DOCD IN RCRD: CPT | Performed by: NURSE PRACTITIONER

## 2025-04-28 PROCEDURE — 2500000004 HC RX 250 GENERAL PHARMACY W/ HCPCS (ALT 636 FOR OP/ED): Performed by: NURSE PRACTITIONER

## 2025-04-28 PROCEDURE — 1036F TOBACCO NON-USER: CPT | Performed by: NURSE PRACTITIONER

## 2025-04-28 PROCEDURE — 96375 TX/PRO/DX INJ NEW DRUG ADDON: CPT | Mod: INF

## 2025-04-28 PROCEDURE — G2211 COMPLEX E/M VISIT ADD ON: HCPCS | Performed by: NURSE PRACTITIONER

## 2025-04-28 PROCEDURE — 2500000004 HC RX 250 GENERAL PHARMACY W/ HCPCS (ALT 636 FOR OP/ED): Mod: JZ | Performed by: NURSE PRACTITIONER

## 2025-04-28 PROCEDURE — 80053 COMPREHEN METABOLIC PANEL: CPT

## 2025-04-28 PROCEDURE — 2500000001 HC RX 250 WO HCPCS SELF ADMINISTERED DRUGS (ALT 637 FOR MEDICARE OP): Performed by: NURSE PRACTITIONER

## 2025-04-28 PROCEDURE — 85025 COMPLETE CBC W/AUTO DIFF WBC: CPT

## 2025-04-28 PROCEDURE — 1126F AMNT PAIN NOTED NONE PRSNT: CPT | Performed by: NURSE PRACTITIONER

## 2025-04-28 PROCEDURE — 96413 CHEMO IV INFUSION 1 HR: CPT

## 2025-04-28 PROCEDURE — 99214 OFFICE O/P EST MOD 30 MIN: CPT | Performed by: NURSE PRACTITIONER

## 2025-04-28 PROCEDURE — 2500000004 HC RX 250 GENERAL PHARMACY W/ HCPCS (ALT 636 FOR OP/ED): Mod: JZ | Performed by: STUDENT IN AN ORGANIZED HEALTH CARE EDUCATION/TRAINING PROGRAM

## 2025-04-28 RX ORDER — HEPARIN SODIUM,PORCINE/PF 10 UNIT/ML
50 SYRINGE (ML) INTRAVENOUS AS NEEDED
OUTPATIENT
Start: 2025-04-28

## 2025-04-28 RX ORDER — FAMOTIDINE 10 MG/ML
20 INJECTION, SOLUTION INTRAVENOUS ONCE AS NEEDED
Status: CANCELLED | OUTPATIENT
Start: 2025-04-28

## 2025-04-28 RX ORDER — DIPHENHYDRAMINE HCL 50 MG
50 CAPSULE ORAL ONCE
OUTPATIENT
Start: 2025-05-12

## 2025-04-28 RX ORDER — DIPHENHYDRAMINE HYDROCHLORIDE 50 MG/ML
50 INJECTION, SOLUTION INTRAMUSCULAR; INTRAVENOUS AS NEEDED
OUTPATIENT
Start: 2025-05-12

## 2025-04-28 RX ORDER — ALBUTEROL SULFATE 0.83 MG/ML
3 SOLUTION RESPIRATORY (INHALATION) AS NEEDED
Status: DISCONTINUED | OUTPATIENT
Start: 2025-04-28 | End: 2025-04-28 | Stop reason: HOSPADM

## 2025-04-28 RX ORDER — ALBUTEROL SULFATE 0.83 MG/ML
3 SOLUTION RESPIRATORY (INHALATION) AS NEEDED
OUTPATIENT
Start: 2025-05-19

## 2025-04-28 RX ORDER — FAMOTIDINE 10 MG/ML
20 INJECTION, SOLUTION INTRAVENOUS ONCE AS NEEDED
OUTPATIENT
Start: 2025-05-12

## 2025-04-28 RX ORDER — PROCHLORPERAZINE MALEATE 5 MG
10 TABLET ORAL EVERY 6 HOURS PRN
Status: CANCELLED | OUTPATIENT
Start: 2025-04-28

## 2025-04-28 RX ORDER — DEXAMETHASONE SODIUM PHOSPHATE 10 MG/ML
10 INJECTION INTRAMUSCULAR; INTRAVENOUS ONCE
OUTPATIENT
Start: 2025-05-12

## 2025-04-28 RX ORDER — DIPHENHYDRAMINE HYDROCHLORIDE 50 MG/ML
50 INJECTION, SOLUTION INTRAMUSCULAR; INTRAVENOUS AS NEEDED
OUTPATIENT
Start: 2025-05-05

## 2025-04-28 RX ORDER — HEPARIN 100 UNIT/ML
500 SYRINGE INTRAVENOUS AS NEEDED
Status: DISCONTINUED | OUTPATIENT
Start: 2025-04-28 | End: 2025-04-28 | Stop reason: HOSPADM

## 2025-04-28 RX ORDER — FAMOTIDINE 10 MG/ML
20 INJECTION, SOLUTION INTRAVENOUS ONCE
OUTPATIENT
Start: 2025-05-12

## 2025-04-28 RX ORDER — DIPHENHYDRAMINE HCL 25 MG
50 CAPSULE ORAL ONCE
Status: COMPLETED | OUTPATIENT
Start: 2025-04-28 | End: 2025-04-28

## 2025-04-28 RX ORDER — DIPHENHYDRAMINE HYDROCHLORIDE 50 MG/ML
50 INJECTION, SOLUTION INTRAMUSCULAR; INTRAVENOUS AS NEEDED
OUTPATIENT
Start: 2025-05-19

## 2025-04-28 RX ORDER — FAMOTIDINE 10 MG/ML
20 INJECTION, SOLUTION INTRAVENOUS ONCE
OUTPATIENT
Start: 2025-05-05

## 2025-04-28 RX ORDER — ALBUTEROL SULFATE 0.83 MG/ML
3 SOLUTION RESPIRATORY (INHALATION) AS NEEDED
Status: CANCELLED | OUTPATIENT
Start: 2025-04-28

## 2025-04-28 RX ORDER — ALBUTEROL SULFATE 0.83 MG/ML
3 SOLUTION RESPIRATORY (INHALATION) AS NEEDED
OUTPATIENT
Start: 2025-05-12

## 2025-04-28 RX ORDER — DIPHENHYDRAMINE HCL 50 MG
50 CAPSULE ORAL ONCE
Status: CANCELLED | OUTPATIENT
Start: 2025-04-28

## 2025-04-28 RX ORDER — FAMOTIDINE 10 MG/ML
20 INJECTION, SOLUTION INTRAVENOUS ONCE
Status: CANCELLED | OUTPATIENT
Start: 2025-04-28

## 2025-04-28 RX ORDER — FAMOTIDINE 10 MG/ML
20 INJECTION, SOLUTION INTRAVENOUS ONCE AS NEEDED
Status: DISCONTINUED | OUTPATIENT
Start: 2025-04-28 | End: 2025-04-28 | Stop reason: HOSPADM

## 2025-04-28 RX ORDER — PROCHLORPERAZINE MALEATE 5 MG
10 TABLET ORAL EVERY 6 HOURS PRN
OUTPATIENT
Start: 2025-05-05

## 2025-04-28 RX ORDER — HEPARIN 100 UNIT/ML
500 SYRINGE INTRAVENOUS AS NEEDED
OUTPATIENT
Start: 2025-04-28

## 2025-04-28 RX ORDER — EPINEPHRINE 0.3 MG/.3ML
0.3 INJECTION SUBCUTANEOUS EVERY 5 MIN PRN
Status: DISCONTINUED | OUTPATIENT
Start: 2025-04-28 | End: 2025-04-28 | Stop reason: HOSPADM

## 2025-04-28 RX ORDER — DIPHENHYDRAMINE HYDROCHLORIDE 50 MG/ML
50 INJECTION, SOLUTION INTRAMUSCULAR; INTRAVENOUS AS NEEDED
Status: CANCELLED | OUTPATIENT
Start: 2025-04-28

## 2025-04-28 RX ORDER — FAMOTIDINE 10 MG/ML
20 INJECTION, SOLUTION INTRAVENOUS ONCE
Status: COMPLETED | OUTPATIENT
Start: 2025-04-28 | End: 2025-04-28

## 2025-04-28 RX ORDER — DIPHENHYDRAMINE HCL 50 MG
50 CAPSULE ORAL ONCE
OUTPATIENT
Start: 2025-05-05

## 2025-04-28 RX ORDER — PROCHLORPERAZINE EDISYLATE 5 MG/ML
10 INJECTION INTRAMUSCULAR; INTRAVENOUS EVERY 6 HOURS PRN
OUTPATIENT
Start: 2025-05-12

## 2025-04-28 RX ORDER — PROCHLORPERAZINE EDISYLATE 5 MG/ML
10 INJECTION INTRAMUSCULAR; INTRAVENOUS EVERY 6 HOURS PRN
OUTPATIENT
Start: 2025-05-19

## 2025-04-28 RX ORDER — EPINEPHRINE 0.3 MG/.3ML
0.3 INJECTION SUBCUTANEOUS EVERY 5 MIN PRN
OUTPATIENT
Start: 2025-05-19

## 2025-04-28 RX ORDER — EPINEPHRINE 0.3 MG/.3ML
0.3 INJECTION SUBCUTANEOUS EVERY 5 MIN PRN
OUTPATIENT
Start: 2025-05-12

## 2025-04-28 RX ORDER — FAMOTIDINE 10 MG/ML
20 INJECTION, SOLUTION INTRAVENOUS ONCE AS NEEDED
OUTPATIENT
Start: 2025-05-19

## 2025-04-28 RX ORDER — PROCHLORPERAZINE EDISYLATE 5 MG/ML
10 INJECTION INTRAMUSCULAR; INTRAVENOUS EVERY 6 HOURS PRN
Status: CANCELLED | OUTPATIENT
Start: 2025-04-28

## 2025-04-28 RX ORDER — DIPHENHYDRAMINE HYDROCHLORIDE 50 MG/ML
50 INJECTION, SOLUTION INTRAMUSCULAR; INTRAVENOUS AS NEEDED
Status: DISCONTINUED | OUTPATIENT
Start: 2025-04-28 | End: 2025-04-28 | Stop reason: HOSPADM

## 2025-04-28 RX ORDER — FAMOTIDINE 10 MG/ML
20 INJECTION, SOLUTION INTRAVENOUS ONCE
OUTPATIENT
Start: 2025-05-19

## 2025-04-28 RX ORDER — PROCHLORPERAZINE MALEATE 10 MG
10 TABLET ORAL EVERY 6 HOURS PRN
Status: DISCONTINUED | OUTPATIENT
Start: 2025-04-28 | End: 2025-04-28 | Stop reason: HOSPADM

## 2025-04-28 RX ORDER — PROCHLORPERAZINE MALEATE 5 MG
10 TABLET ORAL EVERY 6 HOURS PRN
OUTPATIENT
Start: 2025-05-12

## 2025-04-28 RX ORDER — DIPHENHYDRAMINE HCL 50 MG
50 CAPSULE ORAL ONCE
OUTPATIENT
Start: 2025-05-19

## 2025-04-28 RX ORDER — PROCHLORPERAZINE EDISYLATE 5 MG/ML
10 INJECTION INTRAMUSCULAR; INTRAVENOUS EVERY 6 HOURS PRN
Status: DISCONTINUED | OUTPATIENT
Start: 2025-04-28 | End: 2025-04-28 | Stop reason: HOSPADM

## 2025-04-28 RX ORDER — FAMOTIDINE 10 MG/ML
20 INJECTION, SOLUTION INTRAVENOUS ONCE AS NEEDED
OUTPATIENT
Start: 2025-05-05

## 2025-04-28 RX ORDER — PROCHLORPERAZINE EDISYLATE 5 MG/ML
10 INJECTION INTRAMUSCULAR; INTRAVENOUS EVERY 6 HOURS PRN
OUTPATIENT
Start: 2025-05-05

## 2025-04-28 RX ORDER — DEXAMETHASONE SODIUM PHOSPHATE 10 MG/ML
10 INJECTION INTRAMUSCULAR; INTRAVENOUS ONCE
OUTPATIENT
Start: 2025-05-19

## 2025-04-28 RX ORDER — DEXAMETHASONE SODIUM PHOSPHATE 10 MG/ML
10 INJECTION INTRAMUSCULAR; INTRAVENOUS ONCE
Status: CANCELLED | OUTPATIENT
Start: 2025-04-28

## 2025-04-28 RX ORDER — DEXAMETHASONE SODIUM PHOSPHATE 10 MG/ML
10 INJECTION INTRAMUSCULAR; INTRAVENOUS ONCE
OUTPATIENT
Start: 2025-05-05

## 2025-04-28 RX ORDER — EPINEPHRINE 0.3 MG/.3ML
0.3 INJECTION SUBCUTANEOUS EVERY 5 MIN PRN
OUTPATIENT
Start: 2025-05-05

## 2025-04-28 RX ORDER — ALBUTEROL SULFATE 0.83 MG/ML
3 SOLUTION RESPIRATORY (INHALATION) AS NEEDED
OUTPATIENT
Start: 2025-05-05

## 2025-04-28 RX ORDER — EPINEPHRINE 0.3 MG/.3ML
0.3 INJECTION SUBCUTANEOUS EVERY 5 MIN PRN
Status: CANCELLED | OUTPATIENT
Start: 2025-04-28

## 2025-04-28 RX ORDER — PROCHLORPERAZINE MALEATE 5 MG
10 TABLET ORAL EVERY 6 HOURS PRN
OUTPATIENT
Start: 2025-05-19

## 2025-04-28 RX ADMIN — DEXAMETHASONE SODIUM PHOSPHATE 10 MG: 4 INJECTION, SOLUTION INTRA-ARTICULAR; INTRALESIONAL; INTRAMUSCULAR; INTRAVENOUS; SOFT TISSUE at 12:48

## 2025-04-28 RX ADMIN — PACLITAXEL 114 MG: 6 INJECTION, SOLUTION INTRAVENOUS at 13:15

## 2025-04-28 RX ADMIN — DIPHENHYDRAMINE HYDROCHLORIDE 50 MG: 25 CAPSULE ORAL at 12:48

## 2025-04-28 RX ADMIN — HEPARIN 500 UNITS: 100 SYRINGE at 14:24

## 2025-04-28 RX ADMIN — FAMOTIDINE 20 MG: 10 INJECTION INTRAVENOUS at 12:48

## 2025-04-28 ASSESSMENT — PAIN SCALES - GENERAL: PAINLEVEL_OUTOF10: 0-NO PAIN

## 2025-04-28 ASSESSMENT — ENCOUNTER SYMPTOMS
SORE THROAT: 0
MUSCULOSKELETAL NEGATIVE: 1
APPETITE CHANGE: 0
CONSTIPATION: 0
CARDIOVASCULAR NEGATIVE: 1
FATIGUE: 1

## 2025-04-28 NOTE — PROGRESS NOTES
[facility] - Medical Oncology Follow-Up Visit    Patient ID: Amrita Grant is a 79 y.o. female.  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 Grant is a 78 yo with PMH significant for lupus, aortic valve repair, fibromyalgia, who first noticed a mass on her nose in fall of 2024. Met Dr. Templeton who did an in-office biopsy 11/11/24 with invasive, poorly differentiated carcinoma, p40+, negative MOC31, unclear etiology, and recommended for repeat biopsy. Biopsy on 11/26/24 with the same, felt to be a primary cutaneous neoplasm such as basal cell carcinoma. Discussed at [facility] tumor board, and due to significant morbidity associated with resection, recommended against surgery and referred to radiation oncology and medical oncology. Met Dr. Guzmán, who recommended against immunotherapy given her lupus and poor performance status, and recommended discussion with radiation oncology, which cutaneous tumor board also agreed with. She planned to have radiation closer to home, but unfortunately her pre-radiation scans showed concern for metastatic disease, with PET/CT on 1/20/25 with FDG avidity of the R nasal soft tissue as well as R level 1 cervical lymph node, R rights, L1, and RUL nodule below PET resolution. She was ultimately started on vismodegib as well as started on palliative radiation (2/18-3/11/25) for the nasal lesion. In the meantime, [facility] pathology revealed invasive  carcinoma, HPV associated, positive for p40 CK5/6, EMA, p16, and negative for MOC, BerEP4, INSM1, and chromogranin. As this was felt to no longer be a basal cell carcinoma, she stopped the vismodegib. Biopsy of the L1 lesion morphologically the same.     Regarding her lupus - she has been on plaquenil monotherapy for many years, stable on the same dose. She was originally diagnosed with lupus in 2001 - she was initially diagnosed with pericarditis. She has fatigue and joint aches with the lupus.     She is here today with her  and friend. Her last day of radiation was last Tuesday or Wednesday. Radiation went ok overall. Her last dose of the visdomegib was last Tuesday or Wednesday. No side effects.  She is very tired, naps every day. In the last year, she has stopped doing the cooking, cleaning, and laundry. She is very careful, leans on her  to help her get up the stairs. Her fatigue has been getting worse and worse. She continues to lose weight - 50 lbs in the last year and a half. She has trouble swallowing - some things are harder to swallow like bread, meat. Gets tickles in her throat. She's had this for a couple years, had her throat stretched - this was also just repeated in December. Done at [facility] typically, helps for a little while, but don't last.      PAST MEDICAL HISTORY  Lupus  Fibromyalgia  Osteoarthritis  Aortic valve repair (mechanical)  on warfarin  HTN  CVA - (2021)     SOCIAL HISTORY  Lives at home with her . They've been  45 years. Previously was a  for [employer].   Tob: none  EtOH: none, previously a little  Illicits: none     FAMILY HISTORY  Mother - uterine cancer  Maternal grandmother - breast cancer  Maternal cousin - unknown metastatic cancer    HPI  Present with her .  Continues to tolerate her treatments well.  Reports she feels good on Wed.  Fatigued on Tues and Thurs.  Able to complete the laundry.   No change in appetite - eating two  meals/day.  Wt is stable.  Dysgeusia resolved.  Denies n/v/c/d.  Routine miralax.  Believes right nasal lesion is getting slightly larger (mm).  No other concerns.  CMP machine down today.  Will treat today using 4/21/25 labs.  Ready for treatment.      Meds (Current):    Current Outpatient Medications:     ARIPiprazole (Abilify) 5 mg tablet, Take 1 tablet (5 mg) by mouth once daily., Disp: , Rfl:     Bystolic 5 mg tablet, 1 tab(s) orally half tablet three times daily for 30 days, Disp: , Rfl:     calcium carbonate 600 mg calcium (1,500 mg) tablet, Take 600 mg by mouth once daily., Disp: , Rfl:     cetirizine (ZyrTEC) 10 mg tablet, 1 tab(s) orally as needed, Disp: , Rfl:     cholecalciferol (Vitamin D-3) 25 MCG (1000 UT) capsule, 1 cap(s) orally three times daily, Disp: , Rfl:     docusate sodium (Colace) 100 mg capsule, once every 24 hours., Disp: , Rfl:     fluticasone (Flonase) 50 mcg/actuation nasal spray, Administer 2 sprays into affected nostril(s)., Disp: , Rfl:     levETIRAcetam (Keppra) 500 mg tablet, every 12 hours., Disp: , Rfl:     LORazepam (Ativan) 0.5 mg tablet, Take half tablet 1 hour before MRI, if needed take other half of tablet while getting checked in for MRI, Disp: 1 tablet, Rfl: 0    losartan (Cozaar) 100 mg tablet, Take 0.5 tablets (50 mg) by mouth twice a day., Disp: , Rfl:     melatonin 3 mg tablet, Take by mouth once daily., Disp: , Rfl:     memantine (Namenda) 5 mg tablet, Take 1 tablet (5 mg) by mouth once daily., Disp: , Rfl:     mirtazapine (Remeron) 15 mg tablet, 1 tab(s) orally half tablet bedtime daily for 30 days, Disp: , Rfl:     omeprazole (PriLOSEC) 20 mg DR capsule, Take 1 capsule (20 mg) by mouth once daily., Disp: , Rfl:     ondansetron (Zofran) 4 mg tablet, Take 1 tablet (4 mg) by mouth if needed for nausea or vomiting., Disp: , Rfl:     ondansetron (Zofran) 8 mg tablet, Take 1 tablet (8 mg) by mouth every 8 hours if needed for nausea or vomiting., Disp: 30 tablet, Rfl: 5     PlaqueniL 200 mg tablet, Take 1 tablet (200 mg) by mouth once daily., Disp: , Rfl:     potassium chloride CR 20 mEq ER tablet, Take 1 tablet (20 mEq) by mouth once daily., Disp: , Rfl:     Premarin 0.3 mg tablet, once every 24 hours., Disp: , Rfl:     prochlorperazine (Compazine) 10 mg tablet, Take 1 tablet (10 mg) by mouth every 6 hours if needed for nausea or vomiting., Disp: 30 tablet, Rfl: 5    sennosides (senna) 8.6 mg tablet, Take 1 tablet (8.6 mg) by mouth 2 times a day., Disp: 60 tablet, Rfl: 2    vismodegib (Erivedge) 150 mg capsule, Take 1 capsule (150 mg total) by mouth every other day.  Take with or without food. Swallow whole. Do not open or crush., Disp: 14 capsule, Rfl: 3    warfarin (Coumadin) 4 mg tablet, Take 1 tablet (4 mg) by mouth once daily., Disp: , Rfl:     lidocaine-prilocaine (Emla) 2.5-2.5 % cream, Apply topically 1 time for 1 dose. Apply 30-45 minutes prior to port access., Disp: 30 g, Rfl: 3  No current facility-administered medications for this visit.    Facility-Administered Medications Ordered in Other Visits:     albuterol 2.5 mg /3 mL (0.083 %) nebulizer solution 3 mL, 3 mL, nebulization, PRN, Preethi Escobedo APRN-CNP    dexAMETHasone (Decadron) injection 10 mg, 10 mg, intravenous, Once, DEISY Chanel-CNP    dextrose 5 % in water (D5W) bolus 500 mL, 500 mL, intravenous, PRN, Preethi Escobedo, APRN-CNP    diphenhydrAMINE (BENADryl) capsule 50 mg, 50 mg, oral, Once, Preethi Escobedo APRDELL-CNP    diphenhydrAMINE (BENADryl) injection 50 mg, 50 mg, intravenous, PRN, Preethi Escobedo, APRN-CNP    EPINEPHrine (Epipen) injection syringe 0.3 mg, 0.3 mg, intramuscular, q5 min PRN, Preethi AKIRA Brasher    famotidine PF (Pepcid) injection 20 mg, 20 mg, intravenous, Once, AKIRA Chanel    famotidine PF (Pepcid) injection 20 mg, 20 mg, intravenous, Once PRN, AKIRA Chanel    methylPREDNISolone sod succinate (SOLU-Medrol) 40 mg/mL injection 40 mg,  40 mg, intravenous, PRN, Preethi Granados, APRN-CNP    PACLitaxeL (Taxol) 114 mg in dextrose 5% 119 mL IV, 80 mg/m2 (Treatment Plan Recorded), intravenous, Once, Preethi MADELIN Trevinogins, APRN-CNP    prochlorperazine (Compazine) injection 10 mg, 10 mg, intravenous, q6h PRN, Preethi MADELIN Trevinogins, APRN-CNP    prochlorperazine (Compazine) tablet 10 mg, 10 mg, oral, q6h PRN, Preethi MADELIN Trevinogins, APRN-CNP    sodium chloride 0.9 % bolus 500 mL, 500 mL, intravenous, PRN, Pretehi L Boggins, APRN-CNP    Allergies   Allergen Reactions    Propofol Nausea/vomiting    Sulfa (Sulfonamide Antibiotics) Other    Opioids - Morphine Analogues Nausea And Vomiting, Other and Unknown    Penicillins Unknown     Pt. Reports passing out upon taking penicillin when she was young.       Review of Systems   Constitutional:  Positive for fatigue. Negative for appetite change.   HENT:  Negative.  Negative for sore throat.    Cardiovascular: Negative.    Gastrointestinal:  Negative for constipation.   Musculoskeletal: Negative.    All other systems reviewed and are negative.       Objective   BSA: 1.43 meters squared  Wt Readings from Last 5 Encounters:   04/28/25 46.9 kg (103 lb 6.4 oz)   04/21/25 46.7 kg (103 lb)   04/15/25 47.7 kg (105 lb 2.6 oz)   04/14/25 47.2 kg (104 lb)   04/07/25 47.3 kg (104 lb 3.2 oz)     BP (!) 151/95 (BP Location: Left arm, Patient Position: Sitting) Comment: NOTIFIED NURSE  Pulse 72   Temp 36.7 °C (98.1 °F) (Temporal)   Resp 18   Wt 46.9 kg (103 lb 6.4 oz)   SpO2 94%   BMI 18.84 kg/m²     ECOG Score: 2    0          Fully active; no performance restrictions.  1          Strenuous physical activity restricted; fully ambulatory and able to carry out light work.  2          Capable of all self-care but unable to carry out any work activities. Up and about >50% of waking hours.  3          Capable of only limited self-care; confined to bed or chair >50% of waking hours.  4          Completely disabled; cannot carry out any  self-care; totally confined to bed or chair.     Physical Exam  Vitals reviewed.   Constitutional:       General: She is not in acute distress.  HENT:      Head: Normocephalic.      Nose:      Comments: Large, erythematous, nasal lesion, erythema skin pigment change, cream/ointment present to area - improved.     Mouth/Throat:      Mouth: Mucous membranes are moist.   Eyes:      Conjunctiva/sclera: Conjunctivae normal.      Pupils: Pupils are equal, round, and reactive to light.   Cardiovascular:      Rate and Rhythm: Normal rate and regular rhythm.      Heart sounds: Normal heart sounds. No murmur heard.  Pulmonary:      Effort: Pulmonary effort is normal. No respiratory distress.      Breath sounds: Normal breath sounds.   Abdominal:      General: Bowel sounds are normal.      Palpations: Abdomen is soft.   Musculoskeletal:         General: Normal range of motion.      Cervical back: Normal range of motion.   Skin:     General: Skin is warm and dry.      Findings: No erythema.   Neurological:      General: No focal deficit present.      Mental Status: She is alert and oriented to person, place, and time.   Psychiatric:         Mood and Affect: Mood normal.         Behavior: Behavior normal.         Thought Content: Thought content normal.          Results:  Labs:  Lab Results   Component Value Date    WBC 3.8 (L) 04/28/2025    HGB 11.1 (L) 04/28/2025    HCT 35.3 (L) 04/28/2025     (H) 04/28/2025     04/28/2025      Lab Results   Component Value Date    NEUTROABS 2.78 04/28/2025      Lab Results   Component Value Date    GLUCOSE 93 04/28/2025    CALCIUM 8.9 04/28/2025     04/28/2025    K 4.4 04/28/2025    CO2 30 04/28/2025     04/28/2025    BUN 13 04/28/2025    CREATININE 0.72 04/28/2025     Lab Results   Component Value Date    ALT 13 04/28/2025    AST 15 04/28/2025    ALKPHOS 53 04/28/2025    BILITOT 0.5 04/28/2025        Imaging:  No new imaging.      Pathology:    Lab Results    Component Value Date    ADD1  11/26/2024     Additional immunostains were reviewed.  Tumor cells are positive for p16 and negative for chromogranin and INSM1.  Isolated tumor cells are positive for BerEP4.  The differential diagnosis remains unchanged.         Assessment/Plan      Amirta Grant is a 79 y.o. female here for recommendations and to establish care for poorly differentiated carcinoma of the face.     # Poorly differentiated carcinoma  - HPV+  - L1 biopsy proven metastasis - will send tempus for better delineation  - discussed the likelihood that this is metastatic, HPV+ nasopharyngeal carcinoma, although tempus may help us better determine this  - s/p palliative radiation  - discussed that typically would recommend combination therapy with chemotherapy+immunotherapy. However, given her lupus which was originally diagnosed with pericarditis, do not recommend immunotherapy. Also discussed that given her current poor performance status, do not believe she could tolerate dual chemotherapy  - discussed single-agent weekly paclitaxel, and consented. Plan to repeat scans after 3 cycles   - will obtain new baseline images, as she has not had imaging since January 2025  - she would like to have her infusions closer to home at [place], but will continue to see us at [facility] for pre-treatment visits  - Pt agreeable to infusions and visits on same day at [facility].    - Update: Unable to obtain IV access after multiple attempts.  Existing mediport placement scheduled 4/2/25.  Infusion rescheduled to 4/7/25.    - 4/2/25 Mediport placed  - RTC in 1 week for C2D8.      # Lupus  - she follows with a rheumatologist at [facility], and will notify them we are starting chemotherapy    # Dysuria, urgency, frequency  - offered obtaining UA and urine culture, and she deferred to PCP  - 3/31:  Current 10-day ATB course.  Pt/family not able to recall medication name.  ATB started prior to her tx C1D1.  Tolerating ATB.   Reports up to 8 UTI's last year.  PCP (non- provider) - any further follow-up with PCP.    - 4/7:  Completed 10-day ATB course.  Denies current UTI symptoms.       # Unintentional weight loss  - may be partly due to dysphagia  - oncology dietician referral in place   - wt loss has stabilized     # Fatigue (g1)  - Recommended she increase her physical activity level - during waking house, get up Q2-3H, walk within the home, pace activities.     # Constipation  - Senna too effective, pt to start routine Miralax - 1/2 capful to start and adjust as needed.      # Pancytopenia - chemo-induced  - Weekly labs, ongoing monitoring     # INR/chronic Warfarin  - INR ordered for coumadin dosing, infusion nurse provided medical release form per facility protocol.  Pt follows with [facility] for dosing/monitoring.  INR to be draw via port with treatment labs, fax to [facility] [phone].  Goal INR 2.5-3.0.   - 4/14: INR 2.0 result faxed to [facility] 4/15/25  - 4/21:  Will monitor for INR result and fax to [facility] 4/22/25.   - 4/28:  Will monitor for INR result and fax to [facility] 4/29/25.       # Advanced care planning  - discussed incurable but treatable nature of disease, with goal to prolong life while maintaining/improving quality of life. She understands that risks of systemic therapy may outweigh benefits at some point in the future.

## 2025-04-28 NOTE — SIGNIFICANT EVENT

## 2025-04-28 NOTE — TELEPHONE ENCOUNTER
Reason for Conversation  [facility] CMP machine down    Background   Patient requires CMP for treatment    Disposition   Called the patient's  to let him know about the issue with the machine. Patient intends on going to [facility] lab to get her labs drawn. Explained to the patient's  to go to the lab and the patient will have to get blood drawn peripherally vs. Using the port. Patient's  stated understanding.

## 2025-04-29 ENCOUNTER — HOSPITAL ENCOUNTER (OUTPATIENT)
Dept: HOSPITAL 100 - MTLAB | Age: 80
LOS: 1 days | Discharge: HOME | End: 2025-04-30
Payer: MEDICARE

## 2025-04-29 ENCOUNTER — APPOINTMENT (OUTPATIENT)
Dept: RADIATION ONCOLOGY | Facility: CLINIC | Age: 80
End: 2025-04-29
Payer: MEDICARE

## 2025-04-29 ENCOUNTER — APPOINTMENT (OUTPATIENT)
Dept: HEMATOLOGY/ONCOLOGY | Facility: CLINIC | Age: 80
End: 2025-04-29
Payer: MEDICARE

## 2025-04-29 DIAGNOSIS — I48.91: Primary | ICD-10-CM

## 2025-04-29 PROCEDURE — 85610 PROTHROMBIN TIME: CPT

## 2025-04-29 PROCEDURE — 36415 COLL VENOUS BLD VENIPUNCTURE: CPT

## 2025-04-30 ENCOUNTER — APPOINTMENT (OUTPATIENT)
Dept: RADIATION ONCOLOGY | Facility: CLINIC | Age: 80
End: 2025-04-30
Payer: MEDICARE

## 2025-05-01 ENCOUNTER — APPOINTMENT (OUTPATIENT)
Dept: RADIATION ONCOLOGY | Facility: CLINIC | Age: 80
End: 2025-05-01
Payer: MEDICARE

## 2025-05-02 ENCOUNTER — APPOINTMENT (OUTPATIENT)
Dept: RADIATION ONCOLOGY | Facility: CLINIC | Age: 80
End: 2025-05-02
Payer: MEDICARE

## 2025-05-02 LAB — PROTHROMBIN TIME (PROTIME)PT.: 25.4 SECONDS (ref 11.7–14.9)

## 2025-05-05 ENCOUNTER — LAB (OUTPATIENT)
Dept: HEMATOLOGY/ONCOLOGY | Facility: CLINIC | Age: 80
End: 2025-05-05
Payer: MEDICARE

## 2025-05-05 ENCOUNTER — OFFICE VISIT (OUTPATIENT)
Dept: HEMATOLOGY/ONCOLOGY | Facility: CLINIC | Age: 80
End: 2025-05-05
Payer: MEDICARE

## 2025-05-05 ENCOUNTER — INFUSION (OUTPATIENT)
Dept: HEMATOLOGY/ONCOLOGY | Facility: CLINIC | Age: 80
End: 2025-05-05
Payer: MEDICARE

## 2025-05-05 ENCOUNTER — NUTRITION (OUTPATIENT)
Dept: HEMATOLOGY/ONCOLOGY | Facility: CLINIC | Age: 80
End: 2025-05-05

## 2025-05-05 ENCOUNTER — APPOINTMENT (OUTPATIENT)
Dept: RADIATION ONCOLOGY | Facility: CLINIC | Age: 80
End: 2025-05-05
Payer: MEDICARE

## 2025-05-05 VITALS — BODY MASS INDEX: 18.69 KG/M2 | WEIGHT: 101.6 LBS | HEIGHT: 62 IN

## 2025-05-05 VITALS
TEMPERATURE: 97.3 F | BODY MASS INDEX: 18.51 KG/M2 | HEART RATE: 75 BPM | OXYGEN SATURATION: 97 % | WEIGHT: 101.6 LBS | RESPIRATION RATE: 16 BRPM | SYSTOLIC BLOOD PRESSURE: 127 MMHG | DIASTOLIC BLOOD PRESSURE: 85 MMHG

## 2025-05-05 DIAGNOSIS — C44.92 SCC (SQUAMOUS CELL CARCINOMA): ICD-10-CM

## 2025-05-05 DIAGNOSIS — Z51.11 ENCOUNTER FOR ANTINEOPLASTIC CHEMOTHERAPY: Primary | ICD-10-CM

## 2025-05-05 LAB
ALBUMIN SERPL BCP-MCNC: 3.9 G/DL (ref 3.4–5)
ALP SERPL-CCNC: 53 U/L (ref 33–136)
ALT SERPL W P-5'-P-CCNC: 11 U/L (ref 7–45)
ANION GAP SERPL CALC-SCNC: 9 MMOL/L (ref 10–20)
AST SERPL W P-5'-P-CCNC: 16 U/L (ref 9–39)
BASOPHILS # BLD AUTO: 0.02 X10*3/UL (ref 0–0.1)
BASOPHILS NFR BLD AUTO: 0.6 %
BILIRUB SERPL-MCNC: 0.5 MG/DL (ref 0–1.2)
BUN SERPL-MCNC: 15 MG/DL (ref 6–23)
CALCIUM SERPL-MCNC: 8.8 MG/DL (ref 8.6–10.3)
CHLORIDE SERPL-SCNC: 105 MMOL/L (ref 98–107)
CO2 SERPL-SCNC: 25 MMOL/L (ref 21–32)
CREAT SERPL-MCNC: 0.76 MG/DL (ref 0.5–1.05)
EGFRCR SERPLBLD CKD-EPI 2021: 81 ML/MIN/1.73M*2
EOSINOPHIL # BLD AUTO: 0.02 X10*3/UL (ref 0–0.4)
EOSINOPHIL NFR BLD AUTO: 0.6 %
ERYTHROCYTE [DISTWIDTH] IN BLOOD BY AUTOMATED COUNT: 14.1 % (ref 11.5–14.5)
GLUCOSE SERPL-MCNC: 99 MG/DL (ref 74–99)
HCT VFR BLD AUTO: 33.2 % (ref 36–46)
HGB BLD-MCNC: 10.7 G/DL (ref 12–16)
IMM GRANULOCYTES # BLD AUTO: 0.01 X10*3/UL (ref 0–0.5)
IMM GRANULOCYTES NFR BLD AUTO: 0.3 % (ref 0–0.9)
LYMPHOCYTES # BLD AUTO: 0.41 X10*3/UL (ref 0.8–3)
LYMPHOCYTES NFR BLD AUTO: 11.4 %
MCH RBC QN AUTO: 32.3 PG (ref 26–34)
MCHC RBC AUTO-ENTMCNC: 32.2 G/DL (ref 32–36)
MCV RBC AUTO: 100 FL (ref 80–100)
MONOCYTES # BLD AUTO: 0.33 X10*3/UL (ref 0.05–0.8)
MONOCYTES NFR BLD AUTO: 9.2 %
NEUTROPHILS # BLD AUTO: 2.8 X10*3/UL (ref 1.6–5.5)
NEUTROPHILS NFR BLD AUTO: 77.9 %
NRBC BLD-RTO: ABNORMAL /100{WBCS}
PLATELET # BLD AUTO: 202 X10*3/UL (ref 150–450)
POTASSIUM SERPL-SCNC: 4 MMOL/L (ref 3.5–5.3)
PROT SERPL-MCNC: 6.2 G/DL (ref 6.4–8.2)
RBC # BLD AUTO: 3.31 X10*6/UL (ref 4–5.2)
SODIUM SERPL-SCNC: 135 MMOL/L (ref 136–145)
WBC # BLD AUTO: 3.6 X10*3/UL (ref 4.4–11.3)

## 2025-05-05 PROCEDURE — 85025 COMPLETE CBC W/AUTO DIFF WBC: CPT

## 2025-05-05 PROCEDURE — 2500000004 HC RX 250 GENERAL PHARMACY W/ HCPCS (ALT 636 FOR OP/ED): Mod: JZ | Performed by: STUDENT IN AN ORGANIZED HEALTH CARE EDUCATION/TRAINING PROGRAM

## 2025-05-05 PROCEDURE — 96413 CHEMO IV INFUSION 1 HR: CPT

## 2025-05-05 PROCEDURE — 1159F MED LIST DOCD IN RCRD: CPT | Performed by: NURSE PRACTITIONER

## 2025-05-05 PROCEDURE — 2500000001 HC RX 250 WO HCPCS SELF ADMINISTERED DRUGS (ALT 637 FOR MEDICARE OP): Performed by: STUDENT IN AN ORGANIZED HEALTH CARE EDUCATION/TRAINING PROGRAM

## 2025-05-05 PROCEDURE — 99215 OFFICE O/P EST HI 40 MIN: CPT | Performed by: NURSE PRACTITIONER

## 2025-05-05 PROCEDURE — 96375 TX/PRO/DX INJ NEW DRUG ADDON: CPT | Mod: INF

## 2025-05-05 PROCEDURE — 1126F AMNT PAIN NOTED NONE PRSNT: CPT | Performed by: NURSE PRACTITIONER

## 2025-05-05 PROCEDURE — 1036F TOBACCO NON-USER: CPT | Performed by: NURSE PRACTITIONER

## 2025-05-05 PROCEDURE — 2500000004 HC RX 250 GENERAL PHARMACY W/ HCPCS (ALT 636 FOR OP/ED): Performed by: STUDENT IN AN ORGANIZED HEALTH CARE EDUCATION/TRAINING PROGRAM

## 2025-05-05 PROCEDURE — 85610 PROTHROMBIN TIME: CPT

## 2025-05-05 PROCEDURE — 80053 COMPREHEN METABOLIC PANEL: CPT

## 2025-05-05 RX ORDER — HEPARIN 100 UNIT/ML
500 SYRINGE INTRAVENOUS AS NEEDED
Status: DISCONTINUED | OUTPATIENT
Start: 2025-05-05 | End: 2025-05-05 | Stop reason: HOSPADM

## 2025-05-05 RX ORDER — FAMOTIDINE 10 MG/ML
20 INJECTION, SOLUTION INTRAVENOUS ONCE
Status: COMPLETED | OUTPATIENT
Start: 2025-05-05 | End: 2025-05-05

## 2025-05-05 RX ORDER — FAMOTIDINE 10 MG/ML
20 INJECTION, SOLUTION INTRAVENOUS ONCE AS NEEDED
Status: DISCONTINUED | OUTPATIENT
Start: 2025-05-05 | End: 2025-05-05 | Stop reason: HOSPADM

## 2025-05-05 RX ORDER — HEPARIN 100 UNIT/ML
500 SYRINGE INTRAVENOUS AS NEEDED
OUTPATIENT
Start: 2025-05-05

## 2025-05-05 RX ORDER — HEPARIN SODIUM,PORCINE/PF 10 UNIT/ML
50 SYRINGE (ML) INTRAVENOUS AS NEEDED
OUTPATIENT
Start: 2025-05-05

## 2025-05-05 RX ORDER — ALBUTEROL SULFATE 0.83 MG/ML
3 SOLUTION RESPIRATORY (INHALATION) AS NEEDED
Status: DISCONTINUED | OUTPATIENT
Start: 2025-05-05 | End: 2025-05-05 | Stop reason: HOSPADM

## 2025-05-05 RX ORDER — EPINEPHRINE 0.3 MG/.3ML
0.3 INJECTION SUBCUTANEOUS EVERY 5 MIN PRN
Status: DISCONTINUED | OUTPATIENT
Start: 2025-05-05 | End: 2025-05-05 | Stop reason: HOSPADM

## 2025-05-05 RX ORDER — DIPHENHYDRAMINE HYDROCHLORIDE 50 MG/ML
50 INJECTION, SOLUTION INTRAMUSCULAR; INTRAVENOUS AS NEEDED
Status: DISCONTINUED | OUTPATIENT
Start: 2025-05-05 | End: 2025-05-05 | Stop reason: HOSPADM

## 2025-05-05 RX ORDER — PROCHLORPERAZINE EDISYLATE 5 MG/ML
10 INJECTION INTRAMUSCULAR; INTRAVENOUS EVERY 6 HOURS PRN
Status: DISCONTINUED | OUTPATIENT
Start: 2025-05-05 | End: 2025-05-05 | Stop reason: HOSPADM

## 2025-05-05 RX ORDER — LIDOCAINE AND PRILOCAINE 25; 25 MG/G; MG/G
CREAM TOPICAL ONCE
Qty: 30 G | Refills: 3 | Status: SHIPPED | OUTPATIENT
Start: 2025-05-05 | End: 2025-05-05

## 2025-05-05 RX ORDER — DIPHENHYDRAMINE HCL 25 MG
50 CAPSULE ORAL ONCE
Status: COMPLETED | OUTPATIENT
Start: 2025-05-05 | End: 2025-05-05

## 2025-05-05 RX ORDER — PROCHLORPERAZINE MALEATE 10 MG
10 TABLET ORAL EVERY 6 HOURS PRN
Status: DISCONTINUED | OUTPATIENT
Start: 2025-05-05 | End: 2025-05-05 | Stop reason: HOSPADM

## 2025-05-05 RX ADMIN — DIPHENHYDRAMINE HYDROCHLORIDE 50 MG: 25 CAPSULE ORAL at 11:58

## 2025-05-05 RX ADMIN — DEXAMETHASONE SODIUM PHOSPHATE 10 MG: 4 INJECTION, SOLUTION INTRA-ARTICULAR; INTRALESIONAL; INTRAMUSCULAR; INTRAVENOUS; SOFT TISSUE at 11:58

## 2025-05-05 RX ADMIN — FAMOTIDINE 20 MG: 10 INJECTION INTRAVENOUS at 11:58

## 2025-05-05 RX ADMIN — HEPARIN 500 UNITS: 100 SYRINGE at 13:26

## 2025-05-05 RX ADMIN — DEXTROSE MONOHYDRATE 114 MG: 50 INJECTION, SOLUTION INTRAVENOUS at 12:28

## 2025-05-05 ASSESSMENT — ENCOUNTER SYMPTOMS
SORE THROAT: 0
FATIGUE: 1
APPETITE CHANGE: 0
CONSTIPATION: 0
CARDIOVASCULAR NEGATIVE: 1
MUSCULOSKELETAL NEGATIVE: 1

## 2025-05-05 ASSESSMENT — PAIN SCALES - GENERAL: PAINLEVEL_OUTOF10: 0-NO PAIN

## 2025-05-05 NOTE — SIGNIFICANT EVENT

## 2025-05-05 NOTE — PROGRESS NOTES
[facility] Rates:    Rate/Vol    25/12.6  50/25  75/37.6  100/50  125.5/63  150.6/75.3  176/ 88  201/-

## 2025-05-05 NOTE — PROGRESS NOTES
"NUTRITION Follow-Up NOTE    Nutrition Assessment     Reason for Visit:  Amrita Grant is a 79 y.o. female seen today during her initial infusion of PACLitaxel (Weekly), 28 Day Cycles for diagnosis of Poorly differentiated carcinoma.  Consult received for weight loss and trouble swallowing.        Patient Active Problem List   Diagnosis    Subarachnoid hemorrhage (Multi)    Nontoxic multinodular goiter    Facial basal cell cancer    Other forms of systemic lupus erythematosus    Seizure (Multi)    Aneurysm of ascending aorta without rupture    SCC (squamous cell carcinoma)    Encounter for antineoplastic chemotherapy    Constipation    Neoplastic malignant related fatigue    Aortic valve disorder    Pancytopenia       Nutrition Significant Labs:  Lab Results   Component Value Date/Time    GLUCOSE 99 05/05/2025 1051     (L) 05/05/2025 1051    K 4.0 05/05/2025 1051     05/05/2025 1051    CO2 25 05/05/2025 1051    ANIONGAP 9 (L) 05/05/2025 1051    BUN 15 05/05/2025 1051    CREATININE 0.76 05/05/2025 1051    EGFR 81 05/05/2025 1051    CALCIUM 8.8 05/05/2025 1051    ALBUMIN 3.9 05/05/2025 1051    ALKPHOS 53 05/05/2025 1051    PROT 6.2 (L) 05/05/2025 1051    AST 16 05/05/2025 1051    BILITOT 0.5 05/05/2025 1051    ALT 11 05/05/2025 1051     No results found for: \"VITD25\"      Anthropometrics:  Height: 157.8 cm (5' 2.13\")   Weight: 46.1 kg (101 lb 9.6 oz)   BMI (Calculated): 18.51    IBW/kg (Dietitian Calculated): 50 kg Percent of IBW: 92 %          Weight History:   Daily Weight  05/05/25 : 46.1 kg (101 lb 9.6 oz)  05/05/25 : 46.1 kg (101 lb 9.6 oz)  04/28/25 : 46.9 kg (103 lb 6.4 oz)  04/21/25 : 46.7 kg (103 lb)  04/15/25 : 47.7 kg (105 lb 2.6 oz)  04/14/25 : 47.2 kg (104 lb)  04/07/25 : 47.3 kg (104 lb 3.2 oz)  04/02/25 : 47.6 kg (105 lb)  03/31/25 : 48 kg (105 lb 14.4 oz)  03/25/25 : 46.1 kg (101 lb 10.1 oz)    Weight Change %:  Weight History / % Weight Change: weight stable from over the last " month  Significant Weight Loss: Yes  Interpretation of Weight Loss: >20% in 1 year    Nutrition History:  Food & Nutrition History: Patient reported appetite is decreased with trouble swallowing.  Reports trouble swallowing meats, breads, and some liquids are hard to drink. Trying to do softer foods.  Food Allergies:    Food Intolerances:    Vitamin/mineral intake:       Herbal supplements:    Medication and Complementary/Alternative Medicine Use:    Dentition:    Sleep Habits:      Diet Recall:  Meal 1:    Snack 1:    Meal 2:    Snack 2:    Meal 3:    Snack 3:    Food Variety:    Oral Nutrition Supplement Use: Oral Nutrition Supplements: Other (She stated Boost was suggested to her but has Vitamin K and she is on warfarin.) Frequency: maybe 1 per day Calories: 230 calories Protein: 42g  Fluid Intake: Poor  Energy Intake: Poor < 50 %    Food Preparation:  Cooking: Spouse/Significant Other  Grocery Shopping:    Dining Out:      Medications:  Current Outpatient Medications   Medication Instructions    ARIPiprazole (Abilify) 5 mg tablet 1 tablet, Daily    Bystolic 5 mg tablet 1 tab(s) orally half tablet three times daily for 30 days    calcium carbonate 600 mg, Daily RT    cetirizine (ZyrTEC) 10 mg tablet 1 tab(s) orally as needed    cholecalciferol (Vitamin D-3) 25 MCG (1000 UT) capsule 1 cap(s) orally three times daily    docusate sodium (Colace) 100 mg capsule Every 24 hours    Erivedge 150 mg, oral, Every other day, Take with or without food. Swallow whole. Do not open or crush.    fluticasone (Flonase) 50 mcg/actuation nasal spray 2 sprays    levETIRAcetam (Keppra) 500 mg tablet Every 12 hours    lidocaine-prilocaine (Emla) 2.5-2.5 % cream Topical, Once, Apply 30-45 minutes prior to port access.    LORazepam (Ativan) 0.5 mg tablet Take half tablet 1 hour before MRI, if needed take other half of tablet while getting checked in for MRI    losartan (COZAAR) 50 mg, 2 times daily    melatonin 3 mg tablet Daily RT     memantine (NAMENDA) 5 mg, Daily RT    mirtazapine (Remeron) 15 mg tablet 1 tab(s) orally half tablet bedtime daily for 30 days    omeprazole (PRILOSEC) 20 mg, Daily RT    ondansetron (ZOFRAN) 4 mg, As needed    ondansetron (ZOFRAN) 8 mg, oral, Every 8 hours PRN    PlaqueniL 200 mg tablet 1 tablet, Daily    potassium chloride CR 20 mEq ER tablet 1 tablet, Daily    Premarin 0.3 mg tablet Every 24 hours    prochlorperazine (COMPAZINE) 10 mg, oral, Every 6 hours PRN    sennosides (SENNA) 8.6 mg, oral, 2 times daily    warfarin (COUMADIN) 4 mg, Daily RT       Nutrition Focused Physical Exam Findings:    Subcutaneous Fat Loss:   Orbital Fat Pads: Severe (dark circles, hollowing and loose skin)  Buccal Fat Pads: Severe (hollow, sunken and narrow face)    Muscle Wasting:  Temporalis: Severe (hollowed scooping depression)  Pectoralis (Clavicular Region): Severe (protruding prominent clavicle)  Interosseous: Severe (depressed area between thumb and forefinger)    Physical Findings:  Mouth Findings: Dysphagia       Estimated Needs:       Total Energy Estimated Needs in 24 hours (kCal): 1600 kCal  Method for Estimating Needs: 5992-3399 kcals/day (35-40kcals/kg)  Total Protein Estimated Needs in 24 Hours (g): 60 g  Method for Estimating 24 Hour Protein Needs: 60-70g/day (1.3-1.5g/kg)  Total Fluid Estimated Needs in 24 Hours (mL): 1600 mL  Method for Estimating 24 Hour Fluid Needs: 1ml/kcal             Nutrition Diagnosis   Malnutrition Diagnosis  Patient has Malnutrition Diagnosis: Yes  Diagnosis Status: Active  Malnutrition Diagnosis: Severe malnutrition related to chronic disease or condition  Related to: dysphagia  As Evidenced by: wt loss >20% in 1 year, and meeting <75% of estimated nutritional needs for > 1 month.    Nutrition Diagnosis  Patient has Nutrition Diagnosis: Yes  Diagnosis Status (1): Active  Nutrition Diagnosis 1: Increased nutrient needs  Related to (1): increased protein, calorie, and protein needs  As  Evidenced by (1): chemotherapy for diagnosis of squamous cell carcinoma       Nutrition Interventions/Recommendations   Nutrition Prescription: Oral nutrition Smaller frequent meals including softer/pureed protein rich foods; meeting adequate hydration.    Nutrition Interventions:   Food and Nutrient Delivery: Meals & Snacks: Energy-modified diet, Fluid-modified diet, Modification of schedule of oral intake, Protein-modified diet, Texture-Modified Diet  Goals: Encouraged intake of high calorie high protein softer/pureed foods in small frequent meals throughout the day; aim for at least 6 meals/ snacks daily. ONS may be used to help meet nutritional needs.  Medical Food Supplement: Commercial beverage medical food supplement therapy  Goals: Recommend high calorie high protein options; suggested changing from 42g protein supplement to Ensure plus; consume 1-3 per day as tolerated to closer meet estimated nutritional needs. (Discussed with supplements that contain Vitamin K she would have to drink same amount daily, but she wanted to avoid them.  [employer]life drinks did not have vitmain K listed on nutrition label, however boost and ensure do contain.)  Other:: Hydration  Goals: Encouraged adequate fluid intake; discussed foods that count towards fluid     Coordination of Care: Collaboration and referral of nutrition care: Collaboration and Referral of Nutrition Care  Goals: will continue to follow up throughout treatments; Recommend referral to SLP for swallow evaluation.     Nutrition Education:   Nutrition Education Content: Content related nutrition education  High calorie high protein diet; adjusted consistency for swallowing; provided eating hints book and discussed.           Nutrition Monitoring and Evaluation   Food and Nutrient Intake  Monitoring and Evaluation Plan: Energy intake, Fluid intake, Meal/snack pattern, Protein intake, Amount of food  Energy Intake: Estimated energy intake  Criteria: Meet 100% of  caloric needs; dietary recall  Fluid Intake: Estimated fluid intake  Criteria: Maintain hydration of at least 64 oz per day.  Amount of Food: Medical food intake  Criteria: Ensure plus drink 1-3 per day between meals as tolerated.  Meal/Snack Pattern: Estimated meal and snack pattern  Criteria: increase frequency to smaller frequent meals/snacks, dietary recall  Estimated protein intake: Estimated protein intake  Criteria: Meet 100% of protein needs, dietary recall    Anthropometric measurements  Monitoring and Evaluation Plan: Weight  Body Weight: Measured body weight  Criteria: Maintain weight    Biochemical Data, Medical Tests and Procedures  Monitoring and Evaluation Plan: Electrolyte/renal panel  Criteria: labs WNL              Follow Up: Planned follow up visit: will continue to follow up at [facility] as needed.

## 2025-05-06 ENCOUNTER — APPOINTMENT (OUTPATIENT)
Dept: HEMATOLOGY/ONCOLOGY | Facility: CLINIC | Age: 80
End: 2025-05-06
Payer: MEDICARE

## 2025-05-06 ENCOUNTER — APPOINTMENT (OUTPATIENT)
Dept: RADIATION ONCOLOGY | Facility: CLINIC | Age: 80
End: 2025-05-06
Payer: MEDICARE

## 2025-05-06 LAB
INR PPP: 4.7 (ref 0.9–1.1)
PROTHROMBIN TIME: 51.8 SECONDS (ref 9.8–12.4)

## 2025-05-07 ENCOUNTER — HOSPITAL ENCOUNTER (OUTPATIENT)
Dept: RADIATION ONCOLOGY | Facility: CLINIC | Age: 80
Setting detail: RADIATION/ONCOLOGY SERIES
Discharge: HOME | End: 2025-05-07
Payer: MEDICARE

## 2025-05-07 ENCOUNTER — APPOINTMENT (OUTPATIENT)
Dept: RADIATION ONCOLOGY | Facility: CLINIC | Age: 80
End: 2025-05-07
Payer: MEDICARE

## 2025-05-07 ASSESSMENT — ENCOUNTER SYMPTOMS
LIGHT-HEADEDNESS: 0
FLANK PAIN: 0
DIAPHORESIS: 0
HEMATOLOGIC/LYMPHATIC NEGATIVE: 1
ABDOMINAL DISTENTION: 0
NAUSEA: 0
ARTHRALGIAS: 0
WOUND: 0
HEADACHES: 0
ENDOCRINE NEGATIVE: 1
SORE THROAT: 1
RECTAL PAIN: 0
BLOOD IN STOOL: 0
EXTREMITY WEAKNESS: 1
CARDIOVASCULAR NEGATIVE: 1
RESPIRATORY NEGATIVE: 1
CHILLS: 0
CONSTIPATION: 0
DIARRHEA: 1
VOMITING: 0
EYES NEGATIVE: 1
ABDOMINAL PAIN: 0
FEVER: 0
NUMBNESS: 0
PSYCHIATRIC NEGATIVE: 1
APPETITE CHANGE: 0
BACK PAIN: 0
UNEXPECTED WEIGHT CHANGE: 0
NECK STIFFNESS: 0
DIZZINESS: 0
MYALGIAS: 0
SPEECH DIFFICULTY: 0
TROUBLE SWALLOWING: 1
FATIGUE: 1
SEIZURES: 0
VOICE CHANGE: 0
NECK PAIN: 0

## 2025-05-07 NOTE — PROGRESS NOTES
Radiation Oncology Nursing Note    Pain: The patient's current pain level was assessed.  They report currently having a pain of 0 out of 10.  They feel their pain is under control with the use of pain medications.    Review of Systems:  Review of Systems   Constitutional:  Positive for fatigue. Negative for appetite change, chills, diaphoresis, fever and unexpected weight change.   HENT:   Positive for lump/mass (R nose), sore throat and trouble swallowing (baseline; seeing nutrition). Negative for hearing loss, mouth sores, nosebleeds, tinnitus and voice change.    Eyes: Negative.    Respiratory: Negative.     Cardiovascular: Negative.    Gastrointestinal:  Positive for diarrhea. Negative for abdominal distention, abdominal pain, blood in stool, constipation, nausea, rectal pain and vomiting.   Endocrine: Negative.    Genitourinary: Negative.     Musculoskeletal:  Negative for arthralgias, back pain, flank pain, gait problem, myalgias, neck pain and neck stiffness.   Skin:  Negative for itching, rash and wound (using aquaphor on right nose TID).   Neurological:  Positive for extremity weakness. Negative for dizziness, gait problem, headaches, light-headedness, numbness, seizures and speech difficulty.   Hematological: Negative.    Psychiatric/Behavioral: Negative.

## 2025-05-08 ENCOUNTER — APPOINTMENT (OUTPATIENT)
Dept: RADIATION ONCOLOGY | Facility: CLINIC | Age: 80
End: 2025-05-08
Payer: MEDICARE

## 2025-05-09 ENCOUNTER — APPOINTMENT (OUTPATIENT)
Dept: RADIATION ONCOLOGY | Facility: CLINIC | Age: 80
End: 2025-05-09
Payer: MEDICARE

## 2025-05-12 ENCOUNTER — APPOINTMENT (OUTPATIENT)
Dept: RADIATION ONCOLOGY | Facility: CLINIC | Age: 80
End: 2025-05-12
Payer: MEDICARE

## 2025-05-12 ENCOUNTER — INFUSION (OUTPATIENT)
Dept: HEMATOLOGY/ONCOLOGY | Facility: CLINIC | Age: 80
End: 2025-05-12
Payer: MEDICARE

## 2025-05-12 ENCOUNTER — OFFICE VISIT (OUTPATIENT)
Dept: HEMATOLOGY/ONCOLOGY | Facility: CLINIC | Age: 80
End: 2025-05-12
Payer: MEDICARE

## 2025-05-12 ENCOUNTER — APPOINTMENT (OUTPATIENT)
Dept: HEMATOLOGY/ONCOLOGY | Facility: CLINIC | Age: 80
End: 2025-05-12
Payer: MEDICARE

## 2025-05-12 ENCOUNTER — LAB (OUTPATIENT)
Dept: HEMATOLOGY/ONCOLOGY | Facility: CLINIC | Age: 80
End: 2025-05-12
Payer: MEDICARE

## 2025-05-12 VITALS
TEMPERATURE: 96.1 F | WEIGHT: 103.4 LBS | OXYGEN SATURATION: 98 % | BODY MASS INDEX: 18.84 KG/M2 | SYSTOLIC BLOOD PRESSURE: 138 MMHG | HEART RATE: 69 BPM | RESPIRATION RATE: 18 BRPM | DIASTOLIC BLOOD PRESSURE: 88 MMHG

## 2025-05-12 DIAGNOSIS — C44.92 SCC (SQUAMOUS CELL CARCINOMA): ICD-10-CM

## 2025-05-12 DIAGNOSIS — Z51.11 ENCOUNTER FOR ANTINEOPLASTIC CHEMOTHERAPY: Primary | ICD-10-CM

## 2025-05-12 DIAGNOSIS — E83.51 HYPOCALCEMIA: ICD-10-CM

## 2025-05-12 LAB
ALBUMIN SERPL BCP-MCNC: 3.5 G/DL (ref 3.4–5)
ALP SERPL-CCNC: 49 U/L (ref 33–136)
ALT SERPL W P-5'-P-CCNC: 8 U/L (ref 7–45)
ANION GAP SERPL CALC-SCNC: 8 MMOL/L (ref 10–20)
AST SERPL W P-5'-P-CCNC: 12 U/L (ref 9–39)
BASOPHILS # BLD AUTO: 0.01 X10*3/UL (ref 0–0.1)
BASOPHILS NFR BLD AUTO: 0.3 %
BILIRUB SERPL-MCNC: 0.5 MG/DL (ref 0–1.2)
BUN SERPL-MCNC: 15 MG/DL (ref 6–23)
CALCIUM SERPL-MCNC: 8.4 MG/DL (ref 8.6–10.3)
CHLORIDE SERPL-SCNC: 105 MMOL/L (ref 98–107)
CO2 SERPL-SCNC: 26 MMOL/L (ref 21–32)
CREAT SERPL-MCNC: 0.53 MG/DL (ref 0.5–1.05)
EGFRCR SERPLBLD CKD-EPI 2021: >90 ML/MIN/1.73M*2
EOSINOPHIL # BLD AUTO: 0.03 X10*3/UL (ref 0–0.4)
EOSINOPHIL NFR BLD AUTO: 0.8 %
ERYTHROCYTE [DISTWIDTH] IN BLOOD BY AUTOMATED COUNT: 14 % (ref 11.5–14.5)
GLUCOSE SERPL-MCNC: 109 MG/DL (ref 74–99)
HCT VFR BLD AUTO: 32.6 % (ref 36–46)
HGB BLD-MCNC: 10.3 G/DL (ref 12–16)
IMM GRANULOCYTES # BLD AUTO: 0.02 X10*3/UL (ref 0–0.5)
IMM GRANULOCYTES NFR BLD AUTO: 0.5 % (ref 0–0.9)
LYMPHOCYTES # BLD AUTO: 0.4 X10*3/UL (ref 0.8–3)
LYMPHOCYTES NFR BLD AUTO: 10.4 %
MCH RBC QN AUTO: 32.3 PG (ref 26–34)
MCHC RBC AUTO-ENTMCNC: 31.6 G/DL (ref 32–36)
MCV RBC AUTO: 102 FL (ref 80–100)
MONOCYTES # BLD AUTO: 0.36 X10*3/UL (ref 0.05–0.8)
MONOCYTES NFR BLD AUTO: 9.4 %
NEUTROPHILS # BLD AUTO: 3.01 X10*3/UL (ref 1.6–5.5)
NEUTROPHILS NFR BLD AUTO: 78.6 %
NRBC BLD-RTO: ABNORMAL /100{WBCS}
PLATELET # BLD AUTO: 195 X10*3/UL (ref 150–450)
POTASSIUM SERPL-SCNC: 3.9 MMOL/L (ref 3.5–5.3)
PROT SERPL-MCNC: 5.7 G/DL (ref 6.4–8.2)
RBC # BLD AUTO: 3.19 X10*6/UL (ref 4–5.2)
SODIUM SERPL-SCNC: 135 MMOL/L (ref 136–145)
WBC # BLD AUTO: 3.8 X10*3/UL (ref 4.4–11.3)

## 2025-05-12 PROCEDURE — 1160F RVW MEDS BY RX/DR IN RCRD: CPT | Performed by: NURSE PRACTITIONER

## 2025-05-12 PROCEDURE — 99215 OFFICE O/P EST HI 40 MIN: CPT | Performed by: NURSE PRACTITIONER

## 2025-05-12 PROCEDURE — 1036F TOBACCO NON-USER: CPT | Performed by: NURSE PRACTITIONER

## 2025-05-12 PROCEDURE — 85025 COMPLETE CBC W/AUTO DIFF WBC: CPT

## 2025-05-12 PROCEDURE — 1126F AMNT PAIN NOTED NONE PRSNT: CPT | Performed by: NURSE PRACTITIONER

## 2025-05-12 PROCEDURE — 2500000004 HC RX 250 GENERAL PHARMACY W/ HCPCS (ALT 636 FOR OP/ED): Mod: JZ | Performed by: STUDENT IN AN ORGANIZED HEALTH CARE EDUCATION/TRAINING PROGRAM

## 2025-05-12 PROCEDURE — 99215 OFFICE O/P EST HI 40 MIN: CPT | Mod: 25 | Performed by: NURSE PRACTITIONER

## 2025-05-12 PROCEDURE — G2211 COMPLEX E/M VISIT ADD ON: HCPCS | Performed by: NURSE PRACTITIONER

## 2025-05-12 PROCEDURE — 1159F MED LIST DOCD IN RCRD: CPT | Performed by: NURSE PRACTITIONER

## 2025-05-12 PROCEDURE — 2500000004 HC RX 250 GENERAL PHARMACY W/ HCPCS (ALT 636 FOR OP/ED): Performed by: STUDENT IN AN ORGANIZED HEALTH CARE EDUCATION/TRAINING PROGRAM

## 2025-05-12 PROCEDURE — 2500000001 HC RX 250 WO HCPCS SELF ADMINISTERED DRUGS (ALT 637 FOR MEDICARE OP): Performed by: STUDENT IN AN ORGANIZED HEALTH CARE EDUCATION/TRAINING PROGRAM

## 2025-05-12 PROCEDURE — 80053 COMPREHEN METABOLIC PANEL: CPT

## 2025-05-12 PROCEDURE — 96375 TX/PRO/DX INJ NEW DRUG ADDON: CPT | Mod: INF

## 2025-05-12 PROCEDURE — 85610 PROTHROMBIN TIME: CPT

## 2025-05-12 PROCEDURE — 96413 CHEMO IV INFUSION 1 HR: CPT

## 2025-05-12 RX ORDER — HEPARIN 100 UNIT/ML
500 SYRINGE INTRAVENOUS AS NEEDED
Status: DISCONTINUED | OUTPATIENT
Start: 2025-05-12 | End: 2025-05-12 | Stop reason: HOSPADM

## 2025-05-12 RX ORDER — HEPARIN SODIUM,PORCINE/PF 10 UNIT/ML
50 SYRINGE (ML) INTRAVENOUS AS NEEDED
Status: CANCELLED | OUTPATIENT
Start: 2025-05-12

## 2025-05-12 RX ORDER — PROCHLORPERAZINE EDISYLATE 5 MG/ML
10 INJECTION INTRAMUSCULAR; INTRAVENOUS EVERY 6 HOURS PRN
Status: DISCONTINUED | OUTPATIENT
Start: 2025-05-12 | End: 2025-05-12 | Stop reason: HOSPADM

## 2025-05-12 RX ORDER — HEPARIN SODIUM,PORCINE/PF 10 UNIT/ML
50 SYRINGE (ML) INTRAVENOUS AS NEEDED
OUTPATIENT
Start: 2025-05-12

## 2025-05-12 RX ORDER — HEPARIN 100 UNIT/ML
500 SYRINGE INTRAVENOUS AS NEEDED
Status: CANCELLED | OUTPATIENT
Start: 2025-05-12

## 2025-05-12 RX ORDER — DIPHENHYDRAMINE HYDROCHLORIDE 50 MG/ML
50 INJECTION, SOLUTION INTRAMUSCULAR; INTRAVENOUS AS NEEDED
Status: DISCONTINUED | OUTPATIENT
Start: 2025-05-12 | End: 2025-05-12 | Stop reason: HOSPADM

## 2025-05-12 RX ORDER — HEPARIN SODIUM,PORCINE/PF 10 UNIT/ML
50 SYRINGE (ML) INTRAVENOUS AS NEEDED
Status: DISCONTINUED | OUTPATIENT
Start: 2025-05-12 | End: 2025-05-12 | Stop reason: HOSPADM

## 2025-05-12 RX ORDER — ALBUTEROL SULFATE 0.83 MG/ML
3 SOLUTION RESPIRATORY (INHALATION) AS NEEDED
Status: DISCONTINUED | OUTPATIENT
Start: 2025-05-12 | End: 2025-05-12 | Stop reason: HOSPADM

## 2025-05-12 RX ORDER — EPINEPHRINE 0.3 MG/.3ML
0.3 INJECTION SUBCUTANEOUS EVERY 5 MIN PRN
Status: DISCONTINUED | OUTPATIENT
Start: 2025-05-12 | End: 2025-05-12 | Stop reason: HOSPADM

## 2025-05-12 RX ORDER — FAMOTIDINE 10 MG/ML
20 INJECTION, SOLUTION INTRAVENOUS ONCE
Status: COMPLETED | OUTPATIENT
Start: 2025-05-12 | End: 2025-05-12

## 2025-05-12 RX ORDER — CALCIUM CARBONATE 500(1250)
1 TABLET,CHEWABLE ORAL DAILY
COMMUNITY

## 2025-05-12 RX ORDER — FAMOTIDINE 10 MG/ML
20 INJECTION, SOLUTION INTRAVENOUS ONCE AS NEEDED
Status: DISCONTINUED | OUTPATIENT
Start: 2025-05-12 | End: 2025-05-12 | Stop reason: HOSPADM

## 2025-05-12 RX ORDER — DIPHENHYDRAMINE HCL 25 MG
50 CAPSULE ORAL ONCE
Status: COMPLETED | OUTPATIENT
Start: 2025-05-12 | End: 2025-05-12

## 2025-05-12 RX ORDER — HEPARIN 100 UNIT/ML
500 SYRINGE INTRAVENOUS AS NEEDED
OUTPATIENT
Start: 2025-05-12

## 2025-05-12 RX ORDER — PROCHLORPERAZINE MALEATE 10 MG
10 TABLET ORAL EVERY 6 HOURS PRN
Status: DISCONTINUED | OUTPATIENT
Start: 2025-05-12 | End: 2025-05-12 | Stop reason: HOSPADM

## 2025-05-12 RX ADMIN — HEPARIN 500 UNITS: 100 SYRINGE at 15:03

## 2025-05-12 RX ADMIN — DIPHENHYDRAMINE HYDROCHLORIDE 50 MG: 25 CAPSULE ORAL at 13:34

## 2025-05-12 RX ADMIN — DEXAMETHASONE SODIUM PHOSPHATE 10 MG: 4 INJECTION, SOLUTION INTRA-ARTICULAR; INTRALESIONAL; INTRAMUSCULAR; INTRAVENOUS; SOFT TISSUE at 13:34

## 2025-05-12 RX ADMIN — DEXTROSE MONOHYDRATE 114 MG: 50 INJECTION, SOLUTION INTRAVENOUS at 14:03

## 2025-05-12 RX ADMIN — FAMOTIDINE 20 MG: 10 INJECTION INTRAVENOUS at 13:34

## 2025-05-12 ASSESSMENT — ENCOUNTER SYMPTOMS
CARDIOVASCULAR NEGATIVE: 1
FATIGUE: 1
MUSCULOSKELETAL NEGATIVE: 1
COUGH: 1
APPETITE CHANGE: 0
SORE THROAT: 0
CONSTIPATION: 0

## 2025-05-12 ASSESSMENT — PAIN SCALES - GENERAL: PAINLEVEL_OUTOF10: 0-NO PAIN

## 2025-05-12 NOTE — PROGRESS NOTES
[facility] - Medical Oncology Follow-Up Visit    Patient ID: Amrita Grant is a 79 y.o. female.  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 Grant is a 80 yo with PMH significant for lupus, aortic valve repair, fibromyalgia, who first noticed a mass on her nose in fall of 2024. Met Dr. Templeton who did an in-office biopsy 11/11/24 with invasive, poorly differentiated carcinoma, p40+, negative MOC31, unclear etiology, and recommended for repeat biopsy. Biopsy on 11/26/24 with the same, felt to be a primary cutaneous neoplasm such as basal cell carcinoma. Discussed at [facility] tumor board, and due to significant morbidity associated with resection, recommended against surgery and referred to radiation oncology and medical oncology. Met Dr. Guzmán, who recommended against immunotherapy given her lupus and poor performance status, and recommended discussion with radiation oncology, which cutaneous tumor board also agreed with. She planned to have radiation closer to home, but unfortunately her pre-radiation scans showed concern for metastatic disease, with PET/CT on 1/20/25 with FDG avidity of the R nasal soft tissue as well as R level 1 cervical lymph node, R rights, L1, and RUL nodule below PET resolution. She was ultimately started on vismodegib as well as started on palliative radiation (2/18-3/11/25) for the nasal lesion. In the meantime, [facility]  pathology revealed invasive carcinoma, HPV associated, positive for p40 CK5/6, EMA, p16, and negative for MOC, BerEP4, INSM1, and chromogranin. As this was felt to no longer be a basal cell carcinoma, she stopped the vismodegib. Biopsy of the L1 lesion morphologically the same.     Regarding her lupus - she has been on plaquenil monotherapy for many years, stable on the same dose. She was originally diagnosed with lupus in 2001 - she was initially diagnosed with pericarditis. She has fatigue and joint aches with the lupus.     She is here today with her  and friend. Her last day of radiation was last Tuesday or Wednesday. Radiation went ok overall. Her last dose of the visdomegib was last Tuesday or Wednesday. No side effects.  She is very tired, naps every day. In the last year, she has stopped doing the cooking, cleaning, and laundry. She is very careful, leans on her  to help her get up the stairs. Her fatigue has been getting worse and worse. She continues to lose weight - 50 lbs in the last year and a half. She has trouble swallowing - some things are harder to swallow like bread, meat. Gets tickles in her throat. She's had this for a couple years, had her throat stretched - this was also just repeated in December. Done at [facility] typically, helps for a little while, but don't last.      PAST MEDICAL HISTORY  Lupus  Fibromyalgia  Osteoarthritis  Aortic valve repair (mechanical)  on warfarin  HTN  CVA - (2021)     SOCIAL HISTORY  Lives at home with her . They've been  45 years. Previously was a  for [employer].   Tob: none  EtOH: none, previously a little  Illicits: none     FAMILY HISTORY  Mother - uterine cancer  Maternal grandmother - breast cancer  Maternal cousin - unknown metastatic cancer    HPI  Present with her .  Fatigue - day after treatment will sleep all day.  Then feels better on D2.   A repetitive pattern.    Cough - started Friday night.  Woke her from  sleep.  Choking, single episode.  Phlegm builds up.  Appetite - good and bad days. Wt is stable.  Denies n/v/d.  Routine miralax.  Voiding fine. No fevers or CP.  Faint erythema to right cheek and forehead.  No other concerns.  Labs WNL.  Ready for treatment.        Meds (Current):    Current Outpatient Medications:     ARIPiprazole (Abilify) 5 mg tablet, Take 1 tablet (5 mg) by mouth once daily., Disp: , Rfl:     Bystolic 5 mg tablet, Take 2 tablets (10 mg) by mouth once daily. Dose increased, Disp: , Rfl:     calcium carbonate 500 mg calcium (1,250 mg) chewable tablet, Chew 1 tablet (500 mg of elemental calcium) once daily., Disp: , Rfl:     cetirizine (ZyrTEC) 10 mg tablet, 1 tab(s) orally as needed, Disp: , Rfl:     cholecalciferol (Vitamin D-3) 25 MCG (1000 UT) capsule, 1 cap(s) orally three times daily, Disp: , Rfl:     docusate sodium (Colace) 100 mg capsule, once every 24 hours., Disp: , Rfl:     fluticasone (Flonase) 50 mcg/actuation nasal spray, Administer 2 sprays into affected nostril(s)., Disp: , Rfl:     levETIRAcetam (Keppra) 500 mg tablet, every 12 hours., Disp: , Rfl:     LORazepam (Ativan) 0.5 mg tablet, Take half tablet 1 hour before MRI, if needed take other half of tablet while getting checked in for MRI, Disp: 1 tablet, Rfl: 0    losartan (Cozaar) 100 mg tablet, Take 0.5 tablets (50 mg) by mouth twice a day., Disp: , Rfl:     melatonin 3 mg tablet, Take by mouth once daily., Disp: , Rfl:     memantine (Namenda) 5 mg tablet, Take 1 tablet (5 mg) by mouth once daily., Disp: , Rfl:     mirtazapine (Remeron) 15 mg tablet, 1 tab(s) orally half tablet bedtime daily for 30 days, Disp: , Rfl:     omeprazole (PriLOSEC) 20 mg DR capsule, Take 1 capsule (20 mg) by mouth once daily., Disp: , Rfl:     ondansetron (Zofran) 4 mg tablet, Take 1 tablet (4 mg) by mouth if needed for nausea or vomiting., Disp: , Rfl:     ondansetron (Zofran) 8 mg tablet, Take 1 tablet (8 mg) by mouth every 8 hours if needed for  nausea or vomiting., Disp: 30 tablet, Rfl: 5    PlaqueniL 200 mg tablet, Take 1 tablet (200 mg) by mouth once daily., Disp: , Rfl:     potassium chloride CR 20 mEq ER tablet, Take 1 tablet (20 mEq) by mouth once daily., Disp: , Rfl:     Premarin 0.3 mg tablet, once every 24 hours., Disp: , Rfl:     prochlorperazine (Compazine) 10 mg tablet, Take 1 tablet (10 mg) by mouth every 6 hours if needed for nausea or vomiting., Disp: 30 tablet, Rfl: 5    sennosides (senna) 8.6 mg tablet, Take 1 tablet (8.6 mg) by mouth 2 times a day., Disp: 60 tablet, Rfl: 2    vismodegib (Erivedge) 150 mg capsule, Take 1 capsule (150 mg total) by mouth every other day.  Take with or without food. Swallow whole. Do not open or crush., Disp: 14 capsule, Rfl: 3    warfarin (Coumadin) 4 mg tablet, Take 1 tablet (4 mg) by mouth once daily., Disp: , Rfl:     lidocaine-prilocaine (Emla) 2.5-2.5 % cream, Apply topically 1 time for 1 dose. Apply 30-45 minutes prior to port access., Disp: 30 g, Rfl: 3  No current facility-administered medications for this visit.    Facility-Administered Medications Ordered in Other Visits:     albuterol 2.5 mg /3 mL (0.083 %) nebulizer solution 3 mL, 3 mL, nebulization, PRN, Dipika Perrin MD    alteplase (Cathflo Activase) injection 2 mg, 2 mg, intra-catheter, PRN, Dipika Perrin MD    dextrose 5 % in water (D5W) bolus 500 mL, 500 mL, intravenous, PRN, Dipika Perrin MD    diphenhydrAMINE (BENADryl) injection 50 mg, 50 mg, intravenous, PRN, Dipika Perrin MD    EPINEPHrine (Epipen) injection syringe 0.3 mg, 0.3 mg, intramuscular, q5 min PRN, Dipika Perrin MD    famotidine PF (Pepcid) injection 20 mg, 20 mg, intravenous, Once PRNDipika MD    heparin flush 10 unit/mL syringe 50 Units, 50 Units, intra-catheter, PRDipika SHARPE MD    heparin flush 100 unit/mL syringe 500 Units, 500 Units, intra-catheter, PRNDipika MD, 500 Units at 05/12/25 1503    methylPREDNISolone sod succinate (SOLU-Medrol)  40 mg/mL injection 40 mg, 40 mg, intravenous, PRN, Dipika Perrin MD    prochlorperazine (Compazine) injection 10 mg, 10 mg, intravenous, q6h PRN, Dipika Perrin MD    prochlorperazine (Compazine) tablet 10 mg, 10 mg, oral, q6h PRN, Dipika Perrin MD    sodium chloride 0.9 % bolus 500 mL, 500 mL, intravenous, PRN, Dipika Perrin MD    Allergies   Allergen Reactions    Propofol Nausea/vomiting    Sulfa (Sulfonamide Antibiotics) Other    Opioids - Morphine Analogues Nausea And Vomiting, Other and Unknown    Penicillins Unknown     Pt. Reports passing out upon taking penicillin when she was young.       Review of Systems   Constitutional:  Positive for fatigue. Negative for appetite change.   HENT:  Negative.  Negative for sore throat.    Respiratory:  Positive for cough.    Cardiovascular: Negative.    Gastrointestinal:  Negative for constipation.   Musculoskeletal: Negative.    Skin:         Erythema area to right cheek and forehead areas. Denies pruritus.     All other systems reviewed and are negative.       Objective   BSA: 1.43 meters squared  Wt Readings from Last 5 Encounters:   05/12/25 46.9 kg (103 lb 6.4 oz)   05/05/25 46.1 kg (101 lb 9.6 oz)   05/05/25 46.1 kg (101 lb 9.6 oz)   04/28/25 46.9 kg (103 lb 6.4 oz)   04/21/25 46.7 kg (103 lb)     /88 (BP Location: Left arm, Patient Position: Sitting, BP Cuff Size: Child)   Pulse 69   Temp 35.6 °C (96.1 °F) (Temporal)   Resp 18   Wt 46.9 kg (103 lb 6.4 oz)   SpO2 98%   BMI 18.84 kg/m²     ECOG Score: 2    0          Fully active; no performance restrictions.  1          Strenuous physical activity restricted; fully ambulatory and able to carry out light work.  2          Capable of all self-care but unable to carry out any work activities. Up and about >50% of waking hours.  3          Capable of only limited self-care; confined to bed or chair >50% of waking hours.  4          Completely disabled; cannot carry out any self-care; totally confined to  bed or chair.     Physical Exam  Vitals reviewed.   Constitutional:       General: She is not in acute distress.  HENT:      Head: Normocephalic.      Nose:      Comments: Large, erythematous, nasal lesion, erythema skin pigment change, cream/ointment present to area - stable.     Mouth/Throat:      Mouth: Mucous membranes are moist.   Eyes:      Conjunctiva/sclera: Conjunctivae normal.      Pupils: Pupils are equal, round, and reactive to light.   Cardiovascular:      Rate and Rhythm: Normal rate and regular rhythm.      Heart sounds: Normal heart sounds. No murmur heard.  Pulmonary:      Effort: Pulmonary effort is normal. No respiratory distress.      Breath sounds: Normal breath sounds.   Abdominal:      General: Bowel sounds are normal.      Palpations: Abdomen is soft.   Musculoskeletal:         General: Normal range of motion.      Cervical back: Normal range of motion.   Skin:     General: Skin is warm and dry.      Findings: Erythema present.      Comments: Erythema area to right cheek and forehead areas. Denies pruritus.     Neurological:      General: No focal deficit present.      Mental Status: She is alert and oriented to person, place, and time.   Psychiatric:         Mood and Affect: Mood normal.         Behavior: Behavior normal.         Thought Content: Thought content normal.          Results:  Labs:  Lab Results   Component Value Date    WBC 3.8 (L) 05/12/2025    HGB 10.3 (L) 05/12/2025    HCT 32.6 (L) 05/12/2025     (H) 05/12/2025     05/12/2025      Lab Results   Component Value Date    NEUTROABS 3.01 05/12/2025      Lab Results   Component Value Date    GLUCOSE 109 (H) 05/12/2025    CALCIUM 8.4 (L) 05/12/2025     (L) 05/12/2025    K 3.9 05/12/2025    CO2 26 05/12/2025     05/12/2025    BUN 15 05/12/2025    CREATININE 0.53 05/12/2025     Lab Results   Component Value Date    ALT 8 05/12/2025    AST 12 05/12/2025    ALKPHOS 49 05/12/2025    BILITOT 0.5 05/12/2025         Imaging:  No new imaging.      Pathology:    Lab Results   Component Value Date    ADD1  11/26/2024     Additional immunostains were reviewed.  Tumor cells are positive for p16 and negative for chromogranin and INSM1.  Isolated tumor cells are positive for BerEP4.  The differential diagnosis remains unchanged.         Assessment/Plan      Amrita Grant is a 79 y.o. female here for recommendations and to establish care for poorly differentiated carcinoma of the face.     # Poorly differentiated carcinoma  - HPV+  - L1 biopsy proven metastasis - will send tempus for better delineation  - discussed the likelihood that this is metastatic, HPV+ nasopharyngeal carcinoma, although tempus may help us better determine this  - s/p palliative radiation  - discussed that typically would recommend combination therapy with chemotherapy+immunotherapy. However, given her lupus which was originally diagnosed with pericarditis, do not recommend immunotherapy. Also discussed that given her current poor performance status, do not believe she could tolerate dual chemotherapy  - discussed single-agent weekly paclitaxel, and consented. Plan to repeat scans after 3 cycles - CT scheduled 6/20/25    - will obtain new baseline images, as she has not had imaging since January 2025  - she would like to have her infusions closer to home at [place], but will continue to see us at [facility] for pre-treatment visits  - Pt agreeable to infusions and visits on same day at [facility].    - Update: Unable to obtain IV access after multiple attempts.  Existing mediport placement scheduled 4/2/25.  Infusion rescheduled to 4/7/25.    - 4/2/25 Mediport placed  - RTC in 1 week for C2D22.      # Lupus  - she follows with a rheumatologist at [facility], and will notify them we are starting chemotherapy    # Dysuria, urgency, frequency  - offered obtaining UA and urine culture, and she deferred to PCP  - 3/31:  Current 10-day ATB course.  Pt/family not  able to recall medication name.  ATB started prior to her tx C1D1.  Tolerating ATB.  Reports up to 8 UTI's last year.  PCP (non- provider) - any further follow-up with PCP.    - 4/7:  Completed 10-day ATB course.  Denies current UTI symptoms.       # Unintentional weight loss  - may be partly due to dysphagia  - oncology dietician referral in place   - wt loss has stabilized     # Fatigue (g1)  - Recommended she increase her physical activity level - during waking house, get up Q2-3H, walk within the home, pace activities.     # Constipation  - Senna too effective, pt to start routine Miralax - 1/2 capful to start and adjust as needed.      # Hypocalcemia 8.4 5/12/25  - Calcium listed on MAR, pt not taking med d/t pill size.  Recommended she start OTC calcium soft chew.     # Cough/PND   - Will add room humidifier.  Sleeps using one pillow, recommended she try sleeping with two pillows.    - Ongoing monitoring     # Rash/erythema to right cheek/forehead  - denies pruritus, apply lotion to area.      # Pancytopenia - chemo-induced - stable  - Weekly labs, ongoing monitoring    # INR/chronic Warfarin  - INR ordered for coumadin dosing, infusion nurse provided medical release form per facility protocol.  Pt follows with [facility] for dosing/monitoring.  INR to be draw via port with treatment labs, fax to [facility] [phone].  Goal INR 2.5-3.0.   - 4/14: INR 2.0 result faxed to [facility] 4/15/25  - 4/21:  Will monitor for INR result and fax to [facility] 4/22/25.   - 4/28:  Will monitor for INR result and fax to [facility] 4/29/25.   - Not enough blood in tube. Pt to [facility] for INR draw.  5/6:  Will monitor for INR result and fax to [facility].  INR 4.7, instructions per [facility].    5/12:  Will monitor for INR result and fax to [facility].      # Advanced care planning  - discussed incurable but treatable nature of disease, with goal to prolong life while  maintaining/improving quality of life. She understands that risks of systemic therapy may outweigh benefits at some point in the future.

## 2025-05-13 ENCOUNTER — APPOINTMENT (OUTPATIENT)
Dept: RADIATION ONCOLOGY | Facility: CLINIC | Age: 80
End: 2025-05-13
Payer: MEDICARE

## 2025-05-13 ENCOUNTER — APPOINTMENT (OUTPATIENT)
Dept: HEMATOLOGY/ONCOLOGY | Facility: CLINIC | Age: 80
End: 2025-05-13
Payer: MEDICARE

## 2025-05-13 LAB
INR PPP: 1.3 (ref 0.9–1.1)
PROTHROMBIN TIME: 14.5 SECONDS (ref 9.8–12.4)

## 2025-05-14 ENCOUNTER — APPOINTMENT (OUTPATIENT)
Dept: RADIATION ONCOLOGY | Facility: CLINIC | Age: 80
End: 2025-05-14
Payer: MEDICARE

## 2025-05-15 ENCOUNTER — APPOINTMENT (OUTPATIENT)
Dept: RADIATION ONCOLOGY | Facility: CLINIC | Age: 80
End: 2025-05-15
Payer: MEDICARE

## 2025-05-15 LAB — PROTHROMBIN TIME (PROTIME)PT.: 32.9 SECONDS (ref 11.7–14.9)

## 2025-05-16 ENCOUNTER — APPOINTMENT (OUTPATIENT)
Dept: RADIATION ONCOLOGY | Facility: CLINIC | Age: 80
End: 2025-05-16
Payer: MEDICARE

## 2025-05-19 ENCOUNTER — OFFICE VISIT (OUTPATIENT)
Dept: HEMATOLOGY/ONCOLOGY | Facility: CLINIC | Age: 80
End: 2025-05-19
Payer: MEDICARE

## 2025-05-19 ENCOUNTER — APPOINTMENT (OUTPATIENT)
Dept: RADIATION ONCOLOGY | Facility: CLINIC | Age: 80
End: 2025-05-19
Payer: MEDICARE

## 2025-05-19 ENCOUNTER — LAB (OUTPATIENT)
Dept: HEMATOLOGY/ONCOLOGY | Facility: CLINIC | Age: 80
End: 2025-05-19
Payer: MEDICARE

## 2025-05-19 ENCOUNTER — INFUSION (OUTPATIENT)
Dept: HEMATOLOGY/ONCOLOGY | Facility: CLINIC | Age: 80
End: 2025-05-19
Payer: MEDICARE

## 2025-05-19 VITALS
OXYGEN SATURATION: 95 % | DIASTOLIC BLOOD PRESSURE: 89 MMHG | HEART RATE: 63 BPM | SYSTOLIC BLOOD PRESSURE: 143 MMHG | WEIGHT: 106.2 LBS | RESPIRATION RATE: 18 BRPM | BODY MASS INDEX: 19.35 KG/M2 | TEMPERATURE: 97.7 F

## 2025-05-19 DIAGNOSIS — C44.92 SCC (SQUAMOUS CELL CARCINOMA): ICD-10-CM

## 2025-05-19 DIAGNOSIS — C44.92 SCC (SQUAMOUS CELL CARCINOMA): Primary | ICD-10-CM

## 2025-05-19 DIAGNOSIS — Z51.11 ENCOUNTER FOR ANTINEOPLASTIC CHEMOTHERAPY: ICD-10-CM

## 2025-05-19 LAB
ALBUMIN SERPL BCP-MCNC: 3.6 G/DL (ref 3.4–5)
ALP SERPL-CCNC: 53 U/L (ref 33–136)
ALT SERPL W P-5'-P-CCNC: 15 U/L (ref 7–45)
ANION GAP SERPL CALC-SCNC: 8 MMOL/L (ref 10–20)
AST SERPL W P-5'-P-CCNC: 16 U/L (ref 9–39)
BASOPHILS # BLD AUTO: 0.03 X10*3/UL (ref 0–0.1)
BASOPHILS NFR BLD AUTO: 0.8 %
BILIRUB SERPL-MCNC: 0.5 MG/DL (ref 0–1.2)
BUN SERPL-MCNC: 11 MG/DL (ref 6–23)
CALCIUM SERPL-MCNC: 8.6 MG/DL (ref 8.6–10.3)
CHLORIDE SERPL-SCNC: 106 MMOL/L (ref 98–107)
CO2 SERPL-SCNC: 26 MMOL/L (ref 21–32)
CREAT SERPL-MCNC: 0.61 MG/DL (ref 0.5–1.05)
EGFRCR SERPLBLD CKD-EPI 2021: >90 ML/MIN/1.73M*2
EOSINOPHIL # BLD AUTO: 0.03 X10*3/UL (ref 0–0.4)
EOSINOPHIL NFR BLD AUTO: 0.8 %
ERYTHROCYTE [DISTWIDTH] IN BLOOD BY AUTOMATED COUNT: 14.8 % (ref 11.5–14.5)
GLUCOSE SERPL-MCNC: 101 MG/DL (ref 74–99)
HCT VFR BLD AUTO: 32.8 % (ref 36–46)
HGB BLD-MCNC: 10.5 G/DL (ref 12–16)
IMM GRANULOCYTES # BLD AUTO: 0.02 X10*3/UL (ref 0–0.5)
IMM GRANULOCYTES NFR BLD AUTO: 0.5 % (ref 0–0.9)
LYMPHOCYTES # BLD AUTO: 0.41 X10*3/UL (ref 0.8–3)
LYMPHOCYTES NFR BLD AUTO: 10.7 %
MCHC RBC AUTO-ENTMCNC: 32 G/DL (ref 32–36)
MCV RBC AUTO: 103 FL (ref 80–100)
MONOCYTES # BLD AUTO: 0.35 X10*3/UL (ref 0.05–0.8)
MONOCYTES NFR BLD AUTO: 9.2 %
NEUTROPHILS # BLD AUTO: 2.98 X10*3/UL (ref 1.6–5.5)
NEUTROPHILS NFR BLD AUTO: 78 %
PLATELET # BLD AUTO: 208 X10*3/UL (ref 150–450)
POTASSIUM SERPL-SCNC: 4 MMOL/L (ref 3.5–5.3)
PROT SERPL-MCNC: 5.7 G/DL (ref 6.4–8.2)
RBC # BLD AUTO: 3.19 X10*6/UL (ref 4–5.2)
SODIUM SERPL-SCNC: 136 MMOL/L (ref 136–145)
WBC # BLD AUTO: 3.8 X10*3/UL (ref 4.4–11.3)

## 2025-05-19 PROCEDURE — 96375 TX/PRO/DX INJ NEW DRUG ADDON: CPT | Mod: INF

## 2025-05-19 PROCEDURE — 80053 COMPREHEN METABOLIC PANEL: CPT | Performed by: STUDENT IN AN ORGANIZED HEALTH CARE EDUCATION/TRAINING PROGRAM

## 2025-05-19 PROCEDURE — 85610 PROTHROMBIN TIME: CPT

## 2025-05-19 PROCEDURE — 2500000004 HC RX 250 GENERAL PHARMACY W/ HCPCS (ALT 636 FOR OP/ED): Mod: JZ | Performed by: STUDENT IN AN ORGANIZED HEALTH CARE EDUCATION/TRAINING PROGRAM

## 2025-05-19 PROCEDURE — 2500000001 HC RX 250 WO HCPCS SELF ADMINISTERED DRUGS (ALT 637 FOR MEDICARE OP): Performed by: STUDENT IN AN ORGANIZED HEALTH CARE EDUCATION/TRAINING PROGRAM

## 2025-05-19 PROCEDURE — 1159F MED LIST DOCD IN RCRD: CPT | Performed by: NURSE PRACTITIONER

## 2025-05-19 PROCEDURE — G2211 COMPLEX E/M VISIT ADD ON: HCPCS | Performed by: NURSE PRACTITIONER

## 2025-05-19 PROCEDURE — 96413 CHEMO IV INFUSION 1 HR: CPT

## 2025-05-19 PROCEDURE — 85025 COMPLETE CBC W/AUTO DIFF WBC: CPT | Performed by: STUDENT IN AN ORGANIZED HEALTH CARE EDUCATION/TRAINING PROGRAM

## 2025-05-19 PROCEDURE — 2500000004 HC RX 250 GENERAL PHARMACY W/ HCPCS (ALT 636 FOR OP/ED): Performed by: STUDENT IN AN ORGANIZED HEALTH CARE EDUCATION/TRAINING PROGRAM

## 2025-05-19 PROCEDURE — 1126F AMNT PAIN NOTED NONE PRSNT: CPT | Performed by: NURSE PRACTITIONER

## 2025-05-19 PROCEDURE — 1036F TOBACCO NON-USER: CPT | Performed by: NURSE PRACTITIONER

## 2025-05-19 PROCEDURE — 99214 OFFICE O/P EST MOD 30 MIN: CPT | Performed by: NURSE PRACTITIONER

## 2025-05-19 PROCEDURE — 99214 OFFICE O/P EST MOD 30 MIN: CPT | Mod: 25 | Performed by: NURSE PRACTITIONER

## 2025-05-19 RX ORDER — ALBUTEROL SULFATE 0.83 MG/ML
3 SOLUTION RESPIRATORY (INHALATION) AS NEEDED
Status: DISCONTINUED | OUTPATIENT
Start: 2025-05-19 | End: 2025-05-19 | Stop reason: HOSPADM

## 2025-05-19 RX ORDER — DIPHENHYDRAMINE HCL 50 MG
50 CAPSULE ORAL ONCE
OUTPATIENT
Start: 2025-05-26

## 2025-05-19 RX ORDER — PROCHLORPERAZINE MALEATE 10 MG
10 TABLET ORAL EVERY 6 HOURS PRN
Status: DISCONTINUED | OUTPATIENT
Start: 2025-05-19 | End: 2025-05-19 | Stop reason: HOSPADM

## 2025-05-19 RX ORDER — DIPHENHYDRAMINE HCL 50 MG
50 CAPSULE ORAL ONCE
OUTPATIENT
Start: 2025-06-02

## 2025-05-19 RX ORDER — DIPHENHYDRAMINE HYDROCHLORIDE 50 MG/ML
50 INJECTION, SOLUTION INTRAMUSCULAR; INTRAVENOUS AS NEEDED
OUTPATIENT
Start: 2025-06-09

## 2025-05-19 RX ORDER — PROCHLORPERAZINE EDISYLATE 5 MG/ML
10 INJECTION INTRAMUSCULAR; INTRAVENOUS EVERY 6 HOURS PRN
OUTPATIENT
Start: 2025-06-09

## 2025-05-19 RX ORDER — PROCHLORPERAZINE EDISYLATE 5 MG/ML
10 INJECTION INTRAMUSCULAR; INTRAVENOUS EVERY 6 HOURS PRN
Status: DISCONTINUED | OUTPATIENT
Start: 2025-05-19 | End: 2025-05-19 | Stop reason: HOSPADM

## 2025-05-19 RX ORDER — PROCHLORPERAZINE MALEATE 5 MG
10 TABLET ORAL EVERY 6 HOURS PRN
OUTPATIENT
Start: 2025-06-16

## 2025-05-19 RX ORDER — PROCHLORPERAZINE EDISYLATE 5 MG/ML
10 INJECTION INTRAMUSCULAR; INTRAVENOUS EVERY 6 HOURS PRN
OUTPATIENT
Start: 2025-06-16

## 2025-05-19 RX ORDER — DEXAMETHASONE SODIUM PHOSPHATE 10 MG/ML
10 INJECTION INTRAMUSCULAR; INTRAVENOUS ONCE
OUTPATIENT
Start: 2025-06-02

## 2025-05-19 RX ORDER — FAMOTIDINE 10 MG/ML
20 INJECTION, SOLUTION INTRAVENOUS ONCE AS NEEDED
Status: DISCONTINUED | OUTPATIENT
Start: 2025-05-19 | End: 2025-05-19 | Stop reason: HOSPADM

## 2025-05-19 RX ORDER — DIPHENHYDRAMINE HYDROCHLORIDE 50 MG/ML
50 INJECTION, SOLUTION INTRAMUSCULAR; INTRAVENOUS AS NEEDED
OUTPATIENT
Start: 2025-06-16

## 2025-05-19 RX ORDER — DIPHENHYDRAMINE HYDROCHLORIDE 50 MG/ML
50 INJECTION, SOLUTION INTRAMUSCULAR; INTRAVENOUS AS NEEDED
OUTPATIENT
Start: 2025-06-02

## 2025-05-19 RX ORDER — EPINEPHRINE 0.3 MG/.3ML
0.3 INJECTION SUBCUTANEOUS EVERY 5 MIN PRN
OUTPATIENT
Start: 2025-06-16

## 2025-05-19 RX ORDER — PROCHLORPERAZINE MALEATE 5 MG
10 TABLET ORAL EVERY 6 HOURS PRN
OUTPATIENT
Start: 2025-05-26

## 2025-05-19 RX ORDER — DIPHENHYDRAMINE HCL 25 MG
50 CAPSULE ORAL ONCE
Status: COMPLETED | OUTPATIENT
Start: 2025-05-19 | End: 2025-05-19

## 2025-05-19 RX ORDER — ALBUTEROL SULFATE 0.83 MG/ML
3 SOLUTION RESPIRATORY (INHALATION) AS NEEDED
OUTPATIENT
Start: 2025-05-26

## 2025-05-19 RX ORDER — HEPARIN SODIUM,PORCINE/PF 10 UNIT/ML
50 SYRINGE (ML) INTRAVENOUS AS NEEDED
Status: DISCONTINUED | OUTPATIENT
Start: 2025-05-19 | End: 2025-05-19 | Stop reason: HOSPADM

## 2025-05-19 RX ORDER — DIPHENHYDRAMINE HCL 50 MG
50 CAPSULE ORAL ONCE
OUTPATIENT
Start: 2025-06-09

## 2025-05-19 RX ORDER — FAMOTIDINE 10 MG/ML
20 INJECTION, SOLUTION INTRAVENOUS ONCE AS NEEDED
OUTPATIENT
Start: 2025-05-26

## 2025-05-19 RX ORDER — ALBUTEROL SULFATE 0.83 MG/ML
3 SOLUTION RESPIRATORY (INHALATION) AS NEEDED
OUTPATIENT
Start: 2025-06-09

## 2025-05-19 RX ORDER — FAMOTIDINE 10 MG/ML
20 INJECTION, SOLUTION INTRAVENOUS ONCE
OUTPATIENT
Start: 2025-06-16

## 2025-05-19 RX ORDER — DEXAMETHASONE SODIUM PHOSPHATE 10 MG/ML
10 INJECTION INTRAMUSCULAR; INTRAVENOUS ONCE
OUTPATIENT
Start: 2025-06-09

## 2025-05-19 RX ORDER — FAMOTIDINE 10 MG/ML
20 INJECTION, SOLUTION INTRAVENOUS ONCE
OUTPATIENT
Start: 2025-06-09

## 2025-05-19 RX ORDER — EPINEPHRINE 0.3 MG/.3ML
0.3 INJECTION SUBCUTANEOUS EVERY 5 MIN PRN
OUTPATIENT
Start: 2025-06-02

## 2025-05-19 RX ORDER — ALBUTEROL SULFATE 0.83 MG/ML
3 SOLUTION RESPIRATORY (INHALATION) AS NEEDED
OUTPATIENT
Start: 2025-06-16

## 2025-05-19 RX ORDER — FAMOTIDINE 10 MG/ML
20 INJECTION, SOLUTION INTRAVENOUS ONCE AS NEEDED
OUTPATIENT
Start: 2025-06-09

## 2025-05-19 RX ORDER — PROCHLORPERAZINE EDISYLATE 5 MG/ML
10 INJECTION INTRAMUSCULAR; INTRAVENOUS EVERY 6 HOURS PRN
OUTPATIENT
Start: 2025-05-26

## 2025-05-19 RX ORDER — DEXAMETHASONE SODIUM PHOSPHATE 10 MG/ML
10 INJECTION INTRAMUSCULAR; INTRAVENOUS ONCE
OUTPATIENT
Start: 2025-06-16

## 2025-05-19 RX ORDER — HEPARIN 100 UNIT/ML
500 SYRINGE INTRAVENOUS AS NEEDED
Status: DISCONTINUED | OUTPATIENT
Start: 2025-05-19 | End: 2025-05-19 | Stop reason: HOSPADM

## 2025-05-19 RX ORDER — ALBUTEROL SULFATE 0.83 MG/ML
3 SOLUTION RESPIRATORY (INHALATION) AS NEEDED
OUTPATIENT
Start: 2025-06-02

## 2025-05-19 RX ORDER — HEPARIN SODIUM,PORCINE/PF 10 UNIT/ML
50 SYRINGE (ML) INTRAVENOUS AS NEEDED
OUTPATIENT
Start: 2025-05-19

## 2025-05-19 RX ORDER — FAMOTIDINE 10 MG/ML
20 INJECTION, SOLUTION INTRAVENOUS ONCE AS NEEDED
OUTPATIENT
Start: 2025-06-16

## 2025-05-19 RX ORDER — DIPHENHYDRAMINE HYDROCHLORIDE 50 MG/ML
50 INJECTION, SOLUTION INTRAMUSCULAR; INTRAVENOUS AS NEEDED
OUTPATIENT
Start: 2025-05-26

## 2025-05-19 RX ORDER — FAMOTIDINE 10 MG/ML
20 INJECTION, SOLUTION INTRAVENOUS ONCE
OUTPATIENT
Start: 2025-06-02

## 2025-05-19 RX ORDER — EPINEPHRINE 0.3 MG/.3ML
0.3 INJECTION SUBCUTANEOUS EVERY 5 MIN PRN
OUTPATIENT
Start: 2025-05-26

## 2025-05-19 RX ORDER — EPINEPHRINE 0.3 MG/.3ML
0.3 INJECTION SUBCUTANEOUS EVERY 5 MIN PRN
Status: DISCONTINUED | OUTPATIENT
Start: 2025-05-19 | End: 2025-05-19 | Stop reason: HOSPADM

## 2025-05-19 RX ORDER — PROCHLORPERAZINE EDISYLATE 5 MG/ML
10 INJECTION INTRAMUSCULAR; INTRAVENOUS EVERY 6 HOURS PRN
OUTPATIENT
Start: 2025-06-02

## 2025-05-19 RX ORDER — EPINEPHRINE 0.3 MG/.3ML
0.3 INJECTION SUBCUTANEOUS EVERY 5 MIN PRN
OUTPATIENT
Start: 2025-06-09

## 2025-05-19 RX ORDER — FAMOTIDINE 10 MG/ML
20 INJECTION, SOLUTION INTRAVENOUS ONCE
OUTPATIENT
Start: 2025-05-26

## 2025-05-19 RX ORDER — HEPARIN 100 UNIT/ML
500 SYRINGE INTRAVENOUS AS NEEDED
OUTPATIENT
Start: 2025-05-19

## 2025-05-19 RX ORDER — PROCHLORPERAZINE MALEATE 5 MG
10 TABLET ORAL EVERY 6 HOURS PRN
OUTPATIENT
Start: 2025-06-02

## 2025-05-19 RX ORDER — DIPHENHYDRAMINE HCL 50 MG
50 CAPSULE ORAL ONCE
OUTPATIENT
Start: 2025-06-16

## 2025-05-19 RX ORDER — FAMOTIDINE 10 MG/ML
20 INJECTION, SOLUTION INTRAVENOUS ONCE AS NEEDED
OUTPATIENT
Start: 2025-06-02

## 2025-05-19 RX ORDER — DIPHENHYDRAMINE HYDROCHLORIDE 50 MG/ML
50 INJECTION, SOLUTION INTRAMUSCULAR; INTRAVENOUS AS NEEDED
Status: DISCONTINUED | OUTPATIENT
Start: 2025-05-19 | End: 2025-05-19 | Stop reason: HOSPADM

## 2025-05-19 RX ORDER — FAMOTIDINE 10 MG/ML
20 INJECTION, SOLUTION INTRAVENOUS ONCE
Status: COMPLETED | OUTPATIENT
Start: 2025-05-19 | End: 2025-05-19

## 2025-05-19 RX ORDER — PROCHLORPERAZINE MALEATE 5 MG
10 TABLET ORAL EVERY 6 HOURS PRN
OUTPATIENT
Start: 2025-06-09

## 2025-05-19 RX ORDER — DEXAMETHASONE SODIUM PHOSPHATE 10 MG/ML
10 INJECTION INTRAMUSCULAR; INTRAVENOUS ONCE
OUTPATIENT
Start: 2025-05-26

## 2025-05-19 RX ADMIN — DEXAMETHASONE SODIUM PHOSPHATE 10 MG: 4 INJECTION, SOLUTION INTRA-ARTICULAR; INTRALESIONAL; INTRAMUSCULAR; INTRAVENOUS; SOFT TISSUE at 11:52

## 2025-05-19 RX ADMIN — FAMOTIDINE 20 MG: 10 INJECTION INTRAVENOUS at 11:48

## 2025-05-19 RX ADMIN — DIPHENHYDRAMINE HYDROCHLORIDE 50 MG: 25 CAPSULE ORAL at 11:48

## 2025-05-19 RX ADMIN — HEPARIN 500 UNITS: 100 SYRINGE at 13:38

## 2025-05-19 RX ADMIN — PACLITAXEL 114 MG: 6 INJECTION, SOLUTION INTRAVENOUS at 12:32

## 2025-05-19 ASSESSMENT — ENCOUNTER SYMPTOMS
APPETITE CHANGE: 0
COUGH: 0
MUSCULOSKELETAL NEGATIVE: 1
CONSTIPATION: 0
FATIGUE: 1
CARDIOVASCULAR NEGATIVE: 1
SORE THROAT: 0

## 2025-05-19 ASSESSMENT — PAIN SCALES - GENERAL: PAINLEVEL_OUTOF10: 0-NO PAIN

## 2025-05-19 NOTE — SIGNIFICANT EVENT

## 2025-05-19 NOTE — PROGRESS NOTES
[facility] - Medical Oncology Follow-Up Visit    Patient ID: Amrita Grant is a 79 y.o. female.  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 Grant is a 78 yo with PMH significant for lupus, aortic valve repair, fibromyalgia, who first noticed a mass on her nose in fall of 2024. Met Dr. Templeton who did an in-office biopsy 11/11/24 with invasive, poorly differentiated carcinoma, p40+, negative MOC31, unclear etiology, and recommended for repeat biopsy. Biopsy on 11/26/24 with the same, felt to be a primary cutaneous neoplasm such as basal cell carcinoma. Discussed at [facility] tumor board, and due to significant morbidity associated with resection, recommended against surgery and referred to radiation oncology and medical oncology. Met Dr. Guzmán, who recommended against immunotherapy given her lupus and poor performance status, and recommended discussion with radiation oncology, which cutaneous tumor board also agreed with. She planned to have radiation closer to home, but unfortunately her pre-radiation scans showed concern for metastatic disease, with PET/CT on 1/20/25 with FDG avidity of the R nasal soft tissue as well as R level 1 cervical lymph node, R rights, L1, and RUL nodule below PET resolution. She was ultimately started on vismodegib as well as started on palliative radiation (2/18-3/11/25) for the nasal lesion. In the  meantime, [facility] pathology revealed invasive carcinoma, HPV associated, positive for p40 CK5/6, EMA, p16, and negative for MOC, BerEP4, INSM1, and chromogranin. As this was felt to no longer be a basal cell carcinoma, she stopped the vismodegib. Biopsy of the L1 lesion morphologically the same.     Regarding her lupus - she has been on plaquenil monotherapy for many years, stable on the same dose. She was originally diagnosed with lupus in 2001 - she was initially diagnosed with pericarditis. She has fatigue and joint aches with the lupus.     She is here today with her  and friend. Her last day of radiation was last Tuesday or Wednesday. Radiation went ok overall. Her last dose of the visdomegib was last Tuesday or Wednesday. No side effects.  She is very tired, naps every day. In the last year, she has stopped doing the cooking, cleaning, and laundry. She is very careful, leans on her  to help her get up the stairs. Her fatigue has been getting worse and worse. She continues to lose weight - 50 lbs in the last year and a half. She has trouble swallowing - some things are harder to swallow like bread, meat. Gets tickles in her throat. She's had this for a couple years, had her throat stretched - this was also just repeated in December. Done at [facility] typically, helps for a little while, but don't last.      PAST MEDICAL HISTORY  Lupus  Fibromyalgia  Osteoarthritis  Aortic valve repair (mechanical)  on warfarin  HTN  CVA - (2021)     SOCIAL HISTORY  Lives at home with her . They've been  45 years. Previously was a  for [employer].   Tob: none  EtOH: none, previously a little  Illicits: none     FAMILY HISTORY  Mother - uterine cancer  Maternal grandmother - breast cancer  Maternal cousin - unknown metastatic cancer    HPI  Present with her .  Continues to tolerate her treatments well.  Reports scattered macular spots to back of hands and wrist areas.  Believes related to her  lupus.  No other changes.  Labs WNL.  Ready for treatment today.      Meds (Current):    Current Outpatient Medications:     ARIPiprazole (Abilify) 5 mg tablet, Take 1 tablet (5 mg) by mouth once daily., Disp: , Rfl:     Bystolic 5 mg tablet, Take 2 tablets (10 mg) by mouth once daily. Dose increased, Disp: , Rfl:     calcium carbonate 500 mg calcium (1,250 mg) chewable tablet, Chew 1 tablet (500 mg of elemental calcium) once daily., Disp: , Rfl:     cetirizine (ZyrTEC) 10 mg tablet, 1 tab(s) orally as needed, Disp: , Rfl:     cholecalciferol (Vitamin D-3) 25 MCG (1000 UT) capsule, 1 cap(s) orally three times daily, Disp: , Rfl:     docusate sodium (Colace) 100 mg capsule, once every 24 hours., Disp: , Rfl:     fluticasone (Flonase) 50 mcg/actuation nasal spray, Administer 2 sprays into affected nostril(s)., Disp: , Rfl:     levETIRAcetam (Keppra) 500 mg tablet, every 12 hours., Disp: , Rfl:     LORazepam (Ativan) 0.5 mg tablet, Take half tablet 1 hour before MRI, if needed take other half of tablet while getting checked in for MRI, Disp: 1 tablet, Rfl: 0    losartan (Cozaar) 100 mg tablet, Take 0.5 tablets (50 mg) by mouth twice a day., Disp: , Rfl:     melatonin 3 mg tablet, Take by mouth once daily., Disp: , Rfl:     memantine (Namenda) 5 mg tablet, Take 1 tablet (5 mg) by mouth once daily., Disp: , Rfl:     mirtazapine (Remeron) 15 mg tablet, 1 tab(s) orally half tablet bedtime daily for 30 days, Disp: , Rfl:     omeprazole (PriLOSEC) 20 mg DR capsule, Take 1 capsule (20 mg) by mouth once daily., Disp: , Rfl:     ondansetron (Zofran) 4 mg tablet, Take 1 tablet (4 mg) by mouth if needed for nausea or vomiting., Disp: , Rfl:     ondansetron (Zofran) 8 mg tablet, Take 1 tablet (8 mg) by mouth every 8 hours if needed for nausea or vomiting., Disp: 30 tablet, Rfl: 5    PlaqueniL 200 mg tablet, Take 1 tablet (200 mg) by mouth once daily., Disp: , Rfl:     potassium chloride CR 20 mEq ER tablet, Take 1 tablet (20 mEq)  by mouth once daily., Disp: , Rfl:     Premarin 0.3 mg tablet, once every 24 hours., Disp: , Rfl:     prochlorperazine (Compazine) 10 mg tablet, Take 1 tablet (10 mg) by mouth every 6 hours if needed for nausea or vomiting., Disp: 30 tablet, Rfl: 5    sennosides (senna) 8.6 mg tablet, Take 1 tablet (8.6 mg) by mouth 2 times a day., Disp: 60 tablet, Rfl: 2    vismodegib (Erivedge) 150 mg capsule, Take 1 capsule (150 mg total) by mouth every other day.  Take with or without food. Swallow whole. Do not open or crush., Disp: 14 capsule, Rfl: 3    warfarin (Coumadin) 4 mg tablet, Take 1 tablet (4 mg) by mouth once daily., Disp: , Rfl:     lidocaine-prilocaine (Emla) 2.5-2.5 % cream, Apply topically 1 time for 1 dose. Apply 30-45 minutes prior to port access., Disp: 30 g, Rfl: 3  No current facility-administered medications for this visit.    Allergies   Allergen Reactions    Propofol Nausea/vomiting    Sulfa (Sulfonamide Antibiotics) Other    Opioids - Morphine Analogues Nausea And Vomiting, Other and Unknown    Penicillins Unknown     Pt. Reports passing out upon taking penicillin when she was young.       Review of Systems   Constitutional:  Positive for fatigue. Negative for appetite change.   HENT:  Negative.  Negative for sore throat.    Respiratory:  Negative for cough.    Cardiovascular: Negative.    Gastrointestinal:  Negative for constipation.   Musculoskeletal: Negative.    Skin:         Erythema area to right cheek and forehead areas. Denies pruritus.  Few scattered macule spots to back of hands and wrist areas.     All other systems reviewed and are negative.       Objective   BSA: 1.45 meters squared  Wt Readings from Last 5 Encounters:   05/19/25 48.2 kg (106 lb 3.2 oz)   05/12/25 46.9 kg (103 lb 6.4 oz)   05/05/25 46.1 kg (101 lb 9.6 oz)   05/05/25 46.1 kg (101 lb 9.6 oz)   04/28/25 46.9 kg (103 lb 6.4 oz)     /89 (BP Location: Left arm, Patient Position: Sitting, BP Cuff Size: Child)   Pulse 63    Temp 36.5 °C (97.7 °F) (Temporal)   Resp 18   Wt 48.2 kg (106 lb 3.2 oz)   SpO2 95%   BMI 19.35 kg/m²     ECOG Score: 2    0          Fully active; no performance restrictions.  1          Strenuous physical activity restricted; fully ambulatory and able to carry out light work.  2          Capable of all self-care but unable to carry out any work activities. Up and about >50% of waking hours.  3          Capable of only limited self-care; confined to bed or chair >50% of waking hours.  4          Completely disabled; cannot carry out any self-care; totally confined to bed or chair.     Physical Exam  Vitals reviewed.   Constitutional:       General: She is not in acute distress.  HENT:      Head: Normocephalic.      Nose:      Comments: Large, erythematous, nasal lesion, erythema skin pigment change, cream/ointment present to area - stable.     Mouth/Throat:      Mouth: Mucous membranes are moist.   Eyes:      Conjunctiva/sclera: Conjunctivae normal.      Pupils: Pupils are equal, round, and reactive to light.   Cardiovascular:      Rate and Rhythm: Normal rate and regular rhythm.      Heart sounds: Normal heart sounds. No murmur heard.  Pulmonary:      Effort: Pulmonary effort is normal. No respiratory distress.      Breath sounds: Normal breath sounds.   Abdominal:      General: Bowel sounds are normal.      Palpations: Abdomen is soft.   Musculoskeletal:         General: Normal range of motion.      Cervical back: Normal range of motion.   Skin:     General: Skin is warm and dry.      Findings: Erythema present.      Comments: Erythema area to right cheek and forehead areas. Denies pruritus.   Few scattered macule spots to back of hands and wrists.     Neurological:      General: No focal deficit present.      Mental Status: She is alert and oriented to person, place, and time.   Psychiatric:         Mood and Affect: Mood normal.         Behavior: Behavior normal.         Thought Content: Thought content  normal.          Results:  Labs:  Lab Results   Component Value Date    WBC 3.8 (L) 05/19/2025    HGB 10.5 (L) 05/19/2025    HCT 32.8 (L) 05/19/2025     (H) 05/19/2025     05/19/2025      Lab Results   Component Value Date    NEUTROABS 2.98 05/19/2025      Lab Results   Component Value Date    GLUCOSE 101 (H) 05/19/2025    CALCIUM 8.6 05/19/2025     05/19/2025    K 4.0 05/19/2025    CO2 26 05/19/2025     05/19/2025    BUN 11 05/19/2025    CREATININE 0.61 05/19/2025     Lab Results   Component Value Date    ALT 15 05/19/2025    AST 16 05/19/2025    ALKPHOS 53 05/19/2025    BILITOT 0.5 05/19/2025        Imaging:  No new imaging.      Pathology:    Lab Results   Component Value Date    ADD1  11/26/2024     Additional immunostains were reviewed.  Tumor cells are positive for p16 and negative for chromogranin and INSM1.  Isolated tumor cells are positive for BerEP4.  The differential diagnosis remains unchanged.         Assessment/Plan      Amrita Grant is a 79 y.o. female here for recommendations and to establish care for poorly differentiated carcinoma of the face.     # Poorly differentiated carcinoma  - HPV+  - L1 biopsy proven metastasis - will send tempus for better delineation  - discussed the likelihood that this is metastatic, HPV+ nasopharyngeal carcinoma, although tempus may help us better determine this  - s/p palliative radiation  - discussed that typically would recommend combination therapy with chemotherapy+immunotherapy. However, given her lupus which was originally diagnosed with pericarditis, do not recommend immunotherapy. Also discussed that given her current poor performance status, do not believe she could tolerate dual chemotherapy  - discussed single-agent weekly paclitaxel, and consented. Plan to repeat scans after 3 cycles - CT scheduled 6/20/25    - will obtain new baseline images, as she has not had imaging since January 2025  - she would like to have her infusions  closer to home at [place], but will continue to see us at [facility] for pre-treatment visits  - Pt agreeable to infusions and visits on same day at [facility].    - Update: Unable to obtain IV access after multiple attempts.  Existing mediport placement scheduled 4/2/25.  Infusion rescheduled to 4/7/25.    - 4/2/25 Mediport placed  - Follow-up TC 5/22/25, no visit 5/26/25 d/t holiday, C3D1 5/27/25.  RTC 6/2/25 for C3D8.      # Lupus  - she follows with a rheumatologist at [facility], and will notify them we are starting chemotherapy    # Dysuria, urgency, frequency  - offered obtaining UA and urine culture, and she deferred to PCP  - 3/31:  Current 10-day ATB course.  Pt/family not able to recall medication name.  ATB started prior to her tx C1D1.  Tolerating ATB.  Reports up to 8 UTI's last year.  PCP (non- provider) - any further follow-up with PCP.    - 4/7:  Completed 10-day ATB course.  Denies current UTI symptoms.       # Unintentional weight loss  - may be partly due to dysphagia  - oncology dietician referral in place   - wt loss has stabilized     # Fatigue (g1)  - Recommended she increase her physical activity level - during waking house, get up Q2-3H, walk within the home, pace activities.     # Constipation  - Senna too effective, pt to start routine Miralax - 1/2 capful to start and adjust as needed.      # Hypocalcemia 8.4 5/12/25  - Calcium listed on MAR, pt not taking med d/t pill size.  Recommended she start OTC calcium soft chew.     # Cough/PND   - Will add room humidifier.  Sleeps using one pillow, recommended she try sleeping with two pillows.    - Ongoing monitoring     # Rash/erythema to right cheek/forehead  - denies pruritus, apply lotion to area.      # Scattered macules to back of hands and wrist area (few)  - apply hydrocortisone cream BID, pt has within the home.      # Pancytopenia - chemo-induced - stable  - Weekly labs, ongoing monitoring    # INR/chronic Warfarin  - INR ordered  for coumadin dosing, infusion nurse provided medical release form per facility protocol.  Pt follows with [facility] for dosing/monitoring.  INR to be draw via port with treatment labs, fax to [facility] [phone].  Goal INR 2.5-3.0.   - 4/14: INR 2.0 result faxed to [facility] 4/15/25  - 4/21:  Will monitor for INR result and fax to [facility] 4/22/25.   - 4/28:  Will monitor for INR result and fax to [facility] 4/29/25.   - Not enough blood in tube. Pt to [facility] for INR draw.  5/6:  Will monitor for INR result and fax to [facility].  INR 4.7, instructions per [facility].    5/12:  Will monitor for INR result and fax to [facility].   5/19:  Will monitor for INR result and fax to [facility].      # Advanced care planning  - discussed incurable but treatable nature of disease, with goal to prolong life while maintaining/improving quality of life. She understands that risks of systemic therapy may outweigh benefits at some point in the future.           COVID-19 ruled out

## 2025-05-20 ENCOUNTER — APPOINTMENT (OUTPATIENT)
Dept: RADIATION ONCOLOGY | Facility: CLINIC | Age: 80
End: 2025-05-20
Payer: MEDICARE

## 2025-05-20 LAB
INR PPP: 4.8 (ref 0.9–1.1)
PROTHROMBIN TIME: 53.7 SECONDS (ref 9.8–12.4)

## 2025-05-21 ENCOUNTER — APPOINTMENT (OUTPATIENT)
Dept: RADIATION ONCOLOGY | Facility: CLINIC | Age: 80
End: 2025-05-21
Payer: MEDICARE

## 2025-05-22 ENCOUNTER — APPOINTMENT (OUTPATIENT)
Dept: RADIATION ONCOLOGY | Facility: CLINIC | Age: 80
End: 2025-05-22
Payer: MEDICARE

## 2025-05-22 ENCOUNTER — APPOINTMENT (OUTPATIENT)
Dept: HEMATOLOGY/ONCOLOGY | Facility: CLINIC | Age: 80
End: 2025-05-22
Payer: MEDICARE

## 2025-05-23 ENCOUNTER — APPOINTMENT (OUTPATIENT)
Dept: RADIATION ONCOLOGY | Facility: CLINIC | Age: 80
End: 2025-05-23
Payer: MEDICARE

## 2025-05-27 ENCOUNTER — INFUSION (OUTPATIENT)
Dept: HEMATOLOGY/ONCOLOGY | Facility: CLINIC | Age: 80
End: 2025-05-27
Payer: MEDICARE

## 2025-05-27 ENCOUNTER — APPOINTMENT (OUTPATIENT)
Dept: RADIATION ONCOLOGY | Facility: CLINIC | Age: 80
End: 2025-05-27
Payer: MEDICARE

## 2025-05-27 ENCOUNTER — APPOINTMENT (OUTPATIENT)
Dept: HEMATOLOGY/ONCOLOGY | Facility: CLINIC | Age: 80
End: 2025-05-27
Payer: MEDICARE

## 2025-05-27 VITALS
SYSTOLIC BLOOD PRESSURE: 138 MMHG | RESPIRATION RATE: 14 BRPM | BODY MASS INDEX: 20.06 KG/M2 | WEIGHT: 109.02 LBS | HEART RATE: 65 BPM | TEMPERATURE: 97.9 F | DIASTOLIC BLOOD PRESSURE: 85 MMHG | OXYGEN SATURATION: 100 % | HEIGHT: 62 IN

## 2025-05-27 DIAGNOSIS — C44.92 SCC (SQUAMOUS CELL CARCINOMA): ICD-10-CM

## 2025-05-27 LAB
BASOPHILS # BLD AUTO: 0.02 X10*3/UL (ref 0–0.1)
BASOPHILS NFR BLD AUTO: 0.4 %
EOSINOPHIL # BLD AUTO: 0.04 X10*3/UL (ref 0–0.4)
EOSINOPHIL NFR BLD AUTO: 0.9 %
ERYTHROCYTE [DISTWIDTH] IN BLOOD BY AUTOMATED COUNT: 14.6 % (ref 11.5–14.5)
HCT VFR BLD AUTO: 31.6 % (ref 36–46)
HGB BLD-MCNC: 10.3 G/DL (ref 12–16)
IMM GRANULOCYTES # BLD AUTO: 0.01 X10*3/UL (ref 0–0.5)
IMM GRANULOCYTES NFR BLD AUTO: 0.2 % (ref 0–0.9)
LYMPHOCYTES # BLD AUTO: 0.41 X10*3/UL (ref 0.8–3)
LYMPHOCYTES NFR BLD AUTO: 9.2 %
MCH RBC QN AUTO: 32.8 PG (ref 26–34)
MCHC RBC AUTO-ENTMCNC: 32.6 G/DL (ref 32–36)
MCV RBC AUTO: 101 FL (ref 80–100)
MONOCYTES # BLD AUTO: 0.46 X10*3/UL (ref 0.05–0.8)
MONOCYTES NFR BLD AUTO: 10.3 %
NEUTROPHILS # BLD AUTO: 3.51 X10*3/UL (ref 1.6–5.5)
NEUTROPHILS NFR BLD AUTO: 79 %
NRBC BLD-RTO: ABNORMAL /100{WBCS}
PLATELET # BLD AUTO: 197 X10*3/UL (ref 150–450)
RBC # BLD AUTO: 3.14 X10*6/UL (ref 4–5.2)
WBC # BLD AUTO: 4.5 X10*3/UL (ref 4.4–11.3)

## 2025-05-27 PROCEDURE — 2500000004 HC RX 250 GENERAL PHARMACY W/ HCPCS (ALT 636 FOR OP/ED): Mod: JZ | Performed by: NURSE PRACTITIONER

## 2025-05-27 PROCEDURE — 96413 CHEMO IV INFUSION 1 HR: CPT

## 2025-05-27 PROCEDURE — 96375 TX/PRO/DX INJ NEW DRUG ADDON: CPT | Mod: INF

## 2025-05-27 PROCEDURE — 2500000001 HC RX 250 WO HCPCS SELF ADMINISTERED DRUGS (ALT 637 FOR MEDICARE OP): Performed by: NURSE PRACTITIONER

## 2025-05-27 PROCEDURE — 80053 COMPREHEN METABOLIC PANEL: CPT

## 2025-05-27 PROCEDURE — 2500000004 HC RX 250 GENERAL PHARMACY W/ HCPCS (ALT 636 FOR OP/ED): Mod: JZ | Performed by: STUDENT IN AN ORGANIZED HEALTH CARE EDUCATION/TRAINING PROGRAM

## 2025-05-27 PROCEDURE — 2500000004 HC RX 250 GENERAL PHARMACY W/ HCPCS (ALT 636 FOR OP/ED): Performed by: NURSE PRACTITIONER

## 2025-05-27 PROCEDURE — 85610 PROTHROMBIN TIME: CPT

## 2025-05-27 PROCEDURE — 85025 COMPLETE CBC W/AUTO DIFF WBC: CPT

## 2025-05-27 RX ORDER — DIPHENHYDRAMINE HCL 50 MG
50 CAPSULE ORAL ONCE
Status: COMPLETED | OUTPATIENT
Start: 2025-05-27 | End: 2025-05-27

## 2025-05-27 RX ORDER — HEPARIN 100 UNIT/ML
500 SYRINGE INTRAVENOUS AS NEEDED
OUTPATIENT
Start: 2025-05-27

## 2025-05-27 RX ORDER — DIPHENHYDRAMINE HYDROCHLORIDE 50 MG/ML
50 INJECTION, SOLUTION INTRAMUSCULAR; INTRAVENOUS AS NEEDED
Status: DISCONTINUED | OUTPATIENT
Start: 2025-05-27 | End: 2025-05-27 | Stop reason: HOSPADM

## 2025-05-27 RX ORDER — FAMOTIDINE 10 MG/ML
20 INJECTION, SOLUTION INTRAVENOUS ONCE AS NEEDED
Status: DISCONTINUED | OUTPATIENT
Start: 2025-05-27 | End: 2025-05-27 | Stop reason: HOSPADM

## 2025-05-27 RX ORDER — PROCHLORPERAZINE MALEATE 10 MG
10 TABLET ORAL EVERY 6 HOURS PRN
Status: DISCONTINUED | OUTPATIENT
Start: 2025-05-27 | End: 2025-05-27 | Stop reason: HOSPADM

## 2025-05-27 RX ORDER — HEPARIN SODIUM,PORCINE/PF 10 UNIT/ML
50 SYRINGE (ML) INTRAVENOUS AS NEEDED
OUTPATIENT
Start: 2025-05-27

## 2025-05-27 RX ORDER — HEPARIN 100 UNIT/ML
500 SYRINGE INTRAVENOUS AS NEEDED
Status: DISCONTINUED | OUTPATIENT
Start: 2025-05-27 | End: 2025-05-27 | Stop reason: HOSPADM

## 2025-05-27 RX ORDER — EPINEPHRINE 0.3 MG/.3ML
0.3 INJECTION SUBCUTANEOUS EVERY 5 MIN PRN
Status: DISCONTINUED | OUTPATIENT
Start: 2025-05-27 | End: 2025-05-27 | Stop reason: HOSPADM

## 2025-05-27 RX ORDER — FAMOTIDINE 10 MG/ML
20 INJECTION, SOLUTION INTRAVENOUS ONCE
Status: COMPLETED | OUTPATIENT
Start: 2025-05-27 | End: 2025-05-27

## 2025-05-27 RX ORDER — HEPARIN SODIUM,PORCINE/PF 10 UNIT/ML
50 SYRINGE (ML) INTRAVENOUS AS NEEDED
Status: DISCONTINUED | OUTPATIENT
Start: 2025-05-27 | End: 2025-05-27 | Stop reason: HOSPADM

## 2025-05-27 RX ORDER — ALBUTEROL SULFATE 0.83 MG/ML
3 SOLUTION RESPIRATORY (INHALATION) AS NEEDED
Status: DISCONTINUED | OUTPATIENT
Start: 2025-05-27 | End: 2025-05-27 | Stop reason: HOSPADM

## 2025-05-27 RX ORDER — PROCHLORPERAZINE EDISYLATE 5 MG/ML
10 INJECTION INTRAMUSCULAR; INTRAVENOUS EVERY 6 HOURS PRN
Status: DISCONTINUED | OUTPATIENT
Start: 2025-05-27 | End: 2025-05-27 | Stop reason: HOSPADM

## 2025-05-27 RX ADMIN — HEPARIN 500 UNITS: 100 SYRINGE at 16:11

## 2025-05-27 RX ADMIN — DIPHENHYDRAMINE HYDROCHLORIDE 50 MG: 50 CAPSULE ORAL at 14:18

## 2025-05-27 RX ADMIN — DEXAMETHASONE SODIUM PHOSPHATE 10 MG: 4 INJECTION, SOLUTION INTRA-ARTICULAR; INTRALESIONAL; INTRAMUSCULAR; INTRAVENOUS; SOFT TISSUE at 14:18

## 2025-05-27 RX ADMIN — PACLITAXEL 114 MG: 6 INJECTION, SOLUTION INTRAVENOUS at 14:48

## 2025-05-27 RX ADMIN — FAMOTIDINE 20 MG: 10 INJECTION, SOLUTION INTRAVENOUS at 14:20

## 2025-05-27 ASSESSMENT — PAIN SCALES - GENERAL: PAINLEVEL_OUTOF10: 0-NO PAIN

## 2025-05-27 NOTE — PROGRESS NOTES
[facility] Infusion Note      Amrita Grant is a 79 y.o. year old female here today,05/27/25,  in the [facility] infusion center for  following treatment regimen:  Medications given today :    Recent Labs     05/27/25  1326   NEUTROABS 3.51      HGB 10.3*      CrCl cannot be calculated (Patient's most recent lab result is older than the maximum 7 days allowed.).    ( Pending cmp tx orders)  BP Readings from Last 2 Encounters:   05/27/25 138/85   05/19/25 143/89       Hold treatment and notify provider if:,  ANC LESS THAN 1 x 10E9/L,  Platelets LESS THAN 50 x 10E9/L,  Total Bilirubin GREATER THAN 1.25 x ULN      Administrations This Visit       dexAMETHasone (Decadron) injection 10 mg       Admin Date  05/27/2025 Action  Given Dose  10 mg Route  intravenous Documented By  Don Sow RN              diphenhydrAMINE (BENADryl) capsule 50 mg       Admin Date  05/27/2025 Action  Given Dose  50 mg Route  oral Documented By  Don Sow RN              famotidine PF (Pepcid) injection 20 mg       Admin Date  05/27/2025 Action  Given Dose  20 mg Route  intravenous Documented By  Don Sow RN              PACLitaxeL (Taxol) 114 mg in dextrose 5% 129 mL IV       Admin Date  05/27/2025 Action  New Bag Dose  114 mg Rate  129 mL/hr Route  intravenous Documented By  Don Sow RN                    Vitals:    05/27/25 1313   BP: 138/85   Pulse: 65   Resp: 14   Temp: 36.6 °C (97.9 °F)   SpO2: 100%    on intake  Vitals:    05/27/25 1313   Weight: 49.5 kg (109 lb 0.3 oz)       Body surface area is 1.47 meters squared.    Follow up 6/2/25 next scheduled paclitaxol     Pt tolerated and was discharged to home in stable condition. Pt ambulated  off the unit with a slow and steady gait. Pt had no further questions or concerns at this time. Has follow up appointment appropriately scheduled. Verbalized understanding of follow up. Made aware that appointments and times are always available online on my chart.

## 2025-05-28 ENCOUNTER — DOCUMENTATION (OUTPATIENT)
Dept: HEMATOLOGY/ONCOLOGY | Facility: HOSPITAL | Age: 80
End: 2025-05-28
Payer: MEDICARE

## 2025-05-28 ENCOUNTER — APPOINTMENT (OUTPATIENT)
Dept: RADIATION ONCOLOGY | Facility: CLINIC | Age: 80
End: 2025-05-28
Payer: MEDICARE

## 2025-05-28 LAB
ALBUMIN SERPL BCP-MCNC: 3.9 G/DL (ref 3.4–5)
ALP SERPL-CCNC: 63 U/L (ref 33–136)
ALT SERPL W P-5'-P-CCNC: 11 U/L (ref 7–45)
ANION GAP SERPL CALC-SCNC: 10 MMOL/L (ref 10–20)
AST SERPL W P-5'-P-CCNC: 14 U/L (ref 9–39)
BILIRUB SERPL-MCNC: 0.4 MG/DL (ref 0–1.2)
BUN SERPL-MCNC: 11 MG/DL (ref 6–23)
CALCIUM SERPL-MCNC: 8.8 MG/DL (ref 8.6–10.6)
CHLORIDE SERPL-SCNC: 102 MMOL/L (ref 98–107)
CO2 SERPL-SCNC: 27 MMOL/L (ref 21–32)
CREAT SERPL-MCNC: 0.5 MG/DL (ref 0.5–1.05)
EGFRCR SERPLBLD CKD-EPI 2021: >90 ML/MIN/1.73M*2
GLUCOSE SERPL-MCNC: 93 MG/DL (ref 74–99)
INR PPP: 8.8 (ref 0.9–1.1)
POTASSIUM SERPL-SCNC: 4 MMOL/L (ref 3.5–5.3)
PROT SERPL-MCNC: 5.9 G/DL (ref 6.4–8.2)
PROTHROMBIN TIME: 97.1 SECONDS (ref 9.8–12.4)
SODIUM SERPL-SCNC: 135 MMOL/L (ref 136–145)

## 2025-05-28 NOTE — PROGRESS NOTES
Got a page from core lab regarding her INR 8.8 (PT 97.1) at 4:31 AM 5/28/25 which was collected around 2:00 PM 5/27/25.     She's on chronic warfarin for her Hx of aortic valve repair with mechanical valve and her warfarin dose has been managed by [facility] so not sure how much of warfarin she's been on. Tried to call her multiple times but went to voice mails. Left a message that she should stop taking warfarin, contact [facility] ASAP, and if there is any signs of bleeding, should go to ER. Will try to call her again in the morning. Also messaged DEISY Chanel-CNP who has seen her for her weekly paclitaxel for facial poorly differentiated carcinoma.

## 2025-05-28 NOTE — PROGRESS NOTES
Patient's spouse calls back to let the team know that they are calling [facility] now for directions.

## 2025-05-28 NOTE — PROGRESS NOTES
Secure chat message received from on-call MD Dr. Frankel.  Pt's INR 8.8.  MD spoke with pt.    This provider spoke with pt.  She is stable at time of call.  No clinical s/sx's bleeding.  Interventions discussed.  She reports recent ATB tx for UTI.  ATB completed yesterday.    INR drawn via port with tx labs.  [facility] manages pt's Coumadin dosing.  Spouse will reach out to [facility] for instructions to manage INR.  INR fluctuation previously discussed with Dr. Perrin and PharmD Pati Santos.  No clear etiology for fluctuation.  Previous max INR 4.8.    Will discuss with Dr. Perrin and pharmacist prior to next treatment.

## 2025-05-28 NOTE — PROGRESS NOTES
Finally able to talk to Mrs Grant. She says she's doing fine and has some small red rash on arms but denies bruises or any other signs of bleeding. When her INR was 4.8 (5/19/25), she was instructed by [facility] to stop warfarin 4mg daily for 2 days and then resume, so she's taking warfarin 4mg daily at night and her last dose was 5/27/25 PM.  Instructed her to stop taking warfarin for now and to call [facility] for further instruction. Also educated her that she should be careful when she's walking around and if she develops any signs of bleeding, should go to ER. She agreed with the plan.

## 2025-05-29 ENCOUNTER — APPOINTMENT (OUTPATIENT)
Dept: RADIATION ONCOLOGY | Facility: CLINIC | Age: 80
End: 2025-05-29
Payer: MEDICARE

## 2025-05-30 ENCOUNTER — APPOINTMENT (OUTPATIENT)
Dept: RADIATION ONCOLOGY | Facility: CLINIC | Age: 80
End: 2025-05-30
Payer: MEDICARE

## 2025-05-30 ENCOUNTER — HOSPITAL ENCOUNTER (OUTPATIENT)
Dept: HOSPITAL 100 - MTLAB | Age: 80
Discharge: HOME | End: 2025-05-30
Payer: MEDICARE

## 2025-05-30 DIAGNOSIS — Z95.2: ICD-10-CM

## 2025-05-30 DIAGNOSIS — Z79.01: Primary | ICD-10-CM

## 2025-05-30 LAB — PROTHROMBIN TIME (PROTIME)PT.: 26.7 SECONDS (ref 11.7–14.9)

## 2025-05-30 PROCEDURE — 85610 PROTHROMBIN TIME: CPT

## 2025-05-30 PROCEDURE — 36415 COLL VENOUS BLD VENIPUNCTURE: CPT

## 2025-06-02 ENCOUNTER — INFUSION (OUTPATIENT)
Dept: HEMATOLOGY/ONCOLOGY | Facility: CLINIC | Age: 80
End: 2025-06-02
Payer: MEDICARE

## 2025-06-02 ENCOUNTER — APPOINTMENT (OUTPATIENT)
Dept: HEMATOLOGY/ONCOLOGY | Facility: CLINIC | Age: 80
End: 2025-06-02
Payer: MEDICARE

## 2025-06-02 ENCOUNTER — LAB (OUTPATIENT)
Dept: HEMATOLOGY/ONCOLOGY | Facility: CLINIC | Age: 80
End: 2025-06-02
Payer: MEDICARE

## 2025-06-02 ENCOUNTER — OFFICE VISIT (OUTPATIENT)
Dept: HEMATOLOGY/ONCOLOGY | Facility: CLINIC | Age: 80
End: 2025-06-02
Payer: MEDICARE

## 2025-06-02 ENCOUNTER — APPOINTMENT (OUTPATIENT)
Dept: RADIATION ONCOLOGY | Facility: CLINIC | Age: 80
End: 2025-06-02
Payer: MEDICARE

## 2025-06-02 VITALS
SYSTOLIC BLOOD PRESSURE: 147 MMHG | WEIGHT: 106.9 LBS | DIASTOLIC BLOOD PRESSURE: 87 MMHG | HEART RATE: 69 BPM | BODY MASS INDEX: 19.47 KG/M2 | TEMPERATURE: 97.5 F | RESPIRATION RATE: 14 BRPM | OXYGEN SATURATION: 95 %

## 2025-06-02 DIAGNOSIS — C44.92 SCC (SQUAMOUS CELL CARCINOMA): ICD-10-CM

## 2025-06-02 DIAGNOSIS — Z51.11 ENCOUNTER FOR ANTINEOPLASTIC CHEMOTHERAPY: ICD-10-CM

## 2025-06-02 DIAGNOSIS — C44.92 SCC (SQUAMOUS CELL CARCINOMA): Primary | ICD-10-CM

## 2025-06-02 LAB
ALBUMIN SERPL BCP-MCNC: 3.7 G/DL (ref 3.4–5)
ALP SERPL-CCNC: 59 U/L (ref 33–136)
ALT SERPL W P-5'-P-CCNC: 11 U/L (ref 7–45)
ANION GAP SERPL CALC-SCNC: 7 MMOL/L (ref 10–20)
AST SERPL W P-5'-P-CCNC: 14 U/L (ref 9–39)
BASOPHILS # BLD AUTO: 0.04 X10*3/UL (ref 0–0.1)
BASOPHILS NFR BLD AUTO: 0.9 %
BILIRUB SERPL-MCNC: 0.7 MG/DL (ref 0–1.2)
BUN SERPL-MCNC: 14 MG/DL (ref 6–23)
CALCIUM SERPL-MCNC: 8.3 MG/DL (ref 8.6–10.3)
CHLORIDE SERPL-SCNC: 106 MMOL/L (ref 98–107)
CO2 SERPL-SCNC: 26 MMOL/L (ref 21–32)
CREAT SERPL-MCNC: 0.59 MG/DL (ref 0.5–1.05)
EGFRCR SERPLBLD CKD-EPI 2021: >90 ML/MIN/1.73M*2
EOSINOPHIL # BLD AUTO: 0.04 X10*3/UL (ref 0–0.4)
EOSINOPHIL NFR BLD AUTO: 0.9 %
ERYTHROCYTE [DISTWIDTH] IN BLOOD BY AUTOMATED COUNT: 14.8 % (ref 11.5–14.5)
GLUCOSE SERPL-MCNC: 86 MG/DL (ref 74–99)
HCT VFR BLD AUTO: 33.2 % (ref 36–46)
HGB BLD-MCNC: 10.6 G/DL (ref 12–16)
IMM GRANULOCYTES # BLD AUTO: 0.02 X10*3/UL (ref 0–0.5)
IMM GRANULOCYTES NFR BLD AUTO: 0.4 % (ref 0–0.9)
LYMPHOCYTES # BLD AUTO: 0.5 X10*3/UL (ref 0.8–3)
LYMPHOCYTES NFR BLD AUTO: 10.8 %
MCH RBC QN AUTO: 33.1 PG (ref 26–34)
MCHC RBC AUTO-ENTMCNC: 31.9 G/DL (ref 32–36)
MCV RBC AUTO: 104 FL (ref 80–100)
MONOCYTES # BLD AUTO: 0.3 X10*3/UL (ref 0.05–0.8)
MONOCYTES NFR BLD AUTO: 6.5 %
NEUTROPHILS # BLD AUTO: 3.73 X10*3/UL (ref 1.6–5.5)
NEUTROPHILS NFR BLD AUTO: 80.5 %
NRBC BLD-RTO: ABNORMAL /100{WBCS}
PLATELET # BLD AUTO: 198 X10*3/UL (ref 150–450)
POTASSIUM SERPL-SCNC: 4.1 MMOL/L (ref 3.5–5.3)
PROT SERPL-MCNC: 5.8 G/DL (ref 6.4–8.2)
RBC # BLD AUTO: 3.2 X10*6/UL (ref 4–5.2)
SODIUM SERPL-SCNC: 135 MMOL/L (ref 136–145)
WBC # BLD AUTO: 4.6 X10*3/UL (ref 4.4–11.3)

## 2025-06-02 PROCEDURE — 2500000004 HC RX 250 GENERAL PHARMACY W/ HCPCS (ALT 636 FOR OP/ED): Performed by: NURSE PRACTITIONER

## 2025-06-02 PROCEDURE — 1126F AMNT PAIN NOTED NONE PRSNT: CPT | Performed by: NURSE PRACTITIONER

## 2025-06-02 PROCEDURE — 1159F MED LIST DOCD IN RCRD: CPT | Performed by: NURSE PRACTITIONER

## 2025-06-02 PROCEDURE — 84075 ASSAY ALKALINE PHOSPHATASE: CPT

## 2025-06-02 PROCEDURE — 96375 TX/PRO/DX INJ NEW DRUG ADDON: CPT | Mod: INF

## 2025-06-02 PROCEDURE — 2500000001 HC RX 250 WO HCPCS SELF ADMINISTERED DRUGS (ALT 637 FOR MEDICARE OP): Performed by: NURSE PRACTITIONER

## 2025-06-02 PROCEDURE — G2211 COMPLEX E/M VISIT ADD ON: HCPCS | Performed by: NURSE PRACTITIONER

## 2025-06-02 PROCEDURE — 85025 COMPLETE CBC W/AUTO DIFF WBC: CPT

## 2025-06-02 PROCEDURE — 96413 CHEMO IV INFUSION 1 HR: CPT

## 2025-06-02 PROCEDURE — 1036F TOBACCO NON-USER: CPT | Performed by: NURSE PRACTITIONER

## 2025-06-02 PROCEDURE — 99215 OFFICE O/P EST HI 40 MIN: CPT | Performed by: NURSE PRACTITIONER

## 2025-06-02 PROCEDURE — 2500000004 HC RX 250 GENERAL PHARMACY W/ HCPCS (ALT 636 FOR OP/ED): Performed by: STUDENT IN AN ORGANIZED HEALTH CARE EDUCATION/TRAINING PROGRAM

## 2025-06-02 RX ORDER — DIPHENHYDRAMINE HYDROCHLORIDE 50 MG/ML
50 INJECTION, SOLUTION INTRAMUSCULAR; INTRAVENOUS AS NEEDED
Status: DISCONTINUED | OUTPATIENT
Start: 2025-06-02 | End: 2025-06-02 | Stop reason: HOSPADM

## 2025-06-02 RX ORDER — EPINEPHRINE 0.3 MG/.3ML
0.3 INJECTION SUBCUTANEOUS EVERY 5 MIN PRN
Status: DISCONTINUED | OUTPATIENT
Start: 2025-06-02 | End: 2025-06-02 | Stop reason: HOSPADM

## 2025-06-02 RX ORDER — FAMOTIDINE 10 MG/ML
20 INJECTION, SOLUTION INTRAVENOUS ONCE AS NEEDED
Status: DISCONTINUED | OUTPATIENT
Start: 2025-06-02 | End: 2025-06-02 | Stop reason: HOSPADM

## 2025-06-02 RX ORDER — DIPHENHYDRAMINE HCL 25 MG
50 CAPSULE ORAL ONCE
Status: COMPLETED | OUTPATIENT
Start: 2025-06-02 | End: 2025-06-02

## 2025-06-02 RX ORDER — HEPARIN SODIUM,PORCINE/PF 10 UNIT/ML
50 SYRINGE (ML) INTRAVENOUS AS NEEDED
OUTPATIENT
Start: 2025-06-02

## 2025-06-02 RX ORDER — ALBUTEROL SULFATE 0.83 MG/ML
3 SOLUTION RESPIRATORY (INHALATION) AS NEEDED
Status: DISCONTINUED | OUTPATIENT
Start: 2025-06-02 | End: 2025-06-02 | Stop reason: HOSPADM

## 2025-06-02 RX ORDER — HEPARIN 100 UNIT/ML
500 SYRINGE INTRAVENOUS AS NEEDED
Status: DISCONTINUED | OUTPATIENT
Start: 2025-06-02 | End: 2025-06-02 | Stop reason: HOSPADM

## 2025-06-02 RX ORDER — PROCHLORPERAZINE EDISYLATE 5 MG/ML
10 INJECTION INTRAMUSCULAR; INTRAVENOUS EVERY 6 HOURS PRN
Status: DISCONTINUED | OUTPATIENT
Start: 2025-06-02 | End: 2025-06-02 | Stop reason: HOSPADM

## 2025-06-02 RX ORDER — HEPARIN 100 UNIT/ML
500 SYRINGE INTRAVENOUS AS NEEDED
OUTPATIENT
Start: 2025-06-02

## 2025-06-02 RX ORDER — PROCHLORPERAZINE MALEATE 10 MG
10 TABLET ORAL EVERY 6 HOURS PRN
Status: DISCONTINUED | OUTPATIENT
Start: 2025-06-02 | End: 2025-06-02 | Stop reason: HOSPADM

## 2025-06-02 RX ORDER — FAMOTIDINE 10 MG/ML
20 INJECTION, SOLUTION INTRAVENOUS ONCE
Status: COMPLETED | OUTPATIENT
Start: 2025-06-02 | End: 2025-06-02

## 2025-06-02 RX ORDER — HEPARIN SODIUM,PORCINE/PF 10 UNIT/ML
50 SYRINGE (ML) INTRAVENOUS AS NEEDED
Status: DISCONTINUED | OUTPATIENT
Start: 2025-06-02 | End: 2025-06-02 | Stop reason: HOSPADM

## 2025-06-02 RX ADMIN — DEXAMETHASONE SODIUM PHOSPHATE 10 MG: 4 INJECTION, SOLUTION INTRA-ARTICULAR; INTRALESIONAL; INTRAMUSCULAR; INTRAVENOUS; SOFT TISSUE at 11:25

## 2025-06-02 RX ADMIN — FAMOTIDINE 20 MG: 10 INJECTION INTRAVENOUS at 11:31

## 2025-06-02 RX ADMIN — PACLITAXEL 114 MG: 6 INJECTION, SOLUTION INTRAVENOUS at 11:57

## 2025-06-02 RX ADMIN — DIPHENHYDRAMINE HYDROCHLORIDE 50 MG: 25 CAPSULE ORAL at 11:25

## 2025-06-02 RX ADMIN — HEPARIN 500 UNITS: 100 SYRINGE at 12:55

## 2025-06-02 ASSESSMENT — ENCOUNTER SYMPTOMS
CONSTIPATION: 0
FATIGUE: 1
CARDIOVASCULAR NEGATIVE: 1
COUGH: 0
SORE THROAT: 0
MUSCULOSKELETAL NEGATIVE: 1
APPETITE CHANGE: 0

## 2025-06-02 ASSESSMENT — PAIN SCALES - GENERAL: PAINLEVEL_OUTOF10: 0-NO PAIN

## 2025-06-02 NOTE — PROGRESS NOTES
[facility] - Medical Oncology Follow-Up Visit    Patient ID: Amrita Grant is a 79 y.o. female.  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 Grant is a 78 yo with PMH significant for lupus, aortic valve repair, fibromyalgia, who first noticed a mass on her nose in fall of 2024. Met Dr. Templeton who did an in-office biopsy 11/11/24 with invasive, poorly differentiated carcinoma, p40+, negative MOC31, unclear etiology, and recommended for repeat biopsy. Biopsy on 11/26/24 with the same, felt to be a primary cutaneous neoplasm such as basal cell carcinoma. Discussed at [facility] tumor board, and due to significant morbidity associated with resection, recommended against surgery and referred to radiation oncology and medical oncology. Met Dr. Guzmán, who recommended against immunotherapy given her lupus and poor performance status, and recommended discussion with radiation oncology, which cutaneous tumor board also agreed with. She planned to have radiation closer to home, but unfortunately her pre-radiation scans showed concern for metastatic disease, with PET/CT on 1/20/25 with FDG avidity of the R nasal soft tissue as well as R level 1 cervical lymph node, R rights, L1, and RUL nodule below PET resolution. She was ultimately started on vismodegib as well as started on palliative radiation (2/18-3/11/25) for the nasal lesion. In the meantime, [facility]  pathology revealed invasive carcinoma, HPV associated, positive for p40 CK5/6, EMA, p16, and negative for MOC, BerEP4, INSM1, and chromogranin. As this was felt to no longer be a basal cell carcinoma, she stopped the vismodegib. Biopsy of the L1 lesion morphologically the same.     Regarding her lupus - she has been on plaquenil monotherapy for many years, stable on the same dose. She was originally diagnosed with lupus in 2001 - she was initially diagnosed with pericarditis. She has fatigue and joint aches with the lupus.     She is here today with her  and friend. Her last day of radiation was last Tuesday or Wednesday. Radiation went ok overall. Her last dose of the visdomegib was last Tuesday or Wednesday. No side effects.  She is very tired, naps every day. In the last year, she has stopped doing the cooking, cleaning, and laundry. She is very careful, leans on her  to help her get up the stairs. Her fatigue has been getting worse and worse. She continues to lose weight - 50 lbs in the last year and a half. She has trouble swallowing - some things are harder to swallow like bread, meat. Gets tickles in her throat. She's had this for a couple years, had her throat stretched - this was also just repeated in December. Done at [facility] typically, helps for a little while, but don't last.      PAST MEDICAL HISTORY  Lupus  Fibromyalgia  Osteoarthritis  Aortic valve repair (mechanical)  on warfarin  HTN  CVA - (2021)     SOCIAL HISTORY  Lives at home with her . They've been  45 years. Previously was a  for [employer].   Tob: none  EtOH: none, previously a little  Illicits: none     FAMILY HISTORY  Mother - uterine cancer  Maternal grandmother - breast cancer  Maternal cousin - unknown metastatic cancer    HPI  Present with her .  Energy level the same.  Spouse reports she is sleeping more.  Pt feels okay today.  No change in functional status.  Slight wt gain noted.  Denies  n/v/c/d.  No fevers or CP.  Spouse reports current coumadin 4mg dose. [facility] will manage INR draws going forward.  No other concerns.  Labs WNL.  Ready for treatment.      Meds (Current):    Current Outpatient Medications:     ARIPiprazole (Abilify) 5 mg tablet, Take 1 tablet (5 mg) by mouth once daily., Disp: , Rfl:     Bystolic 5 mg tablet, Take 2 tablets (10 mg) by mouth once daily. Dose increased, Disp: , Rfl:     calcium carbonate 500 mg calcium (1,250 mg) chewable tablet, Chew 1 tablet (500 mg of elemental calcium) once daily., Disp: , Rfl:     cetirizine (ZyrTEC) 10 mg tablet, 1 tab(s) orally as needed, Disp: , Rfl:     cholecalciferol (Vitamin D-3) 25 MCG (1000 UT) capsule, 1 cap(s) orally three times daily, Disp: , Rfl:     docusate sodium (Colace) 100 mg capsule, once every 24 hours., Disp: , Rfl:     fluticasone (Flonase) 50 mcg/actuation nasal spray, Administer 2 sprays into affected nostril(s)., Disp: , Rfl:     levETIRAcetam (Keppra) 500 mg tablet, every 12 hours., Disp: , Rfl:     lidocaine-prilocaine (Emla) 2.5-2.5 % cream, Apply topically 1 time for 1 dose. Apply 30-45 minutes prior to port access., Disp: 30 g, Rfl: 3    LORazepam (Ativan) 0.5 mg tablet, Take half tablet 1 hour before MRI, if needed take other half of tablet while getting checked in for MRI, Disp: 1 tablet, Rfl: 0    losartan (Cozaar) 100 mg tablet, Take 0.5 tablets (50 mg) by mouth twice a day., Disp: , Rfl:     melatonin 3 mg tablet, Take by mouth once daily., Disp: , Rfl:     memantine (Namenda) 5 mg tablet, Take 1 tablet (5 mg) by mouth once daily., Disp: , Rfl:     mirtazapine (Remeron) 15 mg tablet, 1 tab(s) orally half tablet bedtime daily for 30 days, Disp: , Rfl:     omeprazole (PriLOSEC) 20 mg DR capsule, Take 1 capsule (20 mg) by mouth once daily., Disp: , Rfl:     ondansetron (Zofran) 4 mg tablet, Take 1 tablet (4 mg) by mouth if needed for nausea or vomiting., Disp: , Rfl:     ondansetron (Zofran) 8 mg  tablet, Take 1 tablet (8 mg) by mouth every 8 hours if needed for nausea or vomiting., Disp: 30 tablet, Rfl: 5    PlaqueniL 200 mg tablet, Take 1 tablet (200 mg) by mouth once daily., Disp: , Rfl:     potassium chloride CR 20 mEq ER tablet, Take 1 tablet (20 mEq) by mouth once daily., Disp: , Rfl:     Premarin 0.3 mg tablet, once every 24 hours., Disp: , Rfl:     prochlorperazine (Compazine) 10 mg tablet, Take 1 tablet (10 mg) by mouth every 6 hours if needed for nausea or vomiting., Disp: 30 tablet, Rfl: 5    sennosides (senna) 8.6 mg tablet, Take 1 tablet (8.6 mg) by mouth 2 times a day., Disp: 60 tablet, Rfl: 2    vismodegib (Erivedge) 150 mg capsule, Take 1 capsule (150 mg total) by mouth every other day.  Take with or without food. Swallow whole. Do not open or crush., Disp: 14 capsule, Rfl: 3    warfarin (Coumadin) 4 mg tablet, Take 1 tablet (4 mg) by mouth once daily., Disp: , Rfl:     Allergies   Allergen Reactions    Propofol Nausea/vomiting    Sulfa (Sulfonamide Antibiotics) Other    Opioids - Morphine Analogues Nausea And Vomiting, Other and Unknown    Penicillins Unknown     Pt. Reports passing out upon taking penicillin when she was young.       Review of Systems   Constitutional:  Positive for fatigue. Negative for appetite change.   HENT:  Negative.  Negative for sore throat.    Respiratory:  Negative for cough.    Cardiovascular: Negative.    Gastrointestinal:  Negative for constipation.   Musculoskeletal: Negative.    Skin:         Erythema area to right cheek and forehead areas. Denies pruritus.  Few scattered macule spots to back of hands and forearms.          Objective   BSA: There is no height or weight on file to calculate BSA.  Wt Readings from Last 5 Encounters:   05/27/25 49.5 kg (109 lb 0.3 oz)   05/19/25 48.2 kg (106 lb 3.2 oz)   05/12/25 46.9 kg (103 lb 6.4 oz)   05/05/25 46.1 kg (101 lb 9.6 oz)   05/05/25 46.1 kg (101 lb 9.6 oz)     There were no vitals taken for this visit.    ECOG  Score: 2    0          Fully active; no performance restrictions.  1          Strenuous physical activity restricted; fully ambulatory and able to carry out light work.  2          Capable of all self-care but unable to carry out any work activities. Up and about >50% of waking hours.  3          Capable of only limited self-care; confined to bed or chair >50% of waking hours.  4          Completely disabled; cannot carry out any self-care; totally confined to bed or chair.     Physical Exam  Vitals reviewed.   Constitutional:       General: She is not in acute distress.  HENT:      Head: Normocephalic.      Nose:      Comments: Large, erythematous, nasal lesion, erythema skin pigment change, cream/ointment present to area - stable.     Mouth/Throat:      Mouth: Mucous membranes are moist.   Eyes:      Conjunctiva/sclera: Conjunctivae normal.      Pupils: Pupils are equal, round, and reactive to light.   Cardiovascular:      Rate and Rhythm: Normal rate and regular rhythm.      Heart sounds: Normal heart sounds. No murmur heard.  Pulmonary:      Effort: Pulmonary effort is normal. No respiratory distress.      Breath sounds: Normal breath sounds.   Abdominal:      General: Bowel sounds are normal.      Palpations: Abdomen is soft.   Musculoskeletal:         General: Normal range of motion.      Cervical back: Normal range of motion.   Skin:     General: Skin is warm and dry.      Findings: Erythema present.      Comments: Erythema area to forehead areas. Denies pruritus.   Few scattered macule spots to back of hands and forearms.     Neurological:      General: No focal deficit present.      Mental Status: She is alert and oriented to person, place, and time.   Psychiatric:         Mood and Affect: Mood normal.         Behavior: Behavior normal.         Thought Content: Thought content normal.          Results:  Labs:  Lab Results   Component Value Date    WBC 4.6 06/02/2025    HGB 10.6 (L) 06/02/2025    HCT 33.2  (L) 06/02/2025     (H) 06/02/2025     06/02/2025      Lab Results   Component Value Date    NEUTROABS 3.73 06/02/2025      Lab Results   Component Value Date    GLUCOSE 93 05/27/2025    CALCIUM 8.8 05/27/2025     (L) 05/27/2025    K 4.0 05/27/2025    CO2 27 05/27/2025     05/27/2025    BUN 11 05/27/2025    CREATININE 0.50 05/27/2025     Lab Results   Component Value Date    ALT 11 05/27/2025    AST 14 05/27/2025    ALKPHOS 63 05/27/2025    BILITOT 0.4 05/27/2025        Imaging:  No new imaging.      Pathology:    Lab Results   Component Value Date    ADD1  11/26/2024     Additional immunostains were reviewed.  Tumor cells are positive for p16 and negative for chromogranin and INSM1.  Isolated tumor cells are positive for BerEP4.  The differential diagnosis remains unchanged.         Assessment/Plan      Amrita Grant is a 79 y.o. female here for recommendations and to establish care for poorly differentiated carcinoma of the face.     # Poorly differentiated carcinoma  - HPV+  - L1 biopsy proven metastasis - will send tempus for better delineation  - discussed the likelihood that this is metastatic, HPV+ nasopharyngeal carcinoma, although tempus may help us better determine this  - s/p palliative radiation  - discussed that typically would recommend combination therapy with chemotherapy+immunotherapy. However, given her lupus which was originally diagnosed with pericarditis, do not recommend immunotherapy. Also discussed that given her current poor performance status, do not believe she could tolerate dual chemotherapy  - discussed single-agent weekly paclitaxel, and consented. Plan to repeat scans after 3 cycles - CT scheduled 6/20/25    - will obtain new baseline images, as she has not had imaging since January 2025  - she would like to have her infusions closer to home at [place], but will continue to see us at [place] for pre-treatment visits  - Pt agreeable to infusions and visits  on same day at [facility].    - Update: Unable to obtain IV access after multiple attempts.  Existing mediport placement scheduled 4/2/25.  Infusion rescheduled to 4/7/25.    - 4/2/25 Mediport placed  - Follow-up TC 6/6/25, no visit 6/9/25, infusion at [facility].  RTC 6/16/25 for C3D22.      # Lupus  - she follows with a rheumatologist at [facility], and will notify them we are starting chemotherapy    # Dysuria, urgency, frequency  - offered obtaining UA and urine culture, and she deferred to PCP  - 3/31:  Current 10-day ATB course.  Pt/family not able to recall medication name.  ATB started prior to her tx C1D1.  Tolerating ATB.  Reports up to 8 UTI's last year.  PCP (non- provider) - any further follow-up with PCP.    - 4/7:  Completed 10-day ATB course.  Denies current UTI symptoms.       # Unintentional weight loss  - may be partly due to dysphagia  - oncology dietician referral in place   - wt loss has stabilized     # Fatigue (g1)  - Recommended she increase her physical activity level - during waking house, get up Q2-3H, walk within the home, pace activities.     # Constipation  - Senna too effective, pt to start routine Miralax - 1/2 capful to start and adjust as needed.      # Hypocalcemia 8.4 5/12/25  - Calcium listed on MAR, pt not taking med d/t pill size.  Recommended she start OTC calcium soft chew.     # Cough/PND   - Will add room humidifier.  Sleeps using one pillow, recommended she try sleeping with two pillows.    - Ongoing monitoring     # Rash/erythema to right cheek/forehead  - denies pruritus, apply lotion to area.      # Scattered macules to back of hands and wrist area (few)  - apply hydrocortisone cream BID, pt has within the home.      # Pancytopenia - chemo-induced - stable  - Weekly labs, ongoing monitoring    # INR/chronic Warfarin  - INR ordered for coumadin dosing, infusion nurse provided medical release form per facility protocol.  Pt follows with [facility] for  dosing/monitoring.  INR to be draw via port with treatment labs, fax to [facility] [phone].  Goal INR 2.5-3.0.   - 4/14: INR 2.0 result faxed to [facility] 4/15/25  - 4/21:  Will monitor for INR result and fax to [facility] 4/22/25.   - 4/28:  Will monitor for INR result and fax to [facility] 4/29/25.   - Not enough blood in tube. Pt to [facility] for INR draw.  5/6:  Will monitor for INR result and fax to [facility].  INR 4.7, instructions per [facility].    5/12:  Will monitor for INR result and fax to [facility].   5/19:  Will monitor for INR result and fax to [facility].   5/27:  INR 8.8 - notified via secure chat by on-call MD.  Dr. Frankel was able to speak with pt.  This provider also spoke with pt with faxed results to [facility].   6/2:  spouse reports future INR labs will be drawn by [facility]. INR lab removed from treatment plan.       # Advanced care planning  - discussed incurable but treatable nature of disease, with goal to prolong life while maintaining/improving quality of life. She understands that risks of systemic therapy may outweigh benefits at some point in the future.

## 2025-06-02 NOTE — SIGNIFICANT EVENT
06/02/25 1115   Prechemo Checklist   Has the patient been in the hospital, ED, or urgent care since last date of service No   Chemo/Immuno Consent Completed and Signed Yes   Protocol/Indications Verified Yes   Confirmed to previous date/time of medication Yes   Compared to previous dose Yes   All medications are dated accurately Yes   Pregnancy Test Negative Not applicable   BSA/Weight-Height Verified Yes   Dose Calculations Verified (current, total, cumulative) Yes

## 2025-06-03 ENCOUNTER — APPOINTMENT (OUTPATIENT)
Dept: RADIATION ONCOLOGY | Facility: CLINIC | Age: 80
End: 2025-06-03
Payer: MEDICARE

## 2025-06-04 ENCOUNTER — APPOINTMENT (OUTPATIENT)
Dept: RADIATION ONCOLOGY | Facility: CLINIC | Age: 80
End: 2025-06-04
Payer: MEDICARE

## 2025-06-05 ENCOUNTER — TELEMEDICINE (OUTPATIENT)
Dept: HEMATOLOGY/ONCOLOGY | Facility: CLINIC | Age: 80
End: 2025-06-05
Payer: MEDICARE

## 2025-06-05 ENCOUNTER — APPOINTMENT (OUTPATIENT)
Dept: RADIATION ONCOLOGY | Facility: CLINIC | Age: 80
End: 2025-06-05
Payer: MEDICARE

## 2025-06-05 DIAGNOSIS — Z51.11 ENCOUNTER FOR ANTINEOPLASTIC CHEMOTHERAPY: ICD-10-CM

## 2025-06-05 DIAGNOSIS — K59.09 OTHER CONSTIPATION: ICD-10-CM

## 2025-06-05 DIAGNOSIS — C44.310 FACIAL BASAL CELL CANCER: ICD-10-CM

## 2025-06-05 DIAGNOSIS — C44.92 SCC (SQUAMOUS CELL CARCINOMA): Primary | ICD-10-CM

## 2025-06-05 ASSESSMENT — ENCOUNTER SYMPTOMS
COUGH: 0
FATIGUE: 1
MUSCULOSKELETAL NEGATIVE: 1
SORE THROAT: 0
CARDIOVASCULAR NEGATIVE: 1
APPETITE CHANGE: 0

## 2025-06-05 NOTE — PROGRESS NOTES
[facility] - Medical Oncology Follow-Up Visit    Patient ID: Amrita Grant is a 79 y.o. female.  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 Grant is a 80 yo with PMH significant for lupus, aortic valve repair, fibromyalgia, who first noticed a mass on her nose in fall of 2024. Met Dr. Templeton who did an in-office biopsy 11/11/24 with invasive, poorly differentiated carcinoma, p40+, negative MOC31, unclear etiology, and recommended for repeat biopsy. Biopsy on 11/26/24 with the same, felt to be a primary cutaneous neoplasm such as basal cell carcinoma. Discussed at [facility] tumor board, and due to significant morbidity associated with resection, recommended against surgery and referred to radiation oncology and medical oncology. Met Dr. Guzmán, who recommended against immunotherapy given her lupus and poor performance status, and recommended discussion with radiation oncology, which cutaneous tumor board also agreed with. She planned to have radiation closer to home, but unfortunately her pre-radiation scans showed concern for metastatic disease, with PET/CT on 1/20/25 with FDG avidity of the R nasal soft tissue as well as R level 1 cervical lymph node, R rights, L1, and RUL nodule below PET resolution. She was ultimately started on vismodegib as well as started on palliative radiation (2/18-3/11/25) for the nasal lesion. In the meantime, [facility]  pathology revealed invasive carcinoma, HPV associated, positive for p40 CK5/6, EMA, p16, and negative for MOC, BerEP4, INSM1, and chromogranin. As this was felt to no longer be a basal cell carcinoma, she stopped the vismodegib. Biopsy of the L1 lesion morphologically the same.     Regarding her lupus - she has been on plaquenil monotherapy for many years, stable on the same dose. She was originally diagnosed with lupus in 2001 - she was initially diagnosed with pericarditis. She has fatigue and joint aches with the lupus.     She is here today with her  and friend. Her last day of radiation was last Tuesday or Wednesday. Radiation went ok overall. Her last dose of the visdomegib was last Tuesday or Wednesday. No side effects.  She is very tired, naps every day. In the last year, she has stopped doing the cooking, cleaning, and laundry. She is very careful, leans on her  to help her get up the stairs. Her fatigue has been getting worse and worse. She continues to lose weight - 50 lbs in the last year and a half. She has trouble swallowing - some things are harder to swallow like bread, meat. Gets tickles in her throat. She's had this for a couple years, had her throat stretched - this was also just repeated in December. Done at [place] typically, helps for a little while, but don't last.      PAST MEDICAL HISTORY  Lupus  Fibromyalgia  Osteoarthritis  Aortic valve repair (mechanical)  on warfarin  HTN  CVA - (2021)     SOCIAL HISTORY  Lives at home with her . They've been  45 years. Previously was a  for [employer].   Tob: none  EtOH: none, previously a little  Illicits: none     FAMILY HISTORY  Mother - uterine cancer  Maternal grandmother - breast cancer  Maternal cousin - unknown metastatic cancer    Telephone Consent    While technically available, the patient was unable or unwilling to consent to connect via audio/video telehealth technology; therefore, I performed this visit  using a real-time audio only connection between Amrita Grant & Preethi Escobedo APRN-CNP.  Verbal consent was requested and obtained from Amrita Grant on this date, 6/5/25 for a telehealth visit and the patient's location was confirmed at the time of the visit.       HPI  Visit changed to telephone call.  Visit 6/2/25.  Call on speaker phone.  Present with her . Reports no changes except for being constipated today.  Last BM on Saturday or Sunday.  No change to appetite.  Denies n/v/c/d. Feels up for treatment on 6/9/25.  Labs day of treatment.       Meds (Current):    Current Outpatient Medications:   •  ARIPiprazole (Abilify) 5 mg tablet, Take 1 tablet (5 mg) by mouth once daily., Disp: , Rfl:   •  Bystolic 5 mg tablet, Take 2 tablets (10 mg) by mouth once daily. Dose increased, Disp: , Rfl:   •  calcium carbonate 500 mg calcium (1,250 mg) chewable tablet, Chew 1 tablet (500 mg of elemental calcium) once daily., Disp: , Rfl:   •  cetirizine (ZyrTEC) 10 mg tablet, 1 tab(s) orally as needed, Disp: , Rfl:   •  cholecalciferol (Vitamin D-3) 25 MCG (1000 UT) capsule, 1 cap(s) orally three times daily, Disp: , Rfl:   •  docusate sodium (Colace) 100 mg capsule, once every 24 hours., Disp: , Rfl:   •  fluticasone (Flonase) 50 mcg/actuation nasal spray, Administer 2 sprays into affected nostril(s)., Disp: , Rfl:   •  levETIRAcetam (Keppra) 500 mg tablet, every 12 hours., Disp: , Rfl:   •  lidocaine-prilocaine (Emla) 2.5-2.5 % cream, Apply topically 1 time for 1 dose. Apply 30-45 minutes prior to port access., Disp: 30 g, Rfl: 3  •  LORazepam (Ativan) 0.5 mg tablet, Take half tablet 1 hour before MRI, if needed take other half of tablet while getting checked in for MRI, Disp: 1 tablet, Rfl: 0  •  losartan (Cozaar) 100 mg tablet, Take 0.5 tablets (50 mg) by mouth twice a day., Disp: , Rfl:   •  melatonin 3 mg tablet, Take by mouth once daily., Disp: , Rfl:   •  memantine (Namenda) 5 mg tablet, Take 1 tablet (5 mg)  by mouth once daily., Disp: , Rfl:   •  mirtazapine (Remeron) 15 mg tablet, 1 tab(s) orally half tablet bedtime daily for 30 days, Disp: , Rfl:   •  omeprazole (PriLOSEC) 20 mg DR capsule, Take 1 capsule (20 mg) by mouth once daily., Disp: , Rfl:   •  ondansetron (Zofran) 4 mg tablet, Take 1 tablet (4 mg) by mouth if needed for nausea or vomiting., Disp: , Rfl:   •  ondansetron (Zofran) 8 mg tablet, Take 1 tablet (8 mg) by mouth every 8 hours if needed for nausea or vomiting., Disp: 30 tablet, Rfl: 5  •  PlaqueniL 200 mg tablet, Take 1 tablet (200 mg) by mouth once daily., Disp: , Rfl:   •  potassium chloride CR 20 mEq ER tablet, Take 1 tablet (20 mEq) by mouth once daily., Disp: , Rfl:   •  Premarin 0.3 mg tablet, once every 24 hours., Disp: , Rfl:   •  prochlorperazine (Compazine) 10 mg tablet, Take 1 tablet (10 mg) by mouth every 6 hours if needed for nausea or vomiting., Disp: 30 tablet, Rfl: 5  •  sennosides (senna) 8.6 mg tablet, Take 1 tablet (8.6 mg) by mouth 2 times a day., Disp: 60 tablet, Rfl: 2  •  warfarin (Coumadin) 4 mg tablet, Take 1 tablet (4 mg) by mouth once daily., Disp: , Rfl:     Allergies   Allergen Reactions   • Propofol Nausea/vomiting   • Sulfa (Sulfonamide Antibiotics) Other   • Opioids - Morphine Analogues Nausea And Vomiting, Other and Unknown   • Penicillins Unknown     Pt. Reports passing out upon taking penicillin when she was young.       Review of Systems   Constitutional:  Positive for fatigue. Negative for appetite change.   HENT:  Negative.  Negative for sore throat.    Respiratory:  Negative for cough.    Cardiovascular: Negative.    Gastrointestinal:  Positive for constipation.   Musculoskeletal: Negative.         Objective   BSA: There is no height or weight on file to calculate BSA.  Wt Readings from Last 5 Encounters:   06/02/25 48.5 kg (106 lb 14.4 oz)   05/27/25 49.5 kg (109 lb 0.3 oz)   05/19/25 48.2 kg (106 lb 3.2 oz)   05/12/25 46.9 kg (103 lb 6.4 oz)   05/05/25 46.1 kg  (101 lb 9.6 oz)     There were no vitals taken for this visit.    ECOG Score: 2    0          Fully active; no performance restrictions.  1          Strenuous physical activity restricted; fully ambulatory and able to carry out light work.  2          Capable of all self-care but unable to carry out any work activities. Up and about >50% of waking hours.  3          Capable of only limited self-care; confined to bed or chair >50% of waking hours.  4          Completely disabled; cannot carry out any self-care; totally confined to bed or chair.     Physical Exam  Constitutional:       General: She is not in acute distress.  Cardiovascular:      Heart sounds: No murmur heard.  Pulmonary:      Effort: No respiratory distress.   Skin:     Comments:     Neurological:      Mental Status: She is alert and oriented to person, place, and time.   Psychiatric:         Mood and Affect: Mood normal.         Behavior: Behavior normal.         Thought Content: Thought content normal.        Results:  Labs:  Lab Results   Component Value Date    WBC 4.6 06/02/2025    HGB 10.6 (L) 06/02/2025    HCT 33.2 (L) 06/02/2025     (H) 06/02/2025     06/02/2025      Lab Results   Component Value Date    NEUTROABS 3.73 06/02/2025      Lab Results   Component Value Date    GLUCOSE 86 06/02/2025    CALCIUM 8.3 (L) 06/02/2025     (L) 06/02/2025    K 4.1 06/02/2025    CO2 26 06/02/2025     06/02/2025    BUN 14 06/02/2025    CREATININE 0.59 06/02/2025     Lab Results   Component Value Date    ALT 11 06/02/2025    AST 14 06/02/2025    ALKPHOS 59 06/02/2025    BILITOT 0.7 06/02/2025        Imaging:  No new imaging.      Pathology:    Lab Results   Component Value Date    ADD1  11/26/2024     Additional immunostains were reviewed.  Tumor cells are positive for p16 and negative for chromogranin and INSM1.  Isolated tumor cells are positive for BerEP4.  The differential diagnosis remains unchanged.         Assessment/Plan       Amrita Grant is a 79 y.o. female here for recommendations and to establish care for poorly differentiated carcinoma of the face.     # Poorly differentiated carcinoma  - HPV+  - L1 biopsy proven metastasis - will send tempus for better delineation  - discussed the likelihood that this is metastatic, HPV+ nasopharyngeal carcinoma, although tempus may help us better determine this  - s/p palliative radiation  - discussed that typically would recommend combination therapy with chemotherapy+immunotherapy. However, given her lupus which was originally diagnosed with pericarditis, do not recommend immunotherapy. Also discussed that given her current poor performance status, do not believe she could tolerate dual chemotherapy  - discussed single-agent weekly paclitaxel, and consented. Plan to repeat scans after 3 cycles - CT scheduled 6/20/25    - will obtain new baseline images, as she has not had imaging since January 2025  - she would like to have her infusions closer to home at [place], but will continue to see us at [place] for pre-treatment visits  - Pt agreeable to infusions and visits on same day at [place].    - Update: Unable to obtain IV access after multiple attempts.  Existing mediport placement scheduled 4/2/25.  Infusion rescheduled to 4/7/25.    - 4/2/25 Mediport placed  - Follow-up TC 6/5/25, 6/9/25 infusion at [place].  RTC 6/16/25 for C3D22.      # Lupus  - she follows with a rheumatologist at [facility], and will notify them we are starting chemotherapy    # Dysuria, urgency, frequency  - offered obtaining UA and urine culture, and she deferred to PCP  - 3/31:  Current 10-day ATB course.  Pt/family not able to recall medication name.  ATB started prior to her tx C1D1.  Tolerating ATB.  Reports up to 8 UTI's last year.  PCP (non- provider) - any further follow-up with PCP.    - 4/7:  Completed 10-day ATB course.  Denies current UTI symptoms.       # Unintentional weight loss  - may be  partly due to dysphagia  - oncology dietician referral in place   - wt loss has stabilized     # Fatigue (g1)  - Recommended she increase her physical activity level - during waking house, get up Q2-3H, walk within the home, pace activities.     # Constipation  - Senna too effective, pt to start routine Miralax - 1/2 capful to start and adjust as needed.      # Hypocalcemia 8.4 5/12/25  - Calcium listed on MAR, pt not taking med d/t pill size.  Recommended she start OTC calcium soft chew.     # Cough/PND   - Will add room humidifier.  Sleeps using one pillow, recommended she try sleeping with two pillows.    - Ongoing monitoring     # Constipation  - last BM Sat or Sunday.  Pt to take 2nd Miralax dose following call.  Start MOM with dose this evening.  If no BM by morning, 2nd dose and dose BID until BM.      # Rash/erythema to right cheek/forehead  - denies pruritus, apply lotion to area.      # Scattered macules to back of hands and wrist area (few)  - apply hydrocortisone cream BID, pt has within the home.      # Pancytopenia - chemo-induced - stable  - Weekly labs, ongoing monitoring    # INR/chronic Warfarin  - INR ordered for coumadin dosing, infusion nurse provided medical release form per facility protocol.  Pt follows with [facility] for dosing/monitoring.  INR to be draw via port with treatment labs, fax to [facility] [phone].  Goal INR 2.5-3.0.   - 4/14: INR 2.0 result faxed to [facility] 4/15/25  - 4/21:  Will monitor for INR result and fax to [facility] 4/22/25.   - 4/28:  Will monitor for INR result and fax to [facility] 4/29/25.   - Not enough blood in tube. Pt to [facility] for INR draw.  5/6:  Will monitor for INR result and fax to [facility].  INR 4.7, instructions per [facility].    5/12:  Will monitor for INR result and fax to [facility].   5/19:  Will monitor for INR result and fax to [facility].   5/27:  INR 8.8 - notified via secure chat by  on-call MD.  Dr. Frankel was able to speak with pt.  This provider also spoke with pt with faxed results to [facility].   6/2:  spouse reports future INR labs will be drawn by [facility]. INR lab removed from treatment plan.       # Advanced care planning  - discussed incurable but treatable nature of disease, with goal to prolong life while maintaining/improving quality of life. She understands that risks of systemic therapy may outweigh benefits at some point in the future.     Time spent on call 8:09 minutes.

## 2025-06-06 ENCOUNTER — APPOINTMENT (OUTPATIENT)
Dept: RADIATION ONCOLOGY | Facility: CLINIC | Age: 80
End: 2025-06-06
Payer: MEDICARE

## 2025-06-06 ASSESSMENT — ENCOUNTER SYMPTOMS: CONSTIPATION: 1

## 2025-06-09 ENCOUNTER — APPOINTMENT (OUTPATIENT)
Dept: HEMATOLOGY/ONCOLOGY | Facility: CLINIC | Age: 80
End: 2025-06-09
Payer: MEDICARE

## 2025-06-09 ENCOUNTER — INFUSION (OUTPATIENT)
Dept: HEMATOLOGY/ONCOLOGY | Facility: CLINIC | Age: 80
End: 2025-06-09
Payer: MEDICARE

## 2025-06-09 ENCOUNTER — APPOINTMENT (OUTPATIENT)
Dept: RADIATION ONCOLOGY | Facility: CLINIC | Age: 80
End: 2025-06-09
Payer: MEDICARE

## 2025-06-09 VITALS
HEART RATE: 69 BPM | SYSTOLIC BLOOD PRESSURE: 176 MMHG | WEIGHT: 106.04 LBS | OXYGEN SATURATION: 97 % | BODY MASS INDEX: 19.32 KG/M2 | TEMPERATURE: 98.1 F | RESPIRATION RATE: 18 BRPM | DIASTOLIC BLOOD PRESSURE: 101 MMHG

## 2025-06-09 DIAGNOSIS — C44.92 SCC (SQUAMOUS CELL CARCINOMA): ICD-10-CM

## 2025-06-09 LAB
ALBUMIN SERPL BCP-MCNC: 3.9 G/DL (ref 3.4–5)
ALP SERPL-CCNC: 58 U/L (ref 33–136)
ALT SERPL W P-5'-P-CCNC: 10 U/L (ref 7–45)
ANION GAP SERPL CALC-SCNC: 9 MMOL/L (ref 10–20)
AST SERPL W P-5'-P-CCNC: 15 U/L (ref 9–39)
BASOPHILS # BLD AUTO: 0.02 X10*3/UL (ref 0–0.1)
BASOPHILS NFR BLD AUTO: 0.5 %
BILIRUB SERPL-MCNC: 0.5 MG/DL (ref 0–1.2)
BUN SERPL-MCNC: 11 MG/DL (ref 6–23)
CALCIUM SERPL-MCNC: 8.6 MG/DL (ref 8.6–10.3)
CHLORIDE SERPL-SCNC: 104 MMOL/L (ref 98–107)
CO2 SERPL-SCNC: 27 MMOL/L (ref 21–32)
CREAT SERPL-MCNC: 0.55 MG/DL (ref 0.5–1.05)
EGFRCR SERPLBLD CKD-EPI 2021: >90 ML/MIN/1.73M*2
EOSINOPHIL # BLD AUTO: 0.02 X10*3/UL (ref 0–0.4)
EOSINOPHIL NFR BLD AUTO: 0.5 %
ERYTHROCYTE [DISTWIDTH] IN BLOOD BY AUTOMATED COUNT: 14.8 % (ref 11.5–14.5)
GLUCOSE SERPL-MCNC: 99 MG/DL (ref 74–99)
HCT VFR BLD AUTO: 33.4 % (ref 36–46)
HGB BLD-MCNC: 10.7 G/DL (ref 12–16)
IMM GRANULOCYTES # BLD AUTO: 0.01 X10*3/UL (ref 0–0.5)
IMM GRANULOCYTES NFR BLD AUTO: 0.3 % (ref 0–0.9)
LYMPHOCYTES # BLD AUTO: 0.48 X10*3/UL (ref 0.8–3)
LYMPHOCYTES NFR BLD AUTO: 12.7 %
MCH RBC QN AUTO: 33.1 PG (ref 26–34)
MCHC RBC AUTO-ENTMCNC: 32 G/DL (ref 32–36)
MCV RBC AUTO: 103 FL (ref 80–100)
MONOCYTES # BLD AUTO: 0.3 X10*3/UL (ref 0.05–0.8)
MONOCYTES NFR BLD AUTO: 8 %
NEUTROPHILS # BLD AUTO: 2.94 X10*3/UL (ref 1.6–5.5)
NEUTROPHILS NFR BLD AUTO: 78 %
NRBC BLD-RTO: ABNORMAL /100{WBCS}
PLATELET # BLD AUTO: 200 X10*3/UL (ref 150–450)
POTASSIUM SERPL-SCNC: 4 MMOL/L (ref 3.5–5.3)
PROT SERPL-MCNC: 6.1 G/DL (ref 6.4–8.2)
RBC # BLD AUTO: 3.23 X10*6/UL (ref 4–5.2)
SODIUM SERPL-SCNC: 136 MMOL/L (ref 136–145)
WBC # BLD AUTO: 3.8 X10*3/UL (ref 4.4–11.3)

## 2025-06-09 PROCEDURE — 96413 CHEMO IV INFUSION 1 HR: CPT

## 2025-06-09 PROCEDURE — 2500000004 HC RX 250 GENERAL PHARMACY W/ HCPCS (ALT 636 FOR OP/ED): Performed by: STUDENT IN AN ORGANIZED HEALTH CARE EDUCATION/TRAINING PROGRAM

## 2025-06-09 PROCEDURE — 96375 TX/PRO/DX INJ NEW DRUG ADDON: CPT | Mod: INF

## 2025-06-09 PROCEDURE — 85025 COMPLETE CBC W/AUTO DIFF WBC: CPT

## 2025-06-09 PROCEDURE — 2500000004 HC RX 250 GENERAL PHARMACY W/ HCPCS (ALT 636 FOR OP/ED): Performed by: NURSE PRACTITIONER

## 2025-06-09 PROCEDURE — 2500000001 HC RX 250 WO HCPCS SELF ADMINISTERED DRUGS (ALT 637 FOR MEDICARE OP): Performed by: NURSE PRACTITIONER

## 2025-06-09 PROCEDURE — 80053 COMPREHEN METABOLIC PANEL: CPT

## 2025-06-09 RX ORDER — HEPARIN SODIUM,PORCINE/PF 10 UNIT/ML
50 SYRINGE (ML) INTRAVENOUS AS NEEDED
OUTPATIENT
Start: 2025-06-09

## 2025-06-09 RX ORDER — FAMOTIDINE 10 MG/ML
20 INJECTION, SOLUTION INTRAVENOUS ONCE
Status: COMPLETED | OUTPATIENT
Start: 2025-06-09 | End: 2025-06-09

## 2025-06-09 RX ORDER — PROCHLORPERAZINE MALEATE 10 MG
10 TABLET ORAL EVERY 6 HOURS PRN
Status: DISCONTINUED | OUTPATIENT
Start: 2025-06-09 | End: 2025-06-09 | Stop reason: HOSPADM

## 2025-06-09 RX ORDER — HEPARIN SODIUM,PORCINE/PF 10 UNIT/ML
50 SYRINGE (ML) INTRAVENOUS AS NEEDED
Status: DISCONTINUED | OUTPATIENT
Start: 2025-06-09 | End: 2025-06-09 | Stop reason: HOSPADM

## 2025-06-09 RX ORDER — DIPHENHYDRAMINE HYDROCHLORIDE 50 MG/ML
50 INJECTION, SOLUTION INTRAMUSCULAR; INTRAVENOUS AS NEEDED
Status: DISCONTINUED | OUTPATIENT
Start: 2025-06-09 | End: 2025-06-09 | Stop reason: HOSPADM

## 2025-06-09 RX ORDER — PROCHLORPERAZINE EDISYLATE 5 MG/ML
10 INJECTION INTRAMUSCULAR; INTRAVENOUS EVERY 6 HOURS PRN
Status: DISCONTINUED | OUTPATIENT
Start: 2025-06-09 | End: 2025-06-09 | Stop reason: HOSPADM

## 2025-06-09 RX ORDER — EPINEPHRINE 0.3 MG/.3ML
0.3 INJECTION SUBCUTANEOUS EVERY 5 MIN PRN
Status: DISCONTINUED | OUTPATIENT
Start: 2025-06-09 | End: 2025-06-09 | Stop reason: HOSPADM

## 2025-06-09 RX ORDER — HEPARIN 100 UNIT/ML
500 SYRINGE INTRAVENOUS AS NEEDED
OUTPATIENT
Start: 2025-06-09

## 2025-06-09 RX ORDER — ALBUTEROL SULFATE 0.83 MG/ML
3 SOLUTION RESPIRATORY (INHALATION) AS NEEDED
Status: DISCONTINUED | OUTPATIENT
Start: 2025-06-09 | End: 2025-06-09 | Stop reason: HOSPADM

## 2025-06-09 RX ORDER — DIPHENHYDRAMINE HCL 25 MG
50 CAPSULE ORAL ONCE
Status: COMPLETED | OUTPATIENT
Start: 2025-06-09 | End: 2025-06-09

## 2025-06-09 RX ORDER — HEPARIN 100 UNIT/ML
500 SYRINGE INTRAVENOUS AS NEEDED
Status: DISCONTINUED | OUTPATIENT
Start: 2025-06-09 | End: 2025-06-09 | Stop reason: HOSPADM

## 2025-06-09 RX ORDER — FAMOTIDINE 10 MG/ML
20 INJECTION, SOLUTION INTRAVENOUS ONCE AS NEEDED
Status: DISCONTINUED | OUTPATIENT
Start: 2025-06-09 | End: 2025-06-09 | Stop reason: HOSPADM

## 2025-06-09 RX ADMIN — FAMOTIDINE 20 MG: 10 INJECTION INTRAVENOUS at 14:28

## 2025-06-09 RX ADMIN — DEXAMETHASONE SODIUM PHOSPHATE 10 MG: 4 INJECTION, SOLUTION INTRA-ARTICULAR; INTRALESIONAL; INTRAMUSCULAR; INTRAVENOUS; SOFT TISSUE at 14:30

## 2025-06-09 RX ADMIN — DIPHENHYDRAMINE HYDROCHLORIDE 50 MG: 25 CAPSULE ORAL at 14:27

## 2025-06-09 RX ADMIN — PACLITAXEL 114 MG: 6 INJECTION, SOLUTION INTRAVENOUS at 15:02

## 2025-06-09 RX ADMIN — HEPARIN 500 UNITS: 100 SYRINGE at 15:58

## 2025-06-09 ASSESSMENT — PAIN SCALES - GENERAL: PAINLEVEL_OUTOF10: 0-NO PAIN

## 2025-06-09 NOTE — SIGNIFICANT EVENT

## 2025-06-10 ENCOUNTER — APPOINTMENT (OUTPATIENT)
Dept: RADIATION ONCOLOGY | Facility: CLINIC | Age: 80
End: 2025-06-10
Payer: MEDICARE

## 2025-06-11 ENCOUNTER — APPOINTMENT (OUTPATIENT)
Dept: RADIATION ONCOLOGY | Facility: CLINIC | Age: 80
End: 2025-06-11
Payer: MEDICARE

## 2025-06-12 ENCOUNTER — APPOINTMENT (OUTPATIENT)
Dept: RADIATION ONCOLOGY | Facility: CLINIC | Age: 80
End: 2025-06-12
Payer: MEDICARE

## 2025-06-13 ENCOUNTER — APPOINTMENT (OUTPATIENT)
Dept: RADIATION ONCOLOGY | Facility: CLINIC | Age: 80
End: 2025-06-13
Payer: MEDICARE

## 2025-06-13 ENCOUNTER — TELEPHONE (OUTPATIENT)
Dept: HEMATOLOGY/ONCOLOGY | Facility: HOSPITAL | Age: 80
End: 2025-06-13

## 2025-06-13 NOTE — PROGRESS NOTES
CPM/PAT Nurse Note      Name: Yosvany Chase (Yosvany Chase)  /Age: 1949/75 y.o.       Medical History[1]    Surgical History[2]    Patient Sexual activity questions deferred to the physician.    Family History[3]    Allergies[4]    Prior to Admission medications    Medication Sig Start Date End Date Taking? Authorizing Provider   aspirin 81 mg EC tablet Take 1 tablet (81 mg) by mouth once daily. 3/3/25 3/3/26  Mirlande Low MD   cholecalciferol (Vitamin D-3) 50 mcg (2,000 unit) capsule Take 1 capsule (50 mcg) by mouth once daily. 18   Historical Provider, MD   isosorbide mononitrate ER (Imdur) 30 mg 24 hr tablet Take 1 tablet (30 mg) by mouth once daily. Do not crush or chew. 25   KRZYSZTOF Angel   multivitamin with minerals tablet Take 1 tablet by mouth once daily.    Historical Provider, MD   pravastatin (Pravachol) 20 mg tablet Take 1 tablet (20 mg) by mouth once daily. 25  KRZYSZTOF Angel        PAT ROS     DASI Risk Score    No data to display       Caprini DVT Assessment    No data to display       Modified Frailty Index    No data to display       [id number] Stroke Risk Points  Current as of just now        N/A 0 to 9 Points:      Last Change: N/A          The LCP1BA9-XCUh risk score (Lip GH, et al. 2009. © 2010 American College [employer] Chest [employer]) quantifies the risk of stroke for a patient with atrial fibrillation. For patients without atrial fibrillation or under the age of 18 this score appears as N/A. Higher score values generally indicate higher risk of stroke.        This score is not applicable to this patient. Components are not calculated.          Revised Cardiac Risk Index    No data to display       Apfel Simplified Score    No data to display       Risk Analysis Index Results This Encounter    No data found in the last 10 encounters.       Prodigy: High Risk  Total Score: 20              Prodigy Age Score      Prodigy Gender  Oncology Pharmacist Reassessment:  Oncology Medication: Erivedge (Vismodegib)  Indication: Advanced basal cell carcinoma (suspected metastatic disease)  Dosing: Take 1 capsule (150 mg total) by mouth every other day.  Take with or without food. Swallow whole. Do not open or crush   Relevant Labwork: no new labs since starting the medication   Last Dispense: 2/6/25 (28 day supply)  Drug-Drug Interactions: pt/family confirmed no new medications  Notes: Spoke with Deb (pt's spouse). Confirmed patient is taking Vismodegib one capsule every other day. They report no missed doses. Overall, Amrita seems to be tolerating the medication well, however, they have noticed an increase in feeling sleepy/tired. I reviewed that fatigue can happen with the medication as well as with radiation. Reviewed process for obtaining refills from  Specialty pharmacy.     Connor Quispe, PharmD, BCOP  Clinical Pharmacy Specialist  [address] [place] [address]  Ph: [phone]       Score          ARISCAT Score for Postoperative Pulmonary Complications    No data to display       Kevan Perioperative Risk for Myocardial Infarction or Cardiac Arrest (CARLOS MANUEL)    No data to display         Nurse Plan of Action: RN screening call complete.  Reviewed allergies, medications and pharmacy, medical, surgical and social history with patient.  Chart updated.                [1]   Past Medical History:  Diagnosis Date    Agatston CAC score, >400 02/05/2025    Total 1408.02: LM 73.15   .9   LCx 791.52   .45    Atherosclerosis of native coronary artery of native heart, unspecified whether angina present     Plan: Coronary Artery Bypass Graft x 3 Vessels; LIMA; RADIAL; VEIN 6/24/25    Cardiology follow-up encounter     Omer Pringle MD LOV 4/23/25    Diverticulosis, sigmoid     H/O echocardiogram 05/28/2025    CONCLUSIONS:   1. Left ventricular ejection fraction is normal calculated by Noriega's biplane at 70%.   2. Spectral Doppler shows a Grade I (impaired relaxation pattern) of left ventricular diastolic filling with normal left atrial filling pressure.   3. Poorly visualized anatomical structures due to suboptimal image quality.   4. There is normal right ventricular global systolic function.    History of cardiovascular stress test 05/08/2025    1. Note is made of a partially fixed defect along the inferior wall.   Findings are concerning for prior infarct particularly along the basal aspect the inferior wall with moderate melina-infarct ischemia along the mid to distal aspect.   2. The left ventricle is normal in size.   3. Decreased wall motion & thickening particularly along the basal to mid inferior wall with an LV EF estimated at 63%.    Hyperlipidemia     Obesity     S/P cardiac cath 05/23/2025    Vitamin D deficiency    [2]   Past Surgical History:  Procedure Laterality Date    CARDIAC CATHETERIZATION N/A 05/23/2025    Procedure: LHC, With LV;  Surgeon: Omer Pringle MD;   Location: [facility] Cardiac [facility];  Service: Cardiovascular;  Laterality: N/A;    COLONOSCOPY      ROTATOR CUFF REPAIR Right    [3]   Family History  Problem Relation Name Age of Onset    Atrial fibrillation Other      Prostate cancer Other     [4]   Allergies  Allergen Reactions    Crestor [Rosuvastatin] Myalgia    Vicodin [Hydrocodone-Acetaminophen] Headache and GI Upset

## 2025-06-16 ENCOUNTER — LAB (OUTPATIENT)
Dept: HEMATOLOGY/ONCOLOGY | Facility: CLINIC | Age: 80
End: 2025-06-16
Payer: MEDICARE

## 2025-06-16 ENCOUNTER — APPOINTMENT (OUTPATIENT)
Dept: RADIATION ONCOLOGY | Facility: CLINIC | Age: 80
End: 2025-06-16
Payer: MEDICARE

## 2025-06-16 ENCOUNTER — OFFICE VISIT (OUTPATIENT)
Dept: HEMATOLOGY/ONCOLOGY | Facility: CLINIC | Age: 80
End: 2025-06-16
Payer: MEDICARE

## 2025-06-16 ENCOUNTER — INFUSION (OUTPATIENT)
Dept: HEMATOLOGY/ONCOLOGY | Facility: CLINIC | Age: 80
End: 2025-06-16
Payer: MEDICARE

## 2025-06-16 VITALS
WEIGHT: 106.04 LBS | SYSTOLIC BLOOD PRESSURE: 110 MMHG | HEART RATE: 73 BPM | RESPIRATION RATE: 18 BRPM | DIASTOLIC BLOOD PRESSURE: 75 MMHG | OXYGEN SATURATION: 97 % | BODY MASS INDEX: 19.32 KG/M2 | TEMPERATURE: 98.1 F

## 2025-06-16 DIAGNOSIS — C44.92 SCC (SQUAMOUS CELL CARCINOMA): ICD-10-CM

## 2025-06-16 DIAGNOSIS — Z51.11 ENCOUNTER FOR ANTINEOPLASTIC CHEMOTHERAPY: Primary | ICD-10-CM

## 2025-06-16 LAB
ALBUMIN SERPL BCP-MCNC: 3.8 G/DL (ref 3.4–5)
ALP SERPL-CCNC: 63 U/L (ref 33–136)
ALT SERPL W P-5'-P-CCNC: 11 U/L (ref 7–45)
ANION GAP SERPL CALC-SCNC: 10 MMOL/L (ref 10–20)
AST SERPL W P-5'-P-CCNC: 15 U/L (ref 9–39)
BASOPHILS # BLD AUTO: 0.03 X10*3/UL (ref 0–0.1)
BASOPHILS NFR BLD AUTO: 0.6 %
BILIRUB SERPL-MCNC: 0.7 MG/DL (ref 0–1.2)
BUN SERPL-MCNC: 13 MG/DL (ref 6–23)
CALCIUM SERPL-MCNC: 8 MG/DL (ref 8.6–10.3)
CHLORIDE SERPL-SCNC: 104 MMOL/L (ref 98–107)
CO2 SERPL-SCNC: 25 MMOL/L (ref 21–32)
CREAT SERPL-MCNC: 0.62 MG/DL (ref 0.5–1.05)
EGFRCR SERPLBLD CKD-EPI 2021: >90 ML/MIN/1.73M*2
EOSINOPHIL # BLD AUTO: 0.01 X10*3/UL (ref 0–0.4)
EOSINOPHIL NFR BLD AUTO: 0.2 %
ERYTHROCYTE [DISTWIDTH] IN BLOOD BY AUTOMATED COUNT: 15.1 % (ref 11.5–14.5)
GLUCOSE SERPL-MCNC: 96 MG/DL (ref 74–99)
HCT VFR BLD AUTO: 33.9 % (ref 36–46)
HGB BLD-MCNC: 10.7 G/DL (ref 12–16)
IMM GRANULOCYTES # BLD AUTO: 0.01 X10*3/UL (ref 0–0.5)
IMM GRANULOCYTES NFR BLD AUTO: 0.2 % (ref 0–0.9)
LYMPHOCYTES # BLD AUTO: 0.49 X10*3/UL (ref 0.8–3)
LYMPHOCYTES NFR BLD AUTO: 10.5 %
MCH RBC QN AUTO: 33.2 PG (ref 26–34)
MCHC RBC AUTO-ENTMCNC: 31.6 G/DL (ref 32–36)
MCV RBC AUTO: 105 FL (ref 80–100)
MONOCYTES # BLD AUTO: 0.39 X10*3/UL (ref 0.05–0.8)
MONOCYTES NFR BLD AUTO: 8.4 %
NEUTROPHILS # BLD AUTO: 3.74 X10*3/UL (ref 1.6–5.5)
NEUTROPHILS NFR BLD AUTO: 80.1 %
NRBC BLD-RTO: ABNORMAL /100{WBCS}
PLATELET # BLD AUTO: 228 X10*3/UL (ref 150–450)
POTASSIUM SERPL-SCNC: 4.3 MMOL/L (ref 3.5–5.3)
PROT SERPL-MCNC: 6 G/DL (ref 6.4–8.2)
RBC # BLD AUTO: 3.22 X10*6/UL (ref 4–5.2)
SODIUM SERPL-SCNC: 135 MMOL/L (ref 136–145)
WBC # BLD AUTO: 4.7 X10*3/UL (ref 4.4–11.3)

## 2025-06-16 PROCEDURE — 96375 TX/PRO/DX INJ NEW DRUG ADDON: CPT | Mod: INF

## 2025-06-16 PROCEDURE — 2500000004 HC RX 250 GENERAL PHARMACY W/ HCPCS (ALT 636 FOR OP/ED): Performed by: STUDENT IN AN ORGANIZED HEALTH CARE EDUCATION/TRAINING PROGRAM

## 2025-06-16 PROCEDURE — 2500000004 HC RX 250 GENERAL PHARMACY W/ HCPCS (ALT 636 FOR OP/ED): Performed by: NURSE PRACTITIONER

## 2025-06-16 PROCEDURE — 96413 CHEMO IV INFUSION 1 HR: CPT

## 2025-06-16 PROCEDURE — 99213 OFFICE O/P EST LOW 20 MIN: CPT | Performed by: NURSE PRACTITIONER

## 2025-06-16 PROCEDURE — 85025 COMPLETE CBC W/AUTO DIFF WBC: CPT

## 2025-06-16 PROCEDURE — 1159F MED LIST DOCD IN RCRD: CPT | Performed by: NURSE PRACTITIONER

## 2025-06-16 PROCEDURE — 1126F AMNT PAIN NOTED NONE PRSNT: CPT | Performed by: NURSE PRACTITIONER

## 2025-06-16 PROCEDURE — 2500000001 HC RX 250 WO HCPCS SELF ADMINISTERED DRUGS (ALT 637 FOR MEDICARE OP): Performed by: NURSE PRACTITIONER

## 2025-06-16 PROCEDURE — 80053 COMPREHEN METABOLIC PANEL: CPT

## 2025-06-16 RX ORDER — FAMOTIDINE 10 MG/ML
20 INJECTION, SOLUTION INTRAVENOUS ONCE
Status: COMPLETED | OUTPATIENT
Start: 2025-06-16 | End: 2025-06-16

## 2025-06-16 RX ORDER — DIPHENHYDRAMINE HCL 25 MG
50 CAPSULE ORAL ONCE
Status: COMPLETED | OUTPATIENT
Start: 2025-06-16 | End: 2025-06-16

## 2025-06-16 RX ORDER — PROCHLORPERAZINE MALEATE 10 MG
10 TABLET ORAL EVERY 6 HOURS PRN
Status: DISCONTINUED | OUTPATIENT
Start: 2025-06-16 | End: 2025-06-16 | Stop reason: HOSPADM

## 2025-06-16 RX ORDER — HEPARIN 100 UNIT/ML
500 SYRINGE INTRAVENOUS AS NEEDED
Status: DISCONTINUED | OUTPATIENT
Start: 2025-06-16 | End: 2025-06-16 | Stop reason: HOSPADM

## 2025-06-16 RX ORDER — EPINEPHRINE 0.3 MG/.3ML
0.3 INJECTION SUBCUTANEOUS EVERY 5 MIN PRN
Status: DISCONTINUED | OUTPATIENT
Start: 2025-06-16 | End: 2025-06-16 | Stop reason: HOSPADM

## 2025-06-16 RX ORDER — DIPHENHYDRAMINE HYDROCHLORIDE 50 MG/ML
50 INJECTION, SOLUTION INTRAMUSCULAR; INTRAVENOUS AS NEEDED
Status: DISCONTINUED | OUTPATIENT
Start: 2025-06-16 | End: 2025-06-16 | Stop reason: HOSPADM

## 2025-06-16 RX ORDER — FAMOTIDINE 10 MG/ML
20 INJECTION, SOLUTION INTRAVENOUS ONCE AS NEEDED
Status: DISCONTINUED | OUTPATIENT
Start: 2025-06-16 | End: 2025-06-16 | Stop reason: HOSPADM

## 2025-06-16 RX ORDER — HEPARIN SODIUM,PORCINE/PF 10 UNIT/ML
50 SYRINGE (ML) INTRAVENOUS AS NEEDED
OUTPATIENT
Start: 2025-06-16

## 2025-06-16 RX ORDER — HEPARIN 100 UNIT/ML
500 SYRINGE INTRAVENOUS AS NEEDED
Status: CANCELLED | OUTPATIENT
Start: 2025-06-16

## 2025-06-16 RX ORDER — HEPARIN SODIUM,PORCINE/PF 10 UNIT/ML
50 SYRINGE (ML) INTRAVENOUS AS NEEDED
Status: CANCELLED | OUTPATIENT
Start: 2025-06-16

## 2025-06-16 RX ORDER — HEPARIN 100 UNIT/ML
500 SYRINGE INTRAVENOUS AS NEEDED
OUTPATIENT
Start: 2025-06-16

## 2025-06-16 RX ORDER — ALBUTEROL SULFATE 0.83 MG/ML
3 SOLUTION RESPIRATORY (INHALATION) AS NEEDED
Status: DISCONTINUED | OUTPATIENT
Start: 2025-06-16 | End: 2025-06-16 | Stop reason: HOSPADM

## 2025-06-16 RX ORDER — PROCHLORPERAZINE EDISYLATE 5 MG/ML
10 INJECTION INTRAMUSCULAR; INTRAVENOUS EVERY 6 HOURS PRN
Status: DISCONTINUED | OUTPATIENT
Start: 2025-06-16 | End: 2025-06-16 | Stop reason: HOSPADM

## 2025-06-16 RX ADMIN — FAMOTIDINE 20 MG: 10 INJECTION INTRAVENOUS at 13:49

## 2025-06-16 RX ADMIN — HEPARIN 500 UNITS: 100 SYRINGE at 15:20

## 2025-06-16 RX ADMIN — DIPHENHYDRAMINE HYDROCHLORIDE 50 MG: 25 CAPSULE ORAL at 13:49

## 2025-06-16 RX ADMIN — PACLITAXEL 114 MG: 6 INJECTION, SOLUTION INTRAVENOUS at 14:18

## 2025-06-16 RX ADMIN — DEXAMETHASONE SODIUM PHOSPHATE 10 MG: 4 INJECTION, SOLUTION INTRA-ARTICULAR; INTRALESIONAL; INTRAMUSCULAR; INTRAVENOUS; SOFT TISSUE at 13:49

## 2025-06-16 ASSESSMENT — ENCOUNTER SYMPTOMS
APPETITE CHANGE: 0
MUSCULOSKELETAL NEGATIVE: 1
FATIGUE: 1
CARDIOVASCULAR NEGATIVE: 1
CONSTIPATION: 1
SORE THROAT: 0
COUGH: 0

## 2025-06-16 ASSESSMENT — PAIN SCALES - GENERAL: PAINLEVEL_OUTOF10: 0-NO PAIN

## 2025-06-16 NOTE — PROGRESS NOTES
[facility] - Medical Oncology Follow-Up Visit    Patient ID: Amrita Grant is a 79 y.o. female.  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 Grant is a 80 yo with PMH significant for lupus, aortic valve repair, fibromyalgia, who first noticed a mass on her nose in fall of 2024. Met Dr. Templeton who did an in-office biopsy 11/11/24 with invasive, poorly differentiated carcinoma, p40+, negative MOC31, unclear etiology, and recommended for repeat biopsy. Biopsy on 11/26/24 with the same, felt to be a primary cutaneous neoplasm such as basal cell carcinoma. Discussed at [facility] tumor board, and due to significant morbidity associated with resection, recommended against surgery and referred to radiation oncology and medical oncology. Met Dr. Guzmán, who recommended against immunotherapy given her lupus and poor performance status, and recommended discussion with radiation oncology, which cutaneous tumor board also agreed with. She planned to have radiation closer to home, but unfortunately her pre-radiation scans showed concern for metastatic disease, with PET/CT on 1/20/25 with FDG avidity of the R nasal soft tissue as well as R level 1 cervical lymph node, R rights, L1, and RUL nodule below PET resolution. She was ultimately started on vismodegib as well as started on palliative radiation (2/18-3/11/25) for the nasal lesion. In the meantime, [facility]  pathology revealed invasive carcinoma, HPV associated, positive for p40 CK5/6, EMA, p16, and negative for MOC, BerEP4, INSM1, and chromogranin. As this was felt to no longer be a basal cell carcinoma, she stopped the vismodegib. Biopsy of the L1 lesion morphologically the same.     Regarding her lupus - she has been on plaquenil monotherapy for many years, stable on the same dose. She was originally diagnosed with lupus in 2001 - she was initially diagnosed with pericarditis. She has fatigue and joint aches with the lupus.     She is here today with her  and friend. Her last day of radiation was last Tuesday or Wednesday. Radiation went ok overall. Her last dose of the visdomegib was last Tuesday or Wednesday. No side effects.  She is very tired, naps every day. In the last year, she has stopped doing the cooking, cleaning, and laundry. She is very careful, leans on her  to help her get up the stairs. Her fatigue has been getting worse and worse. She continues to lose weight - 50 lbs in the last year and a half. She has trouble swallowing - some things are harder to swallow like bread, meat. Gets tickles in her throat. She's had this for a couple years, had her throat stretched - this was also just repeated in December. Done at [place] typically, helps for a little while, but don't last.      PAST MEDICAL HISTORY  Lupus  Fibromyalgia  Osteoarthritis  Aortic valve repair (mechanical)  on warfarin  HTN  CVA - (2021)     SOCIAL HISTORY  Lives at home with her . They've been  45 years. Previously was a  for [email or web address].   Tob: none  EtOH: none, previously a little  Illicits: none     FAMILY HISTORY  Mother - uterine cancer  Maternal grandmother - breast cancer  Maternal cousin - unknown metastatic cancer    HPI  Present for readiness to treat visit.  Reports currently being treated for UTI - cipro BID x7 days. Started on Sunday.  Pt to urgent care.  Symptoms included cloudy urine with  increased frequency. Tolerating ATB.  Reports today her symptoms are lessening.  Feels up for treatment. Energy level is the same.  Decreased, independent with ADL's.  Able to complete laundry with spouse's assistance.  Gets dressed each day.  50% of the day is spent sitting.  Breathing feels fine.  Appetite is good.  Eating smaller portions.  Wt is stable.  Upcoming EGD scheduled 6/24.  States she chokes more easier.  Couple of weeks ago, heimlich performed by spouse.  Denies n/v/c/d. Routine bowel meds.  No fevers, chills, or CP.  Labs WNL.  Ready for treatment.      Meds (Current):    Current Outpatient Medications:     ARIPiprazole (Abilify) 5 mg tablet, Take 1 tablet (5 mg) by mouth once daily., Disp: , Rfl:     Bystolic 5 mg tablet, Take 2 tablets (10 mg) by mouth once daily. Dose increased, Disp: , Rfl:     calcium carbonate 500 mg calcium (1,250 mg) chewable tablet, Chew 1 tablet (500 mg of elemental calcium) once daily., Disp: , Rfl:     cetirizine (ZyrTEC) 10 mg tablet, 1 tab(s) orally as needed, Disp: , Rfl:     cholecalciferol (Vitamin D-3) 25 MCG (1000 UT) capsule, 1 cap(s) orally three times daily, Disp: , Rfl:     docusate sodium (Colace) 100 mg capsule, once every 24 hours., Disp: , Rfl:     fluticasone (Flonase) 50 mcg/actuation nasal spray, Administer 2 sprays into affected nostril(s)., Disp: , Rfl:     levETIRAcetam (Keppra) 500 mg tablet, every 12 hours., Disp: , Rfl:     LORazepam (Ativan) 0.5 mg tablet, Take half tablet 1 hour before MRI, if needed take other half of tablet while getting checked in for MRI, Disp: 1 tablet, Rfl: 0    losartan (Cozaar) 100 mg tablet, Take 0.5 tablets (50 mg) by mouth twice a day., Disp: , Rfl:     melatonin 3 mg tablet, Take by mouth once daily., Disp: , Rfl:     memantine (Namenda) 5 mg tablet, Take 1 tablet (5 mg) by mouth once daily., Disp: , Rfl:     mirtazapine (Remeron) 15 mg tablet, 1 tab(s) orally half tablet bedtime daily for 30 days, Disp: , Rfl:      omeprazole (PriLOSEC) 20 mg DR capsule, Take 1 capsule (20 mg) by mouth once daily., Disp: , Rfl:     ondansetron (Zofran) 4 mg tablet, Take 1 tablet (4 mg) by mouth if needed for nausea or vomiting., Disp: , Rfl:     ondansetron (Zofran) 8 mg tablet, Take 1 tablet (8 mg) by mouth every 8 hours if needed for nausea or vomiting., Disp: 30 tablet, Rfl: 5    PlaqueniL 200 mg tablet, Take 1 tablet (200 mg) by mouth once daily., Disp: , Rfl:     potassium chloride CR 20 mEq ER tablet, Take 1 tablet (20 mEq) by mouth once daily., Disp: , Rfl:     Premarin 0.3 mg tablet, once every 24 hours., Disp: , Rfl:     prochlorperazine (Compazine) 10 mg tablet, Take 1 tablet (10 mg) by mouth every 6 hours if needed for nausea or vomiting., Disp: 30 tablet, Rfl: 5    sennosides (senna) 8.6 mg tablet, Take 1 tablet (8.6 mg) by mouth 2 times a day., Disp: 60 tablet, Rfl: 2    warfarin (Coumadin) 4 mg tablet, Take 1 tablet (4 mg) by mouth once daily., Disp: , Rfl:     lidocaine-prilocaine (Emla) 2.5-2.5 % cream, Apply topically 1 time for 1 dose. Apply 30-45 minutes prior to port access., Disp: 30 g, Rfl: 3    Allergies   Allergen Reactions    Propofol Nausea/vomiting    Sulfa (Sulfonamide Antibiotics) Other    Opioids - Morphine Analogues Nausea And Vomiting, Other and Unknown    Penicillins Unknown     Pt. Reports passing out upon taking penicillin when she was young.       Review of Systems   Constitutional:  Positive for fatigue. Negative for appetite change.   HENT:   Positive for trouble swallowing. Negative for sore throat.    Respiratory:  Negative for cough.    Cardiovascular: Negative.    Gastrointestinal:  Positive for constipation.   Genitourinary:  Positive for frequency.    Musculoskeletal: Negative.         Objective   BSA: 1.45 meters squared  Wt Readings from Last 5 Encounters:   06/16/25 48.1 kg (106 lb 0.7 oz)   06/09/25 48.1 kg (106 lb 0.7 oz)   06/02/25 48.5 kg (106 lb 14.4 oz)   05/27/25 49.5 kg (109 lb 0.3 oz)    05/19/25 48.2 kg (106 lb 3.2 oz)     /75 (BP Location: Right arm, Patient Position: Sitting, BP Cuff Size: Child)   Pulse 73   Temp 36.7 °C (98.1 °F) (Temporal)   Resp 18   Wt 48.1 kg (106 lb 0.7 oz)   SpO2 97%   BMI 19.32 kg/m²     ECOG Score: 2    0          Fully active; no performance restrictions.  1          Strenuous physical activity restricted; fully ambulatory and able to carry out light work.  2          Capable of all self-care but unable to carry out any work activities. Up and about >50% of waking hours.  3          Capable of only limited self-care; confined to bed or chair >50% of waking hours.  4          Completely disabled; cannot carry out any self-care; totally confined to bed or chair.     Physical Exam  Vitals reviewed.   Constitutional:       General: She is not in acute distress.     Appearance: Normal appearance.   HENT:      Head: Normocephalic.      Nose: Nose normal.      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        Mouth/Throat:      Mouth: Mucous membranes are moist.      Pharynx: Oropharynx is clear.   Eyes:      Extraocular Movements: Extraocular movements intact.      Conjunctiva/sclera: Conjunctivae normal.      Pupils: Pupils are equal, round, and reactive to light.   Cardiovascular:      Rate and Rhythm: Normal rate and regular rhythm.      Pulses: Normal pulses.      Heart sounds: Normal heart sounds. No murmur heard.  Pulmonary:      Effort: No respiratory distress.      Breath sounds: Normal breath sounds.   Abdominal:      General: Bowel sounds are normal.      Palpations: Abdomen is soft.   Musculoskeletal:         General: Normal range of motion.      Cervical back: Normal range of motion.   Skin:     General: Skin is warm and dry.      Capillary Refill: Capillary refill takes less than 2 seconds.      Comments:     Neurological:      General: No focal deficit present.      Mental Status: She is alert and oriented to person, place, and time.    Psychiatric:         Mood and Affect: Mood normal.         Behavior: Behavior normal.         Thought Content: Thought content normal.         Judgment: Judgment normal.          Results:  Labs:  Lab Results   Component Value Date    WBC 4.7 06/16/2025    HGB 10.7 (L) 06/16/2025    HCT 33.9 (L) 06/16/2025     (H) 06/16/2025     06/16/2025      Lab Results   Component Value Date    NEUTROABS 3.74 06/16/2025      Lab Results   Component Value Date    GLUCOSE 96 06/16/2025    CALCIUM 8.0 (L) 06/16/2025     (L) 06/16/2025    K 4.3 06/16/2025    CO2 25 06/16/2025     06/16/2025    BUN 13 06/16/2025    CREATININE 0.62 06/16/2025     Lab Results   Component Value Date    ALT 11 06/16/2025    AST 15 06/16/2025    ALKPHOS 63 06/16/2025    BILITOT 0.7 06/16/2025        Imaging:  No new imaging.      Pathology:    Lab Results   Component Value Date    ADD1  11/26/2024     Additional immunostains were reviewed.  Tumor cells are positive for p16 and negative for chromogranin and INSM1.  Isolated tumor cells are positive for BerEP4.  The differential diagnosis remains unchanged.         Assessment/Plan      Amrita Grant is a 79 y.o. female here for recommendations and to establish care for poorly differentiated carcinoma of the face.     # Poorly differentiated carcinoma  - HPV+  - L1 biopsy proven metastasis - will send tempus for better delineation  - discussed the likelihood that this is metastatic, HPV+ nasopharyngeal carcinoma, although tempus may help us better determine this  - s/p palliative radiation  - discussed that typically would recommend combination therapy with chemotherapy+immunotherapy. However, given her lupus which was originally diagnosed with pericarditis, do not recommend immunotherapy. Also discussed that given her current poor performance status, do not believe she could tolerate dual chemotherapy  - discussed single-agent weekly paclitaxel, and consented. Plan to repeat scans  after 3 cycles - CT scheduled 6/20/25    - will obtain new baseline images, as she has not had imaging since January 2025  - she would like to have her infusions closer to home at [place], but will continue to see us at [place] for pre-treatment visits  - Pt agreeable to infusions and visits on same day at [place].    - Update: Unable to obtain IV access after multiple attempts.  Existing mediport placement scheduled 4/2/25.  Infusion rescheduled to 4/7/25.    - 4/2/25 Mediport placed  - RTC 6/23/25 fro C4D1.     # Lupus  - she follows with a rheumatologist at [facility], and will notify them we are starting chemotherapy    # Dysuria, urgency, frequency  - offered obtaining UA and urine culture, and she deferred to PCP  - 3/31:  Current 10-day ATB course.  Pt/family not able to recall medication name.  ATB started prior to her tx C1D1.  Tolerating ATB.  Reports up to 8 UTI's last year.  PCP (non- provider) - any further follow-up with PCP.    - 4/7:  Completed 10-day ATB course.  Denies current UTI symptoms.     - 6/16:  Current ATB Cipro BID x5 days (Rx provided at urgent care visit).  Pt instructed to notify [facility] and PCP of UTI/ATB course.      # Unintentional weight loss  - may be partly due to dysphagia  - oncology dietician referral in place   - wt loss has stabilized     # Fatigue (g1)  - Recommended she increase her physical activity level - during waking house, get up Q2-3H, walk within the home, pace activities.     # Constipation  - Senna too effective, pt to start routine Miralax - 1/2 capful to start and adjust as needed.      # Hypocalcemia 8.4 5/12/25  - Calcium listed on MAR, pt not taking med d/t pill size.  Recommended she start OTC calcium soft chew.     # Cough/PND   - Will add room humidifier.  Sleeps using one pillow, recommended she try sleeping with two pillows.    - Ongoing monitoring     # Constipation  - last BM Sat or Sunday.  Pt to take 2nd Miralax dose following  call.  Start MOM with dose this evening.  If no BM by morning, 2nd dose and dose BID until BM.      # Rash/erythema to right cheek/forehead  - denies pruritus, apply lotion to area.    - improved    # Scattered macules to back of hands and wrist area (few)  - apply hydrocortisone cream BID, pt has within the home.    - improved    # Pancytopenia - chemo-induced - stable  - Weekly labs, ongoing monitoring    # INR/chronic Warfarin  - INR ordered for coumadin dosing, infusion nurse provided medical release form per facility protocol.  Pt follows with [facility] for dosing/monitoring.  INR to be draw via port with treatment labs, fax to [facility] [phone].  Goal INR 2.5-3.0.   - 4/14: INR 2.0 result faxed to [facility] 4/15/25  - 4/21:  Will monitor for INR result and fax to [facility] 4/22/25.   - 4/28:  Will monitor for INR result and fax to [facility] 4/29/25.   - Not enough blood in tube. Pt to [facility] for INR draw.  5/6:  Will monitor for INR result and fax to [facility].  INR 4.7, instructions per [facility].    5/12:  Will monitor for INR result and fax to [facility].   5/19:  Will monitor for INR result and fax to [facility].   5/27:  INR 8.8 - notified via secure chat by on-call MD.  Dr. Frankel was able to speak with pt.  This provider also spoke with pt with faxed results to [facility].   6/2:  spouse reports future INR labs will be drawn by [facility]. INR lab removed from treatment plan.       # Advanced care planning  - discussed incurable but treatable nature of disease, with goal to prolong life while maintaining/improving quality of life. She understands that risks of systemic therapy may outweigh benefits at some point in the future.

## 2025-06-17 ENCOUNTER — APPOINTMENT (OUTPATIENT)
Dept: RADIATION ONCOLOGY | Facility: CLINIC | Age: 80
End: 2025-06-17
Payer: MEDICARE

## 2025-06-17 ENCOUNTER — TELEPHONE (OUTPATIENT)
Dept: ADMISSION | Facility: HOSPITAL | Age: 80
End: 2025-06-17
Payer: MEDICARE

## 2025-06-17 ASSESSMENT — ENCOUNTER SYMPTOMS
FREQUENCY: 1
TROUBLE SWALLOWING: 1

## 2025-06-17 NOTE — TELEPHONE ENCOUNTER
Shana from Dr. Larkin Friend, Gastroenterology office called and said Amrita was in for trouble swallowing today for an appointment. She said they want to do an EGD and need a medical clearance filled out by team. She is faxing it to [phone]. Please fill out and fax back to number provided on form. Any questions you can call the office at [phone]. Messaged team.

## 2025-06-17 NOTE — TELEPHONE ENCOUNTER
I called Dr. Faustin's Gastroenterology office back and left V/M on nurse line regarding the patient's INR is managed by [facility] and they will also need to contact them for medical clearance. I left them the fax# [phone].

## 2025-06-18 ENCOUNTER — APPOINTMENT (OUTPATIENT)
Dept: RADIATION ONCOLOGY | Facility: CLINIC | Age: 80
End: 2025-06-18
Payer: MEDICARE

## 2025-06-19 ENCOUNTER — APPOINTMENT (OUTPATIENT)
Dept: RADIATION ONCOLOGY | Facility: CLINIC | Age: 80
End: 2025-06-19
Payer: MEDICARE

## 2025-06-20 ENCOUNTER — HOSPITAL ENCOUNTER (OUTPATIENT)
Dept: RADIOLOGY | Facility: HOSPITAL | Age: 80
Discharge: HOME | End: 2025-06-20
Payer: MEDICARE

## 2025-06-20 ENCOUNTER — APPOINTMENT (OUTPATIENT)
Dept: RADIATION ONCOLOGY | Facility: CLINIC | Age: 80
End: 2025-06-20
Payer: MEDICARE

## 2025-06-20 DIAGNOSIS — C44.92 SCC (SQUAMOUS CELL CARCINOMA): ICD-10-CM

## 2025-06-20 PROCEDURE — 74177 CT ABD & PELVIS W/CONTRAST: CPT

## 2025-06-20 PROCEDURE — 2500000004 HC RX 250 GENERAL PHARMACY W/ HCPCS (ALT 636 FOR OP/ED): Performed by: NURSE PRACTITIONER

## 2025-06-20 PROCEDURE — 2550000001 HC RX 255 CONTRASTS: Performed by: NURSE PRACTITIONER

## 2025-06-20 PROCEDURE — 70488 CT MAXILLOFACIAL W/O & W/DYE: CPT

## 2025-06-20 RX ORDER — HEPARIN 100 UNIT/ML
5 SYRINGE INTRAVENOUS AS NEEDED
Status: DISCONTINUED | OUTPATIENT
Start: 2025-06-20 | End: 2025-06-21 | Stop reason: HOSPADM

## 2025-06-20 RX ADMIN — IOHEXOL 69 ML: 350 INJECTION, SOLUTION INTRAVENOUS at 13:56

## 2025-06-20 RX ADMIN — HEPARIN 500 UNITS: 100 SYRINGE at 13:36

## 2025-06-23 ENCOUNTER — INFUSION (OUTPATIENT)
Dept: HEMATOLOGY/ONCOLOGY | Facility: CLINIC | Age: 80
End: 2025-06-23
Payer: MEDICARE

## 2025-06-23 ENCOUNTER — APPOINTMENT (OUTPATIENT)
Dept: RADIATION ONCOLOGY | Facility: CLINIC | Age: 80
End: 2025-06-23
Payer: MEDICARE

## 2025-06-23 ENCOUNTER — OFFICE VISIT (OUTPATIENT)
Dept: HEMATOLOGY/ONCOLOGY | Facility: CLINIC | Age: 80
End: 2025-06-23
Payer: MEDICARE

## 2025-06-23 ENCOUNTER — LAB (OUTPATIENT)
Dept: HEMATOLOGY/ONCOLOGY | Facility: CLINIC | Age: 80
End: 2025-06-23
Payer: MEDICARE

## 2025-06-23 VITALS
WEIGHT: 105.5 LBS | DIASTOLIC BLOOD PRESSURE: 65 MMHG | TEMPERATURE: 97.7 F | HEART RATE: 82 BPM | BODY MASS INDEX: 19.22 KG/M2 | SYSTOLIC BLOOD PRESSURE: 96 MMHG | RESPIRATION RATE: 18 BRPM | OXYGEN SATURATION: 98 %

## 2025-06-23 DIAGNOSIS — C44.92 SCC (SQUAMOUS CELL CARCINOMA): ICD-10-CM

## 2025-06-23 DIAGNOSIS — Z51.11 ENCOUNTER FOR ANTINEOPLASTIC CHEMOTHERAPY: ICD-10-CM

## 2025-06-23 DIAGNOSIS — C44.92 SCC (SQUAMOUS CELL CARCINOMA): Primary | ICD-10-CM

## 2025-06-23 LAB
ALBUMIN SERPL BCP-MCNC: 3.8 G/DL (ref 3.4–5)
ALP SERPL-CCNC: 57 U/L (ref 33–136)
ALT SERPL W P-5'-P-CCNC: 10 U/L (ref 7–45)
ANION GAP SERPL CALC-SCNC: 9 MMOL/L (ref 10–20)
AST SERPL W P-5'-P-CCNC: 14 U/L (ref 9–39)
BASOPHILS # BLD AUTO: 0.03 X10*3/UL (ref 0–0.1)
BASOPHILS NFR BLD AUTO: 0.6 %
BILIRUB SERPL-MCNC: 0.5 MG/DL (ref 0–1.2)
BUN SERPL-MCNC: 14 MG/DL (ref 6–23)
CALCIUM SERPL-MCNC: 8.5 MG/DL (ref 8.6–10.3)
CHLORIDE SERPL-SCNC: 106 MMOL/L (ref 98–107)
CO2 SERPL-SCNC: 27 MMOL/L (ref 21–32)
CREAT SERPL-MCNC: 0.56 MG/DL (ref 0.5–1.05)
EGFRCR SERPLBLD CKD-EPI 2021: >90 ML/MIN/1.73M*2
EOSINOPHIL # BLD AUTO: 0.04 X10*3/UL (ref 0–0.4)
EOSINOPHIL NFR BLD AUTO: 0.8 %
ERYTHROCYTE [DISTWIDTH] IN BLOOD BY AUTOMATED COUNT: 14.7 % (ref 11.5–14.5)
GLUCOSE SERPL-MCNC: 117 MG/DL (ref 74–99)
HCT VFR BLD AUTO: 34 % (ref 36–46)
HGB BLD-MCNC: 10.7 G/DL (ref 12–16)
IMM GRANULOCYTES # BLD AUTO: 0.03 X10*3/UL (ref 0–0.5)
IMM GRANULOCYTES NFR BLD AUTO: 0.6 % (ref 0–0.9)
LYMPHOCYTES # BLD AUTO: 0.56 X10*3/UL (ref 0.8–3)
LYMPHOCYTES NFR BLD AUTO: 11.1 %
MCH RBC QN AUTO: 33.4 PG (ref 26–34)
MCHC RBC AUTO-ENTMCNC: 31.5 G/DL (ref 32–36)
MCV RBC AUTO: 106 FL (ref 80–100)
MONOCYTES # BLD AUTO: 0.58 X10*3/UL (ref 0.05–0.8)
MONOCYTES NFR BLD AUTO: 11.5 %
NEUTROPHILS # BLD AUTO: 3.82 X10*3/UL (ref 1.6–5.5)
NEUTROPHILS NFR BLD AUTO: 75.4 %
NRBC BLD-RTO: ABNORMAL /100{WBCS}
PLATELET # BLD AUTO: 212 X10*3/UL (ref 150–450)
POTASSIUM SERPL-SCNC: 3.8 MMOL/L (ref 3.5–5.3)
PROT SERPL-MCNC: 5.9 G/DL (ref 6.4–8.2)
RBC # BLD AUTO: 3.2 X10*6/UL (ref 4–5.2)
SODIUM SERPL-SCNC: 138 MMOL/L (ref 136–145)
WBC # BLD AUTO: 5.1 X10*3/UL (ref 4.4–11.3)

## 2025-06-23 PROCEDURE — 99214 OFFICE O/P EST MOD 30 MIN: CPT | Performed by: NURSE PRACTITIONER

## 2025-06-23 PROCEDURE — 2500000001 HC RX 250 WO HCPCS SELF ADMINISTERED DRUGS (ALT 637 FOR MEDICARE OP): Performed by: NURSE PRACTITIONER

## 2025-06-23 PROCEDURE — G2211 COMPLEX E/M VISIT ADD ON: HCPCS | Performed by: NURSE PRACTITIONER

## 2025-06-23 PROCEDURE — 1126F AMNT PAIN NOTED NONE PRSNT: CPT | Performed by: NURSE PRACTITIONER

## 2025-06-23 PROCEDURE — 2500000004 HC RX 250 GENERAL PHARMACY W/ HCPCS (ALT 636 FOR OP/ED): Performed by: NURSE PRACTITIONER

## 2025-06-23 PROCEDURE — 80053 COMPREHEN METABOLIC PANEL: CPT

## 2025-06-23 PROCEDURE — 96413 CHEMO IV INFUSION 1 HR: CPT

## 2025-06-23 PROCEDURE — 96375 TX/PRO/DX INJ NEW DRUG ADDON: CPT | Mod: INF

## 2025-06-23 PROCEDURE — 85025 COMPLETE CBC W/AUTO DIFF WBC: CPT

## 2025-06-23 PROCEDURE — 2500000004 HC RX 250 GENERAL PHARMACY W/ HCPCS (ALT 636 FOR OP/ED): Performed by: STUDENT IN AN ORGANIZED HEALTH CARE EDUCATION/TRAINING PROGRAM

## 2025-06-23 PROCEDURE — 1036F TOBACCO NON-USER: CPT | Performed by: NURSE PRACTITIONER

## 2025-06-23 RX ORDER — HEPARIN 100 UNIT/ML
500 SYRINGE INTRAVENOUS AS NEEDED
Status: DISCONTINUED | OUTPATIENT
Start: 2025-06-23 | End: 2025-06-23 | Stop reason: HOSPADM

## 2025-06-23 RX ORDER — ALBUTEROL SULFATE 0.83 MG/ML
3 SOLUTION RESPIRATORY (INHALATION) AS NEEDED
OUTPATIENT
Start: 2025-06-30

## 2025-06-23 RX ORDER — DIPHENHYDRAMINE HYDROCHLORIDE 50 MG/ML
50 INJECTION, SOLUTION INTRAMUSCULAR; INTRAVENOUS AS NEEDED
OUTPATIENT
Start: 2025-07-07

## 2025-06-23 RX ORDER — FAMOTIDINE 10 MG/ML
20 INJECTION, SOLUTION INTRAVENOUS ONCE
Status: CANCELLED | OUTPATIENT
Start: 2025-06-23

## 2025-06-23 RX ORDER — DIPHENHYDRAMINE HCL 50 MG
50 CAPSULE ORAL ONCE
OUTPATIENT
Start: 2025-06-30

## 2025-06-23 RX ORDER — DIPHENHYDRAMINE HCL 50 MG
50 CAPSULE ORAL ONCE
OUTPATIENT
Start: 2025-07-14

## 2025-06-23 RX ORDER — DIPHENHYDRAMINE HYDROCHLORIDE 50 MG/ML
50 INJECTION, SOLUTION INTRAMUSCULAR; INTRAVENOUS AS NEEDED
OUTPATIENT
Start: 2025-06-30

## 2025-06-23 RX ORDER — ALBUTEROL SULFATE 0.83 MG/ML
3 SOLUTION RESPIRATORY (INHALATION) AS NEEDED
OUTPATIENT
Start: 2025-07-14

## 2025-06-23 RX ORDER — EPINEPHRINE 0.3 MG/.3ML
0.3 INJECTION SUBCUTANEOUS EVERY 5 MIN PRN
OUTPATIENT
Start: 2025-06-30

## 2025-06-23 RX ORDER — DIPHENHYDRAMINE HCL 25 MG
50 CAPSULE ORAL ONCE
Status: COMPLETED | OUTPATIENT
Start: 2025-06-23 | End: 2025-06-23

## 2025-06-23 RX ORDER — HEPARIN SODIUM,PORCINE/PF 10 UNIT/ML
50 SYRINGE (ML) INTRAVENOUS AS NEEDED
OUTPATIENT
Start: 2025-06-23

## 2025-06-23 RX ORDER — DIPHENHYDRAMINE HCL 50 MG
50 CAPSULE ORAL ONCE
Status: CANCELLED | OUTPATIENT
Start: 2025-06-23

## 2025-06-23 RX ORDER — PROCHLORPERAZINE MALEATE 5 MG
10 TABLET ORAL EVERY 6 HOURS PRN
OUTPATIENT
Start: 2025-07-14

## 2025-06-23 RX ORDER — DIPHENHYDRAMINE HYDROCHLORIDE 50 MG/ML
50 INJECTION, SOLUTION INTRAMUSCULAR; INTRAVENOUS AS NEEDED
Status: CANCELLED | OUTPATIENT
Start: 2025-06-23

## 2025-06-23 RX ORDER — PROCHLORPERAZINE EDISYLATE 5 MG/ML
10 INJECTION INTRAMUSCULAR; INTRAVENOUS EVERY 6 HOURS PRN
OUTPATIENT
Start: 2025-07-14

## 2025-06-23 RX ORDER — FAMOTIDINE 10 MG/ML
20 INJECTION, SOLUTION INTRAVENOUS ONCE AS NEEDED
OUTPATIENT
Start: 2025-06-30

## 2025-06-23 RX ORDER — FAMOTIDINE 10 MG/ML
20 INJECTION, SOLUTION INTRAVENOUS ONCE
OUTPATIENT
Start: 2025-06-30

## 2025-06-23 RX ORDER — PROCHLORPERAZINE EDISYLATE 5 MG/ML
10 INJECTION INTRAMUSCULAR; INTRAVENOUS EVERY 6 HOURS PRN
OUTPATIENT
Start: 2025-06-30

## 2025-06-23 RX ORDER — FAMOTIDINE 10 MG/ML
20 INJECTION, SOLUTION INTRAVENOUS ONCE
OUTPATIENT
Start: 2025-07-07

## 2025-06-23 RX ORDER — ALBUTEROL SULFATE 0.83 MG/ML
3 SOLUTION RESPIRATORY (INHALATION) AS NEEDED
OUTPATIENT
Start: 2025-07-07

## 2025-06-23 RX ORDER — EPINEPHRINE 0.3 MG/.3ML
0.3 INJECTION SUBCUTANEOUS EVERY 5 MIN PRN
Status: DISCONTINUED | OUTPATIENT
Start: 2025-06-23 | End: 2025-06-23 | Stop reason: HOSPADM

## 2025-06-23 RX ORDER — FAMOTIDINE 10 MG/ML
20 INJECTION, SOLUTION INTRAVENOUS ONCE
OUTPATIENT
Start: 2025-07-14

## 2025-06-23 RX ORDER — EPINEPHRINE 0.3 MG/.3ML
0.3 INJECTION SUBCUTANEOUS EVERY 5 MIN PRN
OUTPATIENT
Start: 2025-07-07

## 2025-06-23 RX ORDER — DEXAMETHASONE SODIUM PHOSPHATE 10 MG/ML
10 INJECTION INTRAMUSCULAR; INTRAVENOUS ONCE
Status: CANCELLED | OUTPATIENT
Start: 2025-06-23

## 2025-06-23 RX ORDER — EPINEPHRINE 0.3 MG/.3ML
0.3 INJECTION SUBCUTANEOUS EVERY 5 MIN PRN
Status: CANCELLED | OUTPATIENT
Start: 2025-06-23

## 2025-06-23 RX ORDER — FAMOTIDINE 10 MG/ML
20 INJECTION, SOLUTION INTRAVENOUS ONCE AS NEEDED
OUTPATIENT
Start: 2025-07-07

## 2025-06-23 RX ORDER — DIPHENHYDRAMINE HCL 50 MG
50 CAPSULE ORAL ONCE
OUTPATIENT
Start: 2025-07-07

## 2025-06-23 RX ORDER — PROCHLORPERAZINE EDISYLATE 5 MG/ML
10 INJECTION INTRAMUSCULAR; INTRAVENOUS EVERY 6 HOURS PRN
OUTPATIENT
Start: 2025-07-07

## 2025-06-23 RX ORDER — PROCHLORPERAZINE EDISYLATE 5 MG/ML
10 INJECTION INTRAMUSCULAR; INTRAVENOUS EVERY 6 HOURS PRN
Status: DISCONTINUED | OUTPATIENT
Start: 2025-06-23 | End: 2025-06-23 | Stop reason: HOSPADM

## 2025-06-23 RX ORDER — HEPARIN 100 UNIT/ML
500 SYRINGE INTRAVENOUS AS NEEDED
OUTPATIENT
Start: 2025-06-23

## 2025-06-23 RX ORDER — PROCHLORPERAZINE EDISYLATE 5 MG/ML
10 INJECTION INTRAMUSCULAR; INTRAVENOUS EVERY 6 HOURS PRN
Status: CANCELLED | OUTPATIENT
Start: 2025-06-23

## 2025-06-23 RX ORDER — DIPHENHYDRAMINE HYDROCHLORIDE 50 MG/ML
50 INJECTION, SOLUTION INTRAMUSCULAR; INTRAVENOUS AS NEEDED
Status: DISCONTINUED | OUTPATIENT
Start: 2025-06-23 | End: 2025-06-23 | Stop reason: HOSPADM

## 2025-06-23 RX ORDER — EPINEPHRINE 0.3 MG/.3ML
0.3 INJECTION SUBCUTANEOUS EVERY 5 MIN PRN
OUTPATIENT
Start: 2025-07-14

## 2025-06-23 RX ORDER — PROCHLORPERAZINE MALEATE 5 MG
10 TABLET ORAL EVERY 6 HOURS PRN
Status: CANCELLED | OUTPATIENT
Start: 2025-06-23

## 2025-06-23 RX ORDER — DEXAMETHASONE SODIUM PHOSPHATE 10 MG/ML
10 INJECTION INTRAMUSCULAR; INTRAVENOUS ONCE
OUTPATIENT
Start: 2025-06-30

## 2025-06-23 RX ORDER — DEXAMETHASONE SODIUM PHOSPHATE 10 MG/ML
10 INJECTION INTRAMUSCULAR; INTRAVENOUS ONCE
OUTPATIENT
Start: 2025-07-14

## 2025-06-23 RX ORDER — ALBUTEROL SULFATE 0.83 MG/ML
3 SOLUTION RESPIRATORY (INHALATION) AS NEEDED
Status: CANCELLED | OUTPATIENT
Start: 2025-06-23

## 2025-06-23 RX ORDER — FAMOTIDINE 10 MG/ML
20 INJECTION, SOLUTION INTRAVENOUS ONCE
Status: COMPLETED | OUTPATIENT
Start: 2025-06-23 | End: 2025-06-23

## 2025-06-23 RX ORDER — ALBUTEROL SULFATE 0.83 MG/ML
3 SOLUTION RESPIRATORY (INHALATION) AS NEEDED
Status: DISCONTINUED | OUTPATIENT
Start: 2025-06-23 | End: 2025-06-23 | Stop reason: HOSPADM

## 2025-06-23 RX ORDER — FAMOTIDINE 10 MG/ML
20 INJECTION, SOLUTION INTRAVENOUS ONCE AS NEEDED
Status: CANCELLED | OUTPATIENT
Start: 2025-06-23

## 2025-06-23 RX ORDER — PROCHLORPERAZINE MALEATE 10 MG
10 TABLET ORAL EVERY 6 HOURS PRN
Status: DISCONTINUED | OUTPATIENT
Start: 2025-06-23 | End: 2025-06-23 | Stop reason: HOSPADM

## 2025-06-23 RX ORDER — PROCHLORPERAZINE MALEATE 5 MG
10 TABLET ORAL EVERY 6 HOURS PRN
OUTPATIENT
Start: 2025-07-07

## 2025-06-23 RX ORDER — PROCHLORPERAZINE MALEATE 5 MG
10 TABLET ORAL EVERY 6 HOURS PRN
OUTPATIENT
Start: 2025-06-30

## 2025-06-23 RX ORDER — HEPARIN SODIUM,PORCINE/PF 10 UNIT/ML
50 SYRINGE (ML) INTRAVENOUS AS NEEDED
Status: DISCONTINUED | OUTPATIENT
Start: 2025-06-23 | End: 2025-06-23 | Stop reason: HOSPADM

## 2025-06-23 RX ORDER — DIPHENHYDRAMINE HYDROCHLORIDE 50 MG/ML
50 INJECTION, SOLUTION INTRAMUSCULAR; INTRAVENOUS AS NEEDED
OUTPATIENT
Start: 2025-07-14

## 2025-06-23 RX ORDER — FAMOTIDINE 10 MG/ML
20 INJECTION, SOLUTION INTRAVENOUS ONCE AS NEEDED
OUTPATIENT
Start: 2025-07-14

## 2025-06-23 RX ORDER — DEXAMETHASONE SODIUM PHOSPHATE 10 MG/ML
10 INJECTION INTRAMUSCULAR; INTRAVENOUS ONCE
OUTPATIENT
Start: 2025-07-07

## 2025-06-23 RX ORDER — FAMOTIDINE 10 MG/ML
20 INJECTION, SOLUTION INTRAVENOUS ONCE AS NEEDED
Status: DISCONTINUED | OUTPATIENT
Start: 2025-06-23 | End: 2025-06-23 | Stop reason: HOSPADM

## 2025-06-23 RX ADMIN — HEPARIN 500 UNITS: 100 SYRINGE at 13:31

## 2025-06-23 RX ADMIN — DIPHENHYDRAMINE HYDROCHLORIDE 50 MG: 25 CAPSULE ORAL at 11:53

## 2025-06-23 RX ADMIN — DEXAMETHASONE SODIUM PHOSPHATE 10 MG: 4 INJECTION, SOLUTION INTRA-ARTICULAR; INTRALESIONAL; INTRAMUSCULAR; INTRAVENOUS; SOFT TISSUE at 11:52

## 2025-06-23 RX ADMIN — FAMOTIDINE 20 MG: 10 INJECTION INTRAVENOUS at 11:52

## 2025-06-23 RX ADMIN — PACLITAXEL 114 MG: 6 INJECTION, SOLUTION INTRAVENOUS at 12:26

## 2025-06-23 ASSESSMENT — ENCOUNTER SYMPTOMS
TROUBLE SWALLOWING: 1
FATIGUE: 1
SORE THROAT: 0
COUGH: 0
APPETITE CHANGE: 0
FREQUENCY: 0
CONSTIPATION: 1
CARDIOVASCULAR NEGATIVE: 1
MUSCULOSKELETAL NEGATIVE: 1

## 2025-06-23 ASSESSMENT — PAIN SCALES - GENERAL: PAINLEVEL_OUTOF10: 0-NO PAIN

## 2025-06-23 NOTE — PROGRESS NOTES
[facility] - Medical Oncology Follow-Up Visit    Patient ID: Amrita Grant is a 79 y.o. female.  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 Grant is a 78 yo with PMH significant for lupus, aortic valve repair, fibromyalgia, who first noticed a mass on her nose in fall of 2024. Met Dr. Templeton who did an in-office biopsy 11/11/24 with invasive, poorly differentiated carcinoma, p40+, negative MOC31, unclear etiology, and recommended for repeat biopsy. Biopsy on 11/26/24 with the same, felt to be a primary cutaneous neoplasm such as basal cell carcinoma. Discussed at [facility] tumor board, and due to significant morbidity associated with resection, recommended against surgery and referred to radiation oncology and medical oncology. Met Dr. Guzmán, who recommended against immunotherapy given her lupus and poor performance status, and recommended discussion with radiation oncology, which cutaneous tumor board also agreed with. She planned to have radiation closer to home, but unfortunately her pre-radiation scans showed concern for metastatic disease, with PET/CT on 1/20/25 with FDG avidity of the R nasal soft tissue as well as R level 1 cervical lymph node, R rights, L1, and RUL nodule below PET resolution. She was ultimately started on vismodegib as well as started on palliative radiation (2/18-3/11/25) for the nasal lesion. In the meantime, [facility]  pathology revealed invasive carcinoma, HPV associated, positive for p40 CK5/6, EMA, p16, and negative for MOC, BerEP4, INSM1, and chromogranin. As this was felt to no longer be a basal cell carcinoma, she stopped the vismodegib. Biopsy of the L1 lesion morphologically the same.     Regarding her lupus - she has been on plaquenil monotherapy for many years, stable on the same dose. She was originally diagnosed with lupus in 2001 - she was initially diagnosed with pericarditis. She has fatigue and joint aches with the lupus.     She is here today with her  and friend. Her last day of radiation was last Tuesday or Wednesday. Radiation went ok overall. Her last dose of the visdomegib was last Tuesday or Wednesday. No side effects.  She is very tired, naps every day. In the last year, she has stopped doing the cooking, cleaning, and laundry. She is very careful, leans on her  to help her get up the stairs. Her fatigue has been getting worse and worse. She continues to lose weight - 50 lbs in the last year and a half. She has trouble swallowing - some things are harder to swallow like bread, meat. Gets tickles in her throat. She's had this for a couple years, had her throat stretched - this was also just repeated in December. Done at [place] typically, helps for a little while, but don't last.      PAST MEDICAL HISTORY  Lupus  Fibromyalgia  Osteoarthritis  Aortic valve repair (mechanical)  on warfarin  HTN  CVA - (2021)     SOCIAL HISTORY  Lives at home with her . They've been  45 years. Previously was a  for [employer].   Tob: none  EtOH: none, previously a little  Illicits: none     FAMILY HISTORY  Mother - uterine cancer  Maternal grandmother - breast cancer  Maternal cousin - unknown metastatic cancer    HPI  Present with spouse for readiness to treat visit.  Only tired on Tuesday.  Feeling well today.  Today is a good day for her.  Still up and about.  Eating better now.  Denies  n/v/d.  Last BM yesterday.   BM daily to every other day.  Completed ATB yesterday for UTI.  Denies ASE.  Symptoms have resolved.   No fevers, chills, or CP.   EGD tomorrow.   No recent choking episodes.  No fevers, chills, or CP.  Labs WNL.  Ready for treatment.      Meds (Current):    Current Outpatient Medications:     ARIPiprazole (Abilify) 5 mg tablet, Take 1 tablet (5 mg) by mouth once daily., Disp: , Rfl:     Bystolic 5 mg tablet, Take 2 tablets (10 mg) by mouth once daily. Dose increased, Disp: , Rfl:     calcium carbonate 500 mg calcium (1,250 mg) chewable tablet, Chew 1 tablet (500 mg of elemental calcium) once daily., Disp: , Rfl:     cetirizine (ZyrTEC) 10 mg tablet, 1 tab(s) orally as needed, Disp: , Rfl:     cholecalciferol (Vitamin D-3) 25 MCG (1000 UT) capsule, 1 cap(s) orally three times daily, Disp: , Rfl:     docusate sodium (Colace) 100 mg capsule, once every 24 hours., Disp: , Rfl:     fluticasone (Flonase) 50 mcg/actuation nasal spray, Administer 2 sprays into affected nostril(s)., Disp: , Rfl:     levETIRAcetam (Keppra) 500 mg tablet, every 12 hours., Disp: , Rfl:     lidocaine-prilocaine (Emla) 2.5-2.5 % cream, Apply topically 1 time for 1 dose. Apply 30-45 minutes prior to port access., Disp: 30 g, Rfl: 3    LORazepam (Ativan) 0.5 mg tablet, Take half tablet 1 hour before MRI, if needed take other half of tablet while getting checked in for MRI, Disp: 1 tablet, Rfl: 0    losartan (Cozaar) 100 mg tablet, Take 0.5 tablets (50 mg) by mouth twice a day., Disp: , Rfl:     melatonin 3 mg tablet, Take by mouth once daily., Disp: , Rfl:     memantine (Namenda) 5 mg tablet, Take 1 tablet (5 mg) by mouth once daily., Disp: , Rfl:     mirtazapine (Remeron) 15 mg tablet, 1 tab(s) orally half tablet bedtime daily for 30 days, Disp: , Rfl:     omeprazole (PriLOSEC) 20 mg DR capsule, Take 1 capsule (20 mg) by mouth once daily., Disp: , Rfl:     ondansetron (Zofran) 4 mg tablet, Take 1 tablet (4 mg) by  mouth if needed for nausea or vomiting., Disp: , Rfl:     ondansetron (Zofran) 8 mg tablet, Take 1 tablet (8 mg) by mouth every 8 hours if needed for nausea or vomiting., Disp: 30 tablet, Rfl: 5    PlaqueniL 200 mg tablet, Take 1 tablet (200 mg) by mouth once daily., Disp: , Rfl:     potassium chloride CR 20 mEq ER tablet, Take 1 tablet (20 mEq) by mouth once daily., Disp: , Rfl:     Premarin 0.3 mg tablet, once every 24 hours., Disp: , Rfl:     prochlorperazine (Compazine) 10 mg tablet, Take 1 tablet (10 mg) by mouth every 6 hours if needed for nausea or vomiting., Disp: 30 tablet, Rfl: 5    sennosides (senna) 8.6 mg tablet, Take 1 tablet (8.6 mg) by mouth 2 times a day., Disp: 60 tablet, Rfl: 2    warfarin (Coumadin) 4 mg tablet, Take 1 tablet (4 mg) by mouth once daily., Disp: , Rfl:   No current facility-administered medications for this visit.    Facility-Administered Medications Ordered in Other Visits:     alteplase (Cathflo Activase) injection 2 mg, 2 mg, intra-catheter, PRN, Dipika Perrin MD    heparin flush 10 unit/mL syringe 50 Units, 50 Units, intra-catheter, PRDipika SHARPE MD    heparin flush 100 unit/mL syringe 500 Units, 500 Units, intra-catheter, PRDipika SHARPE MD    Allergies   Allergen Reactions    Propofol Nausea/vomiting    Sulfa (Sulfonamide Antibiotics) Other    Opioids - Morphine Analogues Nausea And Vomiting, Other and Unknown    Penicillins Unknown     Pt. Reports passing out upon taking penicillin when she was young.       Review of Systems   Constitutional:  Positive for fatigue. Negative for appetite change.   HENT:   Positive for trouble swallowing. Negative for sore throat.    Respiratory:  Negative for cough.    Cardiovascular: Negative.    Gastrointestinal:  Positive for constipation.   Genitourinary:  Negative for frequency.    Musculoskeletal: Negative.         Objective   BSA: 1.45 meters squared  Wt Readings from Last 5 Encounters:   06/23/25 47.9 kg (105 lb 8 oz)    06/16/25 48.1 kg (106 lb 0.7 oz)   06/09/25 48.1 kg (106 lb 0.7 oz)   06/02/25 48.5 kg (106 lb 14.4 oz)   05/27/25 49.5 kg (109 lb 0.3 oz)     BP 96/65 (BP Location: Left arm, Patient Position: Sitting, BP Cuff Size: Child)   Pulse 82   Temp 36.5 °C (97.7 °F) (Temporal)   Resp 18   Wt 47.9 kg (105 lb 8 oz)   SpO2 98%   BMI 19.22 kg/m²     ECOG Score: 2    0          Fully active; no performance restrictions.  1          Strenuous physical activity restricted; fully ambulatory and able to carry out light work.  2          Capable of all self-care but unable to carry out any work activities. Up and about >50% of waking hours.  3          Capable of only limited self-care; confined to bed or chair >50% of waking hours.  4          Completely disabled; cannot carry out any self-care; totally confined to bed or chair.     Physical Exam  Vitals reviewed.   Constitutional:       General: She is not in acute distress.     Appearance: Normal appearance.   HENT:      Head: Normocephalic.      Nose: Nose normal.      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        Mouth/Throat:      Mouth: Mucous membranes are moist.      Pharynx: Oropharynx is clear.   Eyes:      Extraocular Movements: Extraocular movements intact.      Conjunctiva/sclera: Conjunctivae normal.      Pupils: Pupils are equal, round, and reactive to light.   Cardiovascular:      Rate and Rhythm: Normal rate and regular rhythm.      Pulses: Normal pulses.      Heart sounds: Normal heart sounds. No murmur heard.  Pulmonary:      Effort: No respiratory distress.      Breath sounds: Normal breath sounds.   Abdominal:      General: Bowel sounds are normal.      Palpations: Abdomen is soft.   Musculoskeletal:         General: Normal range of motion.      Cervical back: Normal range of motion.   Skin:     General: Skin is warm and dry.   Neurological:      General: No focal deficit present.      Mental Status: She is alert and oriented to person,  place, and time.   Psychiatric:         Mood and Affect: Mood normal.         Behavior: Behavior normal.         Thought Content: Thought content normal.         Judgment: Judgment normal.          Results:  Labs:  Lab Results   Component Value Date    WBC 5.1 2025    HGB 10.7 (L) 2025    HCT 34.0 (L) 2025     (H) 2025     2025      Lab Results   Component Value Date    NEUTROABS 3.82 2025      Lab Results   Component Value Date    GLUCOSE 117 (H) 2025    CALCIUM 8.5 (L) 2025     2025    K 3.8 2025    CO2 27 2025     2025    BUN 14 2025    CREATININE 0.56 2025     Lab Results   Component Value Date    ALT 10 2025    AST 14 2025    ALKPHOS 57 2025    BILITOT 0.5 2025        Imagin25 CT CAP(+): IMPRESSION:  CHEST:  1. Stable prevascular nodule measuring 1.4 cm characterized as proteinaceous cysts in prior chest MRI dated 2024. No new enlarged intrathoracic lymphadenopathy or suspicious pulmonary nodules.  2. Stable aneurysmal dilatation of the ascending thoracic aorta measuring 4.6 cm and descending thoracic aorta measuring 3.2 cm.  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      ABDOMEN-PELVIS:  1. No metastatic disease in the abdomen or pelvis    25 CT sinus:  IMPRESSION:  Significant decrease in size/thickness of irregular/erosive right nasal mass. There is a similar degree of osseous destruction involving the right nose and maxillary region. No evidence of progressive disease or orbital invasion.    Pathology:    Lab Results   Component Value Date    ADD1  2024     Additional immunostains were reviewed.  Tumor cells are positive for p16 and negative for chromogranin and INSM1.  Isolated tumor cells are positive for  BerEP4.  The differential diagnosis remains unchanged.         Assessment/Plan      Amrita Grant is a 79 y.o. female here for recommendations and to establish care for poorly differentiated carcinoma of the face.     # Poorly differentiated carcinoma  - HPV+  - L1 biopsy proven metastasis - will send tempus for better delineation  - discussed the likelihood that this is metastatic, HPV+ nasopharyngeal carcinoma, although tempus may help us better determine this  - s/p palliative radiation  - discussed that typically would recommend combination therapy with chemotherapy+immunotherapy. However, given her lupus which was originally diagnosed with pericarditis, do not recommend immunotherapy. Also discussed that given her current poor performance status, do not believe she could tolerate dual chemotherapy  - discussed single-agent weekly paclitaxel, and consented. Plan to repeat scans after 3 cycles - CT scheduled 6/20/25    - will obtain new baseline images, as she has not had imaging since January 2025  - she would like to have her infusions closer to home at [place], but will continue to see us at [facility] for pre-treatment visits  - Pt agreeable to infusions and visits on same day at [facility].    - Update: Unable to obtain IV access after multiple attempts.  Existing mediport placement scheduled 4/2/25.  Infusion rescheduled to 4/7/25.    - 4/2/25 Mediport placed  - 6/20/25 CT CAP and sinus scans reflect favorable response to treatment.    - RTC 6/30/25 for C4D8.     # Lupus  - she follows with a rheumatologist at [facility], and will notify them we are starting chemotherapy    # Dysuria, urgency, frequency  - offered obtaining UA and urine culture, and she deferred to PCP  - 3/31:  Current 10-day ATB course.  Pt/family not able to recall medication name.  ATB started prior to her tx C1D1.  Tolerating ATB.  Reports up to 8 UTI's last year.  PCP (non- provider) - any further follow-up with PCP.    - 4/7:   Completed 10-day ATB course.  Denies current UTI symptoms.     - 6/16:  Current ATB Cipro BID x5 days (Rx provided at urgent care visit).  Pt instructed to notify [facility] and PCP of UTI/ATB course.    - 6/23:  ATB completed.  Reports symptoms resolved.      # Unintentional weight loss  - may be partly due to dysphagia  - oncology dietician referral in place   - wt loss has stabilized     # Fatigue (g1)  - Recommended she increase her physical activity level - during waking house, get up Q2-3H, walk within the home, pace activities.     # Constipation  - Senna too effective, pt to start routine Miralax - 1/2 capful to start and adjust as needed.    - Routine bowel meds with BM Q1-2 days    # Hypocalcemia 8.4 5/12/25  - Calcium listed on MAR, pt not taking med d/t pill size.  Recommended she start OTC calcium soft chew.   - stable Ca 8.5 6/23/25, pt declining OTC intervention     # Cough/PND   - Will add room humidifier.  Sleeps using one pillow, recommended she try sleeping with two pillows.    - Ongoing monitoring     # Constipation  - last BM Sat or Sunday.  Pt to take 2nd Miralax dose following call.  Start MOM with dose this evening.  If no BM by morning, 2nd dose and dose BID until BM.      # Rash/erythema to right cheek/forehead  - denies pruritus, apply lotion to area.    - improved    # Scattered macules to back of hands and wrist area (few)  - apply hydrocortisone cream BID, pt has within the home.    - improved    # Pancytopenia - chemo-induced - stable  - Weekly labs, ongoing monitoring    # INR/chronic Warfarin  - INR ordered for coumadin dosing, infusion nurse provided medical release form per facility protocol.  Pt follows with [facility] for dosing/monitoring.  INR to be draw via port with treatment labs, fax to [facility] [phone].  Goal INR 2.5-3.0.   - 4/14: INR 2.0 result faxed to [facility] 4/15/25  - 4/21:  Will monitor for INR result and fax to [facility]  4/22/25.   - 4/28:  Will monitor for INR result and fax to [facility] 4/29/25.   - Not enough blood in tube. Pt to [facility] for INR draw.  5/6:  Will monitor for INR result and fax to [facility].  INR 4.7, instructions per [facility].    5/12:  Will monitor for INR result and fax to [facility].   5/19:  Will monitor for INR result and fax to [facility].   5/27:  INR 8.8 - notified via secure chat by on-call MD.  Dr. Frankel was able to speak with pt.  This provider also spoke with pt with faxed results to [facility].   6/2:  spouse reports future INR labs will be drawn by [facility]. INR lab removed from treatment plan.      # EGD (scheduled 6/23/25)  - 6/17/25 TC from triage nurse to GI office with  message left - pt's INR is managed by [facility].  Cardiac approval should come from [facility].  Fax # provided.  Message with  on 6/18 by this provider with above info.  No return call received.       # Advanced care planning  - discussed incurable but treatable nature of disease, with goal to prolong life while maintaining/improving quality of life. She understands that risks of systemic therapy may outweigh benefits at some point in the future.

## 2025-06-23 NOTE — SIGNIFICANT EVENT

## 2025-06-24 ENCOUNTER — APPOINTMENT (OUTPATIENT)
Dept: RADIATION ONCOLOGY | Facility: CLINIC | Age: 80
End: 2025-06-24
Payer: MEDICARE

## 2025-06-24 ENCOUNTER — HOSPITAL ENCOUNTER (OUTPATIENT)
Dept: HOSPITAL 100 - EN | Age: 80
Discharge: HOME | End: 2025-06-24
Payer: MEDICARE

## 2025-06-24 VITALS
SYSTOLIC BLOOD PRESSURE: 107 MMHG | TEMPERATURE: 97.34 F | RESPIRATION RATE: 18 BRPM | HEART RATE: 70 BPM | OXYGEN SATURATION: 100 % | DIASTOLIC BLOOD PRESSURE: 79 MMHG

## 2025-06-24 VITALS
OXYGEN SATURATION: 98 % | DIASTOLIC BLOOD PRESSURE: 84 MMHG | RESPIRATION RATE: 18 BRPM | SYSTOLIC BLOOD PRESSURE: 107 MMHG | HEART RATE: 71 BPM

## 2025-06-24 VITALS
DIASTOLIC BLOOD PRESSURE: 79 MMHG | SYSTOLIC BLOOD PRESSURE: 107 MMHG | OXYGEN SATURATION: 100 % | HEART RATE: 79 BPM | RESPIRATION RATE: 16 BRPM | TEMPERATURE: 97.5 F

## 2025-06-24 VITALS
OXYGEN SATURATION: 100 % | HEART RATE: 70 BPM | SYSTOLIC BLOOD PRESSURE: 107 MMHG | DIASTOLIC BLOOD PRESSURE: 79 MMHG | TEMPERATURE: 97.34 F | RESPIRATION RATE: 18 BRPM

## 2025-06-24 VITALS — DIASTOLIC BLOOD PRESSURE: 79 MMHG | SYSTOLIC BLOOD PRESSURE: 107 MMHG

## 2025-06-24 VITALS
RESPIRATION RATE: 16 BRPM | DIASTOLIC BLOOD PRESSURE: 81 MMHG | SYSTOLIC BLOOD PRESSURE: 107 MMHG | HEART RATE: 68 BPM | OXYGEN SATURATION: 97 %

## 2025-06-24 VITALS
HEART RATE: 71 BPM | OXYGEN SATURATION: 96 % | SYSTOLIC BLOOD PRESSURE: 107 MMHG | TEMPERATURE: 98.78 F | RESPIRATION RATE: 16 BRPM | DIASTOLIC BLOOD PRESSURE: 79 MMHG

## 2025-06-24 VITALS
TEMPERATURE: 98.7 F | SYSTOLIC BLOOD PRESSURE: 126 MMHG | HEART RATE: 70 BPM | DIASTOLIC BLOOD PRESSURE: 86 MMHG | OXYGEN SATURATION: 97 % | RESPIRATION RATE: 16 BRPM

## 2025-06-24 VITALS — BODY MASS INDEX: 19.5 KG/M2

## 2025-06-24 DIAGNOSIS — Z79.01: ICD-10-CM

## 2025-06-24 DIAGNOSIS — R07.9: ICD-10-CM

## 2025-06-24 DIAGNOSIS — K21.9: ICD-10-CM

## 2025-06-24 DIAGNOSIS — B37.81: ICD-10-CM

## 2025-06-24 DIAGNOSIS — R13.12: ICD-10-CM

## 2025-06-24 DIAGNOSIS — K22.0: Primary | ICD-10-CM

## 2025-06-24 DIAGNOSIS — Z79.899: ICD-10-CM

## 2025-06-24 DIAGNOSIS — R63.4: ICD-10-CM

## 2025-06-24 DIAGNOSIS — I10: ICD-10-CM

## 2025-06-24 PROCEDURE — 43236 UPPR GI SCOPE W/SUBMUC INJ: CPT

## 2025-06-24 PROCEDURE — 85610 PROTHROMBIN TIME: CPT

## 2025-06-24 PROCEDURE — 0DJ08ZZ INSPECTION OF UPPER INTESTINAL TRACT, VIA NATURAL OR ARTIFICIAL OPENING ENDOSCOPIC: ICD-10-PCS | Performed by: INTERNAL MEDICINE

## 2025-06-24 PROCEDURE — 43239 EGD BIOPSY SINGLE/MULTIPLE: CPT

## 2025-06-24 PROCEDURE — 88305 TISSUE EXAM BY PATHOLOGIST: CPT

## 2025-06-24 PROCEDURE — A4216 STERILE WATER/SALINE, 10 ML: HCPCS

## 2025-06-24 PROCEDURE — 36416 COLLJ CAPILLARY BLOOD SPEC: CPT

## 2025-06-24 RX ADMIN — ONABOTULINUMTOXINA 100 UNITS: 100 INJECTION, POWDER, LYOPHILIZED, FOR SOLUTION INTRADERMAL; INTRAMUSCULAR at 15:08

## 2025-06-24 RX ADMIN — SODIUM CHLORIDE, PRESERVATIVE FREE 10 ML: 5 INJECTION INTRAVENOUS at 15:09

## 2025-06-24 RX ADMIN — SODIUM CHLORIDE, POTASSIUM CHLORIDE, SODIUM LACTATE AND CALCIUM CHLORIDE 15 ML: 600; 310; 30; 20 INJECTION, SOLUTION INTRAVENOUS at 14:14

## 2025-06-24 RX ADMIN — SODIUM CHLORIDE 10 ML: 9 INJECTION, SOLUTION INTRAMUSCULAR; INTRAVENOUS; SUBCUTANEOUS at 15:08

## 2025-06-25 ENCOUNTER — APPOINTMENT (OUTPATIENT)
Dept: RADIATION ONCOLOGY | Facility: CLINIC | Age: 80
End: 2025-06-25
Payer: MEDICARE

## 2025-06-26 ENCOUNTER — APPOINTMENT (OUTPATIENT)
Dept: RADIATION ONCOLOGY | Facility: CLINIC | Age: 80
End: 2025-06-26
Payer: MEDICARE

## 2025-06-26 LAB
INR FINGERSTICK: 1.2
PT BLD: 14.3 SEC (ref 11.7–14.9)

## 2025-06-27 ENCOUNTER — APPOINTMENT (OUTPATIENT)
Dept: RADIATION ONCOLOGY | Facility: CLINIC | Age: 80
End: 2025-06-27
Payer: MEDICARE

## 2025-06-30 ENCOUNTER — OFFICE VISIT (OUTPATIENT)
Dept: HEMATOLOGY/ONCOLOGY | Facility: CLINIC | Age: 80
End: 2025-06-30
Payer: MEDICARE

## 2025-06-30 ENCOUNTER — INFUSION (OUTPATIENT)
Dept: HEMATOLOGY/ONCOLOGY | Facility: CLINIC | Age: 80
End: 2025-06-30
Payer: MEDICARE

## 2025-06-30 ENCOUNTER — APPOINTMENT (OUTPATIENT)
Dept: RADIATION ONCOLOGY | Facility: CLINIC | Age: 80
End: 2025-06-30
Payer: MEDICARE

## 2025-06-30 ENCOUNTER — LAB (OUTPATIENT)
Dept: HEMATOLOGY/ONCOLOGY | Facility: CLINIC | Age: 80
End: 2025-06-30
Payer: MEDICARE

## 2025-06-30 VITALS
HEART RATE: 74 BPM | BODY MASS INDEX: 19.09 KG/M2 | TEMPERATURE: 98.4 F | RESPIRATION RATE: 16 BRPM | OXYGEN SATURATION: 97 % | WEIGHT: 104.8 LBS | SYSTOLIC BLOOD PRESSURE: 92 MMHG | DIASTOLIC BLOOD PRESSURE: 61 MMHG

## 2025-06-30 DIAGNOSIS — C44.92 SCC (SQUAMOUS CELL CARCINOMA): ICD-10-CM

## 2025-06-30 DIAGNOSIS — C44.92 SCC (SQUAMOUS CELL CARCINOMA): Primary | ICD-10-CM

## 2025-06-30 LAB
ALBUMIN SERPL BCP-MCNC: 3.7 G/DL (ref 3.4–5)
ALP SERPL-CCNC: 52 U/L (ref 33–136)
ALT SERPL W P-5'-P-CCNC: 14 U/L (ref 7–45)
ANION GAP SERPL CALC-SCNC: 8 MMOL/L (ref 10–20)
AST SERPL W P-5'-P-CCNC: 15 U/L (ref 9–39)
BASOPHILS # BLD AUTO: 0.04 X10*3/UL (ref 0–0.1)
BASOPHILS NFR BLD AUTO: 0.9 %
BILIRUB SERPL-MCNC: 0.4 MG/DL (ref 0–1.2)
BUN SERPL-MCNC: 18 MG/DL (ref 6–23)
CALCIUM SERPL-MCNC: 8.5 MG/DL (ref 8.6–10.3)
CHLORIDE SERPL-SCNC: 107 MMOL/L (ref 98–107)
CO2 SERPL-SCNC: 26 MMOL/L (ref 21–32)
CREAT SERPL-MCNC: 0.71 MG/DL (ref 0.5–1.05)
EGFRCR SERPLBLD CKD-EPI 2021: 87 ML/MIN/1.73M*2
EOSINOPHIL # BLD AUTO: 0.05 X10*3/UL (ref 0–0.4)
EOSINOPHIL NFR BLD AUTO: 1.2 %
ERYTHROCYTE [DISTWIDTH] IN BLOOD BY AUTOMATED COUNT: 14.4 % (ref 11.5–14.5)
GLUCOSE SERPL-MCNC: 103 MG/DL (ref 74–99)
HCT VFR BLD AUTO: 32.7 % (ref 36–46)
HGB BLD-MCNC: 10.3 G/DL (ref 12–16)
IMM GRANULOCYTES # BLD AUTO: 0.01 X10*3/UL (ref 0–0.5)
IMM GRANULOCYTES NFR BLD AUTO: 0.2 % (ref 0–0.9)
LYMPHOCYTES # BLD AUTO: 0.47 X10*3/UL (ref 0.8–3)
LYMPHOCYTES NFR BLD AUTO: 10.9 %
MCH RBC QN AUTO: 33.3 PG (ref 26–34)
MCHC RBC AUTO-ENTMCNC: 31.5 G/DL (ref 32–36)
MCV RBC AUTO: 106 FL (ref 80–100)
MONOCYTES # BLD AUTO: 0.37 X10*3/UL (ref 0.05–0.8)
MONOCYTES NFR BLD AUTO: 8.6 %
NEUTROPHILS # BLD AUTO: 3.36 X10*3/UL (ref 1.6–5.5)
NEUTROPHILS NFR BLD AUTO: 78.2 %
NRBC BLD-RTO: ABNORMAL /100{WBCS}
PLATELET # BLD AUTO: 212 X10*3/UL (ref 150–450)
POTASSIUM SERPL-SCNC: 4.2 MMOL/L (ref 3.5–5.3)
PROT SERPL-MCNC: 5.9 G/DL (ref 6.4–8.2)
RBC # BLD AUTO: 3.09 X10*6/UL (ref 4–5.2)
SODIUM SERPL-SCNC: 137 MMOL/L (ref 136–145)
WBC # BLD AUTO: 4.3 X10*3/UL (ref 4.4–11.3)

## 2025-06-30 PROCEDURE — G2211 COMPLEX E/M VISIT ADD ON: HCPCS | Performed by: STUDENT IN AN ORGANIZED HEALTH CARE EDUCATION/TRAINING PROGRAM

## 2025-06-30 PROCEDURE — 1159F MED LIST DOCD IN RCRD: CPT | Performed by: STUDENT IN AN ORGANIZED HEALTH CARE EDUCATION/TRAINING PROGRAM

## 2025-06-30 PROCEDURE — 2500000004 HC RX 250 GENERAL PHARMACY W/ HCPCS (ALT 636 FOR OP/ED): Performed by: STUDENT IN AN ORGANIZED HEALTH CARE EDUCATION/TRAINING PROGRAM

## 2025-06-30 PROCEDURE — 99215 OFFICE O/P EST HI 40 MIN: CPT | Performed by: STUDENT IN AN ORGANIZED HEALTH CARE EDUCATION/TRAINING PROGRAM

## 2025-06-30 PROCEDURE — 84075 ASSAY ALKALINE PHOSPHATASE: CPT

## 2025-06-30 PROCEDURE — 1036F TOBACCO NON-USER: CPT | Performed by: STUDENT IN AN ORGANIZED HEALTH CARE EDUCATION/TRAINING PROGRAM

## 2025-06-30 PROCEDURE — 1126F AMNT PAIN NOTED NONE PRSNT: CPT | Performed by: STUDENT IN AN ORGANIZED HEALTH CARE EDUCATION/TRAINING PROGRAM

## 2025-06-30 PROCEDURE — 85025 COMPLETE CBC W/AUTO DIFF WBC: CPT

## 2025-06-30 PROCEDURE — 99215 OFFICE O/P EST HI 40 MIN: CPT | Mod: 25 | Performed by: STUDENT IN AN ORGANIZED HEALTH CARE EDUCATION/TRAINING PROGRAM

## 2025-06-30 PROCEDURE — 96360 HYDRATION IV INFUSION INIT: CPT | Mod: INF

## 2025-06-30 RX ORDER — HEPARIN 100 UNIT/ML
500 SYRINGE INTRAVENOUS AS NEEDED
OUTPATIENT
Start: 2025-06-30

## 2025-06-30 RX ORDER — HEPARIN 100 UNIT/ML
500 SYRINGE INTRAVENOUS AS NEEDED
Status: DISCONTINUED | OUTPATIENT
Start: 2025-06-30 | End: 2025-06-30 | Stop reason: HOSPADM

## 2025-06-30 RX ORDER — HEPARIN SODIUM,PORCINE/PF 10 UNIT/ML
50 SYRINGE (ML) INTRAVENOUS AS NEEDED
OUTPATIENT
Start: 2025-06-30

## 2025-06-30 RX ADMIN — HEPARIN 500 UNITS: 100 SYRINGE at 12:48

## 2025-06-30 RX ADMIN — SODIUM CHLORIDE 1000 ML: 0.9 INJECTION, SOLUTION INTRAVENOUS at 11:46

## 2025-06-30 ASSESSMENT — ENCOUNTER SYMPTOMS
FATIGUE: 1
MUSCULOSKELETAL NEGATIVE: 1
CONSTIPATION: 1
FREQUENCY: 0
SORE THROAT: 0
TROUBLE SWALLOWING: 1
CARDIOVASCULAR NEGATIVE: 1
COUGH: 0
APPETITE CHANGE: 0

## 2025-06-30 ASSESSMENT — PAIN SCALES - GENERAL: PAINLEVEL_OUTOF10: 0-NO PAIN

## 2025-06-30 NOTE — PROGRESS NOTES
[facility] - Medical Oncology Follow-Up Visit    Patient ID: Amrita Grant is a 80 y.o. female.  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 Grant is a 78 yo with PMH significant for lupus, aortic valve repair, fibromyalgia, who first noticed a mass on her nose in fall of 2024. Met Dr. Templeton who did an in-office biopsy 11/11/24 with invasive, poorly differentiated carcinoma, p40+, negative MOC31, unclear etiology, and recommended for repeat biopsy. Biopsy on 11/26/24 with the same, felt to be a primary cutaneous neoplasm such as basal cell carcinoma. Discussed at [facility] tumor board, and due to significant morbidity associated with resection, recommended against surgery and referred to radiation oncology and medical oncology. Met Dr. Guzmán, who recommended against immunotherapy given her lupus and poor performance status, and recommended discussion with radiation oncology, which cutaneous tumor board also agreed with. She planned to have radiation closer to home, but unfortunately her pre-radiation scans showed concern for metastatic disease, with PET/CT on 1/20/25 with FDG avidity of the R nasal soft tissue as well as R level 1 cervical lymph node, R rights, L1, and RUL nodule below PET resolution. She was ultimately started on vismodegib as well as started on palliative radiation (2/18-3/11/25) for the nasal lesion. In the meantime, [facility]  pathology revealed invasive carcinoma, HPV associated, positive for p40 CK5/6, EMA, p16, and negative for MOC, BerEP4, INSM1, and chromogranin. As this was felt to no longer be a basal cell carcinoma, she stopped the vismodegib. Biopsy of the L1 lesion morphologically the same.     Regarding her lupus - she has been on plaquenil monotherapy for many years, stable on the same dose. She was originally diagnosed with lupus in 2001 - she was initially diagnosed with pericarditis. She has fatigue and joint aches with the lupus.     She is here today with her  and friend. Her last day of radiation was last Tuesday or Wednesday. Radiation went ok overall. Her last dose of the visdomegib was last Tuesday or Wednesday. No side effects.  She is very tired, naps every day. In the last year, she has stopped doing the cooking, cleaning, and laundry. She is very careful, leans on her  to help her get up the stairs. Her fatigue has been getting worse and worse. She continues to lose weight - 50 lbs in the last year and a half. She has trouble swallowing - some things are harder to swallow like bread, meat. Gets tickles in her throat. She's had this for a couple years, had her throat stretched - this was also just repeated in December. Done at [facility] typically, helps for a little while, but don't last.      PAST MEDICAL HISTORY  Lupus  Fibromyalgia  Osteoarthritis  Aortic valve repair (mechanical)  on warfarin  HTN  CVA - (2021)     SOCIAL HISTORY  Lives at home with her . They've been  45 years. Previously was a  for [employer].   Tob: none  EtOH: none, previously a little  Illicits: none     FAMILY HISTORY  Mother - uterine cancer  Maternal grandmother - breast cancer  Maternal cousin - unknown metastatic cancer    HPI  She is here today with her . She just had her esophagus dilated last Tuesday - swallowing is easier now, keeps getting easier and easier. No pain with swallowing. No  neuropathy. She's been very tired all week since the procedure last Tuesday - not feeling like doing anything.    Meds (Current):    Current Outpatient Medications:     ARIPiprazole (Abilify) 5 mg tablet, Take 1 tablet (5 mg) by mouth once daily., Disp: , Rfl:     Bystolic 5 mg tablet, Take 2 tablets (10 mg) by mouth once daily. Dose increased, Disp: , Rfl:     calcium carbonate 500 mg calcium (1,250 mg) chewable tablet, Chew 1 tablet (500 mg of elemental calcium) once daily., Disp: , Rfl:     cetirizine (ZyrTEC) 10 mg tablet, 1 tab(s) orally as needed, Disp: , Rfl:     cholecalciferol (Vitamin D-3) 25 MCG (1000 UT) capsule, 1 cap(s) orally three times daily, Disp: , Rfl:     docusate sodium (Colace) 100 mg capsule, once every 24 hours., Disp: , Rfl:     fluticasone (Flonase) 50 mcg/actuation nasal spray, Administer 2 sprays into affected nostril(s)., Disp: , Rfl:     levETIRAcetam (Keppra) 500 mg tablet, every 12 hours., Disp: , Rfl:     lidocaine-prilocaine (Emla) 2.5-2.5 % cream, Apply topically 1 time for 1 dose. Apply 30-45 minutes prior to port access., Disp: 30 g, Rfl: 3    LORazepam (Ativan) 0.5 mg tablet, Take half tablet 1 hour before MRI, if needed take other half of tablet while getting checked in for MRI, Disp: 1 tablet, Rfl: 0    losartan (Cozaar) 100 mg tablet, Take 0.5 tablets (50 mg) by mouth twice a day., Disp: , Rfl:     melatonin 3 mg tablet, Take by mouth once daily., Disp: , Rfl:     memantine (Namenda) 5 mg tablet, Take 1 tablet (5 mg) by mouth once daily., Disp: , Rfl:     mirtazapine (Remeron) 15 mg tablet, 1 tab(s) orally half tablet bedtime daily for 30 days, Disp: , Rfl:     omeprazole (PriLOSEC) 20 mg DR capsule, Take 1 capsule (20 mg) by mouth once daily., Disp: , Rfl:     ondansetron (Zofran) 4 mg tablet, Take 1 tablet (4 mg) by mouth if needed for nausea or vomiting., Disp: , Rfl:     ondansetron (Zofran) 8 mg tablet, Take 1 tablet (8 mg) by mouth every 8 hours if needed for nausea or  vomiting., Disp: 30 tablet, Rfl: 5    PlaqueniL 200 mg tablet, Take 1 tablet (200 mg) by mouth once daily., Disp: , Rfl:     potassium chloride CR 20 mEq ER tablet, Take 1 tablet (20 mEq) by mouth once daily., Disp: , Rfl:     Premarin 0.3 mg tablet, once every 24 hours., Disp: , Rfl:     prochlorperazine (Compazine) 10 mg tablet, Take 1 tablet (10 mg) by mouth every 6 hours if needed for nausea or vomiting., Disp: 30 tablet, Rfl: 5    sennosides (senna) 8.6 mg tablet, Take 1 tablet (8.6 mg) by mouth 2 times a day., Disp: 60 tablet, Rfl: 2    warfarin (Coumadin) 4 mg tablet, Take 1 tablet (4 mg) by mouth once daily., Disp: , Rfl:     Allergies   Allergen Reactions    Propofol Nausea/vomiting    Sulfa (Sulfonamide Antibiotics) Other    Opioids - Morphine Analogues Nausea And Vomiting, Other and Unknown    Penicillins Unknown     Pt. Reports passing out upon taking penicillin when she was young.       Review of Systems   Constitutional:  Positive for fatigue. Negative for appetite change.   HENT:   Positive for trouble swallowing. Negative for sore throat.    Respiratory:  Negative for cough.    Cardiovascular: Negative.    Gastrointestinal:  Positive for constipation.   Genitourinary:  Negative for frequency.    Musculoskeletal: Negative.         Objective   BSA: 1.44 meters squared  Wt Readings from Last 5 Encounters:   06/30/25 47.5 kg (104 lb 12.8 oz)   06/23/25 47.9 kg (105 lb 8 oz)   06/16/25 48.1 kg (106 lb 0.7 oz)   06/09/25 48.1 kg (106 lb 0.7 oz)   06/02/25 48.5 kg (106 lb 14.4 oz)     BP 92/61 (BP Location: Right arm, Patient Position: Sitting)   Pulse 74   Temp 36.9 °C (98.4 °F) (Temporal)   Resp 16   Wt 47.5 kg (104 lb 12.8 oz)   SpO2 97%   BMI 19.09 kg/m²     ECOG Score: 2    0          Fully active; no performance restrictions.  1          Strenuous physical activity restricted; fully ambulatory and able to carry out light work.  2          Capable of all self-care but unable to carry out any work  activities. Up and about >50% of waking hours.  3          Capable of only limited self-care; confined to bed or chair >50% of waking hours.  4          Completely disabled; cannot carry out any self-care; totally confined to bed or chair.     Physical Exam  Vitals reviewed.   Constitutional:       General: She is not in acute distress.     Appearance: Normal appearance.   HENT:      Head: Normocephalic.      Nose: Nose normal.      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        Mouth/Throat:      Mouth: Mucous membranes are moist.      Pharynx: Oropharynx is clear.   Eyes:      Extraocular Movements: Extraocular movements intact.      Conjunctiva/sclera: Conjunctivae normal.      Pupils: Pupils are equal, round, and reactive to light.   Cardiovascular:      Rate and Rhythm: Normal rate and regular rhythm.      Pulses: Normal pulses.      Heart sounds: Normal heart sounds. No murmur heard.  Pulmonary:      Effort: No respiratory distress.      Breath sounds: Normal breath sounds.   Abdominal:      General: Bowel sounds are normal.      Palpations: Abdomen is soft.   Musculoskeletal:         General: Normal range of motion.      Cervical back: Normal range of motion.   Skin:     General: Skin is warm and dry.   Neurological:      General: No focal deficit present.      Mental Status: She is alert and oriented to person, place, and time.   Psychiatric:         Mood and Affect: Mood normal.         Behavior: Behavior normal.         Thought Content: Thought content normal.         Judgment: Judgment normal.        Results:  Labs:  Lab Results   Component Value Date    WBC 5.1 06/23/2025    HGB 10.7 (L) 06/23/2025    HCT 34.0 (L) 06/23/2025     (H) 06/23/2025     06/23/2025      Lab Results   Component Value Date    NEUTROABS 3.82 06/23/2025      Lab Results   Component Value Date    GLUCOSE 117 (H) 06/23/2025    CALCIUM 8.5 (L) 06/23/2025     06/23/2025    K 3.8 06/23/2025    CO2 27  2025     2025    BUN 14 2025    CREATININE 0.56 2025     Lab Results   Component Value Date    ALT 10 2025    AST 14 2025    ALKPHOS 57 2025    BILITOT 0.5 2025        Imagin25 CT CAP(+): IMPRESSION:  CHEST:  1. Stable prevascular nodule measuring 1.4 cm characterized as proteinaceous cysts in prior chest MRI dated 2024. No new enlarged intrathoracic lymphadenopathy or suspicious pulmonary nodules.  2. Stable aneurysmal dilatation of the ascending thoracic aorta measuring 4.6 cm and descending thoracic aorta measuring 3.2 cm.  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      ABDOMEN-PELVIS:  1. No metastatic disease in the abdomen or pelvis    25 CT sinus:  IMPRESSION:  Significant decrease in size/thickness of irregular/erosive right nasal mass. There is a similar degree of osseous destruction involving the right nose and maxillary region. No evidence of progressive disease or orbital invasion.    Pathology:    Lab Results   Component Value Date    ADD1  2024     Additional immunostains were reviewed.  Tumor cells are positive for p16 and negative for chromogranin and INSM1.  Isolated tumor cells are positive for BerEP4.  The differential diagnosis remains unchanged.         Assessment/Plan      Amrita Grant is a 80 y.o. female here for recommendations and to establish care for poorly differentiated carcinoma of the face.     # Poorly differentiated carcinoma  - HPV+  - L1 biopsy proven metastasis - will send tempus for better delineation  - discussed the likelihood that this is metastatic, HPV+ nasopharyngeal carcinoma, although tempus may help us better determine this  - s/p palliative radiation  - discussed that typically would recommend combination therapy with chemotherapy+immunotherapy.  However, given her lupus which was originally diagnosed with pericarditis, do not recommend immunotherapy. Also discussed that given her current poor performance status, do not believe she could tolerate dual chemotherapy  - discussed single-agent weekly paclitaxel, and consented. Plan to repeat scans after 3 cycles - CT scheduled 6/20/25    - will obtain new baseline images, as she has not had imaging since January 2025  - she would like to have her infusions closer to home at [facility], but will continue to see us at [facility] for pre-treatment visits  - Pt agreeable to infusions and visits on same day at [facility].    - Update: Unable to obtain IV access after multiple attempts.  Existing mediport placement scheduled 4/2/25.  Infusion rescheduled to 4/7/25.    - 4/2/25 Mediport placed  - personally reviewed 6/20/25 CT CAP and sinus scans reflect favorable response to treatment.    - will hold today's treatment due to fatigue and resume next week. Will give 1L NS instead.  - next scans after C6 of treatment  - RTC next week    # Lupus  - she follows with a rheumatologist at [facility], and will notify them we are starting chemotherapy    # Dysuria, urgency, frequency  - offered obtaining UA and urine culture, and she deferred to PCP  - 3/31:  Current 10-day ATB course.  Pt/family not able to recall medication name.  ATB started prior to her tx C1D1.  Tolerating ATB.  Reports up to 8 UTI's last year.  PCP (non- provider) - any further follow-up with PCP.    - 4/7:  Completed 10-day ATB course.  Denies current UTI symptoms.     - 6/16:  Current ATB Cipro BID x5 days (Rx provided at urgent care visit).  Pt instructed to notify [facility] and PCP of UTI/ATB course.    - 6/23:  ATB completed.  Reports symptoms resolved.      # Unintentional weight loss  - may be partly due to dysphagia  - oncology dietician referral in place   - wt loss has stabilized     # Fatigue (g1)  - Recommended she increase her  physical activity level - during waking house, get up Q2-3H, walk within the home, pace activities.     # Constipation  - Senna too effective, pt to start routine Miralax - 1/2 capful to start and adjust as needed.    - Routine bowel meds with BM Q1-2 days    # Hypocalcemia 8.4 5/12/25  - Calcium listed on MAR, pt not taking med d/t pill size.  Recommended she start OTC calcium soft chew.   - stable Ca 8.5 6/23/25, pt declining OTC intervention     # Cough/PND   - Will add room humidifier.  Sleeps using one pillow, recommended she try sleeping with two pillows.    - Ongoing monitoring     # Constipation  - last BM Sat or Sunday.  Pt to take 2nd Miralax dose following call.  Start MOM with dose this evening.  If no BM by morning, 2nd dose and dose BID until BM.      # Rash/erythema to right cheek/forehead  - denies pruritus, apply lotion to area.    - improved    # Scattered macules to back of hands and wrist area (few)  - apply hydrocortisone cream BID, pt has within the home.    - improved    # Pancytopenia - chemo-induced - stable  - Weekly labs, ongoing monitoring    # INR/chronic Warfarin  - INR ordered for coumadin dosing, infusion nurse provided medical release form per facility protocol.  Pt follows with [facility] for dosing/monitoring.  INR to be draw via port with treatment labs, fax to [facility] [phone].  Goal INR 2.5-3.0.   - 4/14: INR 2.0 result faxed to [facility] 4/15/25  - 4/21:  Will monitor for INR result and fax to [facility] 4/22/25.   - 4/28:  Will monitor for INR result and fax to [facility] 4/29/25.   - Not enough blood in tube. Pt to [facility] for INR draw.  5/6:  Will monitor for INR result and fax to [facility].  INR 4.7, instructions per [facility].    5/12:  Will monitor for INR result and fax to [facility].   5/19:  Will monitor for INR result and fax to [facility].   5/27:  INR 8.8 - notified via secure chat by on-call MD. Gro[place]  Jostin was able to speak with pt.  This provider also spoke with pt with faxed results to [facility].   6/2:  spouse reports future INR labs will be drawn by [facility]. INR lab removed from treatment plan.      # EGD (scheduled 6/23/25)  - 6/17/25 TC from triage nurse to GI office with VM message left - pt's INR is managed by [facility].  Cardiac approval should come from [facility].  Fax # provided.  Message with VM on 6/18 by this provider with above info.  No return call received.       # Advanced care planning  - discussed incurable but treatable nature of disease, with goal to prolong life while maintaining/improving quality of life. She understands that risks of systemic therapy may outweigh benefits at some point in the future.

## 2025-07-01 ENCOUNTER — APPOINTMENT (OUTPATIENT)
Dept: RADIATION ONCOLOGY | Facility: CLINIC | Age: 80
End: 2025-07-01
Payer: MEDICARE

## 2025-07-01 ENCOUNTER — SOCIAL WORK (OUTPATIENT)
Dept: CASE MANAGEMENT | Facility: HOSPITAL | Age: 80
End: 2025-07-01
Payer: MEDICARE

## 2025-07-01 NOTE — PROGRESS NOTES
Social Work Note  7/1/2025 SW was asked to talk with patient and/or  regarding transportation from [place] to [place].  They both stated it is getting hard to drive so much.  SW let them know that there is not any transportation that will get them here from their home.  SW did let them know that they should talk with the MDs about moving her care before it does become too much.  SW also offered that they can call this writer and SW will let the MDs know.  MASSIMO did sent Dr Guzmán and Matt a secure chat that the patient and  may be wanting this to happen.  SW will remain available to assist patient.  LUDA Jarrell, [facility] [phone]

## 2025-07-02 ENCOUNTER — APPOINTMENT (OUTPATIENT)
Dept: RADIATION ONCOLOGY | Facility: CLINIC | Age: 80
End: 2025-07-02
Payer: MEDICARE

## 2025-07-03 ENCOUNTER — APPOINTMENT (OUTPATIENT)
Dept: RADIATION ONCOLOGY | Facility: CLINIC | Age: 80
End: 2025-07-03
Payer: MEDICARE

## 2025-07-07 ENCOUNTER — OFFICE VISIT (OUTPATIENT)
Dept: HEMATOLOGY/ONCOLOGY | Facility: CLINIC | Age: 80
End: 2025-07-07
Payer: MEDICARE

## 2025-07-07 ENCOUNTER — LAB (OUTPATIENT)
Dept: HEMATOLOGY/ONCOLOGY | Facility: CLINIC | Age: 80
End: 2025-07-07
Payer: MEDICARE

## 2025-07-07 ENCOUNTER — INFUSION (OUTPATIENT)
Dept: HEMATOLOGY/ONCOLOGY | Facility: CLINIC | Age: 80
End: 2025-07-07
Payer: MEDICARE

## 2025-07-07 ENCOUNTER — APPOINTMENT (OUTPATIENT)
Dept: RADIATION ONCOLOGY | Facility: CLINIC | Age: 80
End: 2025-07-07
Payer: MEDICARE

## 2025-07-07 ENCOUNTER — LAB (OUTPATIENT)
Dept: LAB | Facility: CLINIC | Age: 80
End: 2025-07-07
Payer: MEDICARE

## 2025-07-07 VITALS
WEIGHT: 107 LBS | OXYGEN SATURATION: 97 % | RESPIRATION RATE: 18 BRPM | BODY MASS INDEX: 19.49 KG/M2 | TEMPERATURE: 97.3 F | DIASTOLIC BLOOD PRESSURE: 78 MMHG | HEART RATE: 83 BPM | SYSTOLIC BLOOD PRESSURE: 123 MMHG

## 2025-07-07 DIAGNOSIS — C44.92 SCC (SQUAMOUS CELL CARCINOMA): ICD-10-CM

## 2025-07-07 DIAGNOSIS — C44.92 SCC (SQUAMOUS CELL CARCINOMA): Primary | ICD-10-CM

## 2025-07-07 DIAGNOSIS — Z51.11 ENCOUNTER FOR ANTINEOPLASTIC CHEMOTHERAPY: ICD-10-CM

## 2025-07-07 LAB
ALBUMIN SERPL BCP-MCNC: 3.9 G/DL (ref 3.4–5)
ALP SERPL-CCNC: 57 U/L (ref 33–136)
ALT SERPL W P-5'-P-CCNC: 11 U/L (ref 7–45)
ANION GAP SERPL CALC-SCNC: 9 MMOL/L (ref 10–20)
AST SERPL W P-5'-P-CCNC: 15 U/L (ref 9–39)
BASOPHILS # BLD AUTO: 0.03 X10*3/UL (ref 0–0.1)
BASOPHILS NFR BLD AUTO: 0.7 %
BILIRUB SERPL-MCNC: 0.4 MG/DL (ref 0–1.2)
BUN SERPL-MCNC: 14 MG/DL (ref 6–23)
CALCIUM SERPL-MCNC: 8.8 MG/DL (ref 8.6–10.3)
CHLORIDE SERPL-SCNC: 107 MMOL/L (ref 98–107)
CO2 SERPL-SCNC: 26 MMOL/L (ref 21–32)
CREAT SERPL-MCNC: 0.6 MG/DL (ref 0.5–1.05)
EGFRCR SERPLBLD CKD-EPI 2021: >90 ML/MIN/1.73M*2
EOSINOPHIL # BLD AUTO: 0.07 X10*3/UL (ref 0–0.4)
EOSINOPHIL NFR BLD AUTO: 1.7 %
ERYTHROCYTE [DISTWIDTH] IN BLOOD BY AUTOMATED COUNT: 14.4 % (ref 11.5–14.5)
GLUCOSE SERPL-MCNC: 91 MG/DL (ref 74–99)
HCT VFR BLD AUTO: 33.6 % (ref 36–46)
HGB BLD-MCNC: 10.6 G/DL (ref 12–16)
IMM GRANULOCYTES # BLD AUTO: 0 X10*3/UL (ref 0–0.5)
IMM GRANULOCYTES NFR BLD AUTO: 0 % (ref 0–0.9)
LYMPHOCYTES # BLD AUTO: 0.45 X10*3/UL (ref 0.8–3)
LYMPHOCYTES NFR BLD AUTO: 10.6 %
MCH RBC QN AUTO: 33.1 PG (ref 26–34)
MCHC RBC AUTO-ENTMCNC: 31.5 G/DL (ref 32–36)
MCV RBC AUTO: 105 FL (ref 80–100)
MONOCYTES # BLD AUTO: 0.54 X10*3/UL (ref 0.05–0.8)
MONOCYTES NFR BLD AUTO: 12.8 %
NEUTROPHILS # BLD AUTO: 3.14 X10*3/UL (ref 1.6–5.5)
NEUTROPHILS NFR BLD AUTO: 74.2 %
NRBC BLD-RTO: ABNORMAL /100{WBCS}
PLATELET # BLD AUTO: 192 X10*3/UL (ref 150–450)
POTASSIUM SERPL-SCNC: 3.9 MMOL/L (ref 3.5–5.3)
PROT SERPL-MCNC: 6.1 G/DL (ref 6.4–8.2)
RBC # BLD AUTO: 3.2 X10*6/UL (ref 4–5.2)
SODIUM SERPL-SCNC: 138 MMOL/L (ref 136–145)
WBC # BLD AUTO: 4.2 X10*3/UL (ref 4.4–11.3)

## 2025-07-07 PROCEDURE — 80053 COMPREHEN METABOLIC PANEL: CPT

## 2025-07-07 PROCEDURE — 1036F TOBACCO NON-USER: CPT | Performed by: NURSE PRACTITIONER

## 2025-07-07 PROCEDURE — 99214 OFFICE O/P EST MOD 30 MIN: CPT | Mod: 25 | Performed by: NURSE PRACTITIONER

## 2025-07-07 PROCEDURE — G2211 COMPLEX E/M VISIT ADD ON: HCPCS | Performed by: NURSE PRACTITIONER

## 2025-07-07 PROCEDURE — 2500000004 HC RX 250 GENERAL PHARMACY W/ HCPCS (ALT 636 FOR OP/ED): Performed by: NURSE PRACTITIONER

## 2025-07-07 PROCEDURE — 2500000004 HC RX 250 GENERAL PHARMACY W/ HCPCS (ALT 636 FOR OP/ED): Performed by: STUDENT IN AN ORGANIZED HEALTH CARE EDUCATION/TRAINING PROGRAM

## 2025-07-07 PROCEDURE — 99214 OFFICE O/P EST MOD 30 MIN: CPT | Performed by: NURSE PRACTITIONER

## 2025-07-07 PROCEDURE — 96413 CHEMO IV INFUSION 1 HR: CPT

## 2025-07-07 PROCEDURE — 85025 COMPLETE CBC W/AUTO DIFF WBC: CPT

## 2025-07-07 PROCEDURE — 1126F AMNT PAIN NOTED NONE PRSNT: CPT | Performed by: NURSE PRACTITIONER

## 2025-07-07 PROCEDURE — 96375 TX/PRO/DX INJ NEW DRUG ADDON: CPT | Mod: INF

## 2025-07-07 PROCEDURE — 2500000001 HC RX 250 WO HCPCS SELF ADMINISTERED DRUGS (ALT 637 FOR MEDICARE OP): Performed by: NURSE PRACTITIONER

## 2025-07-07 PROCEDURE — 1159F MED LIST DOCD IN RCRD: CPT | Performed by: NURSE PRACTITIONER

## 2025-07-07 RX ORDER — DIPHENHYDRAMINE HYDROCHLORIDE 50 MG/ML
50 INJECTION, SOLUTION INTRAMUSCULAR; INTRAVENOUS AS NEEDED
Status: DISCONTINUED | OUTPATIENT
Start: 2025-07-07 | End: 2025-07-07 | Stop reason: HOSPADM

## 2025-07-07 RX ORDER — FAMOTIDINE 10 MG/ML
20 INJECTION, SOLUTION INTRAVENOUS ONCE
Status: COMPLETED | OUTPATIENT
Start: 2025-07-07 | End: 2025-07-07

## 2025-07-07 RX ORDER — HEPARIN 100 UNIT/ML
500 SYRINGE INTRAVENOUS AS NEEDED
Status: DISCONTINUED | OUTPATIENT
Start: 2025-07-07 | End: 2025-07-07 | Stop reason: HOSPADM

## 2025-07-07 RX ORDER — HEPARIN SODIUM,PORCINE/PF 10 UNIT/ML
50 SYRINGE (ML) INTRAVENOUS AS NEEDED
OUTPATIENT
Start: 2025-07-07

## 2025-07-07 RX ORDER — EPINEPHRINE 0.3 MG/.3ML
0.3 INJECTION SUBCUTANEOUS EVERY 5 MIN PRN
Status: DISCONTINUED | OUTPATIENT
Start: 2025-07-07 | End: 2025-07-07 | Stop reason: HOSPADM

## 2025-07-07 RX ORDER — HEPARIN 100 UNIT/ML
500 SYRINGE INTRAVENOUS AS NEEDED
OUTPATIENT
Start: 2025-07-07

## 2025-07-07 RX ORDER — PROCHLORPERAZINE MALEATE 10 MG
10 TABLET ORAL EVERY 6 HOURS PRN
Status: DISCONTINUED | OUTPATIENT
Start: 2025-07-07 | End: 2025-07-07 | Stop reason: HOSPADM

## 2025-07-07 RX ORDER — FAMOTIDINE 10 MG/ML
20 INJECTION, SOLUTION INTRAVENOUS ONCE AS NEEDED
Status: DISCONTINUED | OUTPATIENT
Start: 2025-07-07 | End: 2025-07-07 | Stop reason: HOSPADM

## 2025-07-07 RX ORDER — HEPARIN SODIUM,PORCINE/PF 10 UNIT/ML
50 SYRINGE (ML) INTRAVENOUS AS NEEDED
Status: DISCONTINUED | OUTPATIENT
Start: 2025-07-07 | End: 2025-07-07 | Stop reason: HOSPADM

## 2025-07-07 RX ORDER — PROCHLORPERAZINE EDISYLATE 5 MG/ML
10 INJECTION INTRAMUSCULAR; INTRAVENOUS EVERY 6 HOURS PRN
Status: DISCONTINUED | OUTPATIENT
Start: 2025-07-07 | End: 2025-07-07 | Stop reason: HOSPADM

## 2025-07-07 RX ORDER — ALBUTEROL SULFATE 0.83 MG/ML
3 SOLUTION RESPIRATORY (INHALATION) AS NEEDED
Status: DISCONTINUED | OUTPATIENT
Start: 2025-07-07 | End: 2025-07-07 | Stop reason: HOSPADM

## 2025-07-07 RX ORDER — DIPHENHYDRAMINE HCL 25 MG
50 CAPSULE ORAL ONCE
Status: COMPLETED | OUTPATIENT
Start: 2025-07-07 | End: 2025-07-07

## 2025-07-07 RX ADMIN — DEXAMETHASONE SODIUM PHOSPHATE 10 MG: 4 INJECTION, SOLUTION INTRA-ARTICULAR; INTRALESIONAL; INTRAMUSCULAR; INTRAVENOUS; SOFT TISSUE at 11:58

## 2025-07-07 RX ADMIN — HEPARIN 500 UNITS: 100 SYRINGE at 13:21

## 2025-07-07 RX ADMIN — DIPHENHYDRAMINE HYDROCHLORIDE 50 MG: 25 CAPSULE ORAL at 11:57

## 2025-07-07 RX ADMIN — FAMOTIDINE 20 MG: 10 INJECTION INTRAVENOUS at 11:57

## 2025-07-07 RX ADMIN — PACLITAXEL 114 MG: 6 INJECTION, SOLUTION INTRAVENOUS at 12:20

## 2025-07-07 ASSESSMENT — ENCOUNTER SYMPTOMS
FREQUENCY: 0
CARDIOVASCULAR NEGATIVE: 1
CONSTIPATION: 1
APPETITE CHANGE: 0
MUSCULOSKELETAL NEGATIVE: 1
NUMBNESS: 1
TROUBLE SWALLOWING: 1
SORE THROAT: 0
COUGH: 0
FATIGUE: 1

## 2025-07-07 ASSESSMENT — PAIN SCALES - GENERAL: PAINLEVEL_OUTOF10: 0-NO PAIN

## 2025-07-07 NOTE — SIGNIFICANT EVENT

## 2025-07-07 NOTE — PROGRESS NOTES
[facility] - Medical Oncology Follow-Up Visit    Patient ID: Amrita Grant is a 80 y.o. female.  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 Grant is a 81 yo with PMH significant for lupus, aortic valve repair, fibromyalgia, who first noticed a mass on her nose in fall of 2024. Met Dr. Templeton who did an in-office biopsy 11/11/24 with invasive, poorly differentiated carcinoma, p40+, negative MOC31, unclear etiology, and recommended for repeat biopsy. Biopsy on 11/26/24 with the same, felt to be a primary cutaneous neoplasm such as basal cell carcinoma. Discussed at [facility] tumor board, and due to significant morbidity associated with resection, recommended against surgery and referred to radiation oncology and medical oncology. Met Dr. Guzmán, who recommended against immunotherapy given her lupus and poor performance status, and recommended discussion with radiation oncology, which cutaneous tumor board also agreed with. She planned to have radiation closer to home, but unfortunately her pre-radiation scans showed concern for metastatic disease, with PET/CT on 1/20/25 with FDG avidity of the R nasal soft tissue as well as R level 1 cervical lymph node, R rights, L1, and RUL nodule below PET resolution. She was ultimately started on vismodegib as well as started on palliative radiation (2/18-3/11/25) for the nasal lesion. In the meantime, [facility]  pathology revealed invasive carcinoma, HPV associated, positive for p40 CK5/6, EMA, p16, and negative for MOC, BerEP4, INSM1, and chromogranin. As this was felt to no longer be a basal cell carcinoma, she stopped the vismodegib. Biopsy of the L1 lesion morphologically the same.     Regarding her lupus - she has been on plaquenil monotherapy for many years, stable on the same dose. She was originally diagnosed with lupus in 2001 - she was initially diagnosed with pericarditis. She has fatigue and joint aches with the lupus.     She is here today with her  and friend. Her last day of radiation was last Tuesday or Wednesday. Radiation went ok overall. Her last dose of the visdomegib was last Tuesday or Wednesday. No side effects.  She is very tired, naps every day. In the last year, she has stopped doing the cooking, cleaning, and laundry. She is very careful, leans on her  to help her get up the stairs. Her fatigue has been getting worse and worse. She continues to lose weight - 50 lbs in the last year and a half. She has trouble swallowing - some things are harder to swallow like bread, meat. Gets tickles in her throat. She's had this for a couple years, had her throat stretched - this was also just repeated in December. Done at [place] typically, helps for a little while, but don't last.      PAST MEDICAL HISTORY  Lupus  Fibromyalgia  Osteoarthritis  Aortic valve repair (mechanical)  on warfarin  HTN  CVA - (2021)     SOCIAL HISTORY  Lives at home with her . They've been  45 years. Previously was a  for [employer].   Tob: none  EtOH: none, previously a little  Illicits: none     FAMILY HISTORY  Mother - uterine cancer  Maternal grandmother - breast cancer  Maternal cousin - unknown metastatic cancer    HPI  She is here today with her . No change in her level of fatigue with last week's tx held.  Started PT/OT in the home.   Now ambulating with walker.  Recent esophageal  "dilation.  States her swallowing is \"getting there.\"  Food sits in her throat.  Foods go down with liquids.  Denies cough with intake.   Denies n/v/c/d.  Voiding fine.  No s/sx's bleeding.  Ball of left foot and some toes feel numb.   Not able to report if new.  Believes she may need a new shoe insole.  Hx surgery on left foot.  Does not keep her up at night. No fevers, or CP. Labs WNL.  Ready for treatment.      Meds (Current):    Current Outpatient Medications:     ARIPiprazole (Abilify) 5 mg tablet, Take 1 tablet (5 mg) by mouth once daily., Disp: , Rfl:     Bystolic 5 mg tablet, Take 2 tablets (10 mg) by mouth once daily. Dose increased, Disp: , Rfl:     calcium carbonate 500 mg calcium (1,250 mg) chewable tablet, Chew and swallow 1 tablet (500 mg of elemental calcium) once daily., Disp: , Rfl:     cetirizine (ZyrTEC) 10 mg tablet, 1 tab(s) orally as needed, Disp: , Rfl:     cholecalciferol (Vitamin D-3) 25 MCG (1000 UT) capsule, 1 cap(s) orally three times daily, Disp: , Rfl:     docusate sodium (Colace) 100 mg capsule, once every 24 hours., Disp: , Rfl:     fluticasone (Flonase) 50 mcg/actuation nasal spray, Administer 2 sprays into affected nostril(s)., Disp: , Rfl:     levETIRAcetam (Keppra) 500 mg tablet, every 12 hours., Disp: , Rfl:     LORazepam (Ativan) 0.5 mg tablet, Take half tablet 1 hour before MRI, if needed take other half of tablet while getting checked in for MRI, Disp: 1 tablet, Rfl: 0    losartan (Cozaar) 100 mg tablet, Take 0.5 tablets (50 mg) by mouth twice a day., Disp: , Rfl:     melatonin 3 mg tablet, Take by mouth once daily., Disp: , Rfl:     memantine (Namenda) 5 mg tablet, Take 1 tablet (5 mg) by mouth once daily., Disp: , Rfl:     mirtazapine (Remeron) 15 mg tablet, 1 tab(s) orally half tablet bedtime daily for 30 days, Disp: , Rfl:     omeprazole (PriLOSEC) 20 mg DR capsule, Take 1 capsule (20 mg) by mouth once daily., Disp: , Rfl:     ondansetron (Zofran) 4 mg tablet, Take 1 tablet " (4 mg) by mouth if needed for nausea or vomiting., Disp: , Rfl:     ondansetron (Zofran) 8 mg tablet, Take 1 tablet (8 mg) by mouth every 8 hours if needed for nausea or vomiting., Disp: 30 tablet, Rfl: 5    PlaqueniL 200 mg tablet, Take 1 tablet (200 mg) by mouth once daily., Disp: , Rfl:     potassium chloride CR 20 mEq ER tablet, Take 1 tablet (20 mEq) by mouth once daily., Disp: , Rfl:     Premarin 0.3 mg tablet, once every 24 hours., Disp: , Rfl:     prochlorperazine (Compazine) 10 mg tablet, Take 1 tablet (10 mg) by mouth every 6 hours if needed for nausea or vomiting., Disp: 30 tablet, Rfl: 5    warfarin (Coumadin) 4 mg tablet, Take 1 tablet (4 mg) by mouth once daily., Disp: , Rfl:     lidocaine-prilocaine (Emla) 2.5-2.5 % cream, Apply topically 1 time for 1 dose. Apply 30-45 minutes prior to port access., Disp: 30 g, Rfl: 3    sennosides (senna) 8.6 mg tablet, Take 1 tablet (8.6 mg) by mouth 2 times a day., Disp: 60 tablet, Rfl: 2    Allergies   Allergen Reactions    Propofol Nausea/vomiting    Sulfa (Sulfonamide Antibiotics) Other    Opioids - Morphine Analogues Nausea And Vomiting, Other and Unknown    Penicillins Unknown     Pt. Reports passing out upon taking penicillin when she was young.       Review of Systems   Constitutional:  Positive for fatigue. Negative for appetite change.   HENT:   Positive for trouble swallowing. Negative for sore throat.    Respiratory:  Negative for cough.    Cardiovascular: Negative.    Gastrointestinal:  Positive for constipation.   Genitourinary:  Negative for frequency.    Musculoskeletal: Negative.    Neurological:  Positive for numbness.        Objective   BSA: 1.46 meters squared  Wt Readings from Last 5 Encounters:   07/07/25 48.5 kg (107 lb)   06/30/25 47.5 kg (104 lb 12.8 oz)   06/23/25 47.9 kg (105 lb 8 oz)   06/16/25 48.1 kg (106 lb 0.7 oz)   06/09/25 48.1 kg (106 lb 0.7 oz)     /78 (BP Location: Left arm, Patient Position: Sitting, BP Cuff Size: Child)    Pulse 83   Temp 36.3 °C (97.3 °F) (Temporal)   Resp 18   Wt 48.5 kg (107 lb)   SpO2 97%   BMI 19.49 kg/m²     ECOG Score: 2    0          Fully active; no performance restrictions.  1          Strenuous physical activity restricted; fully ambulatory and able to carry out light work.  2          Capable of all self-care but unable to carry out any work activities. Up and about >50% of waking hours.  3          Capable of only limited self-care; confined to bed or chair >50% of waking hours.  4          Completely disabled; cannot carry out any self-care; totally confined to bed or chair.     Physical Exam  Vitals reviewed.   Constitutional:       General: She is not in acute distress.     Appearance: Normal appearance.   HENT:      Head: Normocephalic.      Nose: Nose normal.      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        Mouth/Throat:      Mouth: Mucous membranes are moist.      Pharynx: Oropharynx is clear.   Eyes:      Extraocular Movements: Extraocular movements intact.      Conjunctiva/sclera: Conjunctivae normal.      Pupils: Pupils are equal, round, and reactive to light.   Cardiovascular:      Rate and Rhythm: Normal rate and regular rhythm.      Pulses: Normal pulses.      Heart sounds: Normal heart sounds. No murmur heard.  Pulmonary:      Effort: No respiratory distress.      Breath sounds: Normal breath sounds.   Abdominal:      General: Bowel sounds are normal.      Palpations: Abdomen is soft.   Musculoskeletal:         General: Normal range of motion.      Cervical back: Normal range of motion.   Skin:     General: Skin is warm and dry.   Neurological:      General: No focal deficit present.      Mental Status: She is alert and oriented to person, place, and time.   Psychiatric:         Mood and Affect: Mood normal.         Behavior: Behavior normal.         Thought Content: Thought content normal.         Judgment: Judgment normal.        Results:  Labs:  Lab Results    Component Value Date    WBC 4.2 (L) 07/07/2025    HGB 10.6 (L) 07/07/2025    HCT 33.6 (L) 07/07/2025     (H) 07/07/2025     07/07/2025      Lab Results   Component Value Date    NEUTROABS 3.14 07/07/2025      Lab Results   Component Value Date    GLUCOSE 91 07/07/2025    CALCIUM 8.8 07/07/2025     07/07/2025    K 3.9 07/07/2025    CO2 26 07/07/2025     07/07/2025    BUN 14 07/07/2025    CREATININE 0.60 07/07/2025     Lab Results   Component Value Date    ALT 11 07/07/2025    AST 15 07/07/2025    ALKPHOS 57 07/07/2025    BILITOT 0.4 07/07/2025        Imaging:  No new imaging.      Pathology:    Lab Results   Component Value Date    ADD1  11/26/2024     Additional immunostains were reviewed.  Tumor cells are positive for p16 and negative for chromogranin and INSM1.  Isolated tumor cells are positive for BerEP4.  The differential diagnosis remains unchanged.         Assessment/Plan      Amrita Grant is a 80 y.o. female here for recommendations and to establish care for poorly differentiated carcinoma of the face.     # Poorly differentiated carcinoma  - HPV+  - L1 biopsy proven metastasis - will send tempus for better delineation  - discussed the likelihood that this is metastatic, HPV+ nasopharyngeal carcinoma, although tempus may help us better determine this  - s/p palliative radiation  - discussed that typically would recommend combination therapy with chemotherapy+immunotherapy. However, given her lupus which was originally diagnosed with pericarditis, do not recommend immunotherapy. Also discussed that given her current poor performance status, do not believe she could tolerate dual chemotherapy  - discussed single-agent weekly paclitaxel, and consented. Plan to repeat scans after 3 cycles - CT scheduled 6/20/25    - will obtain new baseline images, as she has not had imaging since January 2025  - she would like to have her infusions closer to home at [facility], but will continue  to see us at [facility] for pre-treatment visits  - Pt agreeable to infusions and visits on same day at [facility].    - Update: Unable to obtain IV access after multiple attempts.  Existing mediport placement scheduled 4/2/25.  Infusion rescheduled to 4/7/25.    - 4/2/25 Mediport placed  - personally reviewed 6/20/25 CT CAP and sinus scans reflect favorable response to treatment.    - will hold today's treatment due to fatigue and resume next week. Will give 1L NS instead.  - next scans after C6 of treatment  - Pt would like to transfer her care to Dr. Bowers at [facility] d/t distance to [facility] infusion center.  Message sent to Dr. Bowers with pt info.     - RTC next week    # Lupus  - she follows with a rheumatologist at [facility], and will notify them we are starting chemotherapy    # Dysuria, urgency, frequency  - offered obtaining UA and urine culture, and she deferred to PCP  - 3/31:  Current 10-day ATB course.  Pt/family not able to recall medication name.  ATB started prior to her tx C1D1.  Tolerating ATB.  Reports up to 8 UTI's last year.  PCP (non- provider) - any further follow-up with PCP.    - 4/7:  Completed 10-day ATB course.  Denies current UTI symptoms.     - 6/16:  Current ATB Cipro BID x5 days (Rx provided at urgent care visit).  Pt instructed to notify [facility] and PCP of UTI/ATB course.    - 6/23:  ATB completed.  Reports symptoms resolved.      # Unintentional weight loss  - may be partly due to dysphagia  - oncology dietician referral in place   - wt loss has stabilized     # Fatigue (g1)  - Recommended she increase her physical activity level - during waking house, get up Q2-3H, walk within the home, pace activities.     # Constipation  - Senna too effective, pt to start routine Miralax - 1/2 capful to start and adjust as needed.    - Routine bowel meds with BM Q1-2 days    # Hypocalcemia 8.4 5/12/25  - Calcium listed on MAR, pt not taking med d/t pill size.  Recommended she start OTC  calcium soft chew.   - stable Ca 8.5 6/23/25, pt declining OTC intervention     # Cough/PND   - Will add room humidifier.  Sleeps using one pillow, recommended she try sleeping with two pillows.    - Ongoing monitoring     # Constipation  - last BM Sat or Sunday.  Pt to take 2nd Miralax dose following call.  Start MOM with dose this evening.  If no BM by morning, 2nd dose and dose BID until BM.      # Rash/erythema to right cheek/forehead  - denies pruritus, apply lotion to area.    - improved    # Scattered macules to back of hands and wrist area (few)  - apply hydrocortisone cream BID, pt has within the home.    - improved    # Pancytopenia - chemo-induced - stable  - Weekly labs, ongoing monitoring    # INR/chronic Warfarin  - INR ordered for coumadin dosing, infusion nurse provided medical release form per facility protocol.  Pt follows with [facility] for dosing/monitoring.  INR to be draw via port with treatment labs, fax to [facility] [phone].  Goal INR 2.5-3.0.   - 4/14: INR 2.0 result faxed to [facility] 4/15/25  - 4/21:  Will monitor for INR result and fax to [facility] 4/22/25.   - 4/28:  Will monitor for INR result and fax to [facility] 4/29/25.   - Not enough blood in tube. Pt to [facility] for INR draw.  5/6:  Will monitor for INR result and fax to [facility].  INR 4.7, instructions per [facility].    5/12:  Will monitor for INR result and fax to [facility].   5/19:  Will monitor for INR result and fax to [facility].   5/27:  INR 8.8 - notified via secure chat by on-call MD.  Dr. Frankel was able to speak with pt.  This provider also spoke with pt with faxed results to [facility].   6/2:  spouse reports future INR labs will be drawn by [facility]. INR lab removed from treatment plan.      # EGD (scheduled 6/23/25)  - 6/17/25 TC from triage nurse to GI office with VM message left - pt's INR is managed by [facility].  Cardiac  approval should come from [facility].  Fax # provided.  Message with VM on 6/18 by this provider with above info.  No return call received.       # Advanced care planning  - discussed incurable but treatable nature of disease, with goal to prolong life while maintaining/improving quality of life. She understands that risks of systemic therapy may outweigh benefits at some point in the future.

## 2025-07-08 ENCOUNTER — APPOINTMENT (OUTPATIENT)
Dept: RADIATION ONCOLOGY | Facility: CLINIC | Age: 80
End: 2025-07-08
Payer: MEDICARE

## 2025-07-09 ENCOUNTER — APPOINTMENT (OUTPATIENT)
Dept: RADIATION ONCOLOGY | Facility: CLINIC | Age: 80
End: 2025-07-09
Payer: MEDICARE

## 2025-07-10 ENCOUNTER — APPOINTMENT (OUTPATIENT)
Dept: RADIATION ONCOLOGY | Facility: CLINIC | Age: 80
End: 2025-07-10
Payer: MEDICARE

## 2025-07-11 ENCOUNTER — APPOINTMENT (OUTPATIENT)
Dept: RADIATION ONCOLOGY | Facility: CLINIC | Age: 80
End: 2025-07-11
Payer: MEDICARE

## 2025-07-14 ENCOUNTER — INFUSION (OUTPATIENT)
Dept: HEMATOLOGY/ONCOLOGY | Facility: CLINIC | Age: 80
End: 2025-07-14
Payer: MEDICARE

## 2025-07-14 ENCOUNTER — OFFICE VISIT (OUTPATIENT)
Dept: HEMATOLOGY/ONCOLOGY | Facility: CLINIC | Age: 80
End: 2025-07-14
Payer: MEDICARE

## 2025-07-14 ENCOUNTER — LAB (OUTPATIENT)
Dept: HEMATOLOGY/ONCOLOGY | Facility: CLINIC | Age: 80
End: 2025-07-14
Payer: MEDICARE

## 2025-07-14 ENCOUNTER — APPOINTMENT (OUTPATIENT)
Dept: RADIATION ONCOLOGY | Facility: CLINIC | Age: 80
End: 2025-07-14
Payer: MEDICARE

## 2025-07-14 ENCOUNTER — PATIENT OUTREACH (OUTPATIENT)
Dept: HEMATOLOGY/ONCOLOGY | Facility: CLINIC | Age: 80
End: 2025-07-14

## 2025-07-14 VITALS
BODY MASS INDEX: 19.22 KG/M2 | WEIGHT: 105.5 LBS | OXYGEN SATURATION: 98 % | SYSTOLIC BLOOD PRESSURE: 129 MMHG | DIASTOLIC BLOOD PRESSURE: 82 MMHG | TEMPERATURE: 97.3 F | RESPIRATION RATE: 16 BRPM | HEART RATE: 76 BPM

## 2025-07-14 DIAGNOSIS — C44.92 SCC (SQUAMOUS CELL CARCINOMA): ICD-10-CM

## 2025-07-14 LAB
ALBUMIN SERPL BCP-MCNC: 3.9 G/DL (ref 3.4–5)
ALP SERPL-CCNC: 52 U/L (ref 33–136)
ALT SERPL W P-5'-P-CCNC: 10 U/L (ref 7–45)
ANION GAP SERPL CALC-SCNC: 8 MMOL/L (ref 10–20)
AST SERPL W P-5'-P-CCNC: 14 U/L (ref 9–39)
BASOPHILS # BLD AUTO: 0.03 X10*3/UL (ref 0–0.1)
BASOPHILS NFR BLD AUTO: 0.6 %
BILIRUB SERPL-MCNC: 0.6 MG/DL (ref 0–1.2)
BUN SERPL-MCNC: 15 MG/DL (ref 6–23)
CALCIUM SERPL-MCNC: 8.8 MG/DL (ref 8.6–10.3)
CHLORIDE SERPL-SCNC: 105 MMOL/L (ref 98–107)
CO2 SERPL-SCNC: 28 MMOL/L (ref 21–32)
CREAT SERPL-MCNC: 0.63 MG/DL (ref 0.5–1.05)
EGFRCR SERPLBLD CKD-EPI 2021: >90 ML/MIN/1.73M*2
EOSINOPHIL # BLD AUTO: 0.07 X10*3/UL (ref 0–0.4)
EOSINOPHIL NFR BLD AUTO: 1.5 %
ERYTHROCYTE [DISTWIDTH] IN BLOOD BY AUTOMATED COUNT: 13.7 % (ref 11.5–14.5)
GLUCOSE SERPL-MCNC: 93 MG/DL (ref 74–99)
HCT VFR BLD AUTO: 32.8 % (ref 36–46)
HGB BLD-MCNC: 10.6 G/DL (ref 12–16)
IMM GRANULOCYTES # BLD AUTO: 0.01 X10*3/UL (ref 0–0.5)
IMM GRANULOCYTES NFR BLD AUTO: 0.2 % (ref 0–0.9)
LYMPHOCYTES # BLD AUTO: 0.61 X10*3/UL (ref 0.8–3)
LYMPHOCYTES NFR BLD AUTO: 13.1 %
MCH RBC QN AUTO: 33.9 PG (ref 26–34)
MCHC RBC AUTO-ENTMCNC: 32.3 G/DL (ref 32–36)
MCV RBC AUTO: 105 FL (ref 80–100)
MONOCYTES # BLD AUTO: 0.44 X10*3/UL (ref 0.05–0.8)
MONOCYTES NFR BLD AUTO: 9.5 %
NEUTROPHILS # BLD AUTO: 3.48 X10*3/UL (ref 1.6–5.5)
NEUTROPHILS NFR BLD AUTO: 75.1 %
NRBC BLD-RTO: ABNORMAL /100{WBCS}
PLATELET # BLD AUTO: 179 X10*3/UL (ref 150–450)
POTASSIUM SERPL-SCNC: 3.6 MMOL/L (ref 3.5–5.3)
PROT SERPL-MCNC: 6.1 G/DL (ref 6.4–8.2)
RBC # BLD AUTO: 3.13 X10*6/UL (ref 4–5.2)
SODIUM SERPL-SCNC: 137 MMOL/L (ref 136–145)
WBC # BLD AUTO: 4.6 X10*3/UL (ref 4.4–11.3)

## 2025-07-14 PROCEDURE — 2500000004 HC RX 250 GENERAL PHARMACY W/ HCPCS (ALT 636 FOR OP/ED): Performed by: NURSE PRACTITIONER

## 2025-07-14 PROCEDURE — 96375 TX/PRO/DX INJ NEW DRUG ADDON: CPT | Mod: INF

## 2025-07-14 PROCEDURE — 2500000004 HC RX 250 GENERAL PHARMACY W/ HCPCS (ALT 636 FOR OP/ED): Performed by: STUDENT IN AN ORGANIZED HEALTH CARE EDUCATION/TRAINING PROGRAM

## 2025-07-14 PROCEDURE — 96413 CHEMO IV INFUSION 1 HR: CPT

## 2025-07-14 PROCEDURE — 85025 COMPLETE CBC W/AUTO DIFF WBC: CPT

## 2025-07-14 PROCEDURE — 2500000001 HC RX 250 WO HCPCS SELF ADMINISTERED DRUGS (ALT 637 FOR MEDICARE OP): Performed by: NURSE PRACTITIONER

## 2025-07-14 PROCEDURE — 1126F AMNT PAIN NOTED NONE PRSNT: CPT | Performed by: STUDENT IN AN ORGANIZED HEALTH CARE EDUCATION/TRAINING PROGRAM

## 2025-07-14 PROCEDURE — 99214 OFFICE O/P EST MOD 30 MIN: CPT | Performed by: STUDENT IN AN ORGANIZED HEALTH CARE EDUCATION/TRAINING PROGRAM

## 2025-07-14 PROCEDURE — 1159F MED LIST DOCD IN RCRD: CPT | Performed by: STUDENT IN AN ORGANIZED HEALTH CARE EDUCATION/TRAINING PROGRAM

## 2025-07-14 PROCEDURE — 80053 COMPREHEN METABOLIC PANEL: CPT

## 2025-07-14 PROCEDURE — G2211 COMPLEX E/M VISIT ADD ON: HCPCS | Performed by: STUDENT IN AN ORGANIZED HEALTH CARE EDUCATION/TRAINING PROGRAM

## 2025-07-14 RX ORDER — HEPARIN SODIUM,PORCINE/PF 10 UNIT/ML
50 SYRINGE (ML) INTRAVENOUS AS NEEDED
OUTPATIENT
Start: 2025-07-14

## 2025-07-14 RX ORDER — ALBUTEROL SULFATE 0.83 MG/ML
3 SOLUTION RESPIRATORY (INHALATION) AS NEEDED
Status: DISCONTINUED | OUTPATIENT
Start: 2025-07-14 | End: 2025-07-14 | Stop reason: HOSPADM

## 2025-07-14 RX ORDER — FAMOTIDINE 10 MG/ML
20 INJECTION, SOLUTION INTRAVENOUS ONCE
Status: COMPLETED | OUTPATIENT
Start: 2025-07-14 | End: 2025-07-14

## 2025-07-14 RX ORDER — EPINEPHRINE 0.3 MG/.3ML
0.3 INJECTION SUBCUTANEOUS EVERY 5 MIN PRN
Status: DISCONTINUED | OUTPATIENT
Start: 2025-07-14 | End: 2025-07-14 | Stop reason: HOSPADM

## 2025-07-14 RX ORDER — DIPHENHYDRAMINE HCL 25 MG
50 CAPSULE ORAL ONCE
Status: COMPLETED | OUTPATIENT
Start: 2025-07-14 | End: 2025-07-14

## 2025-07-14 RX ORDER — FAMOTIDINE 10 MG/ML
20 INJECTION, SOLUTION INTRAVENOUS ONCE AS NEEDED
Status: DISCONTINUED | OUTPATIENT
Start: 2025-07-14 | End: 2025-07-14 | Stop reason: HOSPADM

## 2025-07-14 RX ORDER — HEPARIN 100 UNIT/ML
500 SYRINGE INTRAVENOUS AS NEEDED
Status: DISCONTINUED | OUTPATIENT
Start: 2025-07-14 | End: 2025-07-14 | Stop reason: HOSPADM

## 2025-07-14 RX ORDER — PROCHLORPERAZINE EDISYLATE 5 MG/ML
10 INJECTION INTRAMUSCULAR; INTRAVENOUS EVERY 6 HOURS PRN
Status: DISCONTINUED | OUTPATIENT
Start: 2025-07-14 | End: 2025-07-14 | Stop reason: HOSPADM

## 2025-07-14 RX ORDER — DIPHENHYDRAMINE HYDROCHLORIDE 50 MG/ML
50 INJECTION, SOLUTION INTRAMUSCULAR; INTRAVENOUS AS NEEDED
Status: DISCONTINUED | OUTPATIENT
Start: 2025-07-14 | End: 2025-07-14 | Stop reason: HOSPADM

## 2025-07-14 RX ORDER — PROCHLORPERAZINE MALEATE 10 MG
10 TABLET ORAL EVERY 6 HOURS PRN
Status: DISCONTINUED | OUTPATIENT
Start: 2025-07-14 | End: 2025-07-14 | Stop reason: HOSPADM

## 2025-07-14 RX ORDER — HEPARIN 100 UNIT/ML
500 SYRINGE INTRAVENOUS AS NEEDED
OUTPATIENT
Start: 2025-07-14

## 2025-07-14 RX ADMIN — HEPARIN 500 UNITS: 100 SYRINGE at 15:28

## 2025-07-14 RX ADMIN — DEXAMETHASONE SODIUM PHOSPHATE 10 MG: 4 INJECTION, SOLUTION INTRA-ARTICULAR; INTRALESIONAL; INTRAMUSCULAR; INTRAVENOUS; SOFT TISSUE at 14:12

## 2025-07-14 RX ADMIN — PACLITAXEL 90 MG: 6 INJECTION, SOLUTION INTRAVENOUS at 14:29

## 2025-07-14 RX ADMIN — DIPHENHYDRAMINE HYDROCHLORIDE 50 MG: 25 CAPSULE ORAL at 14:12

## 2025-07-14 RX ADMIN — FAMOTIDINE 20 MG: 10 INJECTION INTRAVENOUS at 14:12

## 2025-07-14 ASSESSMENT — ENCOUNTER SYMPTOMS
APPETITE CHANGE: 0
CONSTIPATION: 1
FREQUENCY: 0
FATIGUE: 1
TROUBLE SWALLOWING: 1
CARDIOVASCULAR NEGATIVE: 1
COUGH: 0
MUSCULOSKELETAL NEGATIVE: 1
SORE THROAT: 0
NUMBNESS: 1

## 2025-07-14 ASSESSMENT — PAIN SCALES - GENERAL: PAINLEVEL_OUTOF10: 0-NO PAIN

## 2025-07-14 NOTE — SIGNIFICANT EVENT
07/14/25 1150   Prechemo Checklist   Has the patient been in the hospital, ED, or urgent care since last date of service No   Chemo/Immuno Consent Completed and Signed Yes   Protocol/Indications Verified Yes   Confirmed to previous date/time of medication Yes   Compared to previous dose Yes   All medications are dated accurately Yes   Pregnancy Test Negative Not applicable   Parameters Met Yes   BSA/Weight-Height Verified Yes   Dose Calculations Verified (current, total, cumulative) Yes     Dr. Perrin dose reduced by 20% after seeing pt in clinic.

## 2025-07-14 NOTE — PROGRESS NOTES
[facility] - Medical Oncology Follow-Up Visit    Patient ID: Amrita Grant is a 80 y.o. female.  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 Grant is a 81 yo with PMH significant for lupus, aortic valve repair, fibromyalgia, who first noticed a mass on her nose in fall of 2024. Met Dr. Templeton who did an in-office biopsy 11/11/24 with invasive, poorly differentiated carcinoma, p40+, negative MOC31, unclear etiology, and recommended for repeat biopsy. Biopsy on 11/26/24 with the same, felt to be a primary cutaneous neoplasm such as basal cell carcinoma. Discussed at [facility] tumor board, and due to significant morbidity associated with resection, recommended against surgery and referred to radiation oncology and medical oncology. Met Dr. Guzmán, who recommended against immunotherapy given her lupus and poor performance status, and recommended discussion with radiation oncology, which cutaneous tumor board also agreed with. She planned to have radiation closer to home, but unfortunately her pre-radiation scans showed concern for metastatic disease, with PET/CT on 1/20/25 with FDG avidity of the R nasal soft tissue as well as R level 1 cervical lymph node, R rights, L1, and RUL nodule below PET resolution. She was ultimately started on vismodegib as well as started on palliative radiation (2/18-3/11/25) for the nasal lesion. In the meantime, [facility]  pathology revealed invasive carcinoma, HPV associated, positive for p40 CK5/6, EMA, p16, and negative for MOC, BerEP4, INSM1, and chromogranin. As this was felt to no longer be a basal cell carcinoma, she stopped the vismodegib. Biopsy of the L1 lesion morphologically the same.     Regarding her lupus - she has been on plaquenil monotherapy for many years, stable on the same dose. She was originally diagnosed with lupus in 2001 - she was initially diagnosed with pericarditis. She has fatigue and joint aches with the lupus.     She is here today with her  and friend. Her last day of radiation was last Tuesday or Wednesday. Radiation went ok overall. Her last dose of the visdomegib was last Tuesday or Wednesday. No side effects.  She is very tired, naps every day. In the last year, she has stopped doing the cooking, cleaning, and laundry. She is very careful, leans on her  to help her get up the stairs. Her fatigue has been getting worse and worse. She continues to lose weight - 50 lbs in the last year and a half. She has trouble swallowing - some things are harder to swallow like bread, meat. Gets tickles in her throat. She's had this for a couple years, had her throat stretched - this was also just repeated in December. Done at [place] typically, helps for a little while, but don't last.      PAST MEDICAL HISTORY  Lupus  Fibromyalgia  Osteoarthritis  Aortic valve repair (mechanical)  on warfarin  HTN  CVA - (2021)     SOCIAL HISTORY  Lives at home with her . They've been  45 years. Previously was a  for [employer].   Tob: none  EtOH: none, previously a little  Illicits: none     FAMILY HISTORY  Mother - uterine cancer  Maternal grandmother - breast cancer  Maternal cousin - unknown metastatic cancer    HPI  She is here today with her  and friend. Eating is about the same. Still has a little trouble swallowing. Has some neuropathy of pretty much all of her toes, just up to the  foot. Had in her left foot to begin with, and just started in the right foot about a month ago.     Meds (Current):    Current Outpatient Medications:     ARIPiprazole (Abilify) 5 mg tablet, Take 1 tablet (5 mg) by mouth once daily., Disp: , Rfl:     Bystolic 5 mg tablet, Take 2 tablets (10 mg) by mouth once daily. Dose increased, Disp: , Rfl:     calcium carbonate 500 mg calcium (1,250 mg) chewable tablet, Chew and swallow 1 tablet (500 mg of elemental calcium) once daily., Disp: , Rfl:     cetirizine (ZyrTEC) 10 mg tablet, 1 tab(s) orally as needed, Disp: , Rfl:     cholecalciferol (Vitamin D-3) 25 MCG (1000 UT) capsule, 1 cap(s) orally three times daily, Disp: , Rfl:     docusate sodium (Colace) 100 mg capsule, once every 24 hours., Disp: , Rfl:     fluticasone (Flonase) 50 mcg/actuation nasal spray, Administer 2 sprays into affected nostril(s)., Disp: , Rfl:     levETIRAcetam (Keppra) 500 mg tablet, every 12 hours., Disp: , Rfl:     lidocaine-prilocaine (Emla) 2.5-2.5 % cream, Apply topically 1 time for 1 dose. Apply 30-45 minutes prior to port access., Disp: 30 g, Rfl: 3    LORazepam (Ativan) 0.5 mg tablet, Take half tablet 1 hour before MRI, if needed take other half of tablet while getting checked in for MRI, Disp: 1 tablet, Rfl: 0    losartan (Cozaar) 100 mg tablet, Take 0.5 tablets (50 mg) by mouth twice a day., Disp: , Rfl:     melatonin 3 mg tablet, Take by mouth once daily., Disp: , Rfl:     memantine (Namenda) 5 mg tablet, Take 1 tablet (5 mg) by mouth once daily., Disp: , Rfl:     mirtazapine (Remeron) 15 mg tablet, 1 tab(s) orally half tablet bedtime daily for 30 days, Disp: , Rfl:     omeprazole (PriLOSEC) 20 mg DR capsule, Take 1 capsule (20 mg) by mouth once daily., Disp: , Rfl:     ondansetron (Zofran) 4 mg tablet, Take 1 tablet (4 mg) by mouth if needed for nausea or vomiting., Disp: , Rfl:     ondansetron (Zofran) 8 mg tablet, Take 1 tablet (8 mg) by mouth every 8 hours if needed for nausea or  vomiting., Disp: 30 tablet, Rfl: 5    PlaqueniL 200 mg tablet, Take 1 tablet (200 mg) by mouth once daily., Disp: , Rfl:     potassium chloride CR 20 mEq ER tablet, Take 1 tablet (20 mEq) by mouth once daily., Disp: , Rfl:     Premarin 0.3 mg tablet, once every 24 hours., Disp: , Rfl:     prochlorperazine (Compazine) 10 mg tablet, Take 1 tablet (10 mg) by mouth every 6 hours if needed for nausea or vomiting., Disp: 30 tablet, Rfl: 5    sennosides (senna) 8.6 mg tablet, Take 1 tablet (8.6 mg) by mouth 2 times a day., Disp: 60 tablet, Rfl: 2    warfarin (Coumadin) 4 mg tablet, Take 1 tablet (4 mg) by mouth once daily., Disp: , Rfl:     Allergies   Allergen Reactions    Propofol Nausea/vomiting    Sulfa (Sulfonamide Antibiotics) Other    Opioids - Morphine Analogues Nausea And Vomiting, Other and Unknown    Penicillins Unknown     Pt. Reports passing out upon taking penicillin when she was young.       Review of Systems   Constitutional:  Positive for fatigue. Negative for appetite change.   HENT:   Positive for trouble swallowing. Negative for sore throat.    Respiratory:  Negative for cough.    Cardiovascular: Negative.    Gastrointestinal:  Positive for constipation.   Genitourinary:  Negative for frequency.    Musculoskeletal: Negative.    Neurological:  Positive for numbness.        Objective   BSA: 1.45 meters squared  Wt Readings from Last 5 Encounters:   07/14/25 47.9 kg (105 lb 8 oz)   07/07/25 48.5 kg (107 lb)   06/30/25 47.5 kg (104 lb 12.8 oz)   06/23/25 47.9 kg (105 lb 8 oz)   06/16/25 48.1 kg (106 lb 0.7 oz)     /82 (BP Location: Left arm, Patient Position: Sitting)   Pulse 76   Temp 36.3 °C (97.3 °F) (Temporal)   Resp 16   Wt 47.9 kg (105 lb 8 oz)   SpO2 98%   BMI 19.22 kg/m²     ECOG Score: 2    0          Fully active; no performance restrictions.  1          Strenuous physical activity restricted; fully ambulatory and able to carry out light work.  2          Capable of all self-care but  unable to carry out any work activities. Up and about >50% of waking hours.  3          Capable of only limited self-care; confined to bed or chair >50% of waking hours.  4          Completely disabled; cannot carry out any self-care; totally confined to bed or chair.     Physical Exam  Vitals reviewed.   Constitutional:       General: She is not in acute distress.     Appearance: Normal appearance.   HENT:      Head: Normocephalic.      Nose: Nose normal.      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        Mouth/Throat:      Mouth: Mucous membranes are moist.      Pharynx: Oropharynx is clear.   Eyes:      Extraocular Movements: Extraocular movements intact.      Conjunctiva/sclera: Conjunctivae normal.      Pupils: Pupils are equal, round, and reactive to light.   Cardiovascular:      Rate and Rhythm: Normal rate and regular rhythm.      Pulses: Normal pulses.      Heart sounds: Normal heart sounds. No murmur heard.  Pulmonary:      Effort: No respiratory distress.      Breath sounds: Normal breath sounds.   Abdominal:      General: Bowel sounds are normal.      Palpations: Abdomen is soft.   Musculoskeletal:         General: Normal range of motion.      Cervical back: Normal range of motion.   Skin:     General: Skin is warm and dry.   Neurological:      General: No focal deficit present.      Mental Status: She is alert and oriented to person, place, and time.   Psychiatric:         Mood and Affect: Mood normal.         Behavior: Behavior normal.         Thought Content: Thought content normal.         Judgment: Judgment normal.          Results:  Labs:  Lab Results   Component Value Date    WBC 4.6 07/14/2025    HGB 10.6 (L) 07/14/2025    HCT 32.8 (L) 07/14/2025     (H) 07/14/2025     07/14/2025      Lab Results   Component Value Date    NEUTROABS 3.48 07/14/2025      Lab Results   Component Value Date    GLUCOSE 93 07/14/2025    CALCIUM 8.8 07/14/2025     07/14/2025    K 3.6  07/14/2025    CO2 28 07/14/2025     07/14/2025    BUN 15 07/14/2025    CREATININE 0.63 07/14/2025     Lab Results   Component Value Date    ALT 10 07/14/2025    AST 14 07/14/2025    ALKPHOS 52 07/14/2025    BILITOT 0.6 07/14/2025        Imaging:  No new imaging.      Pathology:    Lab Results   Component Value Date    ADD1  11/26/2024     Additional immunostains were reviewed.  Tumor cells are positive for p16 and negative for chromogranin and INSM1.  Isolated tumor cells are positive for BerEP4.  The differential diagnosis remains unchanged.         Assessment/Plan      Amrita Grant is a 80 y.o. female here for recommendations and to establish care for poorly differentiated carcinoma of the face.     # Poorly differentiated carcinoma  - HPV+  - L1 biopsy proven metastasis - will send tempus for better delineation  - discussed the likelihood that this is metastatic, HPV+ nasopharyngeal carcinoma, although tempus may help us better determine this  - s/p palliative radiation  - discussed that typically would recommend combination therapy with chemotherapy+immunotherapy. However, given her lupus which was originally diagnosed with pericarditis, do not recommend immunotherapy. Also discussed that given her current poor performance status, do not believe she could tolerate dual chemotherapy  - discussed single-agent weekly paclitaxel, and consented. Plan to repeat scans after 3 cycles - CT scheduled 6/20/25    - will obtain new baseline images, as she has not had imaging since January 2025  - she would like to have her infusions closer to home at [place], but will continue to see us at [facility] for pre-treatment visits  - Pt agreeable to infusions and visits on same day at [place].    - Update: Unable to obtain IV access after multiple attempts.  Existing mediport placement scheduled 4/2/25.  Infusion rescheduled to 4/7/25.    - 4/2/25 Mediport placed  - personally reviewed 6/20/25 CT CAP and sinus scans reflect  favorable response to treatment.    - will dose reduce paclitaxel 20% due to neuropathy  - next scans after C6 of treatment  - Pt would like to transfer her care to Dr. Bowers at [place] d/t distance to [facility] or [place]. Will place referral.     # Neuropathy  - reduce dose of paclitaxel 20%    # Lupus  - she follows with a rheumatologist at [facility], and will notify them we are starting chemotherapy  - her arthritis is worse and she will contact her rheumatologist    # Dysuria, urgency, frequency  - offered obtaining UA and urine culture, and she deferred to PCP  - 3/31:  Current 10-day ATB course.  Pt/family not able to recall medication name.  ATB started prior to her tx C1D1.  Tolerating ATB.  Reports up to 8 UTI's last year.  PCP (non- provider) - any further follow-up with PCP.    - 4/7:  Completed 10-day ATB course.  Denies current UTI symptoms.     - 6/16:  Current ATB Cipro BID x5 days (Rx provided at urgent care visit).  Pt instructed to notify [facility] and PCP of UTI/ATB course.    - 6/23:  ATB completed.  Reports symptoms resolved.      # Unintentional weight loss  - may be partly due to dysphagia  - oncology dietician referral in place   - wt loss has stabilized     # Fatigue (g1)  - Recommended she increase her physical activity level - during waking house, get up Q2-3H, walk within the home, pace activities.     # Constipation  - Senna too effective, pt to start routine Miralax - 1/2 capful to start and adjust as needed.    - Routine bowel meds with BM Q1-2 days    # Hypocalcemia 8.4 5/12/25  - Calcium listed on MAR, pt not taking med d/t pill size.  Recommended she start OTC calcium soft chew.   - stable Ca 8.5 6/23/25, pt declining OTC intervention     # Cough/PND   - Will add room humidifier.  Sleeps using one pillow, recommended she try sleeping with two pillows.    - Ongoing monitoring     # Constipation  - last BM Sat or Sunday.  Pt to take 2nd Miralax  dose following call.  Start MOM with dose this evening.  If no BM by morning, 2nd dose and dose BID until BM.      # Rash/erythema to right cheek/forehead  - denies pruritus, apply lotion to area.    - improved    # Scattered macules to back of hands and wrist area (few)  - apply hydrocortisone cream BID, pt has within the home.    - improved    # Pancytopenia - chemo-induced - stable  - Weekly labs, ongoing monitoring    # INR/chronic Warfarin  - INR ordered for coumadin dosing, infusion nurse provided medical release form per facility protocol.  Pt follows with [facility] for dosing/monitoring.  INR to be draw via port with treatment labs, fax to [facility] [phone].  Goal INR 2.5-3.0.   - 4/14: INR 2.0 result faxed to [facility] 4/15/25  - 4/21:  Will monitor for INR result and fax to [facility] 4/22/25.   - 4/28:  Will monitor for INR result and fax to [facility] 4/29/25.   - Not enough blood in tube. Pt to [facility] for INR draw.  5/6:  Will monitor for INR result and fax to [facility].  INR 4.7, instructions per [facility].    5/12:  Will monitor for INR result and fax to [facility].   5/19:  Will monitor for INR result and fax to [facility].   5/27:  INR 8.8 - notified via secure chat by on-call MD.  Dr. Frankel was able to speak with pt.  This provider also spoke with pt with faxed results to [facility].   6/2:  spouse reports future INR labs will be drawn by [facility]. INR lab removed from treatment plan.      # EGD (scheduled 6/23/25)  - 6/17/25 TC from triage nurse to GI office with VM message left - pt's INR is managed by [facility].  Cardiac approval should come from [facility].  Fax # provided.  Message with VM on 6/18 by this provider with above info.  No return call received.       # Advanced care planning  - discussed incurable but treatable nature of disease, with goal to prolong life while  maintaining/improving quality of life. She understands that risks of systemic therapy may outweigh benefits at some point in the future.

## 2025-07-14 NOTE — SIGNIFICANT EVENT

## 2025-07-15 ENCOUNTER — APPOINTMENT (OUTPATIENT)
Dept: RADIATION ONCOLOGY | Facility: CLINIC | Age: 80
End: 2025-07-15
Payer: MEDICARE

## 2025-07-15 ENCOUNTER — TELEPHONE (OUTPATIENT)
Dept: HEMATOLOGY/ONCOLOGY | Facility: CLINIC | Age: 80
End: 2025-07-15
Payer: MEDICARE

## 2025-07-16 ENCOUNTER — APPOINTMENT (OUTPATIENT)
Dept: RADIATION ONCOLOGY | Facility: CLINIC | Age: 80
End: 2025-07-16
Payer: MEDICARE

## 2025-07-17 ENCOUNTER — APPOINTMENT (OUTPATIENT)
Dept: RADIATION ONCOLOGY | Facility: CLINIC | Age: 80
End: 2025-07-17
Payer: MEDICARE

## 2025-07-17 ENCOUNTER — HOSPITAL ENCOUNTER (OUTPATIENT)
Dept: HOSPITAL 100 - MTLAB | Age: 80
Discharge: HOME | End: 2025-07-17
Payer: MEDICARE

## 2025-07-17 DIAGNOSIS — E53.8: ICD-10-CM

## 2025-07-17 DIAGNOSIS — E55.9: ICD-10-CM

## 2025-07-17 DIAGNOSIS — E78.5: ICD-10-CM

## 2025-07-17 DIAGNOSIS — R73.09: Primary | ICD-10-CM

## 2025-07-17 LAB
ALBUMIN SERPL-MCNC: 3.9 G/DL (ref 3.4–4.8)
ALBUMIN/GLOB SERPL: 1.8 RATIO (ref 0.9–2.4)
ALP SERPL-CCNC: 71 U/L (ref 35–104)
ALT SERPL-CCNC: 16 U/L (ref ?–34)
ANION GAP SERPL CALC-SCNC: 11 MMOL/L (ref 5–15)
AST(SGOT): 22 U/L (ref ?–31)
BUN SERPL-MCNC: 13 MG/DL (ref 4–19)
BUN/CREAT SERPL: 19.6 RATIO (ref 10–20)
CALCIUM SERPL-MCNC: 8.7 MG/DL (ref 7.6–11)
CHLORIDE: 105 MMOL/L (ref 98–108)
CHOLEST SERPL-MCNC: 189 MG/DL (ref ?–200)
CHOLESTEROL:HDL RATIO SCREEN: 3.57
CO2 BLDCV-SCNC: 25.2 MMOL/L (ref 21–32)
CREAT SERPL-MCNC: 0.65 MG/DL (ref 0.7–1.2)
CREATININE, URINE (RANDOM): 94.4 MG/DL (ref 28–217)
FOLATE SERPL-SCNC: 9.53 NG/ML (ref 4.6–34.8)
GLOBULIN: 2.2 G/DL (ref 2.2–4.2)
GLUCOSE SERPL-MCNC: 87 MG/DL (ref 70–99)
HBA1C MFR BLD: 5.2 % (ref ?–5.6)
HDLC SERPL-MCNC: 53 MG/DL
LOW DENSITY LIPOPROTEIN CALC.: 115 MG/DL
Lab: 203.4 MG/G CRE
MICROALBUMIN UR-MCNC: 192 MG/L
POTASSIUM SPEC-MCNC: 3.6 MMOL/L (ref 3.3–5.1)
PROTEIN, TOTAL: 6.1 G/DL (ref 5.9–8.4)
SODIUM LEVEL: 141 MMOL/L (ref 133–145)
TOTAL BILIRUBIN: 0.37 MG/DL (ref 0–1.3)
TRIGLYCERIDES: 106 MG/DL
VIT B12 SERPL-MCNC: 1432 PG/ML (ref 180–914)
VITAMIN D,25 HYDROXY: 53.1 NG/ML (ref 30–100)
VLDLC SERPL-MCNC: 21 MG/DL (ref 5–40)

## 2025-07-17 PROCEDURE — 82043 UR ALBUMIN QUANTITATIVE: CPT

## 2025-07-17 PROCEDURE — 80061 LIPID PANEL: CPT

## 2025-07-17 PROCEDURE — 82306 VITAMIN D 25 HYDROXY: CPT

## 2025-07-17 PROCEDURE — 36415 COLL VENOUS BLD VENIPUNCTURE: CPT

## 2025-07-17 PROCEDURE — 82746 ASSAY OF FOLIC ACID SERUM: CPT

## 2025-07-17 PROCEDURE — 82570 ASSAY OF URINE CREATININE: CPT

## 2025-07-17 PROCEDURE — 80053 COMPREHEN METABOLIC PANEL: CPT

## 2025-07-17 PROCEDURE — 83036 HEMOGLOBIN GLYCOSYLATED A1C: CPT

## 2025-07-17 PROCEDURE — 82607 VITAMIN B-12: CPT

## 2025-07-18 ENCOUNTER — APPOINTMENT (OUTPATIENT)
Dept: RADIATION ONCOLOGY | Facility: CLINIC | Age: 80
End: 2025-07-18
Payer: MEDICARE

## 2025-07-21 ENCOUNTER — APPOINTMENT (OUTPATIENT)
Dept: RADIATION ONCOLOGY | Facility: CLINIC | Age: 80
End: 2025-07-21
Payer: MEDICARE

## 2025-07-21 ENCOUNTER — OFFICE VISIT (OUTPATIENT)
Dept: HEMATOLOGY/ONCOLOGY | Facility: CLINIC | Age: 80
End: 2025-07-21
Payer: MEDICARE

## 2025-07-21 ENCOUNTER — INFUSION (OUTPATIENT)
Dept: HEMATOLOGY/ONCOLOGY | Facility: CLINIC | Age: 80
End: 2025-07-21
Payer: MEDICARE

## 2025-07-21 VITALS
SYSTOLIC BLOOD PRESSURE: 121 MMHG | TEMPERATURE: 98.2 F | OXYGEN SATURATION: 96 % | WEIGHT: 106.81 LBS | DIASTOLIC BLOOD PRESSURE: 78 MMHG | HEART RATE: 77 BPM | RESPIRATION RATE: 16 BRPM | BODY MASS INDEX: 19.46 KG/M2

## 2025-07-21 DIAGNOSIS — C44.92 SCC (SQUAMOUS CELL CARCINOMA): ICD-10-CM

## 2025-07-21 DIAGNOSIS — Z51.11 ENCOUNTER FOR ANTINEOPLASTIC CHEMOTHERAPY: Primary | ICD-10-CM

## 2025-07-21 LAB
ALBUMIN SERPL BCP-MCNC: 3.9 G/DL (ref 3.4–5)
ALP SERPL-CCNC: 57 U/L (ref 33–136)
ALT SERPL W P-5'-P-CCNC: 11 U/L (ref 7–45)
ANION GAP SERPL CALC-SCNC: 9 MMOL/L (ref 10–20)
AST SERPL W P-5'-P-CCNC: 15 U/L (ref 9–39)
BASOPHILS # BLD AUTO: 0.02 X10*3/UL (ref 0–0.1)
BASOPHILS NFR BLD AUTO: 0.5 %
BILIRUB SERPL-MCNC: 0.6 MG/DL (ref 0–1.2)
BUN SERPL-MCNC: 13 MG/DL (ref 6–23)
CALCIUM SERPL-MCNC: 8.9 MG/DL (ref 8.6–10.3)
CHLORIDE SERPL-SCNC: 105 MMOL/L (ref 98–107)
CO2 SERPL-SCNC: 28 MMOL/L (ref 21–32)
CREAT SERPL-MCNC: 0.58 MG/DL (ref 0.5–1.05)
EGFRCR SERPLBLD CKD-EPI 2021: >90 ML/MIN/1.73M*2
EOSINOPHIL # BLD AUTO: 0.06 X10*3/UL (ref 0–0.4)
EOSINOPHIL NFR BLD AUTO: 1.4 %
ERYTHROCYTE [DISTWIDTH] IN BLOOD BY AUTOMATED COUNT: 13.9 % (ref 11.5–14.5)
GLUCOSE SERPL-MCNC: 95 MG/DL (ref 74–99)
HCT VFR BLD AUTO: 32.7 % (ref 36–46)
HGB BLD-MCNC: 10.5 G/DL (ref 12–16)
IMM GRANULOCYTES # BLD AUTO: 0.01 X10*3/UL (ref 0–0.5)
IMM GRANULOCYTES NFR BLD AUTO: 0.2 % (ref 0–0.9)
LYMPHOCYTES # BLD AUTO: 0.57 X10*3/UL (ref 0.8–3)
LYMPHOCYTES NFR BLD AUTO: 13.1 %
MCH RBC QN AUTO: 33.8 PG (ref 26–34)
MCHC RBC AUTO-ENTMCNC: 32.1 G/DL (ref 32–36)
MCV RBC AUTO: 105 FL (ref 80–100)
MONOCYTES # BLD AUTO: 0.39 X10*3/UL (ref 0.05–0.8)
MONOCYTES NFR BLD AUTO: 9 %
NEUTROPHILS # BLD AUTO: 3.3 X10*3/UL (ref 1.6–5.5)
NEUTROPHILS NFR BLD AUTO: 75.8 %
NRBC BLD-RTO: ABNORMAL /100{WBCS}
PLATELET # BLD AUTO: 179 X10*3/UL (ref 150–450)
POTASSIUM SERPL-SCNC: 3.7 MMOL/L (ref 3.5–5.3)
PROT SERPL-MCNC: 5.9 G/DL (ref 6.4–8.2)
RBC # BLD AUTO: 3.11 X10*6/UL (ref 4–5.2)
SODIUM SERPL-SCNC: 138 MMOL/L (ref 136–145)
WBC # BLD AUTO: 4.4 X10*3/UL (ref 4.4–11.3)

## 2025-07-21 PROCEDURE — 99213 OFFICE O/P EST LOW 20 MIN: CPT | Performed by: NURSE PRACTITIONER

## 2025-07-21 PROCEDURE — 2500000001 HC RX 250 WO HCPCS SELF ADMINISTERED DRUGS (ALT 637 FOR MEDICARE OP): Performed by: NURSE PRACTITIONER

## 2025-07-21 PROCEDURE — 2500000004 HC RX 250 GENERAL PHARMACY W/ HCPCS (ALT 636 FOR OP/ED): Performed by: NURSE PRACTITIONER

## 2025-07-21 PROCEDURE — 1036F TOBACCO NON-USER: CPT | Performed by: NURSE PRACTITIONER

## 2025-07-21 PROCEDURE — 1126F AMNT PAIN NOTED NONE PRSNT: CPT | Performed by: NURSE PRACTITIONER

## 2025-07-21 PROCEDURE — 96413 CHEMO IV INFUSION 1 HR: CPT

## 2025-07-21 PROCEDURE — 84075 ASSAY ALKALINE PHOSPHATASE: CPT

## 2025-07-21 PROCEDURE — 85025 COMPLETE CBC W/AUTO DIFF WBC: CPT

## 2025-07-21 PROCEDURE — 96375 TX/PRO/DX INJ NEW DRUG ADDON: CPT | Mod: INF

## 2025-07-21 PROCEDURE — 2500000004 HC RX 250 GENERAL PHARMACY W/ HCPCS (ALT 636 FOR OP/ED): Performed by: STUDENT IN AN ORGANIZED HEALTH CARE EDUCATION/TRAINING PROGRAM

## 2025-07-21 PROCEDURE — 99213 OFFICE O/P EST LOW 20 MIN: CPT | Mod: 25 | Performed by: NURSE PRACTITIONER

## 2025-07-21 PROCEDURE — 1159F MED LIST DOCD IN RCRD: CPT | Performed by: NURSE PRACTITIONER

## 2025-07-21 PROCEDURE — G2211 COMPLEX E/M VISIT ADD ON: HCPCS | Performed by: NURSE PRACTITIONER

## 2025-07-21 RX ORDER — PROCHLORPERAZINE EDISYLATE 5 MG/ML
10 INJECTION INTRAMUSCULAR; INTRAVENOUS EVERY 6 HOURS PRN
Status: CANCELLED | OUTPATIENT
Start: 2025-07-21

## 2025-07-21 RX ORDER — DEXAMETHASONE SODIUM PHOSPHATE 10 MG/ML
10 INJECTION INTRAMUSCULAR; INTRAVENOUS ONCE
OUTPATIENT
Start: 2025-08-11

## 2025-07-21 RX ORDER — DIPHENHYDRAMINE HCL 25 MG
50 CAPSULE ORAL ONCE
Status: COMPLETED | OUTPATIENT
Start: 2025-07-21 | End: 2025-07-21

## 2025-07-21 RX ORDER — HEPARIN 100 UNIT/ML
500 SYRINGE INTRAVENOUS AS NEEDED
Status: CANCELLED | OUTPATIENT
Start: 2025-07-21

## 2025-07-21 RX ORDER — PROCHLORPERAZINE EDISYLATE 5 MG/ML
10 INJECTION INTRAMUSCULAR; INTRAVENOUS EVERY 6 HOURS PRN
OUTPATIENT
Start: 2025-08-04

## 2025-07-21 RX ORDER — DEXAMETHASONE SODIUM PHOSPHATE 10 MG/ML
10 INJECTION INTRAMUSCULAR; INTRAVENOUS ONCE
OUTPATIENT
Start: 2025-07-28

## 2025-07-21 RX ORDER — EPINEPHRINE 0.3 MG/.3ML
0.3 INJECTION SUBCUTANEOUS EVERY 5 MIN PRN
OUTPATIENT
Start: 2025-08-11

## 2025-07-21 RX ORDER — HEPARIN SODIUM,PORCINE/PF 10 UNIT/ML
50 SYRINGE (ML) INTRAVENOUS AS NEEDED
Status: CANCELLED | OUTPATIENT
Start: 2025-07-21

## 2025-07-21 RX ORDER — DIPHENHYDRAMINE HYDROCHLORIDE 50 MG/ML
50 INJECTION, SOLUTION INTRAMUSCULAR; INTRAVENOUS AS NEEDED
Status: DISCONTINUED | OUTPATIENT
Start: 2025-07-21 | End: 2025-07-21 | Stop reason: HOSPADM

## 2025-07-21 RX ORDER — FAMOTIDINE 10 MG/ML
20 INJECTION, SOLUTION INTRAVENOUS ONCE
Status: COMPLETED | OUTPATIENT
Start: 2025-07-21 | End: 2025-07-21

## 2025-07-21 RX ORDER — HEPARIN 100 UNIT/ML
500 SYRINGE INTRAVENOUS AS NEEDED
Status: DISCONTINUED | OUTPATIENT
Start: 2025-07-21 | End: 2025-07-21 | Stop reason: HOSPADM

## 2025-07-21 RX ORDER — HEPARIN SODIUM,PORCINE/PF 10 UNIT/ML
50 SYRINGE (ML) INTRAVENOUS AS NEEDED
Status: DISCONTINUED | OUTPATIENT
Start: 2025-07-21 | End: 2025-07-21 | Stop reason: HOSPADM

## 2025-07-21 RX ORDER — DEXAMETHASONE SODIUM PHOSPHATE 10 MG/ML
10 INJECTION INTRAMUSCULAR; INTRAVENOUS ONCE
Status: CANCELLED | OUTPATIENT
Start: 2025-07-21

## 2025-07-21 RX ORDER — ALBUTEROL SULFATE 0.83 MG/ML
3 SOLUTION RESPIRATORY (INHALATION) AS NEEDED
OUTPATIENT
Start: 2025-08-11

## 2025-07-21 RX ORDER — PROCHLORPERAZINE MALEATE 5 MG
10 TABLET ORAL EVERY 6 HOURS PRN
OUTPATIENT
Start: 2025-08-04

## 2025-07-21 RX ORDER — FAMOTIDINE 10 MG/ML
20 INJECTION, SOLUTION INTRAVENOUS ONCE AS NEEDED
OUTPATIENT
Start: 2025-08-11

## 2025-07-21 RX ORDER — DIPHENHYDRAMINE HYDROCHLORIDE 50 MG/ML
50 INJECTION, SOLUTION INTRAMUSCULAR; INTRAVENOUS AS NEEDED
OUTPATIENT
Start: 2025-08-04

## 2025-07-21 RX ORDER — DIPHENHYDRAMINE HCL 50 MG
50 CAPSULE ORAL ONCE
OUTPATIENT
Start: 2025-08-11

## 2025-07-21 RX ORDER — EPINEPHRINE 0.3 MG/.3ML
0.3 INJECTION SUBCUTANEOUS EVERY 5 MIN PRN
OUTPATIENT
Start: 2025-07-28

## 2025-07-21 RX ORDER — ALBUTEROL SULFATE 0.83 MG/ML
3 SOLUTION RESPIRATORY (INHALATION) AS NEEDED
OUTPATIENT
Start: 2025-07-28

## 2025-07-21 RX ORDER — DIPHENHYDRAMINE HCL 50 MG
50 CAPSULE ORAL ONCE
OUTPATIENT
Start: 2025-08-04

## 2025-07-21 RX ORDER — DIPHENHYDRAMINE HYDROCHLORIDE 50 MG/ML
50 INJECTION, SOLUTION INTRAMUSCULAR; INTRAVENOUS AS NEEDED
OUTPATIENT
Start: 2025-08-11

## 2025-07-21 RX ORDER — PROCHLORPERAZINE EDISYLATE 5 MG/ML
10 INJECTION INTRAMUSCULAR; INTRAVENOUS EVERY 6 HOURS PRN
Status: DISCONTINUED | OUTPATIENT
Start: 2025-07-21 | End: 2025-07-21 | Stop reason: HOSPADM

## 2025-07-21 RX ORDER — PROCHLORPERAZINE MALEATE 5 MG
10 TABLET ORAL EVERY 6 HOURS PRN
OUTPATIENT
Start: 2025-07-28

## 2025-07-21 RX ORDER — FAMOTIDINE 10 MG/ML
20 INJECTION, SOLUTION INTRAVENOUS ONCE
OUTPATIENT
Start: 2025-08-11

## 2025-07-21 RX ORDER — PROCHLORPERAZINE EDISYLATE 5 MG/ML
10 INJECTION INTRAMUSCULAR; INTRAVENOUS EVERY 6 HOURS PRN
OUTPATIENT
Start: 2025-07-28

## 2025-07-21 RX ORDER — FAMOTIDINE 10 MG/ML
20 INJECTION, SOLUTION INTRAVENOUS ONCE
Status: CANCELLED | OUTPATIENT
Start: 2025-07-21

## 2025-07-21 RX ORDER — DIPHENHYDRAMINE HCL 50 MG
50 CAPSULE ORAL ONCE
Status: CANCELLED | OUTPATIENT
Start: 2025-07-21

## 2025-07-21 RX ORDER — PROCHLORPERAZINE EDISYLATE 5 MG/ML
10 INJECTION INTRAMUSCULAR; INTRAVENOUS EVERY 6 HOURS PRN
OUTPATIENT
Start: 2025-08-11

## 2025-07-21 RX ORDER — PROCHLORPERAZINE MALEATE 5 MG
10 TABLET ORAL EVERY 6 HOURS PRN
OUTPATIENT
Start: 2025-08-11

## 2025-07-21 RX ORDER — PROCHLORPERAZINE MALEATE 10 MG
10 TABLET ORAL EVERY 6 HOURS PRN
Status: DISCONTINUED | OUTPATIENT
Start: 2025-07-21 | End: 2025-07-21 | Stop reason: HOSPADM

## 2025-07-21 RX ORDER — DIPHENHYDRAMINE HCL 50 MG
50 CAPSULE ORAL ONCE
OUTPATIENT
Start: 2025-07-28

## 2025-07-21 RX ORDER — DIPHENHYDRAMINE HYDROCHLORIDE 50 MG/ML
50 INJECTION, SOLUTION INTRAMUSCULAR; INTRAVENOUS AS NEEDED
OUTPATIENT
Start: 2025-07-28

## 2025-07-21 RX ORDER — EPINEPHRINE 0.3 MG/.3ML
0.3 INJECTION SUBCUTANEOUS EVERY 5 MIN PRN
Status: DISCONTINUED | OUTPATIENT
Start: 2025-07-21 | End: 2025-07-21 | Stop reason: HOSPADM

## 2025-07-21 RX ORDER — FAMOTIDINE 10 MG/ML
20 INJECTION, SOLUTION INTRAVENOUS ONCE AS NEEDED
OUTPATIENT
Start: 2025-07-28

## 2025-07-21 RX ORDER — FAMOTIDINE 10 MG/ML
20 INJECTION, SOLUTION INTRAVENOUS ONCE
OUTPATIENT
Start: 2025-07-28

## 2025-07-21 RX ORDER — DIPHENHYDRAMINE HYDROCHLORIDE 50 MG/ML
50 INJECTION, SOLUTION INTRAMUSCULAR; INTRAVENOUS AS NEEDED
Status: CANCELLED | OUTPATIENT
Start: 2025-07-21

## 2025-07-21 RX ORDER — ALBUTEROL SULFATE 0.83 MG/ML
3 SOLUTION RESPIRATORY (INHALATION) AS NEEDED
Status: CANCELLED | OUTPATIENT
Start: 2025-07-21

## 2025-07-21 RX ORDER — FAMOTIDINE 10 MG/ML
20 INJECTION, SOLUTION INTRAVENOUS ONCE
OUTPATIENT
Start: 2025-08-04

## 2025-07-21 RX ORDER — ALBUTEROL SULFATE 0.83 MG/ML
3 SOLUTION RESPIRATORY (INHALATION) AS NEEDED
Status: DISCONTINUED | OUTPATIENT
Start: 2025-07-21 | End: 2025-07-21 | Stop reason: HOSPADM

## 2025-07-21 RX ORDER — HEPARIN SODIUM,PORCINE/PF 10 UNIT/ML
50 SYRINGE (ML) INTRAVENOUS AS NEEDED
OUTPATIENT
Start: 2025-07-21

## 2025-07-21 RX ORDER — EPINEPHRINE 0.3 MG/.3ML
0.3 INJECTION SUBCUTANEOUS EVERY 5 MIN PRN
Status: CANCELLED | OUTPATIENT
Start: 2025-07-21

## 2025-07-21 RX ORDER — FAMOTIDINE 10 MG/ML
20 INJECTION, SOLUTION INTRAVENOUS ONCE AS NEEDED
OUTPATIENT
Start: 2025-08-04

## 2025-07-21 RX ORDER — PROCHLORPERAZINE MALEATE 5 MG
10 TABLET ORAL EVERY 6 HOURS PRN
Status: CANCELLED | OUTPATIENT
Start: 2025-07-21

## 2025-07-21 RX ORDER — EPINEPHRINE 0.3 MG/.3ML
0.3 INJECTION SUBCUTANEOUS EVERY 5 MIN PRN
OUTPATIENT
Start: 2025-08-04

## 2025-07-21 RX ORDER — ALBUTEROL SULFATE 0.83 MG/ML
3 SOLUTION RESPIRATORY (INHALATION) AS NEEDED
OUTPATIENT
Start: 2025-08-04

## 2025-07-21 RX ORDER — FAMOTIDINE 10 MG/ML
20 INJECTION, SOLUTION INTRAVENOUS ONCE AS NEEDED
Status: CANCELLED | OUTPATIENT
Start: 2025-07-21

## 2025-07-21 RX ORDER — HEPARIN 100 UNIT/ML
500 SYRINGE INTRAVENOUS AS NEEDED
OUTPATIENT
Start: 2025-07-21

## 2025-07-21 RX ORDER — DEXAMETHASONE SODIUM PHOSPHATE 10 MG/ML
10 INJECTION INTRAMUSCULAR; INTRAVENOUS ONCE
OUTPATIENT
Start: 2025-08-04

## 2025-07-21 RX ORDER — FAMOTIDINE 10 MG/ML
20 INJECTION, SOLUTION INTRAVENOUS ONCE AS NEEDED
Status: DISCONTINUED | OUTPATIENT
Start: 2025-07-21 | End: 2025-07-21 | Stop reason: HOSPADM

## 2025-07-21 RX ADMIN — HEPARIN 500 UNITS: 100 SYRINGE at 14:48

## 2025-07-21 RX ADMIN — DIPHENHYDRAMINE HYDROCHLORIDE 50 MG: 25 CAPSULE ORAL at 13:25

## 2025-07-21 RX ADMIN — PACLITAXEL 90 MG: 6 INJECTION, SOLUTION INTRAVENOUS at 13:50

## 2025-07-21 RX ADMIN — DEXAMETHASONE SODIUM PHOSPHATE 10 MG: 4 INJECTION, SOLUTION INTRA-ARTICULAR; INTRALESIONAL; INTRAMUSCULAR; INTRAVENOUS; SOFT TISSUE at 13:29

## 2025-07-21 RX ADMIN — FAMOTIDINE 20 MG: 10 INJECTION INTRAVENOUS at 13:25

## 2025-07-21 ASSESSMENT — ENCOUNTER SYMPTOMS
APPETITE CHANGE: 0
COUGH: 0
FREQUENCY: 0
CARDIOVASCULAR NEGATIVE: 1
CONSTIPATION: 0
TROUBLE SWALLOWING: 1
MUSCULOSKELETAL NEGATIVE: 1
SORE THROAT: 0
NUMBNESS: 1
FATIGUE: 1

## 2025-07-21 ASSESSMENT — PAIN SCALES - GENERAL: PAINLEVEL_OUTOF10: 0-NO PAIN

## 2025-07-21 NOTE — SIGNIFICANT EVENT

## 2025-07-21 NOTE — PROGRESS NOTES
[facility] - Medical Oncology Follow-Up Visit    Patient ID: Amrita Grant is a 80 y.o. female.  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 Grant is a 81 yo with PMH significant for lupus, aortic valve repair, fibromyalgia, who first noticed a mass on her nose in fall of 2024. Met Dr. Templeton who did an in-office biopsy 11/11/24 with invasive, poorly differentiated carcinoma, p40+, negative MOC31, unclear etiology, and recommended for repeat biopsy. Biopsy on 11/26/24 with the same, felt to be a primary cutaneous neoplasm such as basal cell carcinoma. Discussed at [facility] tumor board, and due to significant morbidity associated with resection, recommended against surgery and referred to radiation oncology and medical oncology. Met Dr. Guzmán, who recommended against immunotherapy given her lupus and poor performance status, and recommended discussion with radiation oncology, which cutaneous tumor board also agreed with. She planned to have radiation closer to home, but unfortunately her pre-radiation scans showed concern for metastatic disease, with PET/CT on 1/20/25 with FDG avidity of the R nasal soft tissue as well as R level 1 cervical lymph node, R rights, L1, and RUL nodule below PET resolution. She was ultimately started on vismodegib as well as started on palliative radiation (2/18-3/11/25) for the nasal lesion. In the meantime, [facility]  pathology revealed invasive carcinoma, HPV associated, positive for p40 CK5/6, EMA, p16, and negative for MOC, BerEP4, INSM1, and chromogranin. As this was felt to no longer be a basal cell carcinoma, she stopped the vismodegib. Biopsy of the L1 lesion morphologically the same.     Regarding her lupus - she has been on plaquenil monotherapy for many years, stable on the same dose. She was originally diagnosed with lupus in 2001 - she was initially diagnosed with pericarditis. She has fatigue and joint aches with the lupus.     She is here today with her  and friend. Her last day of radiation was last Tuesday or Wednesday. Radiation went ok overall. Her last dose of the visdomegib was last Tuesday or Wednesday. No side effects.  She is very tired, naps every day. In the last year, she has stopped doing the cooking, cleaning, and laundry. She is very careful, leans on her  to help her get up the stairs. Her fatigue has been getting worse and worse. She continues to lose weight - 50 lbs in the last year and a half. She has trouble swallowing - some things are harder to swallow like bread, meat. Gets tickles in her throat. She's had this for a couple years, had her throat stretched - this was also just repeated in December. Done at [place] typically, helps for a little while, but don't last.      PAST MEDICAL HISTORY  Lupus  Fibromyalgia  Osteoarthritis  Aortic valve repair (mechanical)  on warfarin  HTN  CVA - (2021)     SOCIAL HISTORY  Lives at home with her . They've been  45 years. Previously was a  for [employer].   Tob: none  EtOH: none, previously a little  Illicits: none     FAMILY HISTORY  Mother - uterine cancer  Maternal grandmother - breast cancer  Maternal cousin - unknown metastatic cancer    HPI  She is here today with her .   Energy level -  still fatigued.  Felt good after last Monday's infusion.  Hard to say if she notices an improvement with reduced dose.   Continues with swallowing difficulties - needing to take smaller bites and longer eating times.   Neuropathy is better.  Still experiencing but no worse. Good appetite with stable wt.  Denies n/v/c/d. No fevers or CP.  PT 2x wk. Labs WNL.  Ready for treatment.      Meds (Current):    Current Outpatient Medications:     ARIPiprazole (Abilify) 5 mg tablet, Take 1 tablet (5 mg) by mouth once daily., Disp: , Rfl:     Bystolic 5 mg tablet, Take 2 tablets (10 mg) by mouth once daily. Dose increased, Disp: , Rfl:     calcium carbonate 500 mg calcium (1,250 mg) chewable tablet, Chew and swallow 1 tablet (500 mg of elemental calcium) once daily., Disp: , Rfl:     cetirizine (ZyrTEC) 10 mg tablet, 1 tab(s) orally as needed, Disp: , Rfl:     cholecalciferol (Vitamin D-3) 25 MCG (1000 UT) capsule, 1 cap(s) orally three times daily, Disp: , Rfl:     docusate sodium (Colace) 100 mg capsule, once every 24 hours., Disp: , Rfl:     fluticasone (Flonase) 50 mcg/actuation nasal spray, Administer 2 sprays into affected nostril(s)., Disp: , Rfl:     levETIRAcetam (Keppra) 500 mg tablet, every 12 hours., Disp: , Rfl:     lidocaine-prilocaine (Emla) 2.5-2.5 % cream, Apply topically 1 time for 1 dose. Apply 30-45 minutes prior to port access., Disp: 30 g, Rfl: 3    LORazepam (Ativan) 0.5 mg tablet, Take half tablet 1 hour before MRI, if needed take other half of tablet while getting checked in for MRI, Disp: 1 tablet, Rfl: 0    losartan (Cozaar) 100 mg tablet, Take 0.5 tablets (50 mg) by mouth twice a day., Disp: , Rfl:     melatonin 3 mg tablet, Take by mouth once daily., Disp: , Rfl:     memantine (Namenda) 5 mg tablet, Take 1 tablet (5 mg) by mouth once daily., Disp: , Rfl:     mirtazapine (Remeron) 15 mg tablet, 1 tab(s) orally half tablet bedtime daily for 30 days, Disp: , Rfl:     omeprazole (PriLOSEC) 20 mg DR capsule, Take 1 capsule (20 mg) by mouth once daily., Disp: , Rfl:     ondansetron (Zofran) 4 mg tablet, Take 1 tablet (4  mg) by mouth if needed for nausea or vomiting., Disp: , Rfl:     ondansetron (Zofran) 8 mg tablet, Take 1 tablet (8 mg) by mouth every 8 hours if needed for nausea or vomiting., Disp: 30 tablet, Rfl: 5    PlaqueniL 200 mg tablet, Take 1 tablet (200 mg) by mouth once daily., Disp: , Rfl:     potassium chloride CR 20 mEq ER tablet, Take 1 tablet (20 mEq) by mouth once daily., Disp: , Rfl:     Premarin 0.3 mg tablet, once every 24 hours., Disp: , Rfl:     prochlorperazine (Compazine) 10 mg tablet, Take 1 tablet (10 mg) by mouth every 6 hours if needed for nausea or vomiting., Disp: 30 tablet, Rfl: 5    sennosides (senna) 8.6 mg tablet, Take 1 tablet (8.6 mg) by mouth 2 times a day., Disp: 60 tablet, Rfl: 2    warfarin (Coumadin) 4 mg tablet, Take 1 tablet (4 mg) by mouth once daily., Disp: , Rfl:   No current facility-administered medications for this visit.    Facility-Administered Medications Ordered in Other Visits:     heparin flush 100 unit/mL syringe 500 Units, 500 Units, intra-catheter, PRN, Dipika Perrin MD    Allergies   Allergen Reactions    Propofol Nausea/vomiting    Sulfa (Sulfonamide Antibiotics) Other    Opioids - Morphine Analogues Nausea And Vomiting, Other and Unknown    Penicillins Unknown     Pt. Reports passing out upon taking penicillin when she was young.       Review of Systems   Constitutional:  Positive for fatigue. Negative for appetite change.   HENT:   Positive for trouble swallowing. Negative for sore throat.    Respiratory:  Negative for cough.    Cardiovascular: Negative.    Gastrointestinal:  Negative for constipation.   Genitourinary:  Negative for frequency.    Musculoskeletal: Negative.    Neurological:  Positive for numbness.        Objective   BSA: 1.46 meters squared  Wt Readings from Last 5 Encounters:   07/21/25 48.5 kg (106 lb 13 oz)   07/14/25 47.9 kg (105 lb 8 oz)   07/07/25 48.5 kg (107 lb)   06/30/25 47.5 kg (104 lb 12.8 oz)   06/23/25 47.9 kg (105 lb 8 oz)     /78  (BP Location: Left arm, Patient Position: Sitting)   Pulse 77   Temp 36.8 °C (98.2 °F) (Temporal)   Resp 16   Wt 48.5 kg (106 lb 13 oz)   SpO2 96%   BMI 19.46 kg/m²     ECOG Score: 2    0          Fully active; no performance restrictions.  1          Strenuous physical activity restricted; fully ambulatory and able to carry out light work.  2          Capable of all self-care but unable to carry out any work activities. Up and about >50% of waking hours.  3          Capable of only limited self-care; confined to bed or chair >50% of waking hours.  4          Completely disabled; cannot carry out any self-care; totally confined to bed or chair.     Physical Exam  Vitals reviewed.   Constitutional:       General: She is not in acute distress.     Appearance: Normal appearance.   HENT:      Head: Normocephalic.      Nose: Nose normal.      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        Mouth/Throat:      Mouth: Mucous membranes are moist.      Pharynx: Oropharynx is clear.     Eyes:      Extraocular Movements: Extraocular movements intact.      Conjunctiva/sclera: Conjunctivae normal.      Pupils: Pupils are equal, round, and reactive to light.       Cardiovascular:      Rate and Rhythm: Normal rate and regular rhythm.      Pulses: Normal pulses.      Heart sounds: Normal heart sounds. No murmur heard.  Pulmonary:      Effort: No respiratory distress.      Breath sounds: Normal breath sounds.   Abdominal:      General: Bowel sounds are normal.      Palpations: Abdomen is soft.     Musculoskeletal:         General: Normal range of motion.      Cervical back: Normal range of motion.      Comments: Ambulates with walker.       Skin:     General: Skin is warm and dry.     Neurological:      General: No focal deficit present.      Mental Status: She is alert and oriented to person, place, and time.     Psychiatric:         Mood and Affect: Mood normal.         Behavior: Behavior normal.          Thought Content: Thought content normal.         Judgment: Judgment normal.          Results:  Labs:  Lab Results   Component Value Date    WBC 4.4 07/21/2025    HGB 10.5 (L) 07/21/2025    HCT 32.7 (L) 07/21/2025     (H) 07/21/2025     07/21/2025      Lab Results   Component Value Date    NEUTROABS 3.30 07/21/2025      Lab Results   Component Value Date    GLUCOSE 95 07/21/2025    CALCIUM 8.9 07/21/2025     07/21/2025    K 3.7 07/21/2025    CO2 28 07/21/2025     07/21/2025    BUN 13 07/21/2025    CREATININE 0.58 07/21/2025     Lab Results   Component Value Date    ALT 11 07/21/2025    AST 15 07/21/2025    ALKPHOS 57 07/21/2025    BILITOT 0.6 07/21/2025        Imaging:  No new imaging.      Pathology:    Lab Results   Component Value Date    ADD1  11/26/2024     Additional immunostains were reviewed.  Tumor cells are positive for p16 and negative for chromogranin and INSM1.  Isolated tumor cells are positive for BerEP4.  The differential diagnosis remains unchanged.         Assessment/Plan      Amrita Grant is a 80 y.o. female here for recommendations and to establish care for poorly differentiated carcinoma of the face.     # Poorly differentiated carcinoma  - HPV+  - L1 biopsy proven metastasis - will send tempus for better delineation  - discussed the likelihood that this is metastatic, HPV+ nasopharyngeal carcinoma, although tempus may help us better determine this  - s/p palliative radiation  - discussed that typically would recommend combination therapy with chemotherapy+immunotherapy. However, given her lupus which was originally diagnosed with pericarditis, do not recommend immunotherapy. Also discussed that given her current poor performance status, do not believe she could tolerate dual chemotherapy  - discussed single-agent weekly paclitaxel, and consented. Plan to repeat scans after 3 cycles - CT scheduled 6/20/25    - will obtain new baseline images, as she has not had imaging  since January 2025  - she would like to have her infusions closer to home at [place], but will continue to see us at [place] for pre-treatment visits  - Pt agreeable to infusions and visits on same day at [place].    - Update: Unable to obtain IV access after multiple attempts.  Existing mediport placement scheduled 4/2/25.  Infusion rescheduled to 4/7/25.    - 4/2/25 Mediport placed  - personally reviewed 6/20/25 CT CAP and sinus scans reflect favorable response to treatment.    - will dose reduce paclitaxel 20% due to neuropathy  - next scans after C6 of treatment  - Pt would like to transfer her care to Dr. Bowers at [place] d/t distance to [facility] or [place]. Will place referral.    - Per pt request, will hold off on referral for the next 1-2 months, then likely will transfer care to Dr. Bowers.      # Neuropathy  - reduce dose of paclitaxel 20%  - 7/21:  continues with symptom, no worse.      # Lupus  - she [facility], and will notify them we are starting chemotherapy  - her arthritis is worse and she will contact her rheumatologist    # Dysuria, urgency, frequency  - offered obtaining UA and urine culture, and she deferred to PCP  - 3/31:  Current 10-day ATB course.  Pt/family not able to recall medication name.  ATB started prior to her tx C1D1.  Tolerating ATB.  Reports up to 8 UTI's last year.  PCP (non- provider) - any further follow-up with PCP.    - 4/7:  Completed 10-day ATB course.  Denies current UTI symptoms.     - 6/16:  Current ATB Cipro BID x5 days (Rx provided at urgent care visit).  Pt instructed to notify [facility] and PCP of UTI/ATB course.    - 6/23:  ATB completed.  Reports symptoms resolved.      # Unintentional weight loss  - may be partly due to dysphagia  - oncology dietician referral in place   - wt loss has stabilized     # Fatigue (g1)  - Recommended she increase her physical activity level - during waking house, get  up Q2-3H, walk within the home, pace activities.     # Constipation  - Senna too effective, pt to start routine Miralax - 1/2 capful to start and adjust as needed.    - Routine bowel meds with BM Q1-2 days    # Hypocalcemia 8.4 5/12/25  - Calcium listed on MAR, pt not taking med d/t pill size.  Recommended she start OTC calcium soft chew.   - stable Ca 8.5 6/23/25, pt declining OTC intervention     # Cough/PND   - Will add room humidifier.  Sleeps using one pillow, recommended she try sleeping with two pillows.    - Ongoing monitoring     # Constipation  - last BM Sat or Sunday.  Pt to take 2nd Miralax dose following call.  Start MOM with dose this evening.  If no BM by morning, 2nd dose and dose BID until BM.      # Rash/erythema to right cheek/forehead  - denies pruritus, apply lotion to area.    - improved    # Scattered macules to back of hands and wrist area (few)  - apply hydrocortisone cream BID, pt has within the home.    - improved    # Pancytopenia - chemo-induced - stable  - Weekly labs, ongoing monitoring    # INR/chronic Warfarin  - INR ordered for coumadin dosing, infusion nurse provided medical release form per facility protocol.  Pt follows with [facility] for dosing/monitoring.  INR to be draw via port with treatment labs, fax to [facility] [phone].  Goal INR 2.5-3.0.   - 4/14: INR 2.0 result faxed to [facility] 4/15/25  - 4/21:  Will monitor for INR result and fax to [facility] 4/22/25.   - 4/28:  Will monitor for INR result and fax to [facility] 4/29/25.   - Not enough blood in tube. Pt to [facility] for INR draw.  5/6:  Will monitor for INR result and fax to [facility].  INR 4.7, instructions per [facility].    5/12:  Will monitor for INR result and fax to [facility].   5/19:  Will monitor for INR result and fax to [facility].   5/27:  INR 8.8 - notified via secure chat by on-call MD.  Dr. Frankel was able to speak with pt.  This provider also  spoke with pt with faxed results to [facility].   6/2:  spouse reports future INR labs will be drawn by [facility]. INR lab removed from treatment plan.      # EGD (scheduled 6/23/25)  - 6/17/25 TC from triage nurse to GI office with VM message left - pt's INR is managed by [facility].  Cardiac approval should come from [facility].  Fax # provided.  Message with VM on 6/18 by this provider with above info.  No return call received.       # Advanced care planning  - discussed incurable but treatable nature of disease, with goal to prolong life while maintaining/improving quality of life. She understands that risks of systemic therapy may outweigh benefits at some point in the future.

## 2025-07-22 ENCOUNTER — APPOINTMENT (OUTPATIENT)
Dept: RADIATION ONCOLOGY | Facility: CLINIC | Age: 80
End: 2025-07-22
Payer: MEDICARE

## 2025-07-23 ENCOUNTER — APPOINTMENT (OUTPATIENT)
Dept: RADIATION ONCOLOGY | Facility: CLINIC | Age: 80
End: 2025-07-23
Payer: MEDICARE

## 2025-07-24 ENCOUNTER — APPOINTMENT (OUTPATIENT)
Dept: RADIATION ONCOLOGY | Facility: CLINIC | Age: 80
End: 2025-07-24
Payer: MEDICARE

## 2025-07-25 ENCOUNTER — APPOINTMENT (OUTPATIENT)
Dept: RADIATION ONCOLOGY | Facility: CLINIC | Age: 80
End: 2025-07-25
Payer: MEDICARE

## 2025-07-28 ENCOUNTER — INFUSION (OUTPATIENT)
Dept: HEMATOLOGY/ONCOLOGY | Facility: CLINIC | Age: 80
End: 2025-07-28
Payer: MEDICARE

## 2025-07-28 ENCOUNTER — APPOINTMENT (OUTPATIENT)
Dept: RADIATION ONCOLOGY | Facility: CLINIC | Age: 80
End: 2025-07-28
Payer: MEDICARE

## 2025-07-28 ENCOUNTER — OFFICE VISIT (OUTPATIENT)
Dept: HEMATOLOGY/ONCOLOGY | Facility: CLINIC | Age: 80
End: 2025-07-28
Payer: MEDICARE

## 2025-07-28 VITALS
RESPIRATION RATE: 16 BRPM | BODY MASS INDEX: 19.22 KG/M2 | OXYGEN SATURATION: 98 % | TEMPERATURE: 97.3 F | SYSTOLIC BLOOD PRESSURE: 114 MMHG | HEART RATE: 87 BPM | DIASTOLIC BLOOD PRESSURE: 77 MMHG | WEIGHT: 105.5 LBS

## 2025-07-28 DIAGNOSIS — C44.92 SCC (SQUAMOUS CELL CARCINOMA): ICD-10-CM

## 2025-07-28 DIAGNOSIS — Z51.11 ENCOUNTER FOR ANTINEOPLASTIC CHEMOTHERAPY: Primary | ICD-10-CM

## 2025-07-28 LAB
ALBUMIN SERPL BCP-MCNC: 3.9 G/DL (ref 3.4–5)
ALP SERPL-CCNC: 55 U/L (ref 33–136)
ALT SERPL W P-5'-P-CCNC: 12 U/L (ref 7–45)
ANION GAP SERPL CALC-SCNC: 9 MMOL/L (ref 10–20)
AST SERPL W P-5'-P-CCNC: 16 U/L (ref 9–39)
BASOPHILS # BLD AUTO: 0.02 X10*3/UL (ref 0–0.1)
BASOPHILS NFR BLD AUTO: 0.5 %
BILIRUB SERPL-MCNC: 0.5 MG/DL (ref 0–1.2)
BUN SERPL-MCNC: 14 MG/DL (ref 6–23)
CALCIUM SERPL-MCNC: 8.7 MG/DL (ref 8.6–10.3)
CHLORIDE SERPL-SCNC: 107 MMOL/L (ref 98–107)
CO2 SERPL-SCNC: 27 MMOL/L (ref 21–32)
CREAT SERPL-MCNC: 0.54 MG/DL (ref 0.5–1.05)
EGFRCR SERPLBLD CKD-EPI 2021: >90 ML/MIN/1.73M*2
EOSINOPHIL # BLD AUTO: 0.04 X10*3/UL (ref 0–0.4)
EOSINOPHIL NFR BLD AUTO: 1.1 %
ERYTHROCYTE [DISTWIDTH] IN BLOOD BY AUTOMATED COUNT: 13.4 % (ref 11.5–14.5)
GLUCOSE SERPL-MCNC: 87 MG/DL (ref 74–99)
HCT VFR BLD AUTO: 32.5 % (ref 36–46)
HGB BLD-MCNC: 10.5 G/DL (ref 12–16)
IMM GRANULOCYTES # BLD AUTO: 0.02 X10*3/UL (ref 0–0.5)
IMM GRANULOCYTES NFR BLD AUTO: 0.5 % (ref 0–0.9)
LYMPHOCYTES # BLD AUTO: 0.54 X10*3/UL (ref 0.8–3)
LYMPHOCYTES NFR BLD AUTO: 14.3 %
MCHC RBC AUTO-ENTMCNC: 32.3 G/DL (ref 32–36)
MCV RBC AUTO: 106 FL (ref 80–100)
MONOCYTES # BLD AUTO: 0.42 X10*3/UL (ref 0.05–0.8)
MONOCYTES NFR BLD AUTO: 11.1 %
NEUTROPHILS # BLD AUTO: 2.74 X10*3/UL (ref 1.6–5.5)
NEUTROPHILS NFR BLD AUTO: 72.5 %
PLATELET # BLD AUTO: 203 X10*3/UL (ref 150–450)
POTASSIUM SERPL-SCNC: 3.5 MMOL/L (ref 3.5–5.3)
PROT SERPL-MCNC: 5.9 G/DL (ref 6.4–8.2)
RBC # BLD AUTO: 3.08 X10*6/UL (ref 4–5.2)
SODIUM SERPL-SCNC: 139 MMOL/L (ref 136–145)
WBC # BLD AUTO: 3.8 X10*3/UL (ref 4.4–11.3)

## 2025-07-28 PROCEDURE — 99214 OFFICE O/P EST MOD 30 MIN: CPT | Performed by: NURSE PRACTITIONER

## 2025-07-28 PROCEDURE — 96375 TX/PRO/DX INJ NEW DRUG ADDON: CPT | Mod: INF

## 2025-07-28 PROCEDURE — 2500000004 HC RX 250 GENERAL PHARMACY W/ HCPCS (ALT 636 FOR OP/ED): Performed by: NURSE PRACTITIONER

## 2025-07-28 PROCEDURE — G2211 COMPLEX E/M VISIT ADD ON: HCPCS | Performed by: NURSE PRACTITIONER

## 2025-07-28 PROCEDURE — 2500000001 HC RX 250 WO HCPCS SELF ADMINISTERED DRUGS (ALT 637 FOR MEDICARE OP): Performed by: NURSE PRACTITIONER

## 2025-07-28 PROCEDURE — 2500000004 HC RX 250 GENERAL PHARMACY W/ HCPCS (ALT 636 FOR OP/ED): Performed by: STUDENT IN AN ORGANIZED HEALTH CARE EDUCATION/TRAINING PROGRAM

## 2025-07-28 PROCEDURE — 85025 COMPLETE CBC W/AUTO DIFF WBC: CPT

## 2025-07-28 PROCEDURE — 80053 COMPREHEN METABOLIC PANEL: CPT

## 2025-07-28 PROCEDURE — 1036F TOBACCO NON-USER: CPT | Performed by: NURSE PRACTITIONER

## 2025-07-28 PROCEDURE — 96413 CHEMO IV INFUSION 1 HR: CPT

## 2025-07-28 PROCEDURE — 1159F MED LIST DOCD IN RCRD: CPT | Performed by: NURSE PRACTITIONER

## 2025-07-28 PROCEDURE — 1126F AMNT PAIN NOTED NONE PRSNT: CPT | Performed by: NURSE PRACTITIONER

## 2025-07-28 RX ORDER — DIPHENHYDRAMINE HCL 25 MG
50 CAPSULE ORAL ONCE
Status: COMPLETED | OUTPATIENT
Start: 2025-07-28 | End: 2025-07-28

## 2025-07-28 RX ORDER — HEPARIN SODIUM,PORCINE/PF 10 UNIT/ML
50 SYRINGE (ML) INTRAVENOUS AS NEEDED
OUTPATIENT
Start: 2025-07-28

## 2025-07-28 RX ORDER — DIPHENHYDRAMINE HYDROCHLORIDE 50 MG/ML
50 INJECTION, SOLUTION INTRAMUSCULAR; INTRAVENOUS AS NEEDED
Status: DISCONTINUED | OUTPATIENT
Start: 2025-07-28 | End: 2025-07-28 | Stop reason: HOSPADM

## 2025-07-28 RX ORDER — EPINEPHRINE 0.3 MG/.3ML
0.3 INJECTION SUBCUTANEOUS EVERY 5 MIN PRN
Status: DISCONTINUED | OUTPATIENT
Start: 2025-07-28 | End: 2025-07-28 | Stop reason: HOSPADM

## 2025-07-28 RX ORDER — PROCHLORPERAZINE EDISYLATE 5 MG/ML
10 INJECTION INTRAMUSCULAR; INTRAVENOUS EVERY 6 HOURS PRN
Status: DISCONTINUED | OUTPATIENT
Start: 2025-07-28 | End: 2025-07-28 | Stop reason: HOSPADM

## 2025-07-28 RX ORDER — PROCHLORPERAZINE MALEATE 10 MG
10 TABLET ORAL EVERY 6 HOURS PRN
Status: DISCONTINUED | OUTPATIENT
Start: 2025-07-28 | End: 2025-07-28 | Stop reason: HOSPADM

## 2025-07-28 RX ORDER — HEPARIN 100 UNIT/ML
500 SYRINGE INTRAVENOUS AS NEEDED
Status: DISCONTINUED | OUTPATIENT
Start: 2025-07-28 | End: 2025-07-28 | Stop reason: HOSPADM

## 2025-07-28 RX ORDER — ALBUTEROL SULFATE 0.83 MG/ML
3 SOLUTION RESPIRATORY (INHALATION) AS NEEDED
Status: DISCONTINUED | OUTPATIENT
Start: 2025-07-28 | End: 2025-07-28 | Stop reason: HOSPADM

## 2025-07-28 RX ORDER — HEPARIN 100 UNIT/ML
500 SYRINGE INTRAVENOUS AS NEEDED
OUTPATIENT
Start: 2025-07-28

## 2025-07-28 RX ORDER — FAMOTIDINE 10 MG/ML
20 INJECTION, SOLUTION INTRAVENOUS ONCE
Status: COMPLETED | OUTPATIENT
Start: 2025-07-28 | End: 2025-07-28

## 2025-07-28 RX ORDER — FAMOTIDINE 10 MG/ML
20 INJECTION, SOLUTION INTRAVENOUS ONCE AS NEEDED
Status: DISCONTINUED | OUTPATIENT
Start: 2025-07-28 | End: 2025-07-28 | Stop reason: HOSPADM

## 2025-07-28 RX ADMIN — PACLITAXEL 90 MG: 6 INJECTION, SOLUTION INTRAVENOUS at 11:45

## 2025-07-28 RX ADMIN — FAMOTIDINE 20 MG: 10 INJECTION INTRAVENOUS at 11:16

## 2025-07-28 RX ADMIN — DIPHENHYDRAMINE HYDROCHLORIDE 50 MG: 25 CAPSULE ORAL at 11:16

## 2025-07-28 RX ADMIN — HEPARIN 500 UNITS: 100 SYRINGE at 12:41

## 2025-07-28 RX ADMIN — DEXAMETHASONE SODIUM PHOSPHATE 10 MG: 4 INJECTION, SOLUTION INTRA-ARTICULAR; INTRALESIONAL; INTRAMUSCULAR; INTRAVENOUS; SOFT TISSUE at 11:20

## 2025-07-28 ASSESSMENT — ENCOUNTER SYMPTOMS
TROUBLE SWALLOWING: 1
NUMBNESS: 1
CARDIOVASCULAR NEGATIVE: 1
APPETITE CHANGE: 0
FATIGUE: 1
MUSCULOSKELETAL NEGATIVE: 1
COUGH: 0
SORE THROAT: 0
FREQUENCY: 0
CONSTIPATION: 0

## 2025-07-28 ASSESSMENT — PAIN SCALES - GENERAL: PAINLEVEL_OUTOF10: 0-NO PAIN

## 2025-07-28 NOTE — SIGNIFICANT EVENT

## 2025-07-28 NOTE — PROGRESS NOTES
[facility] - Medical Oncology Follow-Up Visit    Patient ID: Amrita Grant is a 80 y.o. female.  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 Grant is a 79 yo with PMH significant for lupus, aortic valve repair, fibromyalgia, who first noticed a mass on her nose in fall of 2024. Met Dr. Templeton who did an in-office biopsy 11/11/24 with invasive, poorly differentiated carcinoma, p40+, negative MOC31, unclear etiology, and recommended for repeat biopsy. Biopsy on 11/26/24 with the same, felt to be a primary cutaneous neoplasm such as basal cell carcinoma. Discussed at [facility] tumor board, and due to significant morbidity associated with resection, recommended against surgery and referred to radiation oncology and medical oncology. Met Dr. Guzmán, who recommended against immunotherapy given her lupus and poor performance status, and recommended discussion with radiation oncology, which cutaneous tumor board also agreed with. She planned to have radiation closer to home, but unfortunately her pre-radiation scans showed concern for metastatic disease, with PET/CT on 1/20/25 with FDG avidity of the R nasal soft tissue as well as R level 1 cervical lymph node, R rights, L1, and RUL nodule below PET resolution. She was ultimately started on vismodegib as well as started on palliative radiation (2/18-3/11/25) for the nasal lesion. In the meantime, [facility]  pathology revealed invasive carcinoma, HPV associated, positive for p40 CK5/6, EMA, p16, and negative for MOC, BerEP4, INSM1, and chromogranin. As this was felt to no longer be a basal cell carcinoma, she stopped the vismodegib. Biopsy of the L1 lesion morphologically the same.     Regarding her lupus - she has been on plaquenil monotherapy for many years, stable on the same dose. She was originally diagnosed with lupus in 2001 - she was initially diagnosed with pericarditis. She has fatigue and joint aches with the lupus.     She is here today with her  and friend. Her last day of radiation was last Tuesday or Wednesday. Radiation went ok overall. Her last dose of the visdomegib was last Tuesday or Wednesday. No side effects.  She is very tired, naps every day. In the last year, she has stopped doing the cooking, cleaning, and laundry. She is very careful, leans on her  to help her get up the stairs. Her fatigue has been getting worse and worse. She continues to lose weight - 50 lbs in the last year and a half. She has trouble swallowing - some things are harder to swallow like bread, meat. Gets tickles in her throat. She's had this for a couple years, had her throat stretched - this was also just repeated in December. Done at [facility] typically, helps for a little while, but don't last.      PAST MEDICAL HISTORY  Lupus  Fibromyalgia  Osteoarthritis  Aortic valve repair (mechanical)  on warfarin  HTN  CVA - (2021)     SOCIAL HISTORY  Lives at home with her . They've been  45 years. Previously was a  for [employer].   Tob: none  EtOH: none, previously a little  Illicits: none     FAMILY HISTORY  Mother - uterine cancer  Maternal grandmother - breast cancer  Maternal cousin - unknown metastatic cancer    HPI  She is here today with her .   Reports a little more energy since dose reduction. Breathing is stable.  Neuropathy better but still present in both feet.   Swallowing - not  always good.  Still having problems with meat and bread.  Follows with GI.  Appetite - have an appetite.  Denies n/v/c/d.  No fevers, chills, or CP.  Denies any pain.   INR check this week.    Reports intermittent epistaxis - does not last long.  PT - last day tomorrow.   Labs WNL.  Ready for treatment.      Meds (Current):    Current Outpatient Medications:     ARIPiprazole (Abilify) 5 mg tablet, Take 1 tablet (5 mg) by mouth once daily., Disp: , Rfl:     Bystolic 5 mg tablet, Take 2 tablets (10 mg) by mouth once daily. Dose increased, Disp: , Rfl:     calcium carbonate 500 mg calcium (1,250 mg) chewable tablet, Chew and swallow 1 tablet (500 mg of elemental calcium) once daily., Disp: , Rfl:     cetirizine (ZyrTEC) 10 mg tablet, 1 tab(s) orally as needed, Disp: , Rfl:     cholecalciferol (Vitamin D-3) 25 MCG (1000 UT) capsule, 1 cap(s) orally three times daily, Disp: , Rfl:     docusate sodium (Colace) 100 mg capsule, once every 24 hours., Disp: , Rfl:     fluticasone (Flonase) 50 mcg/actuation nasal spray, Administer 2 sprays into affected nostril(s)., Disp: , Rfl:     levETIRAcetam (Keppra) 500 mg tablet, every 12 hours., Disp: , Rfl:     lidocaine-prilocaine (Emla) 2.5-2.5 % cream, Apply topically 1 time for 1 dose. Apply 30-45 minutes prior to port access., Disp: 30 g, Rfl: 3    LORazepam (Ativan) 0.5 mg tablet, Take half tablet 1 hour before MRI, if needed take other half of tablet while getting checked in for MRI, Disp: 1 tablet, Rfl: 0    losartan (Cozaar) 100 mg tablet, Take 0.5 tablets (50 mg) by mouth twice a day., Disp: , Rfl:     melatonin 3 mg tablet, Take by mouth once daily., Disp: , Rfl:     memantine (Namenda) 5 mg tablet, Take 1 tablet (5 mg) by mouth once daily., Disp: , Rfl:     mirtazapine (Remeron) 15 mg tablet, 1 tab(s) orally half tablet bedtime daily for 30 days, Disp: , Rfl:     omeprazole (PriLOSEC) 20 mg DR capsule, Take 1 capsule (20 mg) by mouth once daily., Disp: , Rfl:      ondansetron (Zofran) 4 mg tablet, Take 1 tablet (4 mg) by mouth if needed for nausea or vomiting., Disp: , Rfl:     ondansetron (Zofran) 8 mg tablet, Take 1 tablet (8 mg) by mouth every 8 hours if needed for nausea or vomiting., Disp: 30 tablet, Rfl: 5    PlaqueniL 200 mg tablet, Take 1 tablet (200 mg) by mouth once daily., Disp: , Rfl:     potassium chloride CR 20 mEq ER tablet, Take 1 tablet (20 mEq) by mouth once daily., Disp: , Rfl:     Premarin 0.3 mg tablet, once every 24 hours., Disp: , Rfl:     prochlorperazine (Compazine) 10 mg tablet, Take 1 tablet (10 mg) by mouth every 6 hours if needed for nausea or vomiting., Disp: 30 tablet, Rfl: 5    sennosides (senna) 8.6 mg tablet, Take 1 tablet (8.6 mg) by mouth 2 times a day., Disp: 60 tablet, Rfl: 2    warfarin (Coumadin) 4 mg tablet, Take 1 tablet (4 mg) by mouth once daily., Disp: , Rfl:     Allergies   Allergen Reactions    Propofol Nausea/vomiting    Sulfa (Sulfonamide Antibiotics) Other    Opioids - Morphine Analogues Nausea And Vomiting, Other and Unknown    Penicillins Unknown     Pt. Reports passing out upon taking penicillin when she was young.       Review of Systems   Constitutional:  Positive for fatigue. Negative for appetite change.   HENT:   Positive for nosebleeds and trouble swallowing. Negative for sore throat.    Respiratory:  Negative for cough.    Cardiovascular: Negative.    Gastrointestinal:  Negative for constipation.   Genitourinary:  Negative for frequency.    Musculoskeletal: Negative.    Neurological:  Positive for numbness.        Objective   BSA: There is no height or weight on file to calculate BSA.  Wt Readings from Last 5 Encounters:   07/21/25 48.5 kg (106 lb 13 oz)   07/14/25 47.9 kg (105 lb 8 oz)   07/07/25 48.5 kg (107 lb)   06/30/25 47.5 kg (104 lb 12.8 oz)   06/23/25 47.9 kg (105 lb 8 oz)     There were no vitals taken for this visit.    ECOG Score: 2    0          Fully active; no performance restrictions.  1           Strenuous physical activity restricted; fully ambulatory and able to carry out light work.  2          Capable of all self-care but unable to carry out any work activities. Up and about >50% of waking hours.  3          Capable of only limited self-care; confined to bed or chair >50% of waking hours.  4          Completely disabled; cannot carry out any self-care; totally confined to bed or chair.     Physical Exam  Vitals reviewed.   Constitutional:       General: She is not in acute distress.     Appearance: Normal appearance.   HENT:      Head: Normocephalic.      Nose: Nose normal.      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        Mouth/Throat:      Mouth: Mucous membranes are moist.      Pharynx: Oropharynx is clear.     Eyes:      Extraocular Movements: Extraocular movements intact.      Conjunctiva/sclera: Conjunctivae normal.      Pupils: Pupils are equal, round, and reactive to light.       Cardiovascular:      Rate and Rhythm: Normal rate and regular rhythm.      Pulses: Normal pulses.      Heart sounds: Normal heart sounds. No murmur heard.  Pulmonary:      Effort: No respiratory distress.      Breath sounds: Normal breath sounds.   Abdominal:      General: Bowel sounds are normal.      Palpations: Abdomen is soft.     Musculoskeletal:         General: Normal range of motion.      Cervical back: Normal range of motion.      Comments: Ambulates with walker.       Skin:     General: Skin is warm and dry.     Neurological:      General: No focal deficit present.      Mental Status: She is alert and oriented to person, place, and time.     Psychiatric:         Mood and Affect: Mood normal.         Behavior: Behavior normal.         Thought Content: Thought content normal.         Judgment: Judgment normal.          Results:  Labs:  Lab Results   Component Value Date    WBC 4.4 07/21/2025    HGB 10.5 (L) 07/21/2025    HCT 32.7 (L) 07/21/2025     (H) 07/21/2025     07/21/2025       Lab Results   Component Value Date    NEUTROABS 3.30 07/21/2025      Lab Results   Component Value Date    GLUCOSE 95 07/21/2025    CALCIUM 8.9 07/21/2025     07/21/2025    K 3.7 07/21/2025    CO2 28 07/21/2025     07/21/2025    BUN 13 07/21/2025    CREATININE 0.58 07/21/2025     Lab Results   Component Value Date    ALT 11 07/21/2025    AST 15 07/21/2025    ALKPHOS 57 07/21/2025    BILITOT 0.6 07/21/2025        Imaging:  No new imaging.      Pathology:    Lab Results   Component Value Date    ADD1  11/26/2024     Additional immunostains were reviewed.  Tumor cells are positive for p16 and negative for chromogranin and INSM1.  Isolated tumor cells are positive for BerEP4.  The differential diagnosis remains unchanged.         Assessment/Plan      Amrita Grant is a 80 y.o. female here for recommendations and to establish care for poorly differentiated carcinoma of the face.     # Poorly differentiated carcinoma  - HPV+  - L1 biopsy proven metastasis - will send tempus for better delineation  - discussed the likelihood that this is metastatic, HPV+ nasopharyngeal carcinoma, although tempus may help us better determine this  - s/p palliative radiation  - discussed that typically would recommend combination therapy with chemotherapy+immunotherapy. However, given her lupus which was originally diagnosed with pericarditis, do not recommend immunotherapy. Also discussed that given her current poor performance status, do not believe she could tolerate dual chemotherapy  - discussed single-agent weekly paclitaxel, and consented. Plan to repeat scans after 3 cycles - CT scheduled 6/20/25    - will obtain new baseline images, as she has not had imaging since January 2025  - she would like to have her infusions closer to home at [place], but will continue to see us at [facility] for pre-treatment visits  - Pt agreeable to infusions and visits on same day at [facility].    - Update: Unable to obtain IV access after  multiple attempts.  Existing mediport placement scheduled 4/2/25.  Infusion rescheduled to 4/7/25.    - 4/2/25 Mediport placed  - personally reviewed 6/20/25 CT CAP and sinus scans reflect favorable response to treatment.    - will dose reduce paclitaxel 20% due to neuropathy  - next scans after C6 of treatment  - Pt would like to transfer her care to Dr. Bowers at [facility] d/t distance to [facility] or [place]. Will place referral.    - Per pt request, will hold off on referral for the next 1-2 months, then likely will transfer care to Dr. Bowers.      # Neuropathy  - reduce dose of paclitaxel 20%  - 7/21:  continues with symptom, no worse.      # Lupus  - she follows with a rheumatologist at [facility], and will notify them we are starting chemotherapy  - her arthritis is worse and she will contact her rheumatologist    # Dysuria, urgency, frequency  - offered obtaining UA and urine culture, and she deferred to PCP  - 3/31:  Current 10-day ATB course.  Pt/family not able to recall medication name.  ATB started prior to her tx C1D1.  Tolerating ATB.  Reports up to 8 UTI's last year.  PCP (non- provider) - any further follow-up with PCP.    - 4/7:  Completed 10-day ATB course.  Denies current UTI symptoms.     - 6/16:  Current ATB Cipro BID x5 days (Rx provided at urgent care visit).  Pt instructed to notify [facility] and PCP of UTI/ATB course.    - 6/23:  ATB completed.  Reports symptoms resolved.      # Unintentional weight loss  - may be partly due to dysphagia  - oncology dietician referral in place   - wt loss has stabilized     # Fatigue (g1)  - Recommended she increase her physical activity level - during waking house, get up Q2-3H, walk within the home, pace activities.     # Constipation  - Senna too effective, pt to start routine Miralax - 1/2 capful to start and adjust as needed.    - Routine bowel meds with BM Q1-2 days    # Hypocalcemia 8.4 5/12/25  - Calcium listed on  MAR, pt not taking med d/t pill size.  Recommended she start OTC calcium soft chew.   - stable Ca 8.5 6/23/25, pt declining OTC intervention     # Cough/PND   - Will add room humidifier.  Sleeps using one pillow, recommended she try sleeping with two pillows.    - Ongoing monitoring     # Constipation  - last BM Sat or Sunday.  Pt to take 2nd Miralax dose following call.  Start MOM with dose this evening.  If no BM by morning, 2nd dose and dose BID until BM.      # Rash/erythema to right cheek/forehead  - denies pruritus, apply lotion to area.    - improved    # Scattered macules to back of hands and wrist area (few)  - apply hydrocortisone cream BID, pt has within the home.    - improved    # Pancytopenia - chemo-induced - stable  - Weekly labs, ongoing monitoring    # INR/chronic Warfarin  - INR ordered for coumadin dosing, infusion nurse provided medical release form per facility protocol.  Pt follows with [facility] for dosing/monitoring.  INR to be draw via port with treatment labs, fax to [facility] [phone].  Goal INR 2.5-3.0.   - 4/14: INR 2.0 result faxed to [facility] 4/15/25  - 4/21:  Will monitor for INR result and fax to [facility] 4/22/25.   - 4/28:  Will monitor for INR result and fax to [facility] 4/29/25.   - Not enough blood in tube. Pt to [facility] for INR draw.  5/6:  Will monitor for INR result and fax to [facility].  INR 4.7, instructions per [facility].    5/12:  Will monitor for INR result and fax to [facility].   5/19:  Will monitor for INR result and fax to [facility].   5/27:  INR 8.8 - notified via secure chat by on-call MD.  Dr. Frankel was able to speak with pt.  This provider also spoke with pt with faxed results to [facility].   6/2:  spouse reports future INR labs will be drawn by [facility]. INR lab removed from treatment plan.      # EGD (scheduled 6/23/25)  - 6/17/25 TC from triage nurse to GI office with [facility]  message left - pt's INR is managed by [facility].  Cardiac approval should come from [facility].  Fax # provided.  Message with VM on 6/18 by this provider with above info.  No return call received.       # Advanced care planning  - discussed incurable but treatable nature of disease, with goal to prolong life while maintaining/improving quality of life. She understands that risks of systemic therapy may outweigh benefits at some point in the future.

## 2025-07-29 ENCOUNTER — APPOINTMENT (OUTPATIENT)
Dept: RADIATION ONCOLOGY | Facility: CLINIC | Age: 80
End: 2025-07-29
Payer: MEDICARE

## 2025-07-29 DIAGNOSIS — C44.92 SCC (SQUAMOUS CELL CARCINOMA): Primary | ICD-10-CM

## 2025-07-30 ENCOUNTER — HOSPITAL ENCOUNTER (OUTPATIENT)
Dept: HOSPITAL 100 - CT | Age: 80
Discharge: HOME | End: 2025-07-30
Payer: MEDICARE

## 2025-07-30 ENCOUNTER — APPOINTMENT (OUTPATIENT)
Dept: RADIATION ONCOLOGY | Facility: CLINIC | Age: 80
End: 2025-07-30
Payer: MEDICARE

## 2025-07-30 DIAGNOSIS — I71.9: Primary | ICD-10-CM

## 2025-07-30 PROCEDURE — A4216 STERILE WATER/SALINE, 10 ML: HCPCS

## 2025-07-30 PROCEDURE — 71275 CT ANGIOGRAPHY CHEST: CPT

## 2025-07-30 RX ADMIN — SODIUM CHLORIDE, PRESERVATIVE FREE 0 ML: 5 INJECTION INTRAVENOUS at 08:35

## 2025-07-30 RX ADMIN — SODIUM CHLORIDE, PRESERVATIVE FREE 0 ML: 5 INJECTION INTRAVENOUS at 09:11

## 2025-07-31 ENCOUNTER — APPOINTMENT (OUTPATIENT)
Dept: RADIATION ONCOLOGY | Facility: CLINIC | Age: 80
End: 2025-07-31
Payer: MEDICARE

## 2025-08-01 ENCOUNTER — APPOINTMENT (OUTPATIENT)
Dept: RADIATION ONCOLOGY | Facility: CLINIC | Age: 80
End: 2025-08-01
Payer: MEDICARE

## 2025-08-04 ENCOUNTER — OFFICE VISIT (OUTPATIENT)
Dept: HEMATOLOGY/ONCOLOGY | Facility: CLINIC | Age: 80
End: 2025-08-04
Payer: MEDICARE

## 2025-08-04 ENCOUNTER — APPOINTMENT (OUTPATIENT)
Dept: RADIATION ONCOLOGY | Facility: CLINIC | Age: 80
End: 2025-08-04
Payer: MEDICARE

## 2025-08-04 ENCOUNTER — LAB (OUTPATIENT)
Dept: HEMATOLOGY/ONCOLOGY | Facility: CLINIC | Age: 80
End: 2025-08-04
Payer: MEDICARE

## 2025-08-04 ENCOUNTER — INFUSION (OUTPATIENT)
Dept: HEMATOLOGY/ONCOLOGY | Facility: CLINIC | Age: 80
End: 2025-08-04
Payer: MEDICARE

## 2025-08-04 VITALS
BODY MASS INDEX: 19.44 KG/M2 | WEIGHT: 106.7 LBS | OXYGEN SATURATION: 98 % | TEMPERATURE: 98.6 F | DIASTOLIC BLOOD PRESSURE: 71 MMHG | RESPIRATION RATE: 14 BRPM | HEART RATE: 73 BPM | SYSTOLIC BLOOD PRESSURE: 105 MMHG

## 2025-08-04 VITALS
RESPIRATION RATE: 18 BRPM | DIASTOLIC BLOOD PRESSURE: 74 MMHG | HEART RATE: 63 BPM | OXYGEN SATURATION: 98 % | SYSTOLIC BLOOD PRESSURE: 112 MMHG

## 2025-08-04 DIAGNOSIS — C44.92 SCC (SQUAMOUS CELL CARCINOMA): ICD-10-CM

## 2025-08-04 DIAGNOSIS — Z51.11 ENCOUNTER FOR ANTINEOPLASTIC CHEMOTHERAPY: Primary | ICD-10-CM

## 2025-08-04 LAB
ALBUMIN SERPL BCP-MCNC: 3.7 G/DL (ref 3.4–5)
ALP SERPL-CCNC: 52 U/L (ref 33–136)
ALT SERPL W P-5'-P-CCNC: 24 U/L (ref 7–45)
ANION GAP SERPL CALC-SCNC: 8 MMOL/L (ref 10–20)
AST SERPL W P-5'-P-CCNC: 31 U/L (ref 9–39)
BASOPHILS # BLD AUTO: 0.02 X10*3/UL (ref 0–0.1)
BASOPHILS NFR BLD AUTO: 0.6 %
BILIRUB SERPL-MCNC: 0.3 MG/DL (ref 0–1.2)
BUN SERPL-MCNC: 13 MG/DL (ref 6–23)
CALCIUM SERPL-MCNC: 8.1 MG/DL (ref 8.6–10.3)
CHLORIDE SERPL-SCNC: 107 MMOL/L (ref 98–107)
CO2 SERPL-SCNC: 28 MMOL/L (ref 21–32)
CREAT SERPL-MCNC: 0.52 MG/DL (ref 0.5–1.05)
EGFRCR SERPLBLD CKD-EPI 2021: >90 ML/MIN/1.73M*2
EOSINOPHIL # BLD AUTO: 0.03 X10*3/UL (ref 0–0.4)
EOSINOPHIL NFR BLD AUTO: 0.9 %
ERYTHROCYTE [DISTWIDTH] IN BLOOD BY AUTOMATED COUNT: 13.3 % (ref 11.5–14.5)
GLUCOSE SERPL-MCNC: 108 MG/DL (ref 74–99)
HCT VFR BLD AUTO: 32.7 % (ref 36–46)
HGB BLD-MCNC: 10.4 G/DL (ref 12–16)
IMM GRANULOCYTES # BLD AUTO: 0 X10*3/UL (ref 0–0.5)
IMM GRANULOCYTES NFR BLD AUTO: 0 % (ref 0–0.9)
LYMPHOCYTES # BLD AUTO: 0.5 X10*3/UL (ref 0.8–3)
LYMPHOCYTES NFR BLD AUTO: 15.8 %
MCH RBC QN AUTO: 33.9 PG (ref 26–34)
MCHC RBC AUTO-ENTMCNC: 31.8 G/DL (ref 32–36)
MCV RBC AUTO: 107 FL (ref 80–100)
MONOCYTES # BLD AUTO: 0.29 X10*3/UL (ref 0.05–0.8)
MONOCYTES NFR BLD AUTO: 9.1 %
NEUTROPHILS # BLD AUTO: 2.33 X10*3/UL (ref 1.6–5.5)
NEUTROPHILS NFR BLD AUTO: 73.6 %
NRBC BLD-RTO: ABNORMAL /100{WBCS}
PLATELET # BLD AUTO: 209 X10*3/UL (ref 150–450)
POTASSIUM SERPL-SCNC: 3.4 MMOL/L (ref 3.5–5.3)
PROT SERPL-MCNC: 5.7 G/DL (ref 6.4–8.2)
RBC # BLD AUTO: 3.07 X10*6/UL (ref 4–5.2)
SODIUM SERPL-SCNC: 140 MMOL/L (ref 136–145)
WBC # BLD AUTO: 3.2 X10*3/UL (ref 4.4–11.3)

## 2025-08-04 PROCEDURE — 2500000004 HC RX 250 GENERAL PHARMACY W/ HCPCS (ALT 636 FOR OP/ED): Performed by: NURSE PRACTITIONER

## 2025-08-04 PROCEDURE — 2500000004 HC RX 250 GENERAL PHARMACY W/ HCPCS (ALT 636 FOR OP/ED): Performed by: STUDENT IN AN ORGANIZED HEALTH CARE EDUCATION/TRAINING PROGRAM

## 2025-08-04 PROCEDURE — 85025 COMPLETE CBC W/AUTO DIFF WBC: CPT

## 2025-08-04 PROCEDURE — 2500000002 HC RX 250 W HCPCS SELF ADMINISTERED DRUGS (ALT 637 FOR MEDICARE OP, ALT 636 FOR OP/ED): Performed by: NURSE PRACTITIONER

## 2025-08-04 PROCEDURE — 1159F MED LIST DOCD IN RCRD: CPT | Performed by: NURSE PRACTITIONER

## 2025-08-04 PROCEDURE — 99213 OFFICE O/P EST LOW 20 MIN: CPT | Performed by: NURSE PRACTITIONER

## 2025-08-04 PROCEDURE — G2211 COMPLEX E/M VISIT ADD ON: HCPCS | Performed by: NURSE PRACTITIONER

## 2025-08-04 PROCEDURE — 96360 HYDRATION IV INFUSION INIT: CPT | Mod: INF

## 2025-08-04 PROCEDURE — 1126F AMNT PAIN NOTED NONE PRSNT: CPT | Performed by: NURSE PRACTITIONER

## 2025-08-04 PROCEDURE — 80053 COMPREHEN METABOLIC PANEL: CPT

## 2025-08-04 RX ORDER — HEPARIN SODIUM,PORCINE/PF 10 UNIT/ML
50 SYRINGE (ML) INTRAVENOUS AS NEEDED
Status: DISCONTINUED | OUTPATIENT
Start: 2025-08-04 | End: 2025-08-04 | Stop reason: HOSPADM

## 2025-08-04 RX ORDER — HEPARIN 100 UNIT/ML
500 SYRINGE INTRAVENOUS AS NEEDED
OUTPATIENT
Start: 2025-08-04

## 2025-08-04 RX ORDER — HEPARIN 100 UNIT/ML
500 SYRINGE INTRAVENOUS AS NEEDED
Status: DISCONTINUED | OUTPATIENT
Start: 2025-08-04 | End: 2025-08-04 | Stop reason: HOSPADM

## 2025-08-04 RX ORDER — HEPARIN SODIUM,PORCINE/PF 10 UNIT/ML
50 SYRINGE (ML) INTRAVENOUS AS NEEDED
OUTPATIENT
Start: 2025-08-04

## 2025-08-04 RX ORDER — POTASSIUM CHLORIDE 20 MEQ/1
40 TABLET, EXTENDED RELEASE ORAL ONCE
Status: CANCELLED | OUTPATIENT
Start: 2025-08-04 | End: 2025-08-04

## 2025-08-04 RX ORDER — HEPARIN 100 UNIT/ML
500 SYRINGE INTRAVENOUS AS NEEDED
Status: CANCELLED | OUTPATIENT
Start: 2025-08-04

## 2025-08-04 RX ORDER — HEPARIN SODIUM,PORCINE/PF 10 UNIT/ML
50 SYRINGE (ML) INTRAVENOUS AS NEEDED
Status: CANCELLED | OUTPATIENT
Start: 2025-08-04

## 2025-08-04 RX ORDER — POTASSIUM CHLORIDE 750 MG/1
40 TABLET, FILM COATED, EXTENDED RELEASE ORAL ONCE
Status: COMPLETED | OUTPATIENT
Start: 2025-08-04 | End: 2025-08-04

## 2025-08-04 RX ADMIN — HEPARIN 500 UNITS: 100 SYRINGE at 15:34

## 2025-08-04 RX ADMIN — SODIUM CHLORIDE 1000 ML: 0.9 INJECTION, SOLUTION INTRAVENOUS at 14:32

## 2025-08-04 RX ADMIN — POTASSIUM CHLORIDE 40 MEQ: 750 TABLET, EXTENDED RELEASE ORAL at 14:39

## 2025-08-04 ASSESSMENT — ENCOUNTER SYMPTOMS
TROUBLE SWALLOWING: 1
FATIGUE: 1
CARDIOVASCULAR NEGATIVE: 1
SORE THROAT: 0
FREQUENCY: 0
NUMBNESS: 1
APPETITE CHANGE: 0
COUGH: 0

## 2025-08-04 ASSESSMENT — PAIN SCALES - GENERAL: PAINLEVEL_OUTOF10: 0-NO PAIN

## 2025-08-04 NOTE — PROGRESS NOTES
[facility] - Medical Oncology Follow-Up Visit    Patient ID: Amrita Grant is a 80 y.o. female.  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 Grant is a 79 yo with PMH significant for lupus, aortic valve repair, fibromyalgia, who first noticed a mass on her nose in fall of 2024. Met Dr. Templeton who did an in-office biopsy 11/11/24 with invasive, poorly differentiated carcinoma, p40+, negative MOC31, unclear etiology, and recommended for repeat biopsy. Biopsy on 11/26/24 with the same, felt to be a primary cutaneous neoplasm such as basal cell carcinoma. Discussed at [facility] tumor board, and due to significant morbidity associated with resection, recommended against surgery and referred to radiation oncology and medical oncology. Met Dr. Guzmán, who recommended against immunotherapy given her lupus and poor performance status, and recommended discussion with radiation oncology, which cutaneous tumor board also agreed with. She planned to have radiation closer to home, but unfortunately her pre-radiation scans showed concern for metastatic disease, with PET/CT on 1/20/25 with FDG avidity of the R nasal soft tissue as well as R level 1 cervical lymph node, R rights, L1, and RUL nodule below PET resolution. She was ultimately started on vismodegib as well as started on palliative radiation (2/18-3/11/25) for the nasal  lesion. In the meantime, [facility] pathology revealed invasive carcinoma, HPV associated, positive for p40 CK5/6, EMA, p16, and negative for MOC, BerEP4, INSM1, and chromogranin. As this was felt to no longer be a basal cell carcinoma, she stopped the vismodegib. Biopsy of the L1 lesion morphologically the same.     Regarding her lupus - she has been on plaquenil monotherapy for many years, stable on the same dose. She was originally diagnosed with lupus in 2001 - she was initially diagnosed with pericarditis. She has fatigue and joint aches with the lupus.     She is here today with her  and friend. Her last day of radiation was last Tuesday or Wednesday. Radiation went ok overall. Her last dose of the visdomegib was last Tuesday or Wednesday. No side effects.  She is very tired, naps every day. In the last year, she has stopped doing the cooking, cleaning, and laundry. She is very careful, leans on her  to help her get up the stairs. Her fatigue has been getting worse and worse. She continues to lose weight - 50 lbs in the last year and a half. She has trouble swallowing - some things are harder to swallow like bread, meat. Gets tickles in her throat. She's had this for a couple years, had her throat stretched - this was also just repeated in December. Done at [facility] typically, helps for a little while, but don't last.      PAST MEDICAL HISTORY  Lupus  Fibromyalgia  Osteoarthritis  Aortic valve repair (mechanical)  on warfarin  HTN  CVA - (2021)     SOCIAL HISTORY  Lives at home with her . They've been  45 years. Previously was a  for [employer].   Tob: none  EtOH: none, previously a little  Illicits: none     FAMILY HISTORY  Mother - uterine cancer  Maternal grandmother - breast cancer  Maternal cousin - unknown metastatic cancer    HPI  She is here today with her .  Feels weak this week.  Legs feel weak.  B/p 105/71 - denies feeling lightheaded.  Decreased energy.  Constipated  with BM on Sunday.  Previous BM on Friday.  Reduced intake.  Urinating less.  Hydrated well yesterday.  Denies n/v.  New - rash to right hand.  Started 2 days ago.  Applying hydrocortisone cream with relief.  No longer keeping her up.  Finished FT this week.  No fevers or CP.  Agreeable to hold tx this week and receive IV fluids.      Meds (Current):    Current Outpatient Medications:     ARIPiprazole (Abilify) 5 mg tablet, Take 1 tablet (5 mg) by mouth once daily., Disp: , Rfl:     Bystolic 5 mg tablet, Take 2 tablets (10 mg) by mouth once daily. Dose increased, Disp: , Rfl:     calcium carbonate 500 mg calcium (1,250 mg) chewable tablet, Chew and swallow 1 tablet (500 mg of elemental calcium) once daily., Disp: , Rfl:     cetirizine (ZyrTEC) 10 mg tablet, 1 tab(s) orally as needed, Disp: , Rfl:     cholecalciferol (Vitamin D-3) 25 MCG (1000 UT) capsule, 1 cap(s) orally three times daily, Disp: , Rfl:     docusate sodium (Colace) 100 mg capsule, once every 24 hours., Disp: , Rfl:     fluticasone (Flonase) 50 mcg/actuation nasal spray, Administer 2 sprays into affected nostril(s)., Disp: , Rfl:     levETIRAcetam (Keppra) 500 mg tablet, every 12 hours., Disp: , Rfl:     lidocaine-prilocaine (Emla) 2.5-2.5 % cream, Apply topically 1 time for 1 dose. Apply 30-45 minutes prior to port access., Disp: 30 g, Rfl: 3    LORazepam (Ativan) 0.5 mg tablet, Take half tablet 1 hour before MRI, if needed take other half of tablet while getting checked in for MRI, Disp: 1 tablet, Rfl: 0    losartan (Cozaar) 100 mg tablet, Take 0.5 tablets (50 mg) by mouth twice a day., Disp: , Rfl:     melatonin 3 mg tablet, Take by mouth once daily., Disp: , Rfl:     memantine (Namenda) 5 mg tablet, Take 1 tablet (5 mg) by mouth once daily., Disp: , Rfl:     mirtazapine (Remeron) 15 mg tablet, 1 tab(s) orally half tablet bedtime daily for 30 days, Disp: , Rfl:     omeprazole (PriLOSEC) 20 mg DR capsule, Take 1 capsule (20 mg) by mouth once daily.,  Disp: , Rfl:     ondansetron (Zofran) 4 mg tablet, Take 1 tablet (4 mg) by mouth if needed for nausea or vomiting., Disp: , Rfl:     ondansetron (Zofran) 8 mg tablet, Take 1 tablet (8 mg) by mouth every 8 hours if needed for nausea or vomiting., Disp: 30 tablet, Rfl: 5    PlaqueniL 200 mg tablet, Take 1 tablet (200 mg) by mouth once daily., Disp: , Rfl:     potassium chloride CR 20 mEq ER tablet, Take 1 tablet (20 mEq) by mouth once daily., Disp: , Rfl:     Premarin 0.3 mg tablet, once every 24 hours., Disp: , Rfl:     prochlorperazine (Compazine) 10 mg tablet, Take 1 tablet (10 mg) by mouth every 6 hours if needed for nausea or vomiting., Disp: 30 tablet, Rfl: 5    sennosides (senna) 8.6 mg tablet, Take 1 tablet (8.6 mg) by mouth 2 times a day., Disp: 60 tablet, Rfl: 2    warfarin (Coumadin) 4 mg tablet, Take 1 tablet (4 mg) by mouth once daily., Disp: , Rfl:     Allergies   Allergen Reactions    Propofol Nausea/vomiting    Sulfa (Sulfonamide Antibiotics) Other    Opioids - Morphine Analogues Nausea And Vomiting, Other and Unknown    Penicillins Unknown     Pt. Reports passing out upon taking penicillin when she was young.       Review of Systems   Constitutional:  Positive for fatigue. Negative for appetite change.   HENT:   Positive for trouble swallowing. Negative for nosebleeds and sore throat.    Respiratory:  Negative for cough.    Cardiovascular: Negative.    Gastrointestinal:  Positive for constipation.   Genitourinary:  Negative for frequency.    Neurological:  Positive for extremity weakness and numbness.        Objective   BSA: 1.46 meters squared  Wt Readings from Last 5 Encounters:   08/04/25 48.4 kg (106 lb 11.2 oz)   07/28/25 47.9 kg (105 lb 8 oz)   07/21/25 48.5 kg (106 lb 13 oz)   07/14/25 47.9 kg (105 lb 8 oz)   07/07/25 48.5 kg (107 lb)     /71 (BP Location: Left arm, Patient Position: Sitting)   Pulse 73   Temp 37 °C (98.6 °F) (Temporal)   Resp 14   Wt 48.4 kg (106 lb 11.2 oz)   SpO2  98%   BMI 19.44 kg/m²     ECOG Score: 2    0          Fully active; no performance restrictions.  1          Strenuous physical activity restricted; fully ambulatory and able to carry out light work.  2          Capable of all self-care but unable to carry out any work activities. Up and about >50% of waking hours.  3          Capable of only limited self-care; confined to bed or chair >50% of waking hours.  4          Completely disabled; cannot carry out any self-care; totally confined to bed or chair.     Physical Exam  Vitals reviewed.   Constitutional:       General: She is not in acute distress.     Appearance: Normal appearance.   HENT:      Head: Normocephalic.      Nose: Nose normal.      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        Mouth/Throat:      Mouth: Mucous membranes are moist.      Pharynx: Oropharynx is clear.     Eyes:      Extraocular Movements: Extraocular movements intact.      Conjunctiva/sclera: Conjunctivae normal.      Pupils: Pupils are equal, round, and reactive to light.       Cardiovascular:      Rate and Rhythm: Normal rate and regular rhythm.      Pulses: Normal pulses.      Heart sounds: Normal heart sounds. No murmur heard.  Pulmonary:      Effort: No respiratory distress.      Breath sounds: Normal breath sounds.   Abdominal:      General: Bowel sounds are normal.      Palpations: Abdomen is soft.     Musculoskeletal:         General: Normal range of motion.      Cervical back: Normal range of motion.      Comments: Ambulates with walker.       Skin:     General: Skin is warm and dry.     Neurological:      General: No focal deficit present.      Mental Status: She is alert and oriented to person, place, and time.     Psychiatric:         Mood and Affect: Mood normal.         Behavior: Behavior normal.         Thought Content: Thought content normal.         Judgment: Judgment normal.          Results:  Labs:  Lab Results   Component Value Date    WBC 3.2 (L)  08/04/2025    HGB 10.4 (L) 08/04/2025    HCT 32.7 (L) 08/04/2025     (H) 08/04/2025     08/04/2025      Lab Results   Component Value Date    NEUTROABS 2.33 08/04/2025      Lab Results   Component Value Date    GLUCOSE 108 (H) 08/04/2025    CALCIUM 8.1 (L) 08/04/2025     08/04/2025    K 3.4 (L) 08/04/2025    CO2 28 08/04/2025     08/04/2025    BUN 13 08/04/2025    CREATININE 0.52 08/04/2025     Lab Results   Component Value Date    ALT 24 08/04/2025    AST 31 08/04/2025    ALKPHOS 52 08/04/2025    BILITOT 0.3 08/04/2025        Imaging:  No new imaging.      Pathology:    Lab Results   Component Value Date    ADD1  11/26/2024     Additional immunostains were reviewed.  Tumor cells are positive for p16 and negative for chromogranin and INSM1.  Isolated tumor cells are positive for BerEP4.  The differential diagnosis remains unchanged.         Assessment/Plan      Amrita Grant is a 80 y.o. female here for recommendations and to establish care for poorly differentiated carcinoma of the face.     # Poorly differentiated carcinoma  - HPV+  - L1 biopsy proven metastasis - will send tempus for better delineation  - discussed the likelihood that this is metastatic, HPV+ nasopharyngeal carcinoma, although tempus may help us better determine this  - s/p palliative radiation  - discussed that typically would recommend combination therapy with chemotherapy+immunotherapy. However, given her lupus which was originally diagnosed with pericarditis, do not recommend immunotherapy. Also discussed that given her current poor performance status, do not believe she could tolerate dual chemotherapy  - discussed single-agent weekly paclitaxel, and consented. Plan to repeat scans after 3 cycles - CT scheduled 6/20/25    - will obtain new baseline images, as she has not had imaging since January 2025  - she would like to have her infusions closer to home at [place], but will continue to see us at [place] for  pre-treatment visits  - Pt agreeable to infusions and visits on same day at [facility].    - Update: Unable to obtain IV access after multiple attempts.  Existing mediport placement scheduled 4/2/25.  Infusion rescheduled to 4/7/25.    - 4/2/25 Mediport placed  - personally reviewed 6/20/25 CT CAP and sinus scans reflect favorable response to treatment.    - will dose reduce paclitaxel 20% due to neuropathy  - next scans after C6 of treatment  - Pt would like to transfer her care to Dr. Bowers at [facility] d/t distance to [facility] infusion center or [place]. Will place referral.    - Per pt request, will hold off on referral for the next 1-2 months, then likely will transfer care to Dr. Bowers.  Dr. Bowers visit scheduled 9/22/25.   - 8/4: Pt reported increased weakness - C5D15 tx held.      # Neuropathy  - reduce dose of paclitaxel 20%  - 7/21:  continues with symptom, no worse.      # Lupus  - she follows with a rheumatologist at [facility], and will notify them we are starting chemotherapy  - her arthritis is worse and she will contact her rheumatologist    # Dysuria, urgency, frequency  - offered obtaining UA and urine culture, and she deferred to PCP  - 3/31:  Current 10-day ATB course.  Pt/family not able to recall medication name.  ATB started prior to her tx C1D1.  Tolerating ATB.  Reports up to 8 UTI's last year.  PCP (non- provider) - any further follow-up with PCP.    - 4/7:  Completed 10-day ATB course.  Denies current UTI symptoms.     - 6/16:  Current ATB Cipro BID x5 days (Rx provided at urgent care visit).  Pt instructed to notify [facility] and PCP of UTI/ATB course.    - 6/23:  ATB completed.  Reports symptoms resolved.      # Unintentional weight loss  - may be partly due to dysphagia  - oncology dietician referral in place   - wt loss has stabilized     # Fatigue (g1)  - Recommended she increase her physical activity level - during waking house, get up Q2-3H, walk within the home, pace  activities.     # Constipation  - Senna too effective, pt to start routine Miralax - 1/2 capful to start and adjust as needed.    - Routine bowel meds with BM Q1-2 days    # Hypocalcemia 8.4 5/12/25  - Calcium listed on MAR, pt not taking med d/t pill size.  Recommended she start OTC calcium soft chew.   - stable Ca 8.5 6/23/25, pt declining OTC intervention     #Hypokalemia 3.4 8/4/25  - repleted - 40 mEq oral    # Cough/PND   - Will add room humidifier.  Sleeps using one pillow, recommended she try sleeping with two pillows.    - Ongoing monitoring     # Rash/erythema to right cheek/forehead  - denies pruritus, apply lotion to area.    - improved    # Scattered macules to back of hands and wrist area (few)  - apply hydrocortisone cream BID, pt has within the home.    - improved    # Pancytopenia - chemo-induced - stable  - Weekly labs, ongoing monitoring    # INR/chronic Warfarin  - INR ordered for coumadin dosing, infusion nurse provided medical release form per facility protocol.  Pt follows with [facility] for dosing/monitoring.  INR to be draw via port with treatment labs, fax to [facility] [phone].  Goal INR 2.5-3.0.   - 4/14: INR 2.0 result faxed to [facility] 4/15/25  - 4/21:  Will monitor for INR result and fax to [facility] 4/22/25.   - 4/28:  Will monitor for INR result and fax to [facility] 4/29/25.   - Not enough blood in tube. Pt to [facility] for INR draw.  5/6:  Will monitor for INR result and fax to [facility].  INR 4.7, instructions per [facility].    5/12:  Will monitor for INR result and fax to [facility].   5/19:  Will monitor for INR result and fax to [facility].   5/27:  INR 8.8 - notified via secure chat by on-call MD.  Dr. Frankel was able to speak with pt.  This provider also spoke with pt with faxed results to [facility].   6/2:  spouse reports future INR labs will be drawn by [facility]. INR lab removed from treatment  plan.      # EGD (scheduled 6/23/25)  - 6/17/25 TC from triage nurse to GI office with VM message left - pt's INR is managed by [facility].  Cardiac approval should come from [facility].  Fax # provided.  Message with VM on 6/18 by this provider with above info.  No return call received.       # Advanced care planning  - discussed incurable but treatable nature of disease, with goal to prolong life while maintaining/improving quality of life. She understands that risks of systemic therapy may outweigh benefits at some point in the future.

## 2025-08-05 ENCOUNTER — APPOINTMENT (OUTPATIENT)
Dept: RADIATION ONCOLOGY | Facility: CLINIC | Age: 80
End: 2025-08-05
Payer: MEDICARE

## 2025-08-05 ASSESSMENT — ENCOUNTER SYMPTOMS
EXTREMITY WEAKNESS: 1
CONSTIPATION: 1

## 2025-08-06 ENCOUNTER — APPOINTMENT (OUTPATIENT)
Dept: RADIATION ONCOLOGY | Facility: CLINIC | Age: 80
End: 2025-08-06
Payer: MEDICARE

## 2025-08-07 ENCOUNTER — APPOINTMENT (OUTPATIENT)
Dept: RADIATION ONCOLOGY | Facility: CLINIC | Age: 80
End: 2025-08-07
Payer: MEDICARE

## 2025-08-08 ENCOUNTER — APPOINTMENT (OUTPATIENT)
Dept: RADIATION ONCOLOGY | Facility: CLINIC | Age: 80
End: 2025-08-08
Payer: MEDICARE

## 2025-08-11 ENCOUNTER — OFFICE VISIT (OUTPATIENT)
Dept: HEMATOLOGY/ONCOLOGY | Facility: CLINIC | Age: 80
End: 2025-08-11
Payer: MEDICARE

## 2025-08-11 ENCOUNTER — INFUSION (OUTPATIENT)
Dept: HEMATOLOGY/ONCOLOGY | Facility: CLINIC | Age: 80
End: 2025-08-11
Payer: MEDICARE

## 2025-08-11 ENCOUNTER — APPOINTMENT (OUTPATIENT)
Dept: RADIATION ONCOLOGY | Facility: CLINIC | Age: 80
End: 2025-08-11
Payer: MEDICARE

## 2025-08-11 ENCOUNTER — LAB (OUTPATIENT)
Dept: HEMATOLOGY/ONCOLOGY | Facility: CLINIC | Age: 80
End: 2025-08-11
Payer: MEDICARE

## 2025-08-11 VITALS
HEART RATE: 71 BPM | OXYGEN SATURATION: 97 % | WEIGHT: 109.35 LBS | DIASTOLIC BLOOD PRESSURE: 85 MMHG | RESPIRATION RATE: 16 BRPM | BODY MASS INDEX: 19.92 KG/M2 | SYSTOLIC BLOOD PRESSURE: 128 MMHG | TEMPERATURE: 97.9 F

## 2025-08-11 DIAGNOSIS — C44.92 SCC (SQUAMOUS CELL CARCINOMA): ICD-10-CM

## 2025-08-11 DIAGNOSIS — Z51.11 ENCOUNTER FOR ANTINEOPLASTIC CHEMOTHERAPY: Primary | ICD-10-CM

## 2025-08-11 LAB
ALBUMIN SERPL BCP-MCNC: 3.8 G/DL (ref 3.4–5)
ALP SERPL-CCNC: 59 U/L (ref 33–136)
ALT SERPL W P-5'-P-CCNC: 15 U/L (ref 7–45)
ANION GAP SERPL CALC-SCNC: 9 MMOL/L (ref 10–20)
AST SERPL W P-5'-P-CCNC: 16 U/L (ref 9–39)
BASOPHILS # BLD AUTO: 0.02 X10*3/UL (ref 0–0.1)
BASOPHILS NFR BLD AUTO: 0.4 %
BILIRUB SERPL-MCNC: 0.6 MG/DL (ref 0–1.2)
BUN SERPL-MCNC: 14 MG/DL (ref 6–23)
CALCIUM SERPL-MCNC: 8.9 MG/DL (ref 8.6–10.3)
CHLORIDE SERPL-SCNC: 104 MMOL/L (ref 98–107)
CO2 SERPL-SCNC: 27 MMOL/L (ref 21–32)
CREAT SERPL-MCNC: 0.55 MG/DL (ref 0.5–1.05)
EGFRCR SERPLBLD CKD-EPI 2021: >90 ML/MIN/1.73M*2
EOSINOPHIL # BLD AUTO: 0.05 X10*3/UL (ref 0–0.4)
EOSINOPHIL NFR BLD AUTO: 0.9 %
ERYTHROCYTE [DISTWIDTH] IN BLOOD BY AUTOMATED COUNT: 13.2 % (ref 11.5–14.5)
GLUCOSE SERPL-MCNC: 96 MG/DL (ref 74–99)
HCT VFR BLD AUTO: 33.6 % (ref 36–46)
HGB BLD-MCNC: 10.8 G/DL (ref 12–16)
IMM GRANULOCYTES # BLD AUTO: 0 X10*3/UL (ref 0–0.5)
IMM GRANULOCYTES NFR BLD AUTO: 0 % (ref 0–0.9)
LYMPHOCYTES # BLD AUTO: 0.63 X10*3/UL (ref 0.8–3)
LYMPHOCYTES NFR BLD AUTO: 11.9 %
MCH RBC QN AUTO: 34 PG (ref 26–34)
MCHC RBC AUTO-ENTMCNC: 32.1 G/DL (ref 32–36)
MCV RBC AUTO: 106 FL (ref 80–100)
MONOCYTES # BLD AUTO: 0.61 X10*3/UL (ref 0.05–0.8)
MONOCYTES NFR BLD AUTO: 11.6 %
NEUTROPHILS # BLD AUTO: 3.97 X10*3/UL (ref 1.6–5.5)
NEUTROPHILS NFR BLD AUTO: 75.2 %
NRBC BLD-RTO: ABNORMAL /100{WBCS}
PLATELET # BLD AUTO: 187 X10*3/UL (ref 150–450)
POTASSIUM SERPL-SCNC: 3.9 MMOL/L (ref 3.5–5.3)
PROT SERPL-MCNC: 5.8 G/DL (ref 6.4–8.2)
RBC # BLD AUTO: 3.18 X10*6/UL (ref 4–5.2)
SODIUM SERPL-SCNC: 136 MMOL/L (ref 136–145)
WBC # BLD AUTO: 5.3 X10*3/UL (ref 4.4–11.3)

## 2025-08-11 PROCEDURE — 2500000004 HC RX 250 GENERAL PHARMACY W/ HCPCS (ALT 636 FOR OP/ED): Performed by: NURSE PRACTITIONER

## 2025-08-11 PROCEDURE — 1159F MED LIST DOCD IN RCRD: CPT | Performed by: NURSE PRACTITIONER

## 2025-08-11 PROCEDURE — 2500000004 HC RX 250 GENERAL PHARMACY W/ HCPCS (ALT 636 FOR OP/ED): Performed by: STUDENT IN AN ORGANIZED HEALTH CARE EDUCATION/TRAINING PROGRAM

## 2025-08-11 PROCEDURE — 96413 CHEMO IV INFUSION 1 HR: CPT

## 2025-08-11 PROCEDURE — 85025 COMPLETE CBC W/AUTO DIFF WBC: CPT

## 2025-08-11 PROCEDURE — 80053 COMPREHEN METABOLIC PANEL: CPT

## 2025-08-11 PROCEDURE — G2211 COMPLEX E/M VISIT ADD ON: HCPCS | Performed by: NURSE PRACTITIONER

## 2025-08-11 PROCEDURE — 99213 OFFICE O/P EST LOW 20 MIN: CPT | Performed by: NURSE PRACTITIONER

## 2025-08-11 PROCEDURE — 2500000001 HC RX 250 WO HCPCS SELF ADMINISTERED DRUGS (ALT 637 FOR MEDICARE OP): Performed by: NURSE PRACTITIONER

## 2025-08-11 PROCEDURE — 1126F AMNT PAIN NOTED NONE PRSNT: CPT | Performed by: NURSE PRACTITIONER

## 2025-08-11 PROCEDURE — 96375 TX/PRO/DX INJ NEW DRUG ADDON: CPT | Mod: INF

## 2025-08-11 PROCEDURE — 1036F TOBACCO NON-USER: CPT | Performed by: NURSE PRACTITIONER

## 2025-08-11 RX ORDER — DIPHENHYDRAMINE HCL 25 MG
50 CAPSULE ORAL ONCE
Status: COMPLETED | OUTPATIENT
Start: 2025-08-11 | End: 2025-08-11

## 2025-08-11 RX ORDER — HEPARIN 100 UNIT/ML
500 SYRINGE INTRAVENOUS AS NEEDED
Status: DISCONTINUED | OUTPATIENT
Start: 2025-08-11 | End: 2025-08-11 | Stop reason: HOSPADM

## 2025-08-11 RX ORDER — FAMOTIDINE 10 MG/ML
20 INJECTION, SOLUTION INTRAVENOUS ONCE
Status: COMPLETED | OUTPATIENT
Start: 2025-08-11 | End: 2025-08-11

## 2025-08-11 RX ORDER — HEPARIN SODIUM,PORCINE/PF 10 UNIT/ML
50 SYRINGE (ML) INTRAVENOUS AS NEEDED
OUTPATIENT
Start: 2025-08-11

## 2025-08-11 RX ORDER — PROCHLORPERAZINE EDISYLATE 5 MG/ML
10 INJECTION INTRAMUSCULAR; INTRAVENOUS EVERY 6 HOURS PRN
Status: DISCONTINUED | OUTPATIENT
Start: 2025-08-11 | End: 2025-08-11 | Stop reason: HOSPADM

## 2025-08-11 RX ORDER — FAMOTIDINE 10 MG/ML
20 INJECTION, SOLUTION INTRAVENOUS ONCE AS NEEDED
Status: DISCONTINUED | OUTPATIENT
Start: 2025-08-11 | End: 2025-08-11 | Stop reason: HOSPADM

## 2025-08-11 RX ORDER — DIPHENHYDRAMINE HYDROCHLORIDE 50 MG/ML
50 INJECTION, SOLUTION INTRAMUSCULAR; INTRAVENOUS AS NEEDED
Status: DISCONTINUED | OUTPATIENT
Start: 2025-08-11 | End: 2025-08-11 | Stop reason: HOSPADM

## 2025-08-11 RX ORDER — HEPARIN 100 UNIT/ML
500 SYRINGE INTRAVENOUS AS NEEDED
OUTPATIENT
Start: 2025-08-11

## 2025-08-11 RX ORDER — ALBUTEROL SULFATE 0.83 MG/ML
3 SOLUTION RESPIRATORY (INHALATION) AS NEEDED
Status: DISCONTINUED | OUTPATIENT
Start: 2025-08-11 | End: 2025-08-11 | Stop reason: HOSPADM

## 2025-08-11 RX ORDER — EPINEPHRINE 0.3 MG/.3ML
0.3 INJECTION SUBCUTANEOUS EVERY 5 MIN PRN
Status: DISCONTINUED | OUTPATIENT
Start: 2025-08-11 | End: 2025-08-11 | Stop reason: HOSPADM

## 2025-08-11 RX ORDER — PROCHLORPERAZINE MALEATE 10 MG
10 TABLET ORAL EVERY 6 HOURS PRN
Status: DISCONTINUED | OUTPATIENT
Start: 2025-08-11 | End: 2025-08-11 | Stop reason: HOSPADM

## 2025-08-11 RX ADMIN — DEXAMETHASONE SODIUM PHOSPHATE 10 MG: 4 INJECTION, SOLUTION INTRA-ARTICULAR; INTRALESIONAL; INTRAMUSCULAR; INTRAVENOUS; SOFT TISSUE at 14:49

## 2025-08-11 RX ADMIN — PACLITAXEL 90 MG: 6 INJECTION, SOLUTION INTRAVENOUS at 15:13

## 2025-08-11 RX ADMIN — DIPHENHYDRAMINE HYDROCHLORIDE 50 MG: 25 CAPSULE ORAL at 14:46

## 2025-08-11 RX ADMIN — HEPARIN 500 UNITS: 100 SYRINGE at 16:17

## 2025-08-11 RX ADMIN — FAMOTIDINE 20 MG: 10 INJECTION INTRAVENOUS at 14:47

## 2025-08-11 ASSESSMENT — ENCOUNTER SYMPTOMS
NUMBNESS: 1
APPETITE CHANGE: 0
EXTREMITY WEAKNESS: 1
COUGH: 0
TROUBLE SWALLOWING: 1
FATIGUE: 1
CARDIOVASCULAR NEGATIVE: 1
SORE THROAT: 0
CONSTIPATION: 0
FREQUENCY: 0

## 2025-08-11 ASSESSMENT — PAIN SCALES - GENERAL: PAINLEVEL_OUTOF10: 0-NO PAIN

## 2025-08-12 ENCOUNTER — APPOINTMENT (OUTPATIENT)
Dept: RADIATION ONCOLOGY | Facility: CLINIC | Age: 80
End: 2025-08-12
Payer: MEDICARE

## 2025-08-13 ENCOUNTER — APPOINTMENT (OUTPATIENT)
Dept: RADIATION ONCOLOGY | Facility: CLINIC | Age: 80
End: 2025-08-13
Payer: MEDICARE

## 2025-08-14 ENCOUNTER — APPOINTMENT (OUTPATIENT)
Dept: RADIATION ONCOLOGY | Facility: CLINIC | Age: 80
End: 2025-08-14
Payer: MEDICARE

## 2025-08-15 ENCOUNTER — APPOINTMENT (OUTPATIENT)
Dept: RADIATION ONCOLOGY | Facility: CLINIC | Age: 80
End: 2025-08-15
Payer: MEDICARE

## 2025-08-18 ENCOUNTER — INFUSION (OUTPATIENT)
Dept: HEMATOLOGY/ONCOLOGY | Facility: CLINIC | Age: 80
End: 2025-08-18
Payer: MEDICARE

## 2025-08-18 ENCOUNTER — LAB (OUTPATIENT)
Dept: HEMATOLOGY/ONCOLOGY | Facility: CLINIC | Age: 80
End: 2025-08-18
Payer: MEDICARE

## 2025-08-18 ENCOUNTER — OFFICE VISIT (OUTPATIENT)
Dept: HEMATOLOGY/ONCOLOGY | Facility: CLINIC | Age: 80
End: 2025-08-18
Payer: MEDICARE

## 2025-08-18 ENCOUNTER — APPOINTMENT (OUTPATIENT)
Dept: RADIATION ONCOLOGY | Facility: CLINIC | Age: 80
End: 2025-08-18
Payer: MEDICARE

## 2025-08-18 VITALS
SYSTOLIC BLOOD PRESSURE: 135 MMHG | OXYGEN SATURATION: 97 % | BODY MASS INDEX: 19.3 KG/M2 | WEIGHT: 105.93 LBS | DIASTOLIC BLOOD PRESSURE: 86 MMHG | TEMPERATURE: 98.4 F | RESPIRATION RATE: 16 BRPM | HEART RATE: 70 BPM

## 2025-08-18 DIAGNOSIS — Z51.11 ENCOUNTER FOR ANTINEOPLASTIC CHEMOTHERAPY: Primary | ICD-10-CM

## 2025-08-18 DIAGNOSIS — C44.92 SCC (SQUAMOUS CELL CARCINOMA): ICD-10-CM

## 2025-08-18 LAB
ALBUMIN SERPL BCP-MCNC: 3.8 G/DL (ref 3.4–5)
ALP SERPL-CCNC: 59 U/L (ref 33–136)
ALT SERPL W P-5'-P-CCNC: 17 U/L (ref 7–45)
ANION GAP SERPL CALC-SCNC: 9 MMOL/L (ref 10–20)
AST SERPL W P-5'-P-CCNC: 19 U/L (ref 9–39)
BASOPHILS # BLD AUTO: 0.02 X10*3/UL (ref 0–0.1)
BASOPHILS NFR BLD AUTO: 0.4 %
BILIRUB SERPL-MCNC: 0.5 MG/DL (ref 0–1.2)
BUN SERPL-MCNC: 14 MG/DL (ref 6–23)
CALCIUM SERPL-MCNC: 9 MG/DL (ref 8.6–10.3)
CHLORIDE SERPL-SCNC: 105 MMOL/L (ref 98–107)
CO2 SERPL-SCNC: 28 MMOL/L (ref 21–32)
CREAT SERPL-MCNC: 0.6 MG/DL (ref 0.5–1.05)
EGFRCR SERPLBLD CKD-EPI 2021: >90 ML/MIN/1.73M*2
EOSINOPHIL # BLD AUTO: 0.05 X10*3/UL (ref 0–0.4)
EOSINOPHIL NFR BLD AUTO: 1.1 %
ERYTHROCYTE [DISTWIDTH] IN BLOOD BY AUTOMATED COUNT: 13 % (ref 11.5–14.5)
GLUCOSE SERPL-MCNC: 86 MG/DL (ref 74–99)
HCT VFR BLD AUTO: 33.7 % (ref 36–46)
HGB BLD-MCNC: 10.8 G/DL (ref 12–16)
IMM GRANULOCYTES # BLD AUTO: 0.02 X10*3/UL (ref 0–0.5)
IMM GRANULOCYTES NFR BLD AUTO: 0.4 % (ref 0–0.9)
LYMPHOCYTES # BLD AUTO: 0.46 X10*3/UL (ref 0.8–3)
LYMPHOCYTES NFR BLD AUTO: 10.3 %
MCH RBC QN AUTO: 34 PG (ref 26–34)
MCHC RBC AUTO-ENTMCNC: 32 G/DL (ref 32–36)
MCV RBC AUTO: 106 FL (ref 80–100)
MONOCYTES # BLD AUTO: 0.4 X10*3/UL (ref 0.05–0.8)
MONOCYTES NFR BLD AUTO: 8.9 %
NEUTROPHILS # BLD AUTO: 3.53 X10*3/UL (ref 1.6–5.5)
NEUTROPHILS NFR BLD AUTO: 78.9 %
NRBC BLD-RTO: ABNORMAL /100{WBCS}
PLATELET # BLD AUTO: 176 X10*3/UL (ref 150–450)
POTASSIUM SERPL-SCNC: 3.9 MMOL/L (ref 3.5–5.3)
PROT SERPL-MCNC: 5.9 G/DL (ref 6.4–8.2)
RBC # BLD AUTO: 3.18 X10*6/UL (ref 4–5.2)
SODIUM SERPL-SCNC: 138 MMOL/L (ref 136–145)
WBC # BLD AUTO: 4.5 X10*3/UL (ref 4.4–11.3)

## 2025-08-18 PROCEDURE — 2500000004 HC RX 250 GENERAL PHARMACY W/ HCPCS (ALT 636 FOR OP/ED): Performed by: NURSE PRACTITIONER

## 2025-08-18 PROCEDURE — 2500000004 HC RX 250 GENERAL PHARMACY W/ HCPCS (ALT 636 FOR OP/ED): Performed by: STUDENT IN AN ORGANIZED HEALTH CARE EDUCATION/TRAINING PROGRAM

## 2025-08-18 PROCEDURE — 96413 CHEMO IV INFUSION 1 HR: CPT

## 2025-08-18 PROCEDURE — 85025 COMPLETE CBC W/AUTO DIFF WBC: CPT

## 2025-08-18 PROCEDURE — 96375 TX/PRO/DX INJ NEW DRUG ADDON: CPT | Mod: INF

## 2025-08-18 PROCEDURE — 80053 COMPREHEN METABOLIC PANEL: CPT

## 2025-08-18 PROCEDURE — 99213 OFFICE O/P EST LOW 20 MIN: CPT | Mod: 25 | Performed by: NURSE PRACTITIONER

## 2025-08-18 PROCEDURE — 2500000001 HC RX 250 WO HCPCS SELF ADMINISTERED DRUGS (ALT 637 FOR MEDICARE OP): Performed by: NURSE PRACTITIONER

## 2025-08-18 PROCEDURE — 99213 OFFICE O/P EST LOW 20 MIN: CPT | Performed by: NURSE PRACTITIONER

## 2025-08-18 RX ORDER — EPINEPHRINE 0.3 MG/.3ML
0.3 INJECTION SUBCUTANEOUS EVERY 5 MIN PRN
Status: CANCELLED | OUTPATIENT
Start: 2025-08-18

## 2025-08-18 RX ORDER — FAMOTIDINE 10 MG/ML
20 INJECTION, SOLUTION INTRAVENOUS ONCE AS NEEDED
Status: DISCONTINUED | OUTPATIENT
Start: 2025-08-18 | End: 2025-08-18 | Stop reason: HOSPADM

## 2025-08-18 RX ORDER — ALBUTEROL SULFATE 0.83 MG/ML
3 SOLUTION RESPIRATORY (INHALATION) AS NEEDED
Status: DISCONTINUED | OUTPATIENT
Start: 2025-08-18 | End: 2025-08-18 | Stop reason: HOSPADM

## 2025-08-18 RX ORDER — ALBUTEROL SULFATE 0.83 MG/ML
3 SOLUTION RESPIRATORY (INHALATION) AS NEEDED
OUTPATIENT
Start: 2025-09-08

## 2025-08-18 RX ORDER — PROCHLORPERAZINE EDISYLATE 5 MG/ML
10 INJECTION INTRAMUSCULAR; INTRAVENOUS EVERY 6 HOURS PRN
OUTPATIENT
Start: 2025-08-25

## 2025-08-18 RX ORDER — EPINEPHRINE 0.3 MG/.3ML
0.3 INJECTION SUBCUTANEOUS EVERY 5 MIN PRN
OUTPATIENT
Start: 2025-08-25

## 2025-08-18 RX ORDER — DIPHENHYDRAMINE HYDROCHLORIDE 50 MG/ML
50 INJECTION, SOLUTION INTRAMUSCULAR; INTRAVENOUS AS NEEDED
OUTPATIENT
Start: 2025-08-25

## 2025-08-18 RX ORDER — DIPHENHYDRAMINE HCL 50 MG
50 CAPSULE ORAL ONCE
OUTPATIENT
Start: 2025-09-08

## 2025-08-18 RX ORDER — PROCHLORPERAZINE EDISYLATE 5 MG/ML
10 INJECTION INTRAMUSCULAR; INTRAVENOUS EVERY 6 HOURS PRN
OUTPATIENT
Start: 2025-09-01

## 2025-08-18 RX ORDER — PROCHLORPERAZINE MALEATE 5 MG
10 TABLET ORAL EVERY 6 HOURS PRN
OUTPATIENT
Start: 2025-09-08

## 2025-08-18 RX ORDER — PROCHLORPERAZINE MALEATE 5 MG
10 TABLET ORAL EVERY 6 HOURS PRN
Status: CANCELLED | OUTPATIENT
Start: 2025-08-18

## 2025-08-18 RX ORDER — FAMOTIDINE 10 MG/ML
20 INJECTION, SOLUTION INTRAVENOUS ONCE
OUTPATIENT
Start: 2025-08-25

## 2025-08-18 RX ORDER — PROCHLORPERAZINE EDISYLATE 5 MG/ML
10 INJECTION INTRAMUSCULAR; INTRAVENOUS EVERY 6 HOURS PRN
Status: DISCONTINUED | OUTPATIENT
Start: 2025-08-18 | End: 2025-08-18 | Stop reason: HOSPADM

## 2025-08-18 RX ORDER — DIPHENHYDRAMINE HYDROCHLORIDE 50 MG/ML
50 INJECTION, SOLUTION INTRAMUSCULAR; INTRAVENOUS AS NEEDED
OUTPATIENT
Start: 2025-09-01

## 2025-08-18 RX ORDER — HEPARIN 100 UNIT/ML
500 SYRINGE INTRAVENOUS AS NEEDED
Status: DISCONTINUED | OUTPATIENT
Start: 2025-08-18 | End: 2025-08-18 | Stop reason: HOSPADM

## 2025-08-18 RX ORDER — EPINEPHRINE 0.3 MG/.3ML
0.3 INJECTION SUBCUTANEOUS EVERY 5 MIN PRN
OUTPATIENT
Start: 2025-09-08

## 2025-08-18 RX ORDER — FAMOTIDINE 10 MG/ML
20 INJECTION, SOLUTION INTRAVENOUS ONCE AS NEEDED
OUTPATIENT
Start: 2025-08-25

## 2025-08-18 RX ORDER — DEXAMETHASONE SODIUM PHOSPHATE 10 MG/ML
10 INJECTION INTRAMUSCULAR; INTRAVENOUS ONCE
OUTPATIENT
Start: 2025-09-08

## 2025-08-18 RX ORDER — DIPHENHYDRAMINE HCL 25 MG
50 CAPSULE ORAL ONCE
Status: COMPLETED | OUTPATIENT
Start: 2025-08-18 | End: 2025-08-18

## 2025-08-18 RX ORDER — EPINEPHRINE 0.3 MG/.3ML
0.3 INJECTION SUBCUTANEOUS EVERY 5 MIN PRN
OUTPATIENT
Start: 2025-09-01

## 2025-08-18 RX ORDER — PROCHLORPERAZINE EDISYLATE 5 MG/ML
10 INJECTION INTRAMUSCULAR; INTRAVENOUS EVERY 6 HOURS PRN
Status: CANCELLED | OUTPATIENT
Start: 2025-08-18

## 2025-08-18 RX ORDER — DIPHENHYDRAMINE HCL 50 MG
50 CAPSULE ORAL ONCE
OUTPATIENT
Start: 2025-08-25

## 2025-08-18 RX ORDER — DIPHENHYDRAMINE HYDROCHLORIDE 50 MG/ML
50 INJECTION, SOLUTION INTRAMUSCULAR; INTRAVENOUS AS NEEDED
Status: DISCONTINUED | OUTPATIENT
Start: 2025-08-18 | End: 2025-08-18 | Stop reason: HOSPADM

## 2025-08-18 RX ORDER — HEPARIN SODIUM,PORCINE/PF 10 UNIT/ML
50 SYRINGE (ML) INTRAVENOUS AS NEEDED
OUTPATIENT
Start: 2025-08-18

## 2025-08-18 RX ORDER — FAMOTIDINE 10 MG/ML
20 INJECTION, SOLUTION INTRAVENOUS ONCE
OUTPATIENT
Start: 2025-09-01

## 2025-08-18 RX ORDER — DIPHENHYDRAMINE HCL 50 MG
50 CAPSULE ORAL ONCE
OUTPATIENT
Start: 2025-09-01

## 2025-08-18 RX ORDER — DEXAMETHASONE SODIUM PHOSPHATE 10 MG/ML
10 INJECTION INTRAMUSCULAR; INTRAVENOUS ONCE
OUTPATIENT
Start: 2025-09-01

## 2025-08-18 RX ORDER — FAMOTIDINE 10 MG/ML
20 INJECTION, SOLUTION INTRAVENOUS ONCE
Status: COMPLETED | OUTPATIENT
Start: 2025-08-18 | End: 2025-08-18

## 2025-08-18 RX ORDER — FAMOTIDINE 10 MG/ML
20 INJECTION, SOLUTION INTRAVENOUS ONCE AS NEEDED
OUTPATIENT
Start: 2025-09-01

## 2025-08-18 RX ORDER — PROCHLORPERAZINE MALEATE 5 MG
10 TABLET ORAL EVERY 6 HOURS PRN
OUTPATIENT
Start: 2025-08-25

## 2025-08-18 RX ORDER — EPINEPHRINE 0.3 MG/.3ML
0.3 INJECTION SUBCUTANEOUS EVERY 5 MIN PRN
Status: DISCONTINUED | OUTPATIENT
Start: 2025-08-18 | End: 2025-08-18 | Stop reason: HOSPADM

## 2025-08-18 RX ORDER — FAMOTIDINE 10 MG/ML
20 INJECTION, SOLUTION INTRAVENOUS ONCE AS NEEDED
OUTPATIENT
Start: 2025-09-08

## 2025-08-18 RX ORDER — PROCHLORPERAZINE MALEATE 5 MG
10 TABLET ORAL EVERY 6 HOURS PRN
OUTPATIENT
Start: 2025-09-01

## 2025-08-18 RX ORDER — DEXAMETHASONE SODIUM PHOSPHATE 10 MG/ML
10 INJECTION INTRAMUSCULAR; INTRAVENOUS ONCE
OUTPATIENT
Start: 2025-08-25

## 2025-08-18 RX ORDER — PROCHLORPERAZINE MALEATE 10 MG
10 TABLET ORAL EVERY 6 HOURS PRN
Status: DISCONTINUED | OUTPATIENT
Start: 2025-08-18 | End: 2025-08-18 | Stop reason: HOSPADM

## 2025-08-18 RX ORDER — ALBUTEROL SULFATE 0.83 MG/ML
3 SOLUTION RESPIRATORY (INHALATION) AS NEEDED
OUTPATIENT
Start: 2025-09-01

## 2025-08-18 RX ORDER — DEXAMETHASONE SODIUM PHOSPHATE 10 MG/ML
10 INJECTION INTRAMUSCULAR; INTRAVENOUS ONCE
Status: CANCELLED | OUTPATIENT
Start: 2025-08-18

## 2025-08-18 RX ORDER — FAMOTIDINE 10 MG/ML
20 INJECTION, SOLUTION INTRAVENOUS ONCE
OUTPATIENT
Start: 2025-09-08

## 2025-08-18 RX ORDER — FAMOTIDINE 10 MG/ML
20 INJECTION, SOLUTION INTRAVENOUS ONCE
Status: CANCELLED | OUTPATIENT
Start: 2025-08-18

## 2025-08-18 RX ORDER — DIPHENHYDRAMINE HYDROCHLORIDE 50 MG/ML
50 INJECTION, SOLUTION INTRAMUSCULAR; INTRAVENOUS AS NEEDED
Status: CANCELLED | OUTPATIENT
Start: 2025-08-18

## 2025-08-18 RX ORDER — ALBUTEROL SULFATE 0.83 MG/ML
3 SOLUTION RESPIRATORY (INHALATION) AS NEEDED
OUTPATIENT
Start: 2025-08-25

## 2025-08-18 RX ORDER — HEPARIN 100 UNIT/ML
500 SYRINGE INTRAVENOUS AS NEEDED
OUTPATIENT
Start: 2025-08-18

## 2025-08-18 RX ORDER — PROCHLORPERAZINE EDISYLATE 5 MG/ML
10 INJECTION INTRAMUSCULAR; INTRAVENOUS EVERY 6 HOURS PRN
OUTPATIENT
Start: 2025-09-08

## 2025-08-18 RX ORDER — FAMOTIDINE 10 MG/ML
20 INJECTION, SOLUTION INTRAVENOUS ONCE AS NEEDED
Status: CANCELLED | OUTPATIENT
Start: 2025-08-18

## 2025-08-18 RX ORDER — DIPHENHYDRAMINE HCL 50 MG
50 CAPSULE ORAL ONCE
Status: CANCELLED | OUTPATIENT
Start: 2025-08-18

## 2025-08-18 RX ORDER — ALBUTEROL SULFATE 0.83 MG/ML
3 SOLUTION RESPIRATORY (INHALATION) AS NEEDED
Status: CANCELLED | OUTPATIENT
Start: 2025-08-18

## 2025-08-18 RX ORDER — DIPHENHYDRAMINE HYDROCHLORIDE 50 MG/ML
50 INJECTION, SOLUTION INTRAMUSCULAR; INTRAVENOUS AS NEEDED
OUTPATIENT
Start: 2025-09-08

## 2025-08-18 RX ADMIN — FAMOTIDINE 20 MG: 10 INJECTION INTRAVENOUS at 12:34

## 2025-08-18 RX ADMIN — DEXAMETHASONE SODIUM PHOSPHATE 10 MG: 4 INJECTION, SOLUTION INTRA-ARTICULAR; INTRALESIONAL; INTRAMUSCULAR; INTRAVENOUS; SOFT TISSUE at 12:34

## 2025-08-18 RX ADMIN — PACLITAXEL 90 MG: 6 INJECTION, SOLUTION INTRAVENOUS at 13:06

## 2025-08-18 RX ADMIN — HEPARIN 500 UNITS: 100 SYRINGE at 14:09

## 2025-08-18 RX ADMIN — DIPHENHYDRAMINE HYDROCHLORIDE 50 MG: 25 CAPSULE ORAL at 12:34

## 2025-08-18 ASSESSMENT — ENCOUNTER SYMPTOMS
DIARRHEA: 1
TROUBLE SWALLOWING: 1
CONSTIPATION: 0
FATIGUE: 1
EXTREMITY WEAKNESS: 1
COUGH: 0
NUMBNESS: 1
FREQUENCY: 0
SORE THROAT: 0
APPETITE CHANGE: 0
CARDIOVASCULAR NEGATIVE: 1

## 2025-08-18 ASSESSMENT — PAIN SCALES - GENERAL: PAINLEVEL_OUTOF10: 0-NO PAIN

## 2025-08-19 ENCOUNTER — APPOINTMENT (OUTPATIENT)
Dept: RADIATION ONCOLOGY | Facility: CLINIC | Age: 80
End: 2025-08-19
Payer: MEDICARE

## 2025-08-20 ENCOUNTER — APPOINTMENT (OUTPATIENT)
Dept: RADIATION ONCOLOGY | Facility: CLINIC | Age: 80
End: 2025-08-20
Payer: MEDICARE

## 2025-08-21 ENCOUNTER — APPOINTMENT (OUTPATIENT)
Dept: RADIATION ONCOLOGY | Facility: CLINIC | Age: 80
End: 2025-08-21
Payer: MEDICARE

## 2025-08-22 ENCOUNTER — APPOINTMENT (OUTPATIENT)
Dept: RADIATION ONCOLOGY | Facility: CLINIC | Age: 80
End: 2025-08-22
Payer: MEDICARE

## 2025-08-25 ENCOUNTER — APPOINTMENT (OUTPATIENT)
Dept: RADIATION ONCOLOGY | Facility: CLINIC | Age: 80
End: 2025-08-25
Payer: MEDICARE

## 2025-08-25 ENCOUNTER — INFUSION (OUTPATIENT)
Dept: HEMATOLOGY/ONCOLOGY | Facility: CLINIC | Age: 80
End: 2025-08-25
Payer: MEDICARE

## 2025-08-25 ENCOUNTER — OFFICE VISIT (OUTPATIENT)
Dept: HEMATOLOGY/ONCOLOGY | Facility: CLINIC | Age: 80
End: 2025-08-25
Payer: MEDICARE

## 2025-08-25 ENCOUNTER — LAB (OUTPATIENT)
Dept: LAB | Facility: CLINIC | Age: 80
End: 2025-08-25
Payer: MEDICARE

## 2025-08-25 ENCOUNTER — LAB (OUTPATIENT)
Dept: HEMATOLOGY/ONCOLOGY | Facility: CLINIC | Age: 80
End: 2025-08-25
Payer: MEDICARE

## 2025-08-25 VITALS
DIASTOLIC BLOOD PRESSURE: 88 MMHG | SYSTOLIC BLOOD PRESSURE: 135 MMHG | BODY MASS INDEX: 19.35 KG/M2 | RESPIRATION RATE: 16 BRPM | TEMPERATURE: 97.3 F | WEIGHT: 106.2 LBS | HEART RATE: 75 BPM | OXYGEN SATURATION: 98 %

## 2025-08-25 VITALS — HEIGHT: 62 IN | BODY MASS INDEX: 19.35 KG/M2

## 2025-08-25 DIAGNOSIS — C44.92 SCC (SQUAMOUS CELL CARCINOMA): ICD-10-CM

## 2025-08-25 DIAGNOSIS — D72.810 LYMPHOPENIA: ICD-10-CM

## 2025-08-25 DIAGNOSIS — Z51.11 ENCOUNTER FOR ANTINEOPLASTIC CHEMOTHERAPY: Primary | ICD-10-CM

## 2025-08-25 LAB
ALBUMIN SERPL BCP-MCNC: 3.8 G/DL (ref 3.4–5)
ALP SERPL-CCNC: 55 U/L (ref 33–136)
ALT SERPL W P-5'-P-CCNC: 11 U/L (ref 7–45)
ANION GAP SERPL CALC-SCNC: 9 MMOL/L (ref 10–20)
AST SERPL W P-5'-P-CCNC: 15 U/L (ref 9–39)
BASOPHILS # BLD AUTO: 0.02 X10*3/UL (ref 0–0.1)
BASOPHILS NFR BLD AUTO: 0.6 %
BILIRUB SERPL-MCNC: 0.4 MG/DL (ref 0–1.2)
BUN SERPL-MCNC: 16 MG/DL (ref 6–23)
CALCIUM SERPL-MCNC: 8.5 MG/DL (ref 8.6–10.3)
CHLORIDE SERPL-SCNC: 106 MMOL/L (ref 98–107)
CO2 SERPL-SCNC: 27 MMOL/L (ref 21–32)
CREAT SERPL-MCNC: 0.55 MG/DL (ref 0.5–1.05)
EGFRCR SERPLBLD CKD-EPI 2021: >90 ML/MIN/1.73M*2
EOSINOPHIL # BLD AUTO: 0.05 X10*3/UL (ref 0–0.4)
EOSINOPHIL NFR BLD AUTO: 1.5 %
ERYTHROCYTE [DISTWIDTH] IN BLOOD BY AUTOMATED COUNT: 12.9 % (ref 11.5–14.5)
GLUCOSE SERPL-MCNC: 98 MG/DL (ref 74–99)
HCT VFR BLD AUTO: 32.4 % (ref 36–46)
HGB BLD-MCNC: 10.4 G/DL (ref 12–16)
IMM GRANULOCYTES # BLD AUTO: 0.02 X10*3/UL (ref 0–0.5)
IMM GRANULOCYTES NFR BLD AUTO: 0.6 % (ref 0–0.9)
LYMPHOCYTES # BLD AUTO: 0.45 X10*3/UL (ref 0.8–3)
LYMPHOCYTES NFR BLD AUTO: 13.9 %
MCH RBC QN AUTO: 33.9 PG (ref 26–34)
MCHC RBC AUTO-ENTMCNC: 32.1 G/DL (ref 32–36)
MCV RBC AUTO: 106 FL (ref 80–100)
MONOCYTES # BLD AUTO: 0.29 X10*3/UL (ref 0.05–0.8)
MONOCYTES NFR BLD AUTO: 9 %
NEUTROPHILS # BLD AUTO: 2.4 X10*3/UL (ref 1.6–5.5)
NEUTROPHILS NFR BLD AUTO: 74.4 %
NRBC BLD-RTO: ABNORMAL /100{WBCS}
PLATELET # BLD AUTO: 177 X10*3/UL (ref 150–450)
POTASSIUM SERPL-SCNC: 3.7 MMOL/L (ref 3.5–5.3)
PROT SERPL-MCNC: 5.9 G/DL (ref 6.4–8.2)
RBC # BLD AUTO: 3.07 X10*6/UL (ref 4–5.2)
SODIUM SERPL-SCNC: 138 MMOL/L (ref 136–145)
WBC # BLD AUTO: 3.2 X10*3/UL (ref 4.4–11.3)

## 2025-08-25 PROCEDURE — 2500000004 HC RX 250 GENERAL PHARMACY W/ HCPCS (ALT 636 FOR OP/ED): Performed by: NURSE PRACTITIONER

## 2025-08-25 PROCEDURE — 1126F AMNT PAIN NOTED NONE PRSNT: CPT | Performed by: NURSE PRACTITIONER

## 2025-08-25 PROCEDURE — 99213 OFFICE O/P EST LOW 20 MIN: CPT | Mod: 25 | Performed by: NURSE PRACTITIONER

## 2025-08-25 PROCEDURE — 85025 COMPLETE CBC W/AUTO DIFF WBC: CPT

## 2025-08-25 PROCEDURE — 2500000004 HC RX 250 GENERAL PHARMACY W/ HCPCS (ALT 636 FOR OP/ED): Performed by: STUDENT IN AN ORGANIZED HEALTH CARE EDUCATION/TRAINING PROGRAM

## 2025-08-25 PROCEDURE — 96375 TX/PRO/DX INJ NEW DRUG ADDON: CPT | Mod: INF

## 2025-08-25 PROCEDURE — 2500000001 HC RX 250 WO HCPCS SELF ADMINISTERED DRUGS (ALT 637 FOR MEDICARE OP): Performed by: NURSE PRACTITIONER

## 2025-08-25 PROCEDURE — 96413 CHEMO IV INFUSION 1 HR: CPT

## 2025-08-25 PROCEDURE — G2211 COMPLEX E/M VISIT ADD ON: HCPCS | Performed by: NURSE PRACTITIONER

## 2025-08-25 PROCEDURE — 1036F TOBACCO NON-USER: CPT | Performed by: NURSE PRACTITIONER

## 2025-08-25 PROCEDURE — 1159F MED LIST DOCD IN RCRD: CPT | Performed by: NURSE PRACTITIONER

## 2025-08-25 PROCEDURE — 99213 OFFICE O/P EST LOW 20 MIN: CPT | Performed by: NURSE PRACTITIONER

## 2025-08-25 PROCEDURE — 80053 COMPREHEN METABOLIC PANEL: CPT

## 2025-08-25 RX ORDER — FAMOTIDINE 10 MG/ML
20 INJECTION, SOLUTION INTRAVENOUS ONCE AS NEEDED
Status: DISCONTINUED | OUTPATIENT
Start: 2025-08-25 | End: 2025-08-25 | Stop reason: HOSPADM

## 2025-08-25 RX ORDER — DIPHENHYDRAMINE HYDROCHLORIDE 50 MG/ML
50 INJECTION, SOLUTION INTRAMUSCULAR; INTRAVENOUS AS NEEDED
Status: DISCONTINUED | OUTPATIENT
Start: 2025-08-25 | End: 2025-08-25 | Stop reason: HOSPADM

## 2025-08-25 RX ORDER — PROCHLORPERAZINE MALEATE 10 MG
10 TABLET ORAL EVERY 6 HOURS PRN
Status: DISCONTINUED | OUTPATIENT
Start: 2025-08-25 | End: 2025-08-25 | Stop reason: HOSPADM

## 2025-08-25 RX ORDER — ALBUTEROL SULFATE 0.83 MG/ML
3 SOLUTION RESPIRATORY (INHALATION) AS NEEDED
Status: DISCONTINUED | OUTPATIENT
Start: 2025-08-25 | End: 2025-08-25 | Stop reason: HOSPADM

## 2025-08-25 RX ORDER — DIPHENHYDRAMINE HCL 25 MG
50 CAPSULE ORAL ONCE
Status: COMPLETED | OUTPATIENT
Start: 2025-08-25 | End: 2025-08-25

## 2025-08-25 RX ORDER — HEPARIN 100 UNIT/ML
500 SYRINGE INTRAVENOUS AS NEEDED
OUTPATIENT
Start: 2025-08-25

## 2025-08-25 RX ORDER — FAMOTIDINE 10 MG/ML
20 INJECTION, SOLUTION INTRAVENOUS ONCE
Status: COMPLETED | OUTPATIENT
Start: 2025-08-25 | End: 2025-08-25

## 2025-08-25 RX ORDER — HEPARIN SODIUM,PORCINE/PF 10 UNIT/ML
50 SYRINGE (ML) INTRAVENOUS AS NEEDED
OUTPATIENT
Start: 2025-08-25

## 2025-08-25 RX ORDER — EPINEPHRINE 0.3 MG/.3ML
0.3 INJECTION SUBCUTANEOUS EVERY 5 MIN PRN
Status: DISCONTINUED | OUTPATIENT
Start: 2025-08-25 | End: 2025-08-25 | Stop reason: HOSPADM

## 2025-08-25 RX ORDER — PROCHLORPERAZINE EDISYLATE 5 MG/ML
10 INJECTION INTRAMUSCULAR; INTRAVENOUS EVERY 6 HOURS PRN
Status: DISCONTINUED | OUTPATIENT
Start: 2025-08-25 | End: 2025-08-25 | Stop reason: HOSPADM

## 2025-08-25 RX ORDER — HEPARIN 100 UNIT/ML
500 SYRINGE INTRAVENOUS AS NEEDED
Status: DISCONTINUED | OUTPATIENT
Start: 2025-08-25 | End: 2025-08-25 | Stop reason: HOSPADM

## 2025-08-25 RX ADMIN — FAMOTIDINE 20 MG: 10 INJECTION INTRAVENOUS at 10:44

## 2025-08-25 RX ADMIN — DEXAMETHASONE SODIUM PHOSPHATE 10 MG: 4 INJECTION, SOLUTION INTRA-ARTICULAR; INTRALESIONAL; INTRAMUSCULAR; INTRAVENOUS; SOFT TISSUE at 10:43

## 2025-08-25 RX ADMIN — DIPHENHYDRAMINE HYDROCHLORIDE 50 MG: 25 CAPSULE ORAL at 10:44

## 2025-08-25 RX ADMIN — HEPARIN 500 UNITS: 100 SYRINGE at 12:27

## 2025-08-25 RX ADMIN — DEXTROSE MONOHYDRATE 90 MG: 50 INJECTION, SOLUTION INTRAVENOUS at 11:24

## 2025-08-25 ASSESSMENT — ENCOUNTER SYMPTOMS
CONSTIPATION: 0
DIARRHEA: 0
COUGH: 0
CARDIOVASCULAR NEGATIVE: 1
APPETITE CHANGE: 0
FATIGUE: 1
NUMBNESS: 1
EXTREMITY WEAKNESS: 1
FREQUENCY: 0
TROUBLE SWALLOWING: 1
SORE THROAT: 0

## 2025-08-25 ASSESSMENT — PAIN SCALES - GENERAL: PAINLEVEL_OUTOF10: 0-NO PAIN

## 2025-08-28 ENCOUNTER — APPOINTMENT (OUTPATIENT)
Dept: HEMATOLOGY/ONCOLOGY | Facility: CLINIC | Age: 80
End: 2025-08-28
Payer: MEDICARE

## 2025-09-02 ENCOUNTER — INFUSION (OUTPATIENT)
Dept: HEMATOLOGY/ONCOLOGY | Facility: CLINIC | Age: 80
End: 2025-09-02
Payer: MEDICARE

## 2025-09-02 VITALS
OXYGEN SATURATION: 98 % | DIASTOLIC BLOOD PRESSURE: 89 MMHG | TEMPERATURE: 97 F | SYSTOLIC BLOOD PRESSURE: 142 MMHG | WEIGHT: 109.13 LBS | RESPIRATION RATE: 18 BRPM | HEART RATE: 66 BPM | BODY MASS INDEX: 19.88 KG/M2

## 2025-09-02 DIAGNOSIS — C44.92 SCC (SQUAMOUS CELL CARCINOMA): ICD-10-CM

## 2025-09-02 LAB
ALBUMIN SERPL BCP-MCNC: 3.9 G/DL (ref 3.4–5)
ALP SERPL-CCNC: 56 U/L (ref 33–136)
ALT SERPL W P-5'-P-CCNC: 13 U/L (ref 7–45)
ANION GAP SERPL CALC-SCNC: 10 MMOL/L (ref 10–20)
AST SERPL W P-5'-P-CCNC: 16 U/L (ref 9–39)
BASOPHILS # BLD AUTO: 0.02 X10*3/UL (ref 0–0.1)
BASOPHILS NFR BLD AUTO: 0.5 %
BILIRUB SERPL-MCNC: 0.5 MG/DL (ref 0–1.2)
BUN SERPL-MCNC: 10 MG/DL (ref 6–23)
CALCIUM SERPL-MCNC: 9 MG/DL (ref 8.6–10.3)
CHLORIDE SERPL-SCNC: 106 MMOL/L (ref 98–107)
CO2 SERPL-SCNC: 28 MMOL/L (ref 21–32)
CREAT SERPL-MCNC: 0.58 MG/DL (ref 0.5–1.05)
EGFRCR SERPLBLD CKD-EPI 2021: >90 ML/MIN/1.73M*2
EOSINOPHIL # BLD AUTO: 0.03 X10*3/UL (ref 0–0.4)
EOSINOPHIL NFR BLD AUTO: 0.7 %
ERYTHROCYTE [DISTWIDTH] IN BLOOD BY AUTOMATED COUNT: 13 % (ref 11.5–14.5)
GLUCOSE SERPL-MCNC: 100 MG/DL (ref 74–99)
HCT VFR BLD AUTO: 32.9 % (ref 36–46)
HGB BLD-MCNC: 10.6 G/DL (ref 12–16)
IMM GRANULOCYTES # BLD AUTO: 0 X10*3/UL (ref 0–0.5)
IMM GRANULOCYTES NFR BLD AUTO: 0 % (ref 0–0.9)
LYMPHOCYTES # BLD AUTO: 0.51 X10*3/UL (ref 0.8–3)
LYMPHOCYTES NFR BLD AUTO: 11.5 %
MCH RBC QN AUTO: 34 PG (ref 26–34)
MCHC RBC AUTO-ENTMCNC: 32.2 G/DL (ref 32–36)
MCV RBC AUTO: 105 FL (ref 80–100)
MONOCYTES # BLD AUTO: 0.46 X10*3/UL (ref 0.05–0.8)
MONOCYTES NFR BLD AUTO: 10.4 %
NEUTROPHILS # BLD AUTO: 3.4 X10*3/UL (ref 1.6–5.5)
NEUTROPHILS NFR BLD AUTO: 76.9 %
PLATELET # BLD AUTO: 179 X10*3/UL (ref 150–450)
POTASSIUM SERPL-SCNC: 3.6 MMOL/L (ref 3.5–5.3)
PROT SERPL-MCNC: 6 G/DL (ref 6.4–8.2)
RBC # BLD AUTO: 3.12 X10*6/UL (ref 4–5.2)
SODIUM SERPL-SCNC: 140 MMOL/L (ref 136–145)
WBC # BLD AUTO: 4.4 X10*3/UL (ref 4.4–11.3)

## 2025-09-02 PROCEDURE — 96413 CHEMO IV INFUSION 1 HR: CPT

## 2025-09-02 PROCEDURE — 2500000004 HC RX 250 GENERAL PHARMACY W/ HCPCS (ALT 636 FOR OP/ED): Performed by: NURSE PRACTITIONER

## 2025-09-02 PROCEDURE — 85025 COMPLETE CBC W/AUTO DIFF WBC: CPT

## 2025-09-02 PROCEDURE — 2500000001 HC RX 250 WO HCPCS SELF ADMINISTERED DRUGS (ALT 637 FOR MEDICARE OP): Performed by: NURSE PRACTITIONER

## 2025-09-02 PROCEDURE — 96375 TX/PRO/DX INJ NEW DRUG ADDON: CPT | Mod: INF

## 2025-09-02 PROCEDURE — 2500000004 HC RX 250 GENERAL PHARMACY W/ HCPCS (ALT 636 FOR OP/ED): Performed by: STUDENT IN AN ORGANIZED HEALTH CARE EDUCATION/TRAINING PROGRAM

## 2025-09-02 PROCEDURE — 80053 COMPREHEN METABOLIC PANEL: CPT

## 2025-09-02 RX ORDER — DIPHENHYDRAMINE HCL 25 MG
50 CAPSULE ORAL ONCE
Status: COMPLETED | OUTPATIENT
Start: 2025-09-02 | End: 2025-09-02

## 2025-09-02 RX ORDER — HEPARIN SODIUM,PORCINE/PF 10 UNIT/ML
50 SYRINGE (ML) INTRAVENOUS AS NEEDED
OUTPATIENT
Start: 2025-09-02

## 2025-09-02 RX ORDER — EPINEPHRINE 0.3 MG/.3ML
0.3 INJECTION SUBCUTANEOUS EVERY 5 MIN PRN
Status: DISCONTINUED | OUTPATIENT
Start: 2025-09-02 | End: 2025-09-02 | Stop reason: HOSPADM

## 2025-09-02 RX ORDER — HEPARIN 100 UNIT/ML
500 SYRINGE INTRAVENOUS AS NEEDED
OUTPATIENT
Start: 2025-09-02

## 2025-09-02 RX ORDER — PROCHLORPERAZINE MALEATE 10 MG
10 TABLET ORAL EVERY 6 HOURS PRN
Status: DISCONTINUED | OUTPATIENT
Start: 2025-09-02 | End: 2025-09-02 | Stop reason: HOSPADM

## 2025-09-02 RX ORDER — HEPARIN 100 UNIT/ML
500 SYRINGE INTRAVENOUS AS NEEDED
Status: DISCONTINUED | OUTPATIENT
Start: 2025-09-02 | End: 2025-09-02 | Stop reason: HOSPADM

## 2025-09-02 RX ORDER — DIPHENHYDRAMINE HYDROCHLORIDE 50 MG/ML
50 INJECTION, SOLUTION INTRAMUSCULAR; INTRAVENOUS AS NEEDED
Status: DISCONTINUED | OUTPATIENT
Start: 2025-09-02 | End: 2025-09-02 | Stop reason: HOSPADM

## 2025-09-02 RX ORDER — PROCHLORPERAZINE EDISYLATE 5 MG/ML
10 INJECTION INTRAMUSCULAR; INTRAVENOUS EVERY 6 HOURS PRN
Status: DISCONTINUED | OUTPATIENT
Start: 2025-09-02 | End: 2025-09-02 | Stop reason: HOSPADM

## 2025-09-02 RX ORDER — FAMOTIDINE 10 MG/ML
20 INJECTION, SOLUTION INTRAVENOUS ONCE AS NEEDED
Status: DISCONTINUED | OUTPATIENT
Start: 2025-09-02 | End: 2025-09-02 | Stop reason: HOSPADM

## 2025-09-02 RX ORDER — ALBUTEROL SULFATE 0.83 MG/ML
3 SOLUTION RESPIRATORY (INHALATION) AS NEEDED
Status: DISCONTINUED | OUTPATIENT
Start: 2025-09-02 | End: 2025-09-02 | Stop reason: HOSPADM

## 2025-09-02 RX ORDER — FAMOTIDINE 10 MG/ML
20 INJECTION, SOLUTION INTRAVENOUS ONCE
Status: COMPLETED | OUTPATIENT
Start: 2025-09-02 | End: 2025-09-02

## 2025-09-02 RX ADMIN — HEPARIN 500 UNITS: 100 SYRINGE at 15:59

## 2025-09-02 RX ADMIN — DEXAMETHASONE SODIUM PHOSPHATE 10 MG: 4 INJECTION, SOLUTION INTRA-ARTICULAR; INTRALESIONAL; INTRAMUSCULAR; INTRAVENOUS; SOFT TISSUE at 14:24

## 2025-09-02 RX ADMIN — FAMOTIDINE 20 MG: 10 INJECTION INTRAVENOUS at 14:25

## 2025-09-02 RX ADMIN — DIPHENHYDRAMINE HYDROCHLORIDE 50 MG: 25 CAPSULE ORAL at 14:25

## 2025-09-02 RX ADMIN — DEXTROSE MONOHYDRATE 90 MG: 50 INJECTION, SOLUTION INTRAVENOUS at 14:55

## 2025-09-02 ASSESSMENT — PAIN SCALES - GENERAL: PAINLEVEL_OUTOF10: 0-NO PAIN

## 2025-09-05 ENCOUNTER — APPOINTMENT (OUTPATIENT)
Dept: HEMATOLOGY/ONCOLOGY | Facility: CLINIC | Age: 80
End: 2025-09-05
Payer: MEDICARE